# Patient Record
Sex: MALE | Race: WHITE | NOT HISPANIC OR LATINO | Employment: UNEMPLOYED | URBAN - NONMETROPOLITAN AREA
[De-identification: names, ages, dates, MRNs, and addresses within clinical notes are randomized per-mention and may not be internally consistent; named-entity substitution may affect disease eponyms.]

---

## 2018-08-08 ENCOUNTER — OFFICE VISIT (OUTPATIENT)
Dept: PULMONOLOGY | Facility: CLINIC | Age: 83
End: 2018-08-08

## 2018-08-08 VITALS
HEART RATE: 82 BPM | DIASTOLIC BLOOD PRESSURE: 73 MMHG | TEMPERATURE: 97.5 F | OXYGEN SATURATION: 90 % | WEIGHT: 198 LBS | BODY MASS INDEX: 25.41 KG/M2 | HEIGHT: 74 IN | SYSTOLIC BLOOD PRESSURE: 135 MMHG

## 2018-08-08 DIAGNOSIS — Z87.891 HISTORY OF NICOTINE USE: ICD-10-CM

## 2018-08-08 DIAGNOSIS — J44.9 COPD, SEVERE (HCC): ICD-10-CM

## 2018-08-08 DIAGNOSIS — J96.11 CHRONIC HYPOXEMIC RESPIRATORY FAILURE (HCC): Primary | ICD-10-CM

## 2018-08-08 DIAGNOSIS — Z87.891 SMOKING HISTORY: ICD-10-CM

## 2018-08-08 PROCEDURE — 99204 OFFICE O/P NEW MOD 45 MIN: CPT | Performed by: INTERNAL MEDICINE

## 2018-08-08 RX ORDER — BUDESONIDE AND FORMOTEROL FUMARATE DIHYDRATE 160; 4.5 UG/1; UG/1
AEROSOL RESPIRATORY (INHALATION)
Refills: 5 | Status: ON HOLD | COMMUNITY
Start: 2018-07-16 | End: 2020-01-01

## 2018-08-08 RX ORDER — ATORVASTATIN CALCIUM 10 MG/1
10 TABLET, FILM COATED ORAL NIGHTLY
Refills: 5 | COMMUNITY
Start: 2018-07-16

## 2018-08-08 RX ORDER — ALBUTEROL SULFATE 90 UG/1
AEROSOL, METERED RESPIRATORY (INHALATION)
Refills: 5 | Status: ON HOLD | COMMUNITY
Start: 2018-07-17 | End: 2020-01-01

## 2018-08-08 RX ORDER — AMLODIPINE BESYLATE AND BENAZEPRIL HYDROCHLORIDE 5; 20 MG/1; MG/1
1 CAPSULE ORAL DAILY
Refills: 5 | COMMUNITY
Start: 2018-07-16

## 2018-08-08 RX ORDER — CLOPIDOGREL BISULFATE 75 MG/1
75 TABLET ORAL DAILY
Refills: 5 | COMMUNITY
Start: 2018-07-16

## 2018-08-08 RX ORDER — AMITRIPTYLINE HYDROCHLORIDE 25 MG/1
25 TABLET, FILM COATED ORAL NIGHTLY
Refills: 5 | COMMUNITY
Start: 2018-07-16

## 2018-08-08 RX ORDER — DOXAZOSIN 8 MG/1
8 TABLET ORAL NIGHTLY
Refills: 5 | COMMUNITY
Start: 2018-07-16

## 2018-08-08 RX ORDER — ERGOCALCIFEROL 1.25 MG/1
50000 CAPSULE ORAL WEEKLY
Refills: 5 | COMMUNITY
Start: 2018-07-24

## 2018-08-08 RX ORDER — GABAPENTIN 100 MG/1
CAPSULE ORAL
Refills: 5 | Status: ON HOLD | COMMUNITY
Start: 2018-05-21 | End: 2020-01-01

## 2018-08-08 NOTE — PROGRESS NOTES
"Subjective   Aaron Domínguez is a 89 y.o. male who is being seen for COPD    History of Present Illness   This 89-year-old gentleman has been referred to us by his primary care provider for evaluation of ongoing shortness of breath from COPD.  Patient tells me that he has history of COPD and is using Symbicort and albuterol inhalers with good result.  He has noted some progression in his symptoms over the past 1-2 years.  He is ambulatory and walks around the house with some difficulty because of joint pain as well as shortness of breath.  He has some dry cough.  Past Medical History:   Diagnosis Date   • COPD (chronic obstructive pulmonary disease) (CMS/[facility])    • Diabetes mellitus (CMS/[facility])    • Hypertension      Past Surgical History:   Procedure Laterality Date   • CORONARY STENT PLACEMENT       Family History   Problem Relation Age of Onset   • Heart failure Mother    • Diabetes Father    • Heart failure Father       reports that he quit smoking about 20 years ago. His smoking use included Cigarettes. He has a 120.00 pack-year smoking history. His smokeless tobacco use includes Chew. He reports that he does not drink alcohol or use drugs.  No Known Allergies        Patient is up tp date with his flu shot and his pneumonia vaccination.     The following portions of the patient's history were reviewed and updated as appropriate: allergies, current medications, past family history, past medical history, past social history, past surgical history and problem list.    Review of Systems   Constitutional: Positive for fatigue.   HENT: Positive for congestion.    Respiratory: Positive for cough, chest tightness, shortness of breath and wheezing.    Cardiovascular: Positive for leg swelling.   Allergic/Immunologic: Positive for environmental allergies.   All other systems reviewed and are negative.      Objective   /73   Pulse 82   Temp 97.5 °F (36.4 °C)   Ht 188 cm (74\")   Wt 89.8 kg (198 lb)   SpO2 90%   BMI " 25.42 kg/m²   Physical Exam   Constitutional: He is oriented to person, place, and time.   HENT:   Head: Normocephalic and atraumatic.   Nose: Mucosal edema present.   Eyes: Pupils are equal, round, and reactive to light. EOM are normal.   Neck: Neck supple.   Cardiovascular: Normal rate, regular rhythm and normal heart sounds.    Pulmonary/Chest: He has rhonchi.   Vesicular breath sound bilaterally with prolonged expiratory phase   Abdominal: Soft. Bowel sounds are normal.   Musculoskeletal: Normal range of motion. He exhibits no deformity.   Neurological: He is alert and oriented to person, place, and time.   Skin: Skin is warm and dry.   Psychiatric: He has a normal mood and affect. His behavior is normal.   Nursing note and vitals reviewed.        Radiology:  No Images in the past 120 days found..    Lab Results:  Hospital Outpatient Visit on 06/14/2016   Component Date Value Ref Range Status   • BSA 06/14/2016 2.2  m^2 Preliminary   • IVSd 06/14/2016 1.1  cm Preliminary   • LVIDd 06/14/2016 4.9  cm Preliminary   • LVIDs 06/14/2016 2.5  cm Preliminary   • LVPWd 06/14/2016 1.0  cm Preliminary   • BH CV ECHO MAU - LVPWS 06/14/2016 0.7  cm Preliminary   • IVS/LVPW 06/14/2016 1.0   Preliminary   • FS 06/14/2016 49.4  % Preliminary   • EDV(Teich) 06/14/2016 114.4  ml Preliminary   • ESV(Teich) 06/14/2016 22.2  ml Preliminary   • EF(Teich) 06/14/2016 80.6  % Preliminary   • EDV(cubed) 06/14/2016 119.8  ml Preliminary   • ESV(cubed) 06/14/2016 15.5  ml Preliminary   • EF(cubed) 06/14/2016 87.1  % Preliminary   • % LVPW thick 06/14/2016 -30.4  % Preliminary   • LV mass(C)d 06/14/2016 186.0  grams Preliminary   • LV mass(C)dI 06/14/2016 85.4  grams/m^2 Preliminary   • SV(Teich) 06/14/2016 92.3  ml Preliminary   • SI(Teich) 06/14/2016 42.4  ml/m^2 Preliminary   • SV(cubed) 06/14/2016 104.3  ml Preliminary   • SI(cubed) 06/14/2016 47.9  ml/m^2 Preliminary   • Ao root diam 06/14/2016 4.1  cm Preliminary   • Ao root area  06/14/2016 13.1  cm^2 Preliminary   • ACS 06/14/2016 2.3  cm Preliminary   • LA dimension 06/14/2016 4.8  cm Preliminary   • LA/Ao 06/14/2016 1.2   Preliminary   • LVOT diam 06/14/2016 2.1  cm Preliminary   • LVOT area 06/14/2016 3.4  cm^2 Preliminary   • LVOT area(traced) 06/14/2016 3.5  cm^2 Preliminary   • LVLd ap4 06/14/2016 7.0  cm Preliminary   • EDV(MOD-sp4) 06/14/2016 107.0  ml Preliminary   • LVLs ap4 06/14/2016 6.8  cm Preliminary   • ESV(MOD-sp4) 06/14/2016 41.0  ml Preliminary   • EF(MOD-sp4) 06/14/2016 61.7  % Preliminary   • SV(MOD-sp4) 06/14/2016 66.0  ml Preliminary   • SI(MOD-sp4) 06/14/2016 30.3  ml/m^2 Preliminary   • Ao root area (BSA corrected) 06/14/2016 1.9   Preliminary   • EF - Contrast (4Ch) 06/14/2016 61.7  ml/m^2 Preliminary   • LV Knutson Vol (BSA corrected) 06/14/2016 49.1  ml/m^2 Preliminary   • LV Sys Vol (BSA corrected) 06/14/2016 18.8  ml/m^2 Preliminary   • MV E max russell 06/14/2016 65.2  cm/sec Preliminary   • MV A max russell 06/14/2016 104.1  cm/sec Preliminary   • MV E/A 06/14/2016 0.63   Preliminary   • PA acc slope 06/14/2016 845.1  cm/sec^2 Preliminary   • PA acc time 06/14/2016 0.12  sec Preliminary   • TR max russell 06/14/2016 338.0  cm/sec Preliminary   • RVSP(TR) 06/14/2016 55.7  mmHg Preliminary   • RAP systole 06/14/2016 10.0  mmHg Preliminary   • PA pr(Accel) 06/14/2016 23.5  mmHg Preliminary   • BH CV ECHO MAU - BZI_BMI 06/14/2016 25.8  kilograms/m^2 Preliminary   • BH CV ECHO MAU - BSA(HAYCOCK) 06/14/2016 2.2  m^2 Preliminary   • BH CV ECHO MAU - BZI_METRIC_WEIGHT 06/14/2016 91.2  kg Preliminary   • BH CV ECHO MAU - BZI_METRIC_HEIGHT 06/14/2016 188.0  cm Preliminary   • Echo EF Estimated 06/14/2016 65  % Final       Assessment      ICD-10-CM ICD-9-CM   1. Chronic hypoxemic respiratory failure (CMS/HCC) J96.11 518.83     799.02   2. COPD, severe (CMS/HCC) J44.9 496   3. Smoking history Z87.891 V15.82   4. History of nicotine use Z87.891 V15.82                 DISCUSSION:  This gentleman had significant smoking history, underlying obstructive airway disease is possibly the prime cause for his ongoing shortness of breath.  He also requires oxygen 24 hours.  I have asked him to continue albuterol and Symbicort for the time being, we are sending him for a pulmonary function test for objective assessment of his airways function.  I'm also sending him for a low-dose CT chest for cancer screening.  We may modify his treatment regimen after I review the PFT.      Plan    Orders Placed This Encounter   Procedures   • CT Chest Without Contrast   • Pulmonary Function Test     No orders of the defined types were placed in this encounter.                 Wilfrido Guo MD, FCCP, FAASM  Pulmonary, Critical Care, and Sleep Medicine

## 2018-08-24 ENCOUNTER — APPOINTMENT (OUTPATIENT)
Dept: RESPIRATORY THERAPY | Facility: HOSPITAL | Age: 83
End: 2018-08-24
Attending: INTERNAL MEDICINE

## 2018-08-27 ENCOUNTER — HOSPITAL ENCOUNTER (OUTPATIENT)
Dept: RESPIRATORY THERAPY | Facility: HOSPITAL | Age: 83
Discharge: HOME | End: 2018-08-27
Attending: INTERNAL MEDICINE

## 2018-08-27 ENCOUNTER — HOSPITAL ENCOUNTER (OUTPATIENT)
Dept: CT IMAGING | Facility: HOSPITAL | Age: 83
Discharge: HOME | End: 2018-08-27
Attending: INTERNAL MEDICINE | Admitting: INTERNAL MEDICINE

## 2018-08-27 DIAGNOSIS — Z87.891 HISTORY OF NICOTINE USE: ICD-10-CM

## 2018-08-27 DIAGNOSIS — J96.11 CHRONIC HYPOXEMIC RESPIRATORY FAILURE (HCC): ICD-10-CM

## 2018-08-27 DIAGNOSIS — Z87.891 SMOKING HISTORY: ICD-10-CM

## 2018-08-27 DIAGNOSIS — J44.9 COPD, SEVERE (HCC): ICD-10-CM

## 2018-08-27 PROCEDURE — 94727 GAS DIL/WSHOT DETER LNG VOL: CPT | Performed by: INTERNAL MEDICINE

## 2018-08-27 PROCEDURE — 94640 AIRWAY INHALATION TREATMENT: CPT

## 2018-08-27 PROCEDURE — 94727 GAS DIL/WSHOT DETER LNG VOL: CPT

## 2018-08-27 PROCEDURE — 94729 DIFFUSING CAPACITY: CPT

## 2018-08-27 PROCEDURE — 94729 DIFFUSING CAPACITY: CPT | Performed by: INTERNAL MEDICINE

## 2018-08-27 PROCEDURE — 71250 CT THORAX DX C-: CPT | Performed by: RADIOLOGY

## 2018-08-27 PROCEDURE — 71250 CT THORAX DX C-: CPT

## 2018-08-27 PROCEDURE — 94060 EVALUATION OF WHEEZING: CPT | Performed by: INTERNAL MEDICINE

## 2018-08-27 PROCEDURE — 94060 EVALUATION OF WHEEZING: CPT

## 2018-08-27 RX ORDER — ALBUTEROL SULFATE 2.5 MG/3ML
2.5 SOLUTION RESPIRATORY (INHALATION) ONCE
Status: COMPLETED | OUTPATIENT
Start: 2018-08-27 | End: 2018-08-27

## 2018-08-27 RX ADMIN — ALBUTEROL SULFATE 2.5 MG: 2.5 SOLUTION RESPIRATORY (INHALATION) at 15:33

## 2018-10-16 ENCOUNTER — OFFICE VISIT (OUTPATIENT)
Dept: PULMONOLOGY | Facility: CLINIC | Age: 83
End: 2018-10-16

## 2018-10-16 VITALS
HEART RATE: 74 BPM | HEIGHT: 74 IN | DIASTOLIC BLOOD PRESSURE: 53 MMHG | SYSTOLIC BLOOD PRESSURE: 164 MMHG | OXYGEN SATURATION: 91 % | TEMPERATURE: 97.5 F | BODY MASS INDEX: 25.41 KG/M2 | WEIGHT: 198 LBS

## 2018-10-16 DIAGNOSIS — J43.2 CENTRILOBULAR EMPHYSEMA (HCC): Primary | ICD-10-CM

## 2018-10-16 DIAGNOSIS — J96.11 CHRONIC HYPOXEMIC RESPIRATORY FAILURE (HCC): ICD-10-CM

## 2018-10-16 DIAGNOSIS — J45.40 MODERATE PERSISTENT ASTHMA WITHOUT COMPLICATION: ICD-10-CM

## 2018-10-16 PROCEDURE — 99214 OFFICE O/P EST MOD 30 MIN: CPT | Performed by: INTERNAL MEDICINE

## 2018-10-16 RX ORDER — ALBUTEROL SULFATE 90 UG/1
2 AEROSOL, METERED RESPIRATORY (INHALATION) EVERY 4 HOURS PRN
Qty: 1 INHALER | Refills: 11 | Status: SHIPPED | OUTPATIENT
Start: 2018-10-16

## 2018-10-16 RX ORDER — IPRATROPIUM BROMIDE AND ALBUTEROL SULFATE 2.5; .5 MG/3ML; MG/3ML
3 SOLUTION RESPIRATORY (INHALATION) EVERY 4 HOURS PRN
Qty: 120 VIAL | Refills: 11 | Status: SHIPPED | OUTPATIENT
Start: 2018-10-16

## 2018-10-16 NOTE — PROGRESS NOTES
Subjective   Aaron Domínguez is a 89 y.o. male who is being seen for COPD    History of Present Illness   Patient returns for follow-up for ongoing shortness of breath.  Since his last visit he had a pulmonary function test as well as a CT scan of the chest and was to discuss about the results.  Subjectively he is feeling slightly better than before, using his inhalers as well as nebulizers properly.  Past Medical History:   Diagnosis Date   • COPD (chronic obstructive pulmonary disease) (CMS/[facility])    • Diabetes mellitus (CMS/[facility])    • Hypertension      Past Surgical History:   Procedure Laterality Date   • CORONARY STENT PLACEMENT       Family History   Problem Relation Age of Onset   • Heart failure Mother    • Diabetes Father    • Heart failure Father       reports that he quit smoking about 20 years ago. His smoking use included Cigarettes. He has a 120.00 pack-year smoking history. His smokeless tobacco use includes Chew. He reports that he does not drink alcohol or use drugs.  No Known Allergies          Patient is current on pneumonia vaccinations, but needs his current flu vaccination for 2018.    The following portions of the patient's history were reviewed and updated as appropriate: allergies, current medications, past family history, past medical history, past social history, past surgical history and problem list.    Review of Systems   Constitutional: Negative for appetite change, chills, diaphoresis and unexpected weight change.   HENT: Negative for sore throat, trouble swallowing and voice change.    Eyes: Negative for visual disturbance.   Respiratory: Positive for cough, shortness of breath and wheezing. Negative for apnea and choking.    Cardiovascular: Negative for chest pain, palpitations and leg swelling.   Gastrointestinal: Negative for abdominal pain, constipation, diarrhea, nausea and vomiting.   Endocrine: Negative for cold intolerance, heat intolerance, polydipsia, polyphagia and polyuria.  "  Genitourinary: Negative for difficulty urinating and dysuria.   Musculoskeletal: Negative for gait problem.   Skin: Negative for rash and wound.   Neurological: Negative for syncope and light-headedness.   Hematological: Negative for adenopathy.   Psychiatric/Behavioral: Negative for agitation, behavioral problems and confusion.   All other systems reviewed and are negative.      Objective   /53   Pulse 74   Temp 97.5 °F (36.4 °C)   Ht 188 cm (74\")   Wt 89.8 kg (198 lb)   SpO2 91% Comment: 3 liters  BMI 25.42 kg/m²   Physical Exam   Constitutional: He is oriented to person, place, and time.   HENT:   Head: Normocephalic and atraumatic.   Nose: Mucosal edema present.   Eyes: Pupils are equal, round, and reactive to light. EOM are normal.   Neck: Neck supple.   Cardiovascular: Normal rate, regular rhythm and normal heart sounds.    Pulmonary/Chest: He has rhonchi.   Vesicular breath sound bilaterally with prolonged expiratory phase   Abdominal: Soft. Bowel sounds are normal.   Musculoskeletal: Normal range of motion. He exhibits no deformity.   Neurological: He is alert and oriented to person, place, and time.   Skin: Skin is warm and dry.   Psychiatric: He has a normal mood and affect. His behavior is normal.   Nursing note and vitals reviewed.        Radiology:  Ct Chest Without Contrast    Result Date: 8/28/2018  Stable CT of the thorax. There is evidence of previous granulomatous disease with multiple calcified lymph nodes in the mediastinum and hilar areas. There are calcified granulomas in the lungs as well. No noncalcified adenopathy was demonstrated.  486.49 mGy.cm The radiation dose reduction device was utilized for each scan per the ALARA (as low as reasonably achievable) protocol.  This report was finalized on 8/28/2018 8:07 AM by Dr. Bran Montgomery II, MD.        Lab Results:  Hospital Outpatient Visit on 06/14/2016   Component Date Value Ref Range Status   • BSA 06/14/2016 2.2  m^2 " Preliminary   • IVSd 06/14/2016 1.1  cm Preliminary   • LVIDd 06/14/2016 4.9  cm Preliminary   • LVIDs 06/14/2016 2.5  cm Preliminary   • LVPWd 06/14/2016 1.0  cm Preliminary   • BH CV ECHO MAU - LVPWS 06/14/2016 0.7  cm Preliminary   • IVS/LVPW 06/14/2016 1.0   Preliminary   • FS 06/14/2016 49.4  % Preliminary   • EDV(Teich) 06/14/2016 114.4  ml Preliminary   • ESV(Teich) 06/14/2016 22.2  ml Preliminary   • EF(Teich) 06/14/2016 80.6  % Preliminary   • EDV(cubed) 06/14/2016 119.8  ml Preliminary   • ESV(cubed) 06/14/2016 15.5  ml Preliminary   • EF(cubed) 06/14/2016 87.1  % Preliminary   • % LVPW thick 06/14/2016 -30.4  % Preliminary   • LV mass(C)d 06/14/2016 186.0  grams Preliminary   • LV mass(C)dI 06/14/2016 85.4  grams/m^2 Preliminary   • SV(Teich) 06/14/2016 92.3  ml Preliminary   • SI(Teich) 06/14/2016 42.4  ml/m^2 Preliminary   • SV(cubed) 06/14/2016 104.3  ml Preliminary   • SI(cubed) 06/14/2016 47.9  ml/m^2 Preliminary   • Ao root diam 06/14/2016 4.1  cm Preliminary   • Ao root area 06/14/2016 13.1  cm^2 Preliminary   • ACS 06/14/2016 2.3  cm Preliminary   • LA dimension 06/14/2016 4.8  cm Preliminary   • LA/Ao 06/14/2016 1.2   Preliminary   • LVOT diam 06/14/2016 2.1  cm Preliminary   • LVOT area 06/14/2016 3.4  cm^2 Preliminary   • LVOT area(traced) 06/14/2016 3.5  cm^2 Preliminary   • LVLd ap4 06/14/2016 7.0  cm Preliminary   • EDV(MOD-sp4) 06/14/2016 107.0  ml Preliminary   • LVLs ap4 06/14/2016 6.8  cm Preliminary   • ESV(MOD-sp4) 06/14/2016 41.0  ml Preliminary   • EF(MOD-sp4) 06/14/2016 61.7  % Preliminary   • SV(MOD-sp4) 06/14/2016 66.0  ml Preliminary   • SI(MOD-sp4) 06/14/2016 30.3  ml/m^2 Preliminary   • Ao root area (BSA corrected) 06/14/2016 1.9   Preliminary   • EF - Contrast (4Ch) 06/14/2016 61.7  ml/m^2 Preliminary   • LV Knutson Vol (BSA corrected) 06/14/2016 49.1  ml/m^2 Preliminary   • LV Sys Vol (BSA corrected) 06/14/2016 18.8  ml/m^2 Preliminary   • MV E max russell 06/14/2016 65.2  cm/sec  Preliminary   • MV A max russell 06/14/2016 104.1  cm/sec Preliminary   • MV E/A 06/14/2016 0.63   Preliminary   • PA acc slope 06/14/2016 845.1  cm/sec^2 Preliminary   • PA acc time 06/14/2016 0.12  sec Preliminary   • TR max russell 06/14/2016 338.0  cm/sec Preliminary   • RVSP(TR) 06/14/2016 55.7  mmHg Preliminary   • RAP systole 06/14/2016 10.0  mmHg Preliminary   • PA pr(Accel) 06/14/2016 23.5  mmHg Preliminary   • BH CV ECHO MAU - BZI_BMI 06/14/2016 25.8  kilograms/m^2 Preliminary   • BH CV ECHO MAU - BSA(HAYCOCK) 06/14/2016 2.2  m^2 Preliminary   • BH CV ECHO MAU - BZI_METRIC_WEIGHT 06/14/2016 91.2  kg Preliminary   • BH CV ECHO MAU - BZI_METRIC_HEIGHT 06/14/2016 188.0  cm Preliminary   • Echo EF Estimated 06/14/2016 65  % Final       Assessment      ICD-10-CM ICD-9-CM   1. Centrilobular emphysema (CMS/[facility]) J43.2 492.8   2. Moderate persistent asthma without complication J45.40 493.90   3. Chronic hypoxemic respiratory failure (CMS/[facility]) J96.11 518.83     799.02                DISCUSSION:  Reviewed the CT scan of the chest.  This is essentially unchanged as compared to the last CT scan a year ago, showing emphysematous changes along with calcified granulomas.  Reviewed the pulmonary function test.  Severe airflow limitation was noted with significant improvement after bronchodilator consistent with bronchial asthma.  Additionally there is also air trapping as well as reduced diffusion capacity consistent with coexisting emphysema.  This patient currently is on oxygen 24 hours for chronic hypoxemic respiratory failure, albuterol inhaler for when necessary use, DuoNeb nebulizer and Symbicort inhaler.  I'm replacing Symbicort with Trilegy ellipta, he would take 1 puff daily.    We would reevaluate her again in approximately 6-8 weeks from now, in between advised him to follow with his primary care provider    Plan    No orders of the defined types were placed in this encounter.    New Medications Ordered This Visit    Medications   • albuterol (PROAIR HFA) 108 (90 Base) MCG/ACT inhaler     Sig: Inhale 2 puffs Every 4 (Four) Hours As Needed for Shortness of Air.     Dispense:  1 inhaler     Refill:  11   • ipratropium-albuterol (DUO-NEB) 0.5-2.5 mg/3 ml nebulizer     Sig: Take 3 mL by nebulization Every 4 (Four) Hours As Needed for Wheezing.     Dispense:  120 vial     Refill:  11   • Fluticasone-Umeclidin-Vilant 100-62.5-25 MCG/INH aerosol powder      Sig: Inhale 1 each Daily.     Dispense:  1 each     Refill:  5                  Wilfrido Guo MD, FCCP, FAASM  Pulmonary, Critical Care, and Sleep Medicine

## 2020-01-01 ENCOUNTER — HOSPITAL ENCOUNTER (INPATIENT)
Facility: HOSPITAL | Age: 85
LOS: 21 days | End: 2021-01-10
Attending: EMERGENCY MEDICINE | Admitting: INTERNAL MEDICINE

## 2020-01-01 ENCOUNTER — APPOINTMENT (OUTPATIENT)
Dept: CT IMAGING | Facility: HOSPITAL | Age: 85
End: 2020-01-01

## 2020-01-01 ENCOUNTER — APPOINTMENT (OUTPATIENT)
Dept: GENERAL RADIOLOGY | Facility: HOSPITAL | Age: 85
End: 2020-01-01

## 2020-01-01 ENCOUNTER — APPOINTMENT (OUTPATIENT)
Dept: ULTRASOUND IMAGING | Facility: HOSPITAL | Age: 85
End: 2020-01-01

## 2020-01-01 ENCOUNTER — APPOINTMENT (OUTPATIENT)
Dept: CARDIOLOGY | Facility: HOSPITAL | Age: 85
End: 2020-01-01

## 2020-01-01 ENCOUNTER — READMISSION MANAGEMENT (OUTPATIENT)
Dept: CALL CENTER | Facility: HOSPITAL | Age: 85
End: 2020-01-01

## 2020-01-01 ENCOUNTER — HOSPITAL ENCOUNTER (INPATIENT)
Facility: HOSPITAL | Age: 85
LOS: 4 days | Discharge: HOME HEALTH | End: 2020-12-17
Attending: FAMILY MEDICINE | Admitting: HOSPITALIST

## 2020-01-01 VITALS
BODY MASS INDEX: 27.52 KG/M2 | RESPIRATION RATE: 18 BRPM | DIASTOLIC BLOOD PRESSURE: 65 MMHG | TEMPERATURE: 97.8 F | SYSTOLIC BLOOD PRESSURE: 160 MMHG | HEIGHT: 72 IN | HEART RATE: 66 BPM | OXYGEN SATURATION: 94 % | WEIGHT: 203.2 LBS
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
## 2020-01-01 DIAGNOSIS — E16.2 HYPOGLYCEMIA: Primary | ICD-10-CM

## 2020-01-01 DIAGNOSIS — R06.00 DYSPNEA, UNSPECIFIED TYPE: Primary | ICD-10-CM

## 2020-01-01 DIAGNOSIS — B95.62 MRSA BACTEREMIA: ICD-10-CM

## 2020-01-01 DIAGNOSIS — R77.8 ELEVATED TROPONIN: ICD-10-CM

## 2020-01-01 DIAGNOSIS — R73.9 HYPERGLYCEMIA: ICD-10-CM

## 2020-01-01 DIAGNOSIS — R78.81 MRSA BACTEREMIA: ICD-10-CM

## 2020-01-01 DIAGNOSIS — T68.XXXA HYPOTHERMIA, INITIAL ENCOUNTER: ICD-10-CM

## 2020-01-01 LAB
25(OH)D3 SERPL-MCNC: 55.8 NG/ML (ref 30–100)
A-A DO2: 109.1 MMHG (ref 0–300)
A-A DO2: 126.7 MMHG (ref 0–300)
A-A DO2: 4.7 MMHG (ref 0–300)
A-A DO2: 49.8 MMHG (ref 0–300)
ALBUMIN SERPL-MCNC: 3.79 G/DL (ref 3.5–5.2)
ALBUMIN SERPL-MCNC: 4.03 G/DL (ref 3.5–5.2)
ALBUMIN/GLOB SERPL: 1.2 G/DL
ALBUMIN/GLOB SERPL: 1.5 G/DL
ALP SERPL-CCNC: 107 U/L (ref 39–117)
ALP SERPL-CCNC: 108 U/L (ref 39–117)
ALT SERPL W P-5'-P-CCNC: 29 U/L (ref 1–41)
ALT SERPL W P-5'-P-CCNC: 39 U/L (ref 1–41)
ANION GAP SERPL CALCULATED.3IONS-SCNC: 2.8 MMOL/L (ref 5–15)
ANION GAP SERPL CALCULATED.3IONS-SCNC: 4.3 MMOL/L (ref 5–15)
ANION GAP SERPL CALCULATED.3IONS-SCNC: 4.5 MMOL/L (ref 5–15)
ANION GAP SERPL CALCULATED.3IONS-SCNC: 4.6 MMOL/L (ref 5–15)
ANION GAP SERPL CALCULATED.3IONS-SCNC: 5.2 MMOL/L (ref 5–15)
ANION GAP SERPL CALCULATED.3IONS-SCNC: 5.9 MMOL/L (ref 5–15)
ANION GAP SERPL CALCULATED.3IONS-SCNC: 6.6 MMOL/L (ref 5–15)
ANION GAP SERPL CALCULATED.3IONS-SCNC: 6.7 MMOL/L (ref 5–15)
ANION GAP SERPL CALCULATED.3IONS-SCNC: 6.9 MMOL/L (ref 5–15)
ANION GAP SERPL CALCULATED.3IONS-SCNC: 7.1 MMOL/L (ref 5–15)
ANION GAP SERPL CALCULATED.3IONS-SCNC: 7.3 MMOL/L (ref 5–15)
ANION GAP SERPL CALCULATED.3IONS-SCNC: 7.4 MMOL/L (ref 5–15)
ANION GAP SERPL CALCULATED.3IONS-SCNC: 7.8 MMOL/L (ref 5–15)
ANION GAP SERPL CALCULATED.3IONS-SCNC: 8.4 MMOL/L (ref 5–15)
ANION GAP SERPL CALCULATED.3IONS-SCNC: 8.5 MMOL/L (ref 5–15)
ANION GAP SERPL CALCULATED.3IONS-SCNC: 8.7 MMOL/L (ref 5–15)
ANION GAP SERPL CALCULATED.3IONS-SCNC: 8.8 MMOL/L (ref 5–15)
ARTERIAL PATENCY WRIST A: ABNORMAL
ARTERIAL PATENCY WRIST A: ABNORMAL
ARTERIAL PATENCY WRIST A: POSITIVE
ARTERIAL PATENCY WRIST A: POSITIVE
AST SERPL-CCNC: 38 U/L (ref 1–40)
AST SERPL-CCNC: 45 U/L (ref 1–40)
ATMOSPHERIC PRESS: 728 MMHG
ATMOSPHERIC PRESS: 729 MMHG
ATMOSPHERIC PRESS: 730 MMHG
ATMOSPHERIC PRESS: 730 MMHG
B PERT DNA SPEC QL NAA+PROBE: NOT DETECTED
BACTERIA BLD CULT: ABNORMAL
BACTERIA SPEC AEROBE CULT: ABNORMAL
BACTERIA SPEC AEROBE CULT: NORMAL
BACTERIA UR QL AUTO: NORMAL /HPF
BASE EXCESS BLDA CALC-SCNC: 14.7 MMOL/L (ref 0–2)
BASE EXCESS BLDA CALC-SCNC: 14.9 MMOL/L (ref 0–2)
BASE EXCESS BLDA CALC-SCNC: 6.3 MMOL/L (ref 0–2)
BASE EXCESS BLDA CALC-SCNC: 8.2 MMOL/L (ref 0–2)
BASOPHILS # BLD AUTO: 0 10*3/MM3 (ref 0–0.2)
BASOPHILS # BLD AUTO: 0 10*3/MM3 (ref 0–0.2)
BASOPHILS # BLD AUTO: 0.01 10*3/MM3 (ref 0–0.2)
BASOPHILS # BLD AUTO: 0.01 10*3/MM3 (ref 0–0.2)
BASOPHILS # BLD AUTO: 0.02 10*3/MM3 (ref 0–0.2)
BASOPHILS # BLD AUTO: 0.03 10*3/MM3 (ref 0–0.2)
BASOPHILS # BLD AUTO: 0.04 10*3/MM3 (ref 0–0.2)
BASOPHILS NFR BLD AUTO: 0 % (ref 0–1.5)
BASOPHILS NFR BLD AUTO: 0 % (ref 0–1.5)
BASOPHILS NFR BLD AUTO: 0.1 % (ref 0–1.5)
BASOPHILS NFR BLD AUTO: 0.2 % (ref 0–1.5)
BASOPHILS NFR BLD AUTO: 0.3 % (ref 0–1.5)
BASOPHILS NFR BLD AUTO: 0.3 % (ref 0–1.5)
BASOPHILS NFR BLD AUTO: 0.4 % (ref 0–1.5)
BASOPHILS NFR BLD AUTO: 0.5 % (ref 0–1.5)
BDY SITE: ABNORMAL
BH CV ECHO MEAS - % IVS THICK: -7.3 %
BH CV ECHO MEAS - % LVPW THICK: 0.68 %
BH CV ECHO MEAS - ACS: 2.6 CM
BH CV ECHO MEAS - AO MAX PG: 5.4 MMHG
BH CV ECHO MEAS - AO MEAN PG: 2 MMHG
BH CV ECHO MEAS - AO ROOT AREA (BSA CORRECTED): 1.9
BH CV ECHO MEAS - AO ROOT AREA: 11.9 CM^2
BH CV ECHO MEAS - AO ROOT DIAM: 3.9 CM
BH CV ECHO MEAS - AO V2 MAX: 116 CM/SEC
BH CV ECHO MEAS - AO V2 MEAN: 65.1 CM/SEC
BH CV ECHO MEAS - AO V2 VTI: 18.8 CM
BH CV ECHO MEAS - BSA(HAYCOCK): 2.1 M^2
BH CV ECHO MEAS - BSA: 2.1 M^2
BH CV ECHO MEAS - BZI_BMI: 26.2 KILOGRAMS/M^2
BH CV ECHO MEAS - BZI_METRIC_HEIGHT: 182.9 CM
BH CV ECHO MEAS - BZI_METRIC_WEIGHT: 87.5 KG
BH CV ECHO MEAS - EDV(CUBED): 164.1 ML
BH CV ECHO MEAS - EDV(TEICH): 145.9 ML
BH CV ECHO MEAS - EF(CUBED): 68.4 %
BH CV ECHO MEAS - EF(TEICH): 59.4 %
BH CV ECHO MEAS - ESV(CUBED): 51.9 ML
BH CV ECHO MEAS - ESV(TEICH): 59.3 ML
BH CV ECHO MEAS - FS: 31.9 %
BH CV ECHO MEAS - IVS/LVPW: 1.1
BH CV ECHO MEAS - IVSD: 1.6 CM
BH CV ECHO MEAS - IVSS: 1.5 CM
BH CV ECHO MEAS - LA DIMENSION: 3.9 CM
BH CV ECHO MEAS - LA/AO: 0.99
BH CV ECHO MEAS - LV MASS(C)D: 378.6 GRAMS
BH CV ECHO MEAS - LV MASS(C)DI: 180.4 GRAMS/M^2
BH CV ECHO MEAS - LV MASS(C)S: 204.3 GRAMS
BH CV ECHO MEAS - LV MASS(C)SI: 97.4 GRAMS/M^2
BH CV ECHO MEAS - LVIDD: 5.5 CM
BH CV ECHO MEAS - LVIDS: 3.7 CM
BH CV ECHO MEAS - LVOT AREA (M): 4.2 CM^2
BH CV ECHO MEAS - LVOT AREA: 4.2 CM^2
BH CV ECHO MEAS - LVOT DIAM: 2.3 CM
BH CV ECHO MEAS - LVPWD: 1.5 CM
BH CV ECHO MEAS - LVPWS: 1.5 CM
BH CV ECHO MEAS - MV A MAX VEL: 99.2 CM/SEC
BH CV ECHO MEAS - MV E MAX VEL: 44.7 CM/SEC
BH CV ECHO MEAS - MV E/A: 0.45
BH CV ECHO MEAS - PA ACC TIME: 0.09 SEC
BH CV ECHO MEAS - PA PR(ACCEL): 39.4 MMHG
BH CV ECHO MEAS - RAP SYSTOLE: 10 MMHG
BH CV ECHO MEAS - RVSP: 50.7 MMHG
BH CV ECHO MEAS - SI(AO): 107 ML/M^2
BH CV ECHO MEAS - SI(CUBED): 53.5 ML/M^2
BH CV ECHO MEAS - SI(TEICH): 41.3 ML/M^2
BH CV ECHO MEAS - SV(AO): 224.6 ML
BH CV ECHO MEAS - SV(CUBED): 112.2 ML
BH CV ECHO MEAS - SV(TEICH): 86.6 ML
BH CV ECHO MEAS - TR MAX VEL: 319 CM/SEC
BILIRUB SERPL-MCNC: 0.4 MG/DL (ref 0–1.2)
BILIRUB SERPL-MCNC: 0.4 MG/DL (ref 0–1.2)
BILIRUB UR QL STRIP: NEGATIVE
BODY TEMPERATURE: 0 C
BUN SERPL-MCNC: 17 MG/DL (ref 8–23)
BUN SERPL-MCNC: 17 MG/DL (ref 8–23)
BUN SERPL-MCNC: 20 MG/DL (ref 8–23)
BUN SERPL-MCNC: 21 MG/DL (ref 8–23)
BUN SERPL-MCNC: 21 MG/DL (ref 8–23)
BUN SERPL-MCNC: 22 MG/DL (ref 8–23)
BUN SERPL-MCNC: 23 MG/DL (ref 8–23)
BUN SERPL-MCNC: 23 MG/DL (ref 8–23)
BUN SERPL-MCNC: 24 MG/DL (ref 8–23)
BUN SERPL-MCNC: 24 MG/DL (ref 8–23)
BUN SERPL-MCNC: 25 MG/DL (ref 8–23)
BUN SERPL-MCNC: 26 MG/DL (ref 8–23)
BUN SERPL-MCNC: 27 MG/DL (ref 8–23)
BUN SERPL-MCNC: 28 MG/DL (ref 8–23)
BUN SERPL-MCNC: 30 MG/DL (ref 8–23)
BUN/CREAT SERPL: 19.5 (ref 7–25)
BUN/CREAT SERPL: 20.5 (ref 7–25)
BUN/CREAT SERPL: 21.4 (ref 7–25)
BUN/CREAT SERPL: 21.6 (ref 7–25)
BUN/CREAT SERPL: 22.1 (ref 7–25)
BUN/CREAT SERPL: 23 (ref 7–25)
BUN/CREAT SERPL: 23.6 (ref 7–25)
BUN/CREAT SERPL: 23.7 (ref 7–25)
BUN/CREAT SERPL: 24 (ref 7–25)
BUN/CREAT SERPL: 25.3 (ref 7–25)
BUN/CREAT SERPL: 25.5 (ref 7–25)
BUN/CREAT SERPL: 25.9 (ref 7–25)
BUN/CREAT SERPL: 26.3 (ref 7–25)
BUN/CREAT SERPL: 29.9 (ref 7–25)
BUN/CREAT SERPL: 30.3 (ref 7–25)
BUN/CREAT SERPL: 33.3 (ref 7–25)
BUN/CREAT SERPL: 38.4 (ref 7–25)
C PEPTIDE SERPL-MCNC: 0.3 NG/ML (ref 1.1–4.4)
C PNEUM DNA NPH QL NAA+NON-PROBE: NOT DETECTED
CALCIUM SPEC-SCNC: 10.3 MG/DL (ref 8.2–9.6)
CALCIUM SPEC-SCNC: 8.5 MG/DL (ref 8.2–9.6)
CALCIUM SPEC-SCNC: 8.6 MG/DL (ref 8.2–9.6)
CALCIUM SPEC-SCNC: 8.7 MG/DL (ref 8.2–9.6)
CALCIUM SPEC-SCNC: 8.9 MG/DL (ref 8.2–9.6)
CALCIUM SPEC-SCNC: 9 MG/DL (ref 8.2–9.6)
CALCIUM SPEC-SCNC: 9 MG/DL (ref 8.2–9.6)
CALCIUM SPEC-SCNC: 9.1 MG/DL (ref 8.2–9.6)
CALCIUM SPEC-SCNC: 9.2 MG/DL (ref 8.2–9.6)
CALCIUM SPEC-SCNC: 9.2 MG/DL (ref 8.2–9.6)
CALCIUM SPEC-SCNC: 9.3 MG/DL (ref 8.2–9.6)
CALCIUM SPEC-SCNC: 9.3 MG/DL (ref 8.2–9.6)
CALCIUM SPEC-SCNC: 9.6 MG/DL (ref 8.2–9.6)
CALCIUM SPEC-SCNC: 9.7 MG/DL (ref 8.2–9.6)
CHLORIDE SERPL-SCNC: 100 MMOL/L (ref 98–107)
CHLORIDE SERPL-SCNC: 101 MMOL/L (ref 98–107)
CHLORIDE SERPL-SCNC: 101 MMOL/L (ref 98–107)
CHLORIDE SERPL-SCNC: 102 MMOL/L (ref 98–107)
CHLORIDE SERPL-SCNC: 90 MMOL/L (ref 98–107)
CHLORIDE SERPL-SCNC: 92 MMOL/L (ref 98–107)
CHLORIDE SERPL-SCNC: 93 MMOL/L (ref 98–107)
CHLORIDE SERPL-SCNC: 96 MMOL/L (ref 98–107)
CHLORIDE SERPL-SCNC: 97 MMOL/L (ref 98–107)
CHLORIDE SERPL-SCNC: 98 MMOL/L (ref 98–107)
CK SERPL-CCNC: 247 U/L (ref 20–200)
CK SERPL-CCNC: 412 U/L (ref 20–200)
CK SERPL-CCNC: 414 U/L (ref 20–200)
CK SERPL-CCNC: 494 U/L (ref 20–200)
CK SERPL-CCNC: 585 U/L (ref 20–200)
CK SERPL-CCNC: 586 U/L (ref 20–200)
CLARITY UR: CLEAR
CO2 BLDA-SCNC: 33.2 MMOL/L (ref 22–33)
CO2 BLDA-SCNC: 37.7 MMOL/L (ref 22–33)
CO2 BLDA-SCNC: 44.1 MMOL/L (ref 22–33)
CO2 BLDA-SCNC: 44.4 MMOL/L (ref 22–33)
CO2 SERPL-SCNC: 25.2 MMOL/L (ref 22–29)
CO2 SERPL-SCNC: 26.3 MMOL/L (ref 22–29)
CO2 SERPL-SCNC: 28.4 MMOL/L (ref 22–29)
CO2 SERPL-SCNC: 29.7 MMOL/L (ref 22–29)
CO2 SERPL-SCNC: 30.3 MMOL/L (ref 22–29)
CO2 SERPL-SCNC: 30.4 MMOL/L (ref 22–29)
CO2 SERPL-SCNC: 30.5 MMOL/L (ref 22–29)
CO2 SERPL-SCNC: 31.6 MMOL/L (ref 22–29)
CO2 SERPL-SCNC: 31.7 MMOL/L (ref 22–29)
CO2 SERPL-SCNC: 31.9 MMOL/L (ref 22–29)
CO2 SERPL-SCNC: 32.2 MMOL/L (ref 22–29)
CO2 SERPL-SCNC: 35.1 MMOL/L (ref 22–29)
CO2 SERPL-SCNC: 35.2 MMOL/L (ref 22–29)
CO2 SERPL-SCNC: 35.6 MMOL/L (ref 22–29)
CO2 SERPL-SCNC: 36.5 MMOL/L (ref 22–29)
CO2 SERPL-SCNC: 39.8 MMOL/L (ref 22–29)
CO2 SERPL-SCNC: 40.1 MMOL/L (ref 22–29)
COHGB MFR BLD: 1.2 % (ref 0–5)
COHGB MFR BLD: 1.2 % (ref 0–5)
COHGB MFR BLD: 1.7 % (ref 0–5)
COHGB MFR BLD: 1.7 % (ref 0–5)
COLOR UR: YELLOW
CREAT SERPL-MCNC: 0.73 MG/DL (ref 0.76–1.27)
CREAT SERPL-MCNC: 0.77 MG/DL (ref 0.76–1.27)
CREAT SERPL-MCNC: 0.81 MG/DL (ref 0.76–1.27)
CREAT SERPL-MCNC: 0.83 MG/DL (ref 0.76–1.27)
CREAT SERPL-MCNC: 0.87 MG/DL (ref 0.76–1.27)
CREAT SERPL-MCNC: 0.87 MG/DL (ref 0.76–1.27)
CREAT SERPL-MCNC: 0.89 MG/DL (ref 0.76–1.27)
CREAT SERPL-MCNC: 0.9 MG/DL (ref 0.76–1.27)
CREAT SERPL-MCNC: 0.93 MG/DL (ref 0.76–1.27)
CREAT SERPL-MCNC: 0.94 MG/DL (ref 0.76–1.27)
CREAT SERPL-MCNC: 0.95 MG/DL (ref 0.76–1.27)
CREAT SERPL-MCNC: 0.95 MG/DL (ref 0.76–1.27)
CREAT SERPL-MCNC: 0.97 MG/DL (ref 0.76–1.27)
CREAT SERPL-MCNC: 1.04 MG/DL (ref 0.76–1.27)
CREAT SERPL-MCNC: 1.04 MG/DL (ref 0.76–1.27)
CREAT SERPL-MCNC: 1.1 MG/DL (ref 0.76–1.27)
CREAT SERPL-MCNC: 1.17 MG/DL (ref 0.76–1.27)
CRP SERPL-MCNC: 0.52 MG/DL (ref 0–0.5)
CRP SERPL-MCNC: 0.69 MG/DL (ref 0–0.5)
CRP SERPL-MCNC: 0.98 MG/DL (ref 0–0.5)
CRP SERPL-MCNC: 1.28 MG/DL (ref 0–0.5)
CRP SERPL-MCNC: 1.59 MG/DL (ref 0–0.5)
CRP SERPL-MCNC: 1.83 MG/DL (ref 0–0.5)
CRP SERPL-MCNC: 1.92 MG/DL (ref 0–0.5)
CRP SERPL-MCNC: 2.23 MG/DL (ref 0–0.5)
CRP SERPL-MCNC: 2.43 MG/DL (ref 0–0.5)
CRP SERPL-MCNC: 2.75 MG/DL (ref 0–0.5)
CRP SERPL-MCNC: 3.01 MG/DL (ref 0–0.5)
CRP SERPL-MCNC: 3.2 MG/DL (ref 0–0.5)
CRP SERPL-MCNC: 3.31 MG/DL (ref 0–0.5)
D-LACTATE SERPL-SCNC: 1.3 MMOL/L (ref 0.5–2)
D-LACTATE SERPL-SCNC: 1.6 MMOL/L (ref 0.5–2)
DEPRECATED RDW RBC AUTO: 46.1 FL (ref 37–54)
DEPRECATED RDW RBC AUTO: 46.8 FL (ref 37–54)
DEPRECATED RDW RBC AUTO: 47.1 FL (ref 37–54)
DEPRECATED RDW RBC AUTO: 48.6 FL (ref 37–54)
DEPRECATED RDW RBC AUTO: 49 FL (ref 37–54)
DEPRECATED RDW RBC AUTO: 49.6 FL (ref 37–54)
DEPRECATED RDW RBC AUTO: 49.6 FL (ref 37–54)
DEPRECATED RDW RBC AUTO: 49.7 FL (ref 37–54)
DEPRECATED RDW RBC AUTO: 49.8 FL (ref 37–54)
DEPRECATED RDW RBC AUTO: 50.1 FL (ref 37–54)
DEPRECATED RDW RBC AUTO: 50.1 FL (ref 37–54)
DEPRECATED RDW RBC AUTO: 50.3 FL (ref 37–54)
DEPRECATED RDW RBC AUTO: 50.5 FL (ref 37–54)
DEPRECATED RDW RBC AUTO: 50.5 FL (ref 37–54)
DEPRECATED RDW RBC AUTO: 50.9 FL (ref 37–54)
DEPRECATED RDW RBC AUTO: 51.4 FL (ref 37–54)
EOSINOPHIL # BLD AUTO: 0 10*3/MM3 (ref 0–0.4)
EOSINOPHIL # BLD AUTO: 0.01 10*3/MM3 (ref 0–0.4)
EOSINOPHIL # BLD AUTO: 0.01 10*3/MM3 (ref 0–0.4)
EOSINOPHIL # BLD AUTO: 0.02 10*3/MM3 (ref 0–0.4)
EOSINOPHIL # BLD AUTO: 0.02 10*3/MM3 (ref 0–0.4)
EOSINOPHIL # BLD AUTO: 0.03 10*3/MM3 (ref 0–0.4)
EOSINOPHIL # BLD AUTO: 0.04 10*3/MM3 (ref 0–0.4)
EOSINOPHIL # BLD AUTO: 0.07 10*3/MM3 (ref 0–0.4)
EOSINOPHIL # BLD AUTO: 0.09 10*3/MM3 (ref 0–0.4)
EOSINOPHIL # BLD AUTO: 0.11 10*3/MM3 (ref 0–0.4)
EOSINOPHIL # BLD AUTO: 0.12 10*3/MM3 (ref 0–0.4)
EOSINOPHIL NFR BLD AUTO: 0 % (ref 0.3–6.2)
EOSINOPHIL NFR BLD AUTO: 0.1 % (ref 0.3–6.2)
EOSINOPHIL NFR BLD AUTO: 0.3 % (ref 0.3–6.2)
EOSINOPHIL NFR BLD AUTO: 0.4 % (ref 0.3–6.2)
EOSINOPHIL NFR BLD AUTO: 0.7 % (ref 0.3–6.2)
EOSINOPHIL NFR BLD AUTO: 1.1 % (ref 0.3–6.2)
EOSINOPHIL NFR BLD AUTO: 1.1 % (ref 0.3–6.2)
EOSINOPHIL NFR BLD AUTO: 1.8 % (ref 0.3–6.2)
EOSINOPHIL NFR BLD AUTO: 1.8 % (ref 0.3–6.2)
ERYTHROCYTE [DISTWIDTH] IN BLOOD BY AUTOMATED COUNT: 13.5 % (ref 12.3–15.4)
ERYTHROCYTE [DISTWIDTH] IN BLOOD BY AUTOMATED COUNT: 13.6 % (ref 12.3–15.4)
ERYTHROCYTE [DISTWIDTH] IN BLOOD BY AUTOMATED COUNT: 13.7 % (ref 12.3–15.4)
ERYTHROCYTE [DISTWIDTH] IN BLOOD BY AUTOMATED COUNT: 13.9 % (ref 12.3–15.4)
ERYTHROCYTE [DISTWIDTH] IN BLOOD BY AUTOMATED COUNT: 14 % (ref 12.3–15.4)
ERYTHROCYTE [DISTWIDTH] IN BLOOD BY AUTOMATED COUNT: 14 % (ref 12.3–15.4)
ERYTHROCYTE [DISTWIDTH] IN BLOOD BY AUTOMATED COUNT: 14.1 % (ref 12.3–15.4)
ERYTHROCYTE [DISTWIDTH] IN BLOOD BY AUTOMATED COUNT: 14.2 % (ref 12.3–15.4)
ERYTHROCYTE [DISTWIDTH] IN BLOOD BY AUTOMATED COUNT: 14.3 % (ref 12.3–15.4)
ERYTHROCYTE [DISTWIDTH] IN BLOOD BY AUTOMATED COUNT: 14.3 % (ref 12.3–15.4)
ERYTHROCYTE [DISTWIDTH] IN BLOOD BY AUTOMATED COUNT: 14.4 % (ref 12.3–15.4)
ERYTHROCYTE [DISTWIDTH] IN BLOOD BY AUTOMATED COUNT: 14.5 % (ref 12.3–15.4)
ETHANOL BLD-MCNC: <10 MG/DL (ref 0–10)
ETHANOL UR QL: <0.01 %
FLUAV H1 2009 PAND RNA NPH QL NAA+PROBE: NOT DETECTED
FLUAV H1 HA GENE NPH QL NAA+PROBE: NOT DETECTED
FLUAV H3 RNA NPH QL NAA+PROBE: NOT DETECTED
FLUAV RNA RESP QL NAA+PROBE: NOT DETECTED
FLUAV SUBTYP SPEC NAA+PROBE: NOT DETECTED
FLUBV RNA ISLT QL NAA+PROBE: NOT DETECTED
FLUBV RNA RESP QL NAA+PROBE: NOT DETECTED
FOLATE SERPL-MCNC: 17.5 NG/ML (ref 4.78–24.2)
GAS FLOW AIRWAY: 2 LPM
GAS FLOW AIRWAY: 5 LPM
GFR SERPL CREATININE-BSD FRML MDRD: 101 ML/MIN/1.73
GFR SERPL CREATININE-BSD FRML MDRD: 58 ML/MIN/1.73
GFR SERPL CREATININE-BSD FRML MDRD: 63 ML/MIN/1.73
GFR SERPL CREATININE-BSD FRML MDRD: 67 ML/MIN/1.73
GFR SERPL CREATININE-BSD FRML MDRD: 67 ML/MIN/1.73
GFR SERPL CREATININE-BSD FRML MDRD: 73 ML/MIN/1.73
GFR SERPL CREATININE-BSD FRML MDRD: 74 ML/MIN/1.73
GFR SERPL CREATININE-BSD FRML MDRD: 74 ML/MIN/1.73
GFR SERPL CREATININE-BSD FRML MDRD: 75 ML/MIN/1.73
GFR SERPL CREATININE-BSD FRML MDRD: 76 ML/MIN/1.73
GFR SERPL CREATININE-BSD FRML MDRD: 79 ML/MIN/1.73
GFR SERPL CREATININE-BSD FRML MDRD: 80 ML/MIN/1.73
GFR SERPL CREATININE-BSD FRML MDRD: 82 ML/MIN/1.73
GFR SERPL CREATININE-BSD FRML MDRD: 82 ML/MIN/1.73
GFR SERPL CREATININE-BSD FRML MDRD: 87 ML/MIN/1.73
GFR SERPL CREATININE-BSD FRML MDRD: 89 ML/MIN/1.73
GFR SERPL CREATININE-BSD FRML MDRD: 95 ML/MIN/1.73
GLOBULIN UR ELPH-MCNC: 2.6 GM/DL
GLOBULIN UR ELPH-MCNC: 3.3 GM/DL
GLUCOSE BLDC GLUCOMTR-MCNC: 105 MG/DL (ref 70–130)
GLUCOSE BLDC GLUCOMTR-MCNC: 106 MG/DL (ref 70–130)
GLUCOSE BLDC GLUCOMTR-MCNC: 111 MG/DL (ref 70–130)
GLUCOSE BLDC GLUCOMTR-MCNC: 116 MG/DL (ref 70–130)
GLUCOSE BLDC GLUCOMTR-MCNC: 125 MG/DL (ref 70–130)
GLUCOSE BLDC GLUCOMTR-MCNC: 128 MG/DL (ref 70–130)
GLUCOSE BLDC GLUCOMTR-MCNC: 130 MG/DL (ref 70–130)
GLUCOSE BLDC GLUCOMTR-MCNC: 136 MG/DL (ref 70–130)
GLUCOSE BLDC GLUCOMTR-MCNC: 139 MG/DL (ref 70–130)
GLUCOSE BLDC GLUCOMTR-MCNC: 140 MG/DL (ref 70–130)
GLUCOSE BLDC GLUCOMTR-MCNC: 140 MG/DL (ref 70–130)
GLUCOSE BLDC GLUCOMTR-MCNC: 144 MG/DL (ref 70–130)
GLUCOSE BLDC GLUCOMTR-MCNC: 145 MG/DL (ref 70–130)
GLUCOSE BLDC GLUCOMTR-MCNC: 154 MG/DL (ref 70–130)
GLUCOSE BLDC GLUCOMTR-MCNC: 156 MG/DL (ref 70–130)
GLUCOSE BLDC GLUCOMTR-MCNC: 160 MG/DL (ref 70–130)
GLUCOSE BLDC GLUCOMTR-MCNC: 161 MG/DL (ref 70–130)
GLUCOSE BLDC GLUCOMTR-MCNC: 165 MG/DL (ref 70–130)
GLUCOSE BLDC GLUCOMTR-MCNC: 168 MG/DL (ref 70–130)
GLUCOSE BLDC GLUCOMTR-MCNC: 168 MG/DL (ref 70–130)
GLUCOSE BLDC GLUCOMTR-MCNC: 170 MG/DL (ref 70–130)
GLUCOSE BLDC GLUCOMTR-MCNC: 170 MG/DL (ref 70–130)
GLUCOSE BLDC GLUCOMTR-MCNC: 177 MG/DL (ref 70–130)
GLUCOSE BLDC GLUCOMTR-MCNC: 182 MG/DL (ref 70–130)
GLUCOSE BLDC GLUCOMTR-MCNC: 186 MG/DL (ref 70–130)
GLUCOSE BLDC GLUCOMTR-MCNC: 187 MG/DL (ref 70–130)
GLUCOSE BLDC GLUCOMTR-MCNC: 189 MG/DL (ref 70–130)
GLUCOSE BLDC GLUCOMTR-MCNC: 196 MG/DL (ref 70–130)
GLUCOSE BLDC GLUCOMTR-MCNC: 197 MG/DL (ref 70–130)
GLUCOSE BLDC GLUCOMTR-MCNC: 206 MG/DL (ref 70–130)
GLUCOSE BLDC GLUCOMTR-MCNC: 208 MG/DL (ref 70–130)
GLUCOSE BLDC GLUCOMTR-MCNC: 216 MG/DL (ref 70–130)
GLUCOSE BLDC GLUCOMTR-MCNC: 220 MG/DL (ref 70–130)
GLUCOSE BLDC GLUCOMTR-MCNC: 222 MG/DL (ref 70–130)
GLUCOSE BLDC GLUCOMTR-MCNC: 225 MG/DL (ref 70–130)
GLUCOSE BLDC GLUCOMTR-MCNC: 225 MG/DL (ref 70–130)
GLUCOSE BLDC GLUCOMTR-MCNC: 231 MG/DL (ref 70–130)
GLUCOSE BLDC GLUCOMTR-MCNC: 231 MG/DL (ref 70–130)
GLUCOSE BLDC GLUCOMTR-MCNC: 232 MG/DL (ref 70–130)
GLUCOSE BLDC GLUCOMTR-MCNC: 233 MG/DL (ref 70–130)
GLUCOSE BLDC GLUCOMTR-MCNC: 239 MG/DL (ref 70–130)
GLUCOSE BLDC GLUCOMTR-MCNC: 241 MG/DL (ref 70–130)
GLUCOSE BLDC GLUCOMTR-MCNC: 242 MG/DL (ref 70–130)
GLUCOSE BLDC GLUCOMTR-MCNC: 243 MG/DL (ref 70–130)
GLUCOSE BLDC GLUCOMTR-MCNC: 244 MG/DL (ref 70–130)
GLUCOSE BLDC GLUCOMTR-MCNC: 249 MG/DL (ref 70–130)
GLUCOSE BLDC GLUCOMTR-MCNC: 251 MG/DL (ref 70–130)
GLUCOSE BLDC GLUCOMTR-MCNC: 253 MG/DL (ref 70–130)
GLUCOSE BLDC GLUCOMTR-MCNC: 259 MG/DL (ref 70–130)
GLUCOSE BLDC GLUCOMTR-MCNC: 261 MG/DL (ref 70–130)
GLUCOSE BLDC GLUCOMTR-MCNC: 270 MG/DL (ref 70–130)
GLUCOSE BLDC GLUCOMTR-MCNC: 278 MG/DL (ref 70–130)
GLUCOSE BLDC GLUCOMTR-MCNC: 289 MG/DL (ref 70–130)
GLUCOSE BLDC GLUCOMTR-MCNC: 290 MG/DL (ref 70–130)
GLUCOSE BLDC GLUCOMTR-MCNC: 301 MG/DL (ref 70–130)
GLUCOSE BLDC GLUCOMTR-MCNC: 304 MG/DL (ref 70–130)
GLUCOSE BLDC GLUCOMTR-MCNC: 304 MG/DL (ref 70–130)
GLUCOSE BLDC GLUCOMTR-MCNC: 306 MG/DL (ref 70–130)
GLUCOSE BLDC GLUCOMTR-MCNC: 307 MG/DL (ref 70–130)
GLUCOSE BLDC GLUCOMTR-MCNC: 324 MG/DL (ref 70–130)
GLUCOSE BLDC GLUCOMTR-MCNC: 328 MG/DL (ref 70–130)
GLUCOSE BLDC GLUCOMTR-MCNC: 328 MG/DL (ref 70–130)
GLUCOSE BLDC GLUCOMTR-MCNC: 333 MG/DL (ref 70–130)
GLUCOSE BLDC GLUCOMTR-MCNC: 339 MG/DL (ref 70–130)
GLUCOSE BLDC GLUCOMTR-MCNC: 341 MG/DL (ref 70–130)
GLUCOSE BLDC GLUCOMTR-MCNC: 344 MG/DL (ref 70–130)
GLUCOSE BLDC GLUCOMTR-MCNC: 345 MG/DL (ref 70–130)
GLUCOSE BLDC GLUCOMTR-MCNC: 353 MG/DL (ref 70–130)
GLUCOSE BLDC GLUCOMTR-MCNC: 363 MG/DL (ref 70–130)
GLUCOSE BLDC GLUCOMTR-MCNC: 387 MG/DL (ref 70–130)
GLUCOSE BLDC GLUCOMTR-MCNC: 56 MG/DL (ref 70–130)
GLUCOSE BLDC GLUCOMTR-MCNC: 58 MG/DL (ref 70–130)
GLUCOSE BLDC GLUCOMTR-MCNC: 60 MG/DL (ref 70–130)
GLUCOSE BLDC GLUCOMTR-MCNC: 75 MG/DL (ref 70–130)
GLUCOSE BLDC GLUCOMTR-MCNC: 76 MG/DL (ref 70–130)
GLUCOSE BLDC GLUCOMTR-MCNC: 78 MG/DL (ref 70–130)
GLUCOSE BLDC GLUCOMTR-MCNC: 80 MG/DL (ref 70–130)
GLUCOSE BLDC GLUCOMTR-MCNC: 88 MG/DL (ref 70–130)
GLUCOSE BLDC GLUCOMTR-MCNC: 94 MG/DL (ref 70–130)
GLUCOSE SERPL-MCNC: 119 MG/DL (ref 65–99)
GLUCOSE SERPL-MCNC: 138 MG/DL (ref 65–99)
GLUCOSE SERPL-MCNC: 155 MG/DL (ref 65–99)
GLUCOSE SERPL-MCNC: 164 MG/DL (ref 65–99)
GLUCOSE SERPL-MCNC: 173 MG/DL (ref 65–99)
GLUCOSE SERPL-MCNC: 179 MG/DL (ref 65–99)
GLUCOSE SERPL-MCNC: 202 MG/DL (ref 65–99)
GLUCOSE SERPL-MCNC: 205 MG/DL (ref 65–99)
GLUCOSE SERPL-MCNC: 217 MG/DL (ref 65–99)
GLUCOSE SERPL-MCNC: 260 MG/DL (ref 65–99)
GLUCOSE SERPL-MCNC: 285 MG/DL (ref 65–99)
GLUCOSE SERPL-MCNC: 298 MG/DL (ref 65–99)
GLUCOSE SERPL-MCNC: 312 MG/DL (ref 65–99)
GLUCOSE SERPL-MCNC: 343 MG/DL (ref 65–99)
GLUCOSE SERPL-MCNC: 350 MG/DL (ref 65–99)
GLUCOSE SERPL-MCNC: 48 MG/DL (ref 65–99)
GLUCOSE SERPL-MCNC: 81 MG/DL (ref 65–99)
GLUCOSE UR STRIP-MCNC: ABNORMAL MG/DL
GRAM STN SPEC: ABNORMAL
HADV DNA SPEC NAA+PROBE: NOT DETECTED
HBA1C MFR BLD: 9.1 % (ref 4.8–5.6)
HCO3 BLDA-SCNC: 31.7 MMOL/L (ref 20–26)
HCO3 BLDA-SCNC: 35.7 MMOL/L (ref 20–26)
HCO3 BLDA-SCNC: 42 MMOL/L (ref 20–26)
HCO3 BLDA-SCNC: 42.3 MMOL/L (ref 20–26)
HCOV 229E RNA SPEC QL NAA+PROBE: NOT DETECTED
HCOV HKU1 RNA SPEC QL NAA+PROBE: NOT DETECTED
HCOV NL63 RNA SPEC QL NAA+PROBE: NOT DETECTED
HCOV OC43 RNA SPEC QL NAA+PROBE: NOT DETECTED
HCT VFR BLD AUTO: 27.9 % (ref 37.5–51)
HCT VFR BLD AUTO: 28.9 % (ref 37.5–51)
HCT VFR BLD AUTO: 29.5 % (ref 37.5–51)
HCT VFR BLD AUTO: 29.6 % (ref 37.5–51)
HCT VFR BLD AUTO: 29.7 % (ref 37.5–51)
HCT VFR BLD AUTO: 29.8 % (ref 37.5–51)
HCT VFR BLD AUTO: 29.9 % (ref 37.5–51)
HCT VFR BLD AUTO: 30.2 % (ref 37.5–51)
HCT VFR BLD AUTO: 30.5 % (ref 37.5–51)
HCT VFR BLD AUTO: 30.8 % (ref 37.5–51)
HCT VFR BLD AUTO: 31 % (ref 37.5–51)
HCT VFR BLD AUTO: 31.2 % (ref 37.5–51)
HCT VFR BLD AUTO: 31.2 % (ref 37.5–51)
HCT VFR BLD AUTO: 31.4 % (ref 37.5–51)
HCT VFR BLD AUTO: 32.4 % (ref 37.5–51)
HCT VFR BLD AUTO: 33.8 % (ref 37.5–51)
HCT VFR BLD AUTO: 35.6 % (ref 37.5–51)
HCT VFR BLD CALC: 29 % (ref 38–51)
HCT VFR BLD CALC: 30.7 % (ref 38–51)
HCT VFR BLD CALC: 35.2 % (ref 38–51)
HCT VFR BLD CALC: 35.3 % (ref 38–51)
HGB BLD-MCNC: 10 G/DL (ref 13–17.7)
HGB BLD-MCNC: 10 G/DL (ref 13–17.7)
HGB BLD-MCNC: 10.4 G/DL (ref 13–17.7)
HGB BLD-MCNC: 10.8 G/DL (ref 13–17.7)
HGB BLD-MCNC: 11.2 G/DL (ref 13–17.7)
HGB BLD-MCNC: 11.6 G/DL (ref 13–17.7)
HGB BLD-MCNC: 9 G/DL (ref 13–17.7)
HGB BLD-MCNC: 9.2 G/DL (ref 13–17.7)
HGB BLD-MCNC: 9.3 G/DL (ref 13–17.7)
HGB BLD-MCNC: 9.5 G/DL (ref 13–17.7)
HGB BLD-MCNC: 9.6 G/DL (ref 13–17.7)
HGB BLD-MCNC: 9.8 G/DL (ref 13–17.7)
HGB BLD-MCNC: 9.8 G/DL (ref 13–17.7)
HGB BLD-MCNC: 9.9 G/DL (ref 13–17.7)
HGB BLDA-MCNC: 10 G/DL (ref 14–18)
HGB BLDA-MCNC: 11.5 G/DL (ref 14–18)
HGB BLDA-MCNC: 11.5 G/DL (ref 14–18)
HGB BLDA-MCNC: 9.5 G/DL (ref 14–18)
HGB UR QL STRIP.AUTO: ABNORMAL
HMPV RNA NPH QL NAA+NON-PROBE: NOT DETECTED
HOLD SPECIMEN: NORMAL
HPIV1 RNA SPEC QL NAA+PROBE: NOT DETECTED
HPIV2 RNA SPEC QL NAA+PROBE: NOT DETECTED
HPIV3 RNA NPH QL NAA+PROBE: NOT DETECTED
HPIV4 P GENE NPH QL NAA+PROBE: NOT DETECTED
HYALINE CASTS UR QL AUTO: NORMAL /LPF
IMM GRANULOCYTES # BLD AUTO: 0.02 10*3/MM3 (ref 0–0.05)
IMM GRANULOCYTES # BLD AUTO: 0.02 10*3/MM3 (ref 0–0.05)
IMM GRANULOCYTES # BLD AUTO: 0.03 10*3/MM3 (ref 0–0.05)
IMM GRANULOCYTES # BLD AUTO: 0.04 10*3/MM3 (ref 0–0.05)
IMM GRANULOCYTES # BLD AUTO: 0.05 10*3/MM3 (ref 0–0.05)
IMM GRANULOCYTES # BLD AUTO: 0.07 10*3/MM3 (ref 0–0.05)
IMM GRANULOCYTES NFR BLD AUTO: 0.3 % (ref 0–0.5)
IMM GRANULOCYTES NFR BLD AUTO: 0.3 % (ref 0–0.5)
IMM GRANULOCYTES NFR BLD AUTO: 0.4 % (ref 0–0.5)
IMM GRANULOCYTES NFR BLD AUTO: 0.4 % (ref 0–0.5)
IMM GRANULOCYTES NFR BLD AUTO: 0.5 % (ref 0–0.5)
IMM GRANULOCYTES NFR BLD AUTO: 0.6 % (ref 0–0.5)
IMM GRANULOCYTES NFR BLD AUTO: 0.7 % (ref 0–0.5)
IMM GRANULOCYTES NFR BLD AUTO: 0.8 % (ref 0–0.5)
INHALED O2 CONCENTRATION: 28 %
INHALED O2 CONCENTRATION: 28 %
INHALED O2 CONCENTRATION: 36 %
INHALED O2 CONCENTRATION: 40 %
INR PPP: 0.89 (ref 0.9–1.1)
INSULIN SERPL-ACNC: 14 UIU/ML
IRON 24H UR-MRATE: 52 MCG/DL (ref 59–158)
IRON SATN MFR SERPL: 18 % (ref 20–50)
KETONES UR QL STRIP: NEGATIVE
LEUKOCYTE ESTERASE UR QL STRIP.AUTO: NEGATIVE
LIPASE SERPL-CCNC: 13 U/L (ref 13–60)
LYMPHOCYTES # BLD AUTO: 0.27 10*3/MM3 (ref 0.7–3.1)
LYMPHOCYTES # BLD AUTO: 0.33 10*3/MM3 (ref 0.7–3.1)
LYMPHOCYTES # BLD AUTO: 0.34 10*3/MM3 (ref 0.7–3.1)
LYMPHOCYTES # BLD AUTO: 0.43 10*3/MM3 (ref 0.7–3.1)
LYMPHOCYTES # BLD AUTO: 0.51 10*3/MM3 (ref 0.7–3.1)
LYMPHOCYTES # BLD AUTO: 0.55 10*3/MM3 (ref 0.7–3.1)
LYMPHOCYTES # BLD AUTO: 0.61 10*3/MM3 (ref 0.7–3.1)
LYMPHOCYTES # BLD AUTO: 0.65 10*3/MM3 (ref 0.7–3.1)
LYMPHOCYTES # BLD AUTO: 0.66 10*3/MM3 (ref 0.7–3.1)
LYMPHOCYTES # BLD AUTO: 0.67 10*3/MM3 (ref 0.7–3.1)
LYMPHOCYTES # BLD AUTO: 0.68 10*3/MM3 (ref 0.7–3.1)
LYMPHOCYTES # BLD AUTO: 0.75 10*3/MM3 (ref 0.7–3.1)
LYMPHOCYTES # BLD AUTO: 0.78 10*3/MM3 (ref 0.7–3.1)
LYMPHOCYTES NFR BLD AUTO: 10.2 % (ref 19.6–45.3)
LYMPHOCYTES NFR BLD AUTO: 10.8 % (ref 19.6–45.3)
LYMPHOCYTES NFR BLD AUTO: 11 % (ref 19.6–45.3)
LYMPHOCYTES NFR BLD AUTO: 11.1 % (ref 19.6–45.3)
LYMPHOCYTES NFR BLD AUTO: 3.1 % (ref 19.6–45.3)
LYMPHOCYTES NFR BLD AUTO: 3.8 % (ref 19.6–45.3)
LYMPHOCYTES NFR BLD AUTO: 4.3 % (ref 19.6–45.3)
LYMPHOCYTES NFR BLD AUTO: 5 % (ref 19.6–45.3)
LYMPHOCYTES NFR BLD AUTO: 7.8 % (ref 19.6–45.3)
LYMPHOCYTES NFR BLD AUTO: 8 % (ref 19.6–45.3)
LYMPHOCYTES NFR BLD AUTO: 8.6 % (ref 19.6–45.3)
LYMPHOCYTES NFR BLD AUTO: 9.1 % (ref 19.6–45.3)
LYMPHOCYTES NFR BLD AUTO: 9.4 % (ref 19.6–45.3)
Lab: ABNORMAL
M PNEUMO IGG SER IA-ACNC: NOT DETECTED
MAGNESIUM SERPL-MCNC: 1.6 MG/DL (ref 1.7–2.3)
MAGNESIUM SERPL-MCNC: 1.6 MG/DL (ref 1.7–2.3)
MAGNESIUM SERPL-MCNC: 1.7 MG/DL (ref 1.7–2.3)
MAGNESIUM SERPL-MCNC: 1.9 MG/DL (ref 1.7–2.3)
MAGNESIUM SERPL-MCNC: 1.9 MG/DL (ref 1.7–2.3)
MAGNESIUM SERPL-MCNC: 2 MG/DL (ref 1.7–2.3)
MAGNESIUM SERPL-MCNC: 2.1 MG/DL (ref 1.7–2.3)
MAGNESIUM SERPL-MCNC: 2.1 MG/DL (ref 1.7–2.3)
MAXIMAL PREDICTED HEART RATE: 129 BPM
MCH RBC QN AUTO: 30.5 PG (ref 26.6–33)
MCH RBC QN AUTO: 30.5 PG (ref 26.6–33)
MCH RBC QN AUTO: 30.6 PG (ref 26.6–33)
MCH RBC QN AUTO: 30.8 PG (ref 26.6–33)
MCH RBC QN AUTO: 30.8 PG (ref 26.6–33)
MCH RBC QN AUTO: 30.9 PG (ref 26.6–33)
MCH RBC QN AUTO: 31 PG (ref 26.6–33)
MCH RBC QN AUTO: 31.1 PG (ref 26.6–33)
MCH RBC QN AUTO: 31.1 PG (ref 26.6–33)
MCH RBC QN AUTO: 31.3 PG (ref 26.6–33)
MCH RBC QN AUTO: 31.4 PG (ref 26.6–33)
MCH RBC QN AUTO: 31.6 PG (ref 26.6–33)
MCHC RBC AUTO-ENTMCNC: 31.2 G/DL (ref 31.5–35.7)
MCHC RBC AUTO-ENTMCNC: 31.8 G/DL (ref 31.5–35.7)
MCHC RBC AUTO-ENTMCNC: 31.9 G/DL (ref 31.5–35.7)
MCHC RBC AUTO-ENTMCNC: 32.1 G/DL (ref 31.5–35.7)
MCHC RBC AUTO-ENTMCNC: 32.3 G/DL (ref 31.5–35.7)
MCHC RBC AUTO-ENTMCNC: 32.3 G/DL (ref 31.5–35.7)
MCHC RBC AUTO-ENTMCNC: 32.4 G/DL (ref 31.5–35.7)
MCHC RBC AUTO-ENTMCNC: 32.5 G/DL (ref 31.5–35.7)
MCHC RBC AUTO-ENTMCNC: 32.6 G/DL (ref 31.5–35.7)
MCHC RBC AUTO-ENTMCNC: 33.1 G/DL (ref 31.5–35.7)
MCHC RBC AUTO-ENTMCNC: 33.1 G/DL (ref 31.5–35.7)
MCHC RBC AUTO-ENTMCNC: 33.3 G/DL (ref 31.5–35.7)
MCV RBC AUTO: 93.5 FL (ref 79–97)
MCV RBC AUTO: 94.2 FL (ref 79–97)
MCV RBC AUTO: 94.7 FL (ref 79–97)
MCV RBC AUTO: 94.7 FL (ref 79–97)
MCV RBC AUTO: 95 FL (ref 79–97)
MCV RBC AUTO: 95.2 FL (ref 79–97)
MCV RBC AUTO: 95.4 FL (ref 79–97)
MCV RBC AUTO: 95.5 FL (ref 79–97)
MCV RBC AUTO: 95.9 FL (ref 79–97)
MCV RBC AUTO: 96.3 FL (ref 79–97)
MCV RBC AUTO: 96.6 FL (ref 79–97)
MCV RBC AUTO: 96.9 FL (ref 79–97)
MCV RBC AUTO: 97.4 FL (ref 79–97)
MCV RBC AUTO: 98 FL (ref 79–97)
MCV RBC AUTO: 98.6 FL (ref 79–97)
MCV RBC AUTO: 99 FL (ref 79–97)
METHGB BLD QL: 0.2 % (ref 0–3)
METHGB BLD QL: 0.4 % (ref 0–3)
METHGB BLD QL: 0.5 % (ref 0–3)
METHGB BLD QL: 0.5 % (ref 0–3)
MODALITY: ABNORMAL
MONOCYTES # BLD AUTO: 0.3 10*3/MM3 (ref 0.1–0.9)
MONOCYTES # BLD AUTO: 0.38 10*3/MM3 (ref 0.1–0.9)
MONOCYTES # BLD AUTO: 0.4 10*3/MM3 (ref 0.1–0.9)
MONOCYTES # BLD AUTO: 0.72 10*3/MM3 (ref 0.1–0.9)
MONOCYTES # BLD AUTO: 0.87 10*3/MM3 (ref 0.1–0.9)
MONOCYTES # BLD AUTO: 0.95 10*3/MM3 (ref 0.1–0.9)
MONOCYTES # BLD AUTO: 0.96 10*3/MM3 (ref 0.1–0.9)
MONOCYTES # BLD AUTO: 1.03 10*3/MM3 (ref 0.1–0.9)
MONOCYTES # BLD AUTO: 1.04 10*3/MM3 (ref 0.1–0.9)
MONOCYTES # BLD AUTO: 1.06 10*3/MM3 (ref 0.1–0.9)
MONOCYTES # BLD AUTO: 1.09 10*3/MM3 (ref 0.1–0.9)
MONOCYTES NFR BLD AUTO: 10.1 % (ref 5–12)
MONOCYTES NFR BLD AUTO: 12.6 % (ref 5–12)
MONOCYTES NFR BLD AUTO: 13.2 % (ref 5–12)
MONOCYTES NFR BLD AUTO: 14.7 % (ref 5–12)
MONOCYTES NFR BLD AUTO: 15 % (ref 5–12)
MONOCYTES NFR BLD AUTO: 15.3 % (ref 5–12)
MONOCYTES NFR BLD AUTO: 15.4 % (ref 5–12)
MONOCYTES NFR BLD AUTO: 15.7 % (ref 5–12)
MONOCYTES NFR BLD AUTO: 15.9 % (ref 5–12)
MONOCYTES NFR BLD AUTO: 3.8 % (ref 5–12)
MONOCYTES NFR BLD AUTO: 5.4 % (ref 5–12)
MONOCYTES NFR BLD AUTO: 6 % (ref 5–12)
MONOCYTES NFR BLD AUTO: 7 % (ref 5–12)
NEUTROPHILS NFR BLD AUTO: 12.15 10*3/MM3 (ref 1.7–7)
NEUTROPHILS NFR BLD AUTO: 3.97 10*3/MM3 (ref 1.7–7)
NEUTROPHILS NFR BLD AUTO: 4.21 10*3/MM3 (ref 1.7–7)
NEUTROPHILS NFR BLD AUTO: 4.76 10*3/MM3 (ref 1.7–7)
NEUTROPHILS NFR BLD AUTO: 4.78 10*3/MM3 (ref 1.7–7)
NEUTROPHILS NFR BLD AUTO: 4.92 10*3/MM3 (ref 1.7–7)
NEUTROPHILS NFR BLD AUTO: 5.04 10*3/MM3 (ref 1.7–7)
NEUTROPHILS NFR BLD AUTO: 5.72 10*3/MM3 (ref 1.7–7)
NEUTROPHILS NFR BLD AUTO: 5.83 10*3/MM3 (ref 1.7–7)
NEUTROPHILS NFR BLD AUTO: 6.32 10*3/MM3 (ref 1.7–7)
NEUTROPHILS NFR BLD AUTO: 6.34 10*3/MM3 (ref 1.7–7)
NEUTROPHILS NFR BLD AUTO: 6.47 10*3/MM3 (ref 1.7–7)
NEUTROPHILS NFR BLD AUTO: 7.24 10*3/MM3 (ref 1.7–7)
NEUTROPHILS NFR BLD AUTO: 70 % (ref 42.7–76)
NEUTROPHILS NFR BLD AUTO: 71.5 % (ref 42.7–76)
NEUTROPHILS NFR BLD AUTO: 71.6 % (ref 42.7–76)
NEUTROPHILS NFR BLD AUTO: 72.8 % (ref 42.7–76)
NEUTROPHILS NFR BLD AUTO: 75 % (ref 42.7–76)
NEUTROPHILS NFR BLD AUTO: 75.4 % (ref 42.7–76)
NEUTROPHILS NFR BLD AUTO: 76.3 % (ref 42.7–76)
NEUTROPHILS NFR BLD AUTO: 78.4 % (ref 42.7–76)
NEUTROPHILS NFR BLD AUTO: 80 % (ref 42.7–76)
NEUTROPHILS NFR BLD AUTO: 88 % (ref 42.7–76)
NEUTROPHILS NFR BLD AUTO: 89 % (ref 42.7–76)
NEUTROPHILS NFR BLD AUTO: 90.1 % (ref 42.7–76)
NEUTROPHILS NFR BLD AUTO: 91.1 % (ref 42.7–76)
NITRITE UR QL STRIP: NEGATIVE
NOTE: ABNORMAL
NOTIFIED BY: ABNORMAL
NOTIFIED BY: ABNORMAL
NOTIFIED WHO: ABNORMAL
NOTIFIED WHO: ABNORMAL
NRBC BLD AUTO-RTO: 0 /100 WBC (ref 0–0.2)
NT-PROBNP SERPL-MCNC: 396.1 PG/ML (ref 0–1800)
NT-PROBNP SERPL-MCNC: 462.8 PG/ML (ref 0–1800)
OXYHGB MFR BLDV: 87.4 % (ref 94–99)
OXYHGB MFR BLDV: 90.2 % (ref 94–99)
OXYHGB MFR BLDV: 96.1 % (ref 94–99)
OXYHGB MFR BLDV: 96.3 % (ref 94–99)
PCO2 BLDA: 48.5 MM HG (ref 35–45)
PCO2 BLDA: 64.6 MM HG (ref 35–45)
PCO2 BLDA: 68.7 MM HG (ref 35–45)
PCO2 BLDA: 70.3 MM HG (ref 35–45)
PCO2 TEMP ADJ BLD: ABNORMAL MM[HG]
PH BLDA: 7.35 PH UNITS (ref 7.35–7.45)
PH BLDA: 7.39 PH UNITS (ref 7.35–7.45)
PH BLDA: 7.39 PH UNITS (ref 7.35–7.45)
PH BLDA: 7.42 PH UNITS (ref 7.35–7.45)
PH UR STRIP.AUTO: 7 [PH] (ref 5–8)
PH, TEMP CORRECTED: ABNORMAL
PHOSPHATE SERPL-MCNC: 2.8 MG/DL (ref 2.5–4.5)
PHOSPHATE SERPL-MCNC: 2.8 MG/DL (ref 2.5–4.5)
PHOSPHATE SERPL-MCNC: 3.1 MG/DL (ref 2.5–4.5)
PLATELET # BLD AUTO: 185 10*3/MM3 (ref 140–450)
PLATELET # BLD AUTO: 185 10*3/MM3 (ref 140–450)
PLATELET # BLD AUTO: 192 10*3/MM3 (ref 140–450)
PLATELET # BLD AUTO: 193 10*3/MM3 (ref 140–450)
PLATELET # BLD AUTO: 194 10*3/MM3 (ref 140–450)
PLATELET # BLD AUTO: 194 10*3/MM3 (ref 140–450)
PLATELET # BLD AUTO: 195 10*3/MM3 (ref 140–450)
PLATELET # BLD AUTO: 205 10*3/MM3 (ref 140–450)
PLATELET # BLD AUTO: 206 10*3/MM3 (ref 140–450)
PLATELET # BLD AUTO: 210 10*3/MM3 (ref 140–450)
PLATELET # BLD AUTO: 212 10*3/MM3 (ref 140–450)
PLATELET # BLD AUTO: 215 10*3/MM3 (ref 140–450)
PLATELET # BLD AUTO: 217 10*3/MM3 (ref 140–450)
PLATELET # BLD AUTO: 228 10*3/MM3 (ref 140–450)
PMV BLD AUTO: 10 FL (ref 6–12)
PMV BLD AUTO: 10 FL (ref 6–12)
PMV BLD AUTO: 10.1 FL (ref 6–12)
PMV BLD AUTO: 10.1 FL (ref 6–12)
PMV BLD AUTO: 10.2 FL (ref 6–12)
PMV BLD AUTO: 10.3 FL (ref 6–12)
PMV BLD AUTO: 10.4 FL (ref 6–12)
PMV BLD AUTO: 9.7 FL (ref 6–12)
PMV BLD AUTO: 9.8 FL (ref 6–12)
PMV BLD AUTO: 9.9 FL (ref 6–12)
PO2 BLDA: 108 MM HG (ref 83–108)
PO2 BLDA: 60.7 MM HG (ref 83–108)
PO2 BLDA: 68.6 MM HG (ref 83–108)
PO2 BLDA: 92.7 MM HG (ref 83–108)
PO2 TEMP ADJ BLD: ABNORMAL MM[HG]
POTASSIUM SERPL-SCNC: 3.4 MMOL/L (ref 3.5–5.2)
POTASSIUM SERPL-SCNC: 3.8 MMOL/L (ref 3.5–5.2)
POTASSIUM SERPL-SCNC: 3.8 MMOL/L (ref 3.5–5.2)
POTASSIUM SERPL-SCNC: 3.9 MMOL/L (ref 3.5–5.2)
POTASSIUM SERPL-SCNC: 4 MMOL/L (ref 3.5–5.2)
POTASSIUM SERPL-SCNC: 4.1 MMOL/L (ref 3.5–5.2)
POTASSIUM SERPL-SCNC: 4.4 MMOL/L (ref 3.5–5.2)
POTASSIUM SERPL-SCNC: 4.5 MMOL/L (ref 3.5–5.2)
POTASSIUM SERPL-SCNC: 4.6 MMOL/L (ref 3.5–5.2)
POTASSIUM SERPL-SCNC: 4.9 MMOL/L (ref 3.5–5.2)
POTASSIUM SERPL-SCNC: 5 MMOL/L (ref 3.5–5.2)
POTASSIUM SERPL-SCNC: 5.2 MMOL/L (ref 3.5–5.2)
POTASSIUM SERPL-SCNC: 5.6 MMOL/L (ref 3.5–5.2)
PROT SERPL-MCNC: 6.4 G/DL (ref 6–8.5)
PROT SERPL-MCNC: 7.3 G/DL (ref 6–8.5)
PROT UR QL STRIP: ABNORMAL
PROTHROMBIN TIME: 11.8 SECONDS (ref 11.9–14.1)
QT INTERVAL: 332 MS
QT INTERVAL: 334 MS
QT INTERVAL: 338 MS
QT INTERVAL: 344 MS
QT INTERVAL: 354 MS
QT INTERVAL: 356 MS
QT INTERVAL: 360 MS
QT INTERVAL: 362 MS
QT INTERVAL: 366 MS
QT INTERVAL: 370 MS
QT INTERVAL: 372 MS
QT INTERVAL: 382 MS
QT INTERVAL: 406 MS
QTC INTERVAL: 409 MS
QTC INTERVAL: 413 MS
QTC INTERVAL: 417 MS
QTC INTERVAL: 418 MS
QTC INTERVAL: 421 MS
QTC INTERVAL: 422 MS
QTC INTERVAL: 424 MS
QTC INTERVAL: 436 MS
QTC INTERVAL: 437 MS
QTC INTERVAL: 438 MS
QTC INTERVAL: 457 MS
QTC INTERVAL: 477 MS
QTC INTERVAL: 526 MS
RBC # BLD AUTO: 2.88 10*6/MM3 (ref 4.14–5.8)
RBC # BLD AUTO: 2.93 10*6/MM3 (ref 4.14–5.8)
RBC # BLD AUTO: 3.01 10*6/MM3 (ref 4.14–5.8)
RBC # BLD AUTO: 3.04 10*6/MM3 (ref 4.14–5.8)
RBC # BLD AUTO: 3.05 10*6/MM3 (ref 4.14–5.8)
RBC # BLD AUTO: 3.09 10*6/MM3 (ref 4.14–5.8)
RBC # BLD AUTO: 3.12 10*6/MM3 (ref 4.14–5.8)
RBC # BLD AUTO: 3.15 10*6/MM3 (ref 4.14–5.8)
RBC # BLD AUTO: 3.21 10*6/MM3 (ref 4.14–5.8)
RBC # BLD AUTO: 3.21 10*6/MM3 (ref 4.14–5.8)
RBC # BLD AUTO: 3.24 10*6/MM3 (ref 4.14–5.8)
RBC # BLD AUTO: 3.27 10*6/MM3 (ref 4.14–5.8)
RBC # BLD AUTO: 3.36 10*6/MM3 (ref 4.14–5.8)
RBC # BLD AUTO: 3.44 10*6/MM3 (ref 4.14–5.8)
RBC # BLD AUTO: 3.57 10*6/MM3 (ref 4.14–5.8)
RBC # BLD AUTO: 3.76 10*6/MM3 (ref 4.14–5.8)
RBC # UR: NORMAL /HPF
REF LAB TEST METHOD: NORMAL
RHINOVIRUS RNA SPEC NAA+PROBE: NOT DETECTED
RSV RNA NPH QL NAA+NON-PROBE: NOT DETECTED
RSV RNA NPH QL NAA+NON-PROBE: NOT DETECTED
SAO2 % BLDCOA: 89.4 % (ref 94–99)
SAO2 % BLDCOA: 91.8 % (ref 94–99)
SAO2 % BLDCOA: 97.5 % (ref 94–99)
SAO2 % BLDCOA: 98.4 % (ref 94–99)
SARS-COV-2 RNA RESP QL NAA+PROBE: DETECTED
SARS-COV-2 RNA RESP QL NAA+PROBE: NOT DETECTED
SARS-COV-2 RNA RESP QL NAA+PROBE: NOT DETECTED
SODIUM SERPL-SCNC: 131 MMOL/L (ref 136–145)
SODIUM SERPL-SCNC: 131 MMOL/L (ref 136–145)
SODIUM SERPL-SCNC: 132 MMOL/L (ref 136–145)
SODIUM SERPL-SCNC: 133 MMOL/L (ref 136–145)
SODIUM SERPL-SCNC: 134 MMOL/L (ref 136–145)
SODIUM SERPL-SCNC: 135 MMOL/L (ref 136–145)
SODIUM SERPL-SCNC: 136 MMOL/L (ref 136–145)
SODIUM SERPL-SCNC: 136 MMOL/L (ref 136–145)
SODIUM SERPL-SCNC: 137 MMOL/L (ref 136–145)
SODIUM SERPL-SCNC: 138 MMOL/L (ref 136–145)
SODIUM SERPL-SCNC: 141 MMOL/L (ref 136–145)
SODIUM SERPL-SCNC: 142 MMOL/L (ref 136–145)
SP GR UR STRIP: 1.02 (ref 1–1.03)
SQUAMOUS #/AREA URNS HPF: NORMAL /HPF
STRESS TARGET HR: 110 BPM
THEOPHYLLINE SERPL-MCNC: 3.6 MCG/ML (ref 10–20)
THEOPHYLLINE SERPL-MCNC: <0.8 MCG/ML (ref 10–20)
TIBC SERPL-MCNC: 297 MCG/DL (ref 298–536)
TRANSFERRIN SERPL-MCNC: 199 MG/DL (ref 200–360)
TROPONIN T SERPL-MCNC: 0.04 NG/ML (ref 0–0.03)
TROPONIN T SERPL-MCNC: 0.04 NG/ML (ref 0–0.03)
TROPONIN T SERPL-MCNC: 0.05 NG/ML (ref 0–0.03)
TROPONIN T SERPL-MCNC: 0.06 NG/ML (ref 0–0.03)
TROPONIN T SERPL-MCNC: 0.08 NG/ML (ref 0–0.03)
TROPONIN T SERPL-MCNC: 0.08 NG/ML (ref 0–0.03)
TROPONIN T SERPL-MCNC: 0.09 NG/ML (ref 0–0.03)
TROPONIN T SERPL-MCNC: 0.1 NG/ML (ref 0–0.03)
TSH SERPL DL<=0.05 MIU/L-ACNC: 2.69 UIU/ML (ref 0.27–4.2)
UROBILINOGEN UR QL STRIP: ABNORMAL
VANCOMYCIN TROUGH SERPL-MCNC: 13 MCG/ML (ref 5–20)
VANCOMYCIN TROUGH SERPL-MCNC: 8.2 MCG/ML (ref 5–20)
VENTILATOR MODE: ABNORMAL
VIT B12 BLD-MCNC: 610 PG/ML (ref 211–946)
WBC # BLD AUTO: 13.66 10*3/MM3 (ref 3.4–10.8)
WBC # BLD AUTO: 5.35 10*3/MM3 (ref 3.4–10.8)
WBC # BLD AUTO: 5.55 10*3/MM3 (ref 3.4–10.8)
WBC # BLD AUTO: 5.8 10*3/MM3 (ref 3.4–10.8)
WBC # BLD AUTO: 6.02 10*3/MM3 (ref 3.4–10.8)
WBC # BLD AUTO: 6.32 10*3/MM3 (ref 3.4–10.8)
WBC # BLD AUTO: 6.38 10*3/MM3 (ref 3.4–10.8)
WBC # BLD AUTO: 6.52 10*3/MM3 (ref 3.4–10.8)
WBC # BLD AUTO: 6.62 10*3/MM3 (ref 3.4–10.8)
WBC # BLD AUTO: 6.67 10*3/MM3 (ref 3.4–10.8)
WBC # BLD AUTO: 6.93 10*3/MM3 (ref 3.4–10.8)
WBC # BLD AUTO: 7.04 10*3/MM3 (ref 3.4–10.8)
WBC # BLD AUTO: 7.15 10*3/MM3 (ref 3.4–10.8)
WBC # BLD AUTO: 7.94 10*3/MM3 (ref 3.4–10.8)
WBC # BLD AUTO: 8.25 10*3/MM3 (ref 3.4–10.8)
WBC # BLD AUTO: 8.28 10*3/MM3 (ref 3.4–10.8)
WBC UR QL AUTO: NORMAL /HPF
WHOLE BLOOD HOLD SPECIMEN: NORMAL

## 2020-01-01 PROCEDURE — 87040 BLOOD CULTURE FOR BACTERIA: CPT | Performed by: FAMILY MEDICINE

## 2020-01-01 PROCEDURE — 94799 UNLISTED PULMONARY SVC/PX: CPT

## 2020-01-01 PROCEDURE — 86140 C-REACTIVE PROTEIN: CPT | Performed by: NURSE PRACTITIONER

## 2020-01-01 PROCEDURE — 36600 WITHDRAWAL OF ARTERIAL BLOOD: CPT

## 2020-01-01 PROCEDURE — 80198 ASSAY OF THEOPHYLLINE: CPT | Performed by: INTERNAL MEDICINE

## 2020-01-01 PROCEDURE — 99232 SBSQ HOSP IP/OBS MODERATE 35: CPT | Performed by: PHYSICIAN ASSISTANT

## 2020-01-01 PROCEDURE — 72131 CT LUMBAR SPINE W/O DYE: CPT

## 2020-01-01 PROCEDURE — 25010000002 ENOXAPARIN PER 10 MG: Performed by: HOSPITALIST

## 2020-01-01 PROCEDURE — 84484 ASSAY OF TROPONIN QUANT: CPT | Performed by: EMERGENCY MEDICINE

## 2020-01-01 PROCEDURE — 72170 X-RAY EXAM OF PELVIS: CPT | Performed by: RADIOLOGY

## 2020-01-01 PROCEDURE — 63710000001 INSULIN DETEMIR PER 5 UNITS: Performed by: INTERNAL MEDICINE

## 2020-01-01 PROCEDURE — 63710000001 PREDNISONE PER 5 MG: Performed by: INTERNAL MEDICINE

## 2020-01-01 PROCEDURE — 85025 COMPLETE CBC W/AUTO DIFF WBC: CPT | Performed by: HOSPITALIST

## 2020-01-01 PROCEDURE — 87637 SARSCOV2&INF A&B&RSV AMP PRB: CPT | Performed by: INTERNAL MEDICINE

## 2020-01-01 PROCEDURE — 83525 ASSAY OF INSULIN: CPT | Performed by: INTERNAL MEDICINE

## 2020-01-01 PROCEDURE — 25010000002 ENOXAPARIN PER 10 MG: Performed by: INTERNAL MEDICINE

## 2020-01-01 PROCEDURE — 25010000002 METHYLPREDNISOLONE PER 40 MG: Performed by: HOSPITALIST

## 2020-01-01 PROCEDURE — 83605 ASSAY OF LACTIC ACID: CPT | Performed by: FAMILY MEDICINE

## 2020-01-01 PROCEDURE — 25010000002 VANCOMYCIN: Performed by: HOSPITALIST

## 2020-01-01 PROCEDURE — 97110 THERAPEUTIC EXERCISES: CPT

## 2020-01-01 PROCEDURE — 83735 ASSAY OF MAGNESIUM: CPT | Performed by: INTERNAL MEDICINE

## 2020-01-01 PROCEDURE — 80048 BASIC METABOLIC PNL TOTAL CA: CPT | Performed by: INTERNAL MEDICINE

## 2020-01-01 PROCEDURE — 87636 SARSCOV2 & INF A&B AMP PRB: CPT | Performed by: EMERGENCY MEDICINE

## 2020-01-01 PROCEDURE — 85610 PROTHROMBIN TIME: CPT | Performed by: FAMILY MEDICINE

## 2020-01-01 PROCEDURE — 82962 GLUCOSE BLOOD TEST: CPT

## 2020-01-01 PROCEDURE — 93010 ELECTROCARDIOGRAM REPORT: CPT | Performed by: INTERNAL MEDICINE

## 2020-01-01 PROCEDURE — 74230 X-RAY XM SWLNG FUNCJ C+: CPT

## 2020-01-01 PROCEDURE — 71045 X-RAY EXAM CHEST 1 VIEW: CPT

## 2020-01-01 PROCEDURE — 63710000001 INSULIN ASPART PER 5 UNITS: Performed by: PHYSICIAN ASSISTANT

## 2020-01-01 PROCEDURE — 94640 AIRWAY INHALATION TREATMENT: CPT

## 2020-01-01 PROCEDURE — 72125 CT NECK SPINE W/O DYE: CPT

## 2020-01-01 PROCEDURE — 93005 ELECTROCARDIOGRAM TRACING: CPT | Performed by: INTERNAL MEDICINE

## 2020-01-01 PROCEDURE — 87040 BLOOD CULTURE FOR BACTERIA: CPT | Performed by: EMERGENCY MEDICINE

## 2020-01-01 PROCEDURE — 70450 CT HEAD/BRAIN W/O DYE: CPT | Performed by: RADIOLOGY

## 2020-01-01 PROCEDURE — 80202 ASSAY OF VANCOMYCIN: CPT | Performed by: INTERNAL MEDICINE

## 2020-01-01 PROCEDURE — 82306 VITAMIN D 25 HYDROXY: CPT | Performed by: PHYSICIAN ASSISTANT

## 2020-01-01 PROCEDURE — 80053 COMPREHEN METABOLIC PANEL: CPT | Performed by: EMERGENCY MEDICINE

## 2020-01-01 PROCEDURE — 83605 ASSAY OF LACTIC ACID: CPT | Performed by: EMERGENCY MEDICINE

## 2020-01-01 PROCEDURE — 99233 SBSQ HOSP IP/OBS HIGH 50: CPT | Performed by: INTERNAL MEDICINE

## 2020-01-01 PROCEDURE — 99232 SBSQ HOSP IP/OBS MODERATE 35: CPT | Performed by: INTERNAL MEDICINE

## 2020-01-01 PROCEDURE — 83050 HGB METHEMOGLOBIN QUAN: CPT

## 2020-01-01 PROCEDURE — 74230 X-RAY XM SWLNG FUNCJ C+: CPT | Performed by: RADIOLOGY

## 2020-01-01 PROCEDURE — 63710000001 INSULIN DETEMIR PER 5 UNITS: Performed by: PHYSICIAN ASSISTANT

## 2020-01-01 PROCEDURE — 97161 PT EVAL LOW COMPLEX 20 MIN: CPT

## 2020-01-01 PROCEDURE — 97166 OT EVAL MOD COMPLEX 45 MIN: CPT

## 2020-01-01 PROCEDURE — 97116 GAIT TRAINING THERAPY: CPT

## 2020-01-01 PROCEDURE — 72128 CT CHEST SPINE W/O DYE: CPT

## 2020-01-01 PROCEDURE — 82375 ASSAY CARBOXYHB QUANT: CPT

## 2020-01-01 PROCEDURE — 72170 X-RAY EXAM OF PELVIS: CPT

## 2020-01-01 PROCEDURE — 85025 COMPLETE CBC W/AUTO DIFF WBC: CPT | Performed by: INTERNAL MEDICINE

## 2020-01-01 PROCEDURE — 73030 X-RAY EXAM OF SHOULDER: CPT

## 2020-01-01 PROCEDURE — 84484 ASSAY OF TROPONIN QUANT: CPT | Performed by: INTERNAL MEDICINE

## 2020-01-01 PROCEDURE — 93005 ELECTROCARDIOGRAM TRACING: CPT | Performed by: HOSPITALIST

## 2020-01-01 PROCEDURE — 84100 ASSAY OF PHOSPHORUS: CPT | Performed by: INTERNAL MEDICINE

## 2020-01-01 PROCEDURE — 92610 EVALUATE SWALLOWING FUNCTION: CPT

## 2020-01-01 PROCEDURE — 25010000002 VANCOMYCIN: Performed by: INTERNAL MEDICINE

## 2020-01-01 PROCEDURE — 97530 THERAPEUTIC ACTIVITIES: CPT

## 2020-01-01 PROCEDURE — 72128 CT CHEST SPINE W/O DYE: CPT | Performed by: RADIOLOGY

## 2020-01-01 PROCEDURE — 82805 BLOOD GASES W/O2 SATURATION: CPT

## 2020-01-01 PROCEDURE — 92611 MOTION FLUOROSCOPY/SWALLOW: CPT

## 2020-01-01 PROCEDURE — 93971 EXTREMITY STUDY: CPT | Performed by: RADIOLOGY

## 2020-01-01 PROCEDURE — 99232 SBSQ HOSP IP/OBS MODERATE 35: CPT | Performed by: NURSE PRACTITIONER

## 2020-01-01 PROCEDURE — 82550 ASSAY OF CK (CPK): CPT | Performed by: PHYSICIAN ASSISTANT

## 2020-01-01 PROCEDURE — 93880 EXTRACRANIAL BILAT STUDY: CPT

## 2020-01-01 PROCEDURE — 80307 DRUG TEST PRSMV CHEM ANLYZR: CPT | Performed by: FAMILY MEDICINE

## 2020-01-01 PROCEDURE — 80053 COMPREHEN METABOLIC PANEL: CPT | Performed by: FAMILY MEDICINE

## 2020-01-01 PROCEDURE — 70450 CT HEAD/BRAIN W/O DYE: CPT

## 2020-01-01 PROCEDURE — 84466 ASSAY OF TRANSFERRIN: CPT | Performed by: PHYSICIAN ASSISTANT

## 2020-01-01 PROCEDURE — 0099U HC BIOFIRE FILMARRAY RESP PANEL 1: CPT | Performed by: HOSPITALIST

## 2020-01-01 PROCEDURE — 83690 ASSAY OF LIPASE: CPT | Performed by: EMERGENCY MEDICINE

## 2020-01-01 PROCEDURE — 05HY33Z INSERTION OF INFUSION DEVICE INTO UPPER VEIN, PERCUTANEOUS APPROACH: ICD-10-PCS | Performed by: INTERNAL MEDICINE

## 2020-01-01 PROCEDURE — 87040 BLOOD CULTURE FOR BACTERIA: CPT | Performed by: HOSPITALIST

## 2020-01-01 PROCEDURE — 71260 CT THORAX DX C+: CPT | Performed by: RADIOLOGY

## 2020-01-01 PROCEDURE — 25010000002 ONDANSETRON PER 1 MG: Performed by: INTERNAL MEDICINE

## 2020-01-01 PROCEDURE — 84484 ASSAY OF TROPONIN QUANT: CPT | Performed by: PHYSICIAN ASSISTANT

## 2020-01-01 PROCEDURE — 85025 COMPLETE CBC W/AUTO DIFF WBC: CPT | Performed by: EMERGENCY MEDICINE

## 2020-01-01 PROCEDURE — 83036 HEMOGLOBIN GLYCOSYLATED A1C: CPT | Performed by: PHYSICIAN ASSISTANT

## 2020-01-01 PROCEDURE — 87636 SARSCOV2 & INF A&B AMP PRB: CPT | Performed by: FAMILY MEDICINE

## 2020-01-01 PROCEDURE — 84132 ASSAY OF SERUM POTASSIUM: CPT | Performed by: INTERNAL MEDICINE

## 2020-01-01 PROCEDURE — 36415 COLL VENOUS BLD VENIPUNCTURE: CPT

## 2020-01-01 PROCEDURE — 84484 ASSAY OF TROPONIN QUANT: CPT | Performed by: HOSPITALIST

## 2020-01-01 PROCEDURE — 82550 ASSAY OF CK (CPK): CPT | Performed by: INTERNAL MEDICINE

## 2020-01-01 PROCEDURE — 83880 ASSAY OF NATRIURETIC PEPTIDE: CPT | Performed by: FAMILY MEDICINE

## 2020-01-01 PROCEDURE — 87147 CULTURE TYPE IMMUNOLOGIC: CPT | Performed by: FAMILY MEDICINE

## 2020-01-01 PROCEDURE — 99239 HOSP IP/OBS DSCHRG MGMT >30: CPT | Performed by: INTERNAL MEDICINE

## 2020-01-01 PROCEDURE — 71045 X-RAY EXAM CHEST 1 VIEW: CPT | Performed by: RADIOLOGY

## 2020-01-01 PROCEDURE — 86140 C-REACTIVE PROTEIN: CPT | Performed by: EMERGENCY MEDICINE

## 2020-01-01 PROCEDURE — 99223 1ST HOSP IP/OBS HIGH 75: CPT | Performed by: PHYSICIAN ASSISTANT

## 2020-01-01 PROCEDURE — 85027 COMPLETE CBC AUTOMATED: CPT | Performed by: INTERNAL MEDICINE

## 2020-01-01 PROCEDURE — 93306 TTE W/DOPPLER COMPLETE: CPT | Performed by: INTERNAL MEDICINE

## 2020-01-01 PROCEDURE — 93306 TTE W/DOPPLER COMPLETE: CPT

## 2020-01-01 PROCEDURE — 93010 ELECTROCARDIOGRAM REPORT: CPT | Performed by: SPECIALIST

## 2020-01-01 PROCEDURE — 25010000002 VANCOMYCIN 5 G RECONSTITUTED SOLUTION: Performed by: INTERNAL MEDICINE

## 2020-01-01 PROCEDURE — 25010000002 VANCOMYCIN 5 G RECONSTITUTED SOLUTION: Performed by: NURSE PRACTITIONER

## 2020-01-01 PROCEDURE — 82607 VITAMIN B-12: CPT | Performed by: PHYSICIAN ASSISTANT

## 2020-01-01 PROCEDURE — 85014 HEMATOCRIT: CPT | Performed by: PHYSICIAN ASSISTANT

## 2020-01-01 PROCEDURE — 87040 BLOOD CULTURE FOR BACTERIA: CPT | Performed by: INTERNAL MEDICINE

## 2020-01-01 PROCEDURE — 63710000001 INSULIN ASPART PER 5 UNITS: Performed by: HOSPITALIST

## 2020-01-01 PROCEDURE — 72125 CT NECK SPINE W/O DYE: CPT | Performed by: RADIOLOGY

## 2020-01-01 PROCEDURE — 82550 ASSAY OF CK (CPK): CPT | Performed by: FAMILY MEDICINE

## 2020-01-01 PROCEDURE — 25010000003 MAGNESIUM SULFATE 4 GM/100ML SOLUTION: Performed by: INTERNAL MEDICINE

## 2020-01-01 PROCEDURE — 85025 COMPLETE CBC W/AUTO DIFF WBC: CPT | Performed by: PHYSICIAN ASSISTANT

## 2020-01-01 PROCEDURE — 80048 BASIC METABOLIC PNL TOTAL CA: CPT | Performed by: PHYSICIAN ASSISTANT

## 2020-01-01 PROCEDURE — 85025 COMPLETE CBC W/AUTO DIFF WBC: CPT | Performed by: FAMILY MEDICINE

## 2020-01-01 PROCEDURE — 80048 BASIC METABOLIC PNL TOTAL CA: CPT | Performed by: HOSPITALIST

## 2020-01-01 PROCEDURE — 87150 DNA/RNA AMPLIFIED PROBE: CPT | Performed by: FAMILY MEDICINE

## 2020-01-01 PROCEDURE — 63710000001 INSULIN ASPART PER 5 UNITS: Performed by: INTERNAL MEDICINE

## 2020-01-01 PROCEDURE — 82550 ASSAY OF CK (CPK): CPT | Performed by: HOSPITALIST

## 2020-01-01 PROCEDURE — 99285 EMERGENCY DEPT VISIT HI MDM: CPT

## 2020-01-01 PROCEDURE — 93005 ELECTROCARDIOGRAM TRACING: CPT | Performed by: FAMILY MEDICINE

## 2020-01-01 PROCEDURE — 81001 URINALYSIS AUTO W/SCOPE: CPT | Performed by: EMERGENCY MEDICINE

## 2020-01-01 PROCEDURE — 25010000002 VANCOMYCIN: Performed by: NURSE PRACTITIONER

## 2020-01-01 PROCEDURE — 72131 CT LUMBAR SPINE W/O DYE: CPT | Performed by: RADIOLOGY

## 2020-01-01 PROCEDURE — 99223 1ST HOSP IP/OBS HIGH 75: CPT | Performed by: HOSPITALIST

## 2020-01-01 PROCEDURE — 25010000002 VANCOMYCIN 5 G RECONSTITUTED SOLUTION: Performed by: HOSPITALIST

## 2020-01-01 PROCEDURE — 97164 PT RE-EVAL EST PLAN CARE: CPT

## 2020-01-01 PROCEDURE — 73030 X-RAY EXAM OF SHOULDER: CPT | Performed by: RADIOLOGY

## 2020-01-01 PROCEDURE — 71260 CT THORAX DX C+: CPT

## 2020-01-01 PROCEDURE — 93880 EXTRACRANIAL BILAT STUDY: CPT | Performed by: RADIOLOGY

## 2020-01-01 PROCEDURE — 25010000002 MORPHINE PER 10 MG: Performed by: INTERNAL MEDICINE

## 2020-01-01 PROCEDURE — 82746 ASSAY OF FOLIC ACID SERUM: CPT | Performed by: PHYSICIAN ASSISTANT

## 2020-01-01 PROCEDURE — C1751 CATH, INF, PER/CENT/MIDLINE: HCPCS

## 2020-01-01 PROCEDURE — 97163 PT EVAL HIGH COMPLEX 45 MIN: CPT

## 2020-01-01 PROCEDURE — 84681 ASSAY OF C-PEPTIDE: CPT | Performed by: INTERNAL MEDICINE

## 2020-01-01 PROCEDURE — 93971 EXTREMITY STUDY: CPT

## 2020-01-01 PROCEDURE — 85018 HEMOGLOBIN: CPT | Performed by: PHYSICIAN ASSISTANT

## 2020-01-01 PROCEDURE — 83735 ASSAY OF MAGNESIUM: CPT | Performed by: HOSPITALIST

## 2020-01-01 PROCEDURE — 93005 ELECTROCARDIOGRAM TRACING: CPT | Performed by: PHYSICIAN ASSISTANT

## 2020-01-01 PROCEDURE — 87186 SC STD MICRODIL/AGAR DIL: CPT | Performed by: FAMILY MEDICINE

## 2020-01-01 PROCEDURE — 93005 ELECTROCARDIOGRAM TRACING: CPT | Performed by: EMERGENCY MEDICINE

## 2020-01-01 PROCEDURE — 0 IOPAMIDOL PER 1 ML: Performed by: FAMILY MEDICINE

## 2020-01-01 PROCEDURE — 99222 1ST HOSP IP/OBS MODERATE 55: CPT | Performed by: PHYSICIAN ASSISTANT

## 2020-01-01 PROCEDURE — 83880 ASSAY OF NATRIURETIC PEPTIDE: CPT | Performed by: EMERGENCY MEDICINE

## 2020-01-01 PROCEDURE — 83540 ASSAY OF IRON: CPT | Performed by: PHYSICIAN ASSISTANT

## 2020-01-01 PROCEDURE — 84443 ASSAY THYROID STIM HORMONE: CPT | Performed by: INTERNAL MEDICINE

## 2020-01-01 PROCEDURE — 80198 ASSAY OF THEOPHYLLINE: CPT | Performed by: EMERGENCY MEDICINE

## 2020-01-01 PROCEDURE — 63710000001 PREDNISONE PER 1 MG: Performed by: INTERNAL MEDICINE

## 2020-01-01 PROCEDURE — 84484 ASSAY OF TROPONIN QUANT: CPT | Performed by: FAMILY MEDICINE

## 2020-01-01 RX ORDER — POLYETHYLENE GLYCOL 3350 17 G/17G
17 POWDER, FOR SOLUTION ORAL DAILY
Status: DISCONTINUED | OUTPATIENT
Start: 2020-01-01 | End: 2020-01-01 | Stop reason: HOSPADM

## 2020-01-01 RX ORDER — SODIUM CHLORIDE 9 MG/ML
125 INJECTION, SOLUTION INTRAVENOUS CONTINUOUS
Status: DISCONTINUED | OUTPATIENT
Start: 2020-01-01 | End: 2020-01-01

## 2020-01-01 RX ORDER — ARFORMOTEROL TARTRATE 15 UG/2ML
15 SOLUTION RESPIRATORY (INHALATION)
Status: DISCONTINUED | OUTPATIENT
Start: 2020-01-01 | End: 2020-01-01 | Stop reason: HOSPADM

## 2020-01-01 RX ORDER — PREDNISONE 20 MG/1
40 TABLET ORAL
Status: DISCONTINUED | OUTPATIENT
Start: 2020-01-01 | End: 2020-01-01 | Stop reason: HOSPADM

## 2020-01-01 RX ORDER — IPRATROPIUM BROMIDE AND ALBUTEROL SULFATE 2.5; .5 MG/3ML; MG/3ML
3 SOLUTION RESPIRATORY (INHALATION) EVERY 4 HOURS PRN
Status: DISCONTINUED | OUTPATIENT
Start: 2020-01-01 | End: 2020-01-01 | Stop reason: HOSPADM

## 2020-01-01 RX ORDER — CHOLECALCIFEROL (VITAMIN D3) 125 MCG
5 CAPSULE ORAL NIGHTLY PRN
Status: DISCONTINUED | OUTPATIENT
Start: 2020-01-01 | End: 2021-01-01

## 2020-01-01 RX ORDER — DEXTROSE AND SODIUM CHLORIDE 5; .9 G/100ML; G/100ML
INJECTION, SOLUTION INTRAVENOUS
Status: COMPLETED
Start: 2020-01-01 | End: 2020-01-01

## 2020-01-01 RX ORDER — NITROGLYCERIN 0.4 MG/1
0.4 TABLET SUBLINGUAL
Status: DISCONTINUED | OUTPATIENT
Start: 2020-01-01 | End: 2020-01-01 | Stop reason: HOSPADM

## 2020-01-01 RX ORDER — ATORVASTATIN CALCIUM 10 MG/1
10 TABLET, FILM COATED ORAL NIGHTLY
Status: DISCONTINUED | OUTPATIENT
Start: 2020-01-01 | End: 2020-01-01 | Stop reason: HOSPADM

## 2020-01-01 RX ORDER — PREDNISONE 1 MG/1
5 TABLET ORAL DAILY
COMMUNITY

## 2020-01-01 RX ORDER — ALBUTEROL SULFATE 2.5 MG/3ML
2.5 SOLUTION RESPIRATORY (INHALATION) EVERY 4 HOURS PRN
Status: DISCONTINUED | OUTPATIENT
Start: 2020-01-01 | End: 2020-01-01

## 2020-01-01 RX ORDER — BUDESONIDE 0.5 MG/2ML
0.5 INHALANT ORAL
Status: DISCONTINUED | OUTPATIENT
Start: 2020-01-01 | End: 2020-01-01 | Stop reason: HOSPADM

## 2020-01-01 RX ORDER — PREDNISONE 1 MG/1
5 TABLET ORAL DAILY
Status: CANCELLED | OUTPATIENT
Start: 2020-01-01

## 2020-01-01 RX ORDER — TERAZOSIN 5 MG/1
5 CAPSULE ORAL EVERY 12 HOURS SCHEDULED
Status: DISCONTINUED | OUTPATIENT
Start: 2020-01-01 | End: 2021-01-01

## 2020-01-01 RX ORDER — BUMETANIDE 0.25 MG/ML
2 INJECTION INTRAMUSCULAR; INTRAVENOUS EVERY 12 HOURS
Status: DISCONTINUED | OUTPATIENT
Start: 2020-01-01 | End: 2020-01-01

## 2020-01-01 RX ORDER — DEXTROSE AND SODIUM CHLORIDE 5; .9 G/100ML; G/100ML
20 INJECTION, SOLUTION INTRAVENOUS CONTINUOUS
Status: DISCONTINUED | OUTPATIENT
Start: 2020-01-01 | End: 2020-01-01

## 2020-01-01 RX ORDER — IPRATROPIUM BROMIDE AND ALBUTEROL SULFATE 2.5; .5 MG/3ML; MG/3ML
3 SOLUTION RESPIRATORY (INHALATION)
Status: DISCONTINUED | OUTPATIENT
Start: 2020-01-01 | End: 2020-01-01 | Stop reason: HOSPADM

## 2020-01-01 RX ORDER — POTASSIUM CHLORIDE 20 MEQ/1
40 TABLET, EXTENDED RELEASE ORAL EVERY 4 HOURS
Status: DISPENSED | OUTPATIENT
Start: 2020-01-01 | End: 2020-01-01

## 2020-01-01 RX ORDER — LACTULOSE 10 G/15ML
30 SOLUTION ORAL DAILY
Status: COMPLETED | OUTPATIENT
Start: 2020-01-01 | End: 2020-01-01

## 2020-01-01 RX ORDER — CLOPIDOGREL BISULFATE 75 MG/1
75 TABLET ORAL DAILY
Status: DISCONTINUED | OUTPATIENT
Start: 2020-01-01 | End: 2020-01-01 | Stop reason: HOSPADM

## 2020-01-01 RX ORDER — NICOTINE 21 MG/24HR
1 PATCH, TRANSDERMAL 24 HOURS TRANSDERMAL
Status: DISCONTINUED | OUTPATIENT
Start: 2020-01-01 | End: 2020-01-01 | Stop reason: HOSPADM

## 2020-01-01 RX ORDER — ISOSORBIDE MONONITRATE 30 MG/1
30 TABLET, EXTENDED RELEASE ORAL DAILY
Status: DISCONTINUED | OUTPATIENT
Start: 2020-01-01 | End: 2020-01-01 | Stop reason: HOSPADM

## 2020-01-01 RX ORDER — DEXTROSE MONOHYDRATE 25 G/50ML
25 INJECTION, SOLUTION INTRAVENOUS
Status: DISCONTINUED | OUTPATIENT
Start: 2020-01-01 | End: 2020-01-01 | Stop reason: HOSPADM

## 2020-01-01 RX ORDER — HYDROCODONE BITARTRATE AND ACETAMINOPHEN 5; 325 MG/1; MG/1
1 TABLET ORAL EVERY 6 HOURS PRN
Status: DISPENSED | OUTPATIENT
Start: 2020-01-01 | End: 2020-01-01

## 2020-01-01 RX ORDER — METOPROLOL SUCCINATE 50 MG/1
100 TABLET, EXTENDED RELEASE ORAL DAILY
Status: DISCONTINUED | OUTPATIENT
Start: 2020-01-01 | End: 2021-01-01

## 2020-01-01 RX ORDER — IPRATROPIUM BROMIDE AND ALBUTEROL SULFATE 2.5; .5 MG/3ML; MG/3ML
3 SOLUTION RESPIRATORY (INHALATION) EVERY 4 HOURS PRN
Status: DISCONTINUED | OUTPATIENT
Start: 2020-01-01 | End: 2020-01-01

## 2020-01-01 RX ORDER — MAGNESIUM SULFATE HEPTAHYDRATE 40 MG/ML
4 INJECTION, SOLUTION INTRAVENOUS AS NEEDED
Status: DISCONTINUED | OUTPATIENT
Start: 2020-01-01 | End: 2021-01-01

## 2020-01-01 RX ORDER — ACETAMINOPHEN 325 MG/1
650 TABLET ORAL EVERY 6 HOURS PRN
Status: DISCONTINUED | OUTPATIENT
Start: 2020-01-01 | End: 2020-01-01 | Stop reason: HOSPADM

## 2020-01-01 RX ORDER — ATORVASTATIN CALCIUM 10 MG/1
10 TABLET, FILM COATED ORAL NIGHTLY
Status: DISCONTINUED | OUTPATIENT
Start: 2020-01-01 | End: 2021-01-01

## 2020-01-01 RX ORDER — DEXTROSE MONOHYDRATE 25 G/50ML
25 INJECTION, SOLUTION INTRAVENOUS ONCE
Status: COMPLETED | OUTPATIENT
Start: 2020-01-01 | End: 2020-01-01

## 2020-01-01 RX ORDER — MAGNESIUM SULFATE HEPTAHYDRATE 40 MG/ML
2 INJECTION, SOLUTION INTRAVENOUS AS NEEDED
Status: DISCONTINUED | OUTPATIENT
Start: 2020-01-01 | End: 2021-01-01

## 2020-01-01 RX ORDER — ONDANSETRON 2 MG/ML
4 INJECTION INTRAMUSCULAR; INTRAVENOUS EVERY 6 HOURS PRN
Status: DISCONTINUED | OUTPATIENT
Start: 2020-01-01 | End: 2020-01-01 | Stop reason: HOSPADM

## 2020-01-01 RX ORDER — BUMETANIDE 1 MG/1
2 TABLET ORAL DAILY
Status: DISCONTINUED | OUTPATIENT
Start: 2020-01-01 | End: 2020-01-01

## 2020-01-01 RX ORDER — POTASSIUM CHLORIDE 1.5 G/1.77G
40 POWDER, FOR SOLUTION ORAL AS NEEDED
Status: DISCONTINUED | OUTPATIENT
Start: 2020-01-01 | End: 2021-01-01

## 2020-01-01 RX ORDER — THEOPHYLLINE 400 MG/1
400 TABLET, EXTENDED RELEASE ORAL DAILY
Status: DISCONTINUED | OUTPATIENT
Start: 2020-01-01 | End: 2021-01-01

## 2020-01-01 RX ORDER — MONTELUKAST SODIUM 10 MG/1
10 TABLET ORAL NIGHTLY
Status: DISCONTINUED | OUTPATIENT
Start: 2020-01-01 | End: 2020-01-01 | Stop reason: HOSPADM

## 2020-01-01 RX ORDER — PANTOPRAZOLE SODIUM 40 MG/1
40 TABLET, DELAYED RELEASE ORAL
Status: DISCONTINUED | OUTPATIENT
Start: 2020-01-01 | End: 2020-01-01 | Stop reason: HOSPADM

## 2020-01-01 RX ORDER — ALBUTEROL SULFATE 2.5 MG/3ML
2.5 SOLUTION RESPIRATORY (INHALATION) EVERY 4 HOURS PRN
Status: DISCONTINUED | OUTPATIENT
Start: 2020-01-01 | End: 2020-01-01 | Stop reason: HOSPADM

## 2020-01-01 RX ORDER — LOSARTAN POTASSIUM 25 MG/1
25 TABLET ORAL
Status: DISCONTINUED | OUTPATIENT
Start: 2020-01-01 | End: 2020-01-01

## 2020-01-01 RX ORDER — POTASSIUM CHLORIDE 750 MG/1
40 CAPSULE, EXTENDED RELEASE ORAL AS NEEDED
Status: DISCONTINUED | OUTPATIENT
Start: 2020-01-01 | End: 2021-01-01

## 2020-01-01 RX ORDER — TERAZOSIN 5 MG/1
5 CAPSULE ORAL EVERY 12 HOURS SCHEDULED
Status: DISCONTINUED | OUTPATIENT
Start: 2020-01-01 | End: 2020-01-01 | Stop reason: HOSPADM

## 2020-01-01 RX ORDER — NICOTINE POLACRILEX 4 MG
15 LOZENGE BUCCAL
Status: DISCONTINUED | OUTPATIENT
Start: 2020-01-01 | End: 2020-01-01 | Stop reason: HOSPADM

## 2020-01-01 RX ORDER — AMITRIPTYLINE HYDROCHLORIDE 25 MG/1
25 TABLET, FILM COATED ORAL NIGHTLY
Status: DISCONTINUED | OUTPATIENT
Start: 2020-01-01 | End: 2020-01-01 | Stop reason: HOSPADM

## 2020-01-01 RX ORDER — PREDNISONE 1 MG/1
5 TABLET ORAL DAILY
Status: DISCONTINUED | OUTPATIENT
Start: 2020-01-01 | End: 2021-01-01

## 2020-01-01 RX ORDER — SODIUM CHLORIDE 0.9 % (FLUSH) 0.9 %
10 SYRINGE (ML) INJECTION AS NEEDED
Status: DISCONTINUED | OUTPATIENT
Start: 2020-01-01 | End: 2020-01-01 | Stop reason: HOSPADM

## 2020-01-01 RX ORDER — ASPIRIN 81 MG/1
324 TABLET, CHEWABLE ORAL ONCE
Status: COMPLETED | OUTPATIENT
Start: 2020-01-01 | End: 2020-01-01

## 2020-01-01 RX ORDER — SODIUM CHLORIDE 0.9 % (FLUSH) 0.9 %
3-10 SYRINGE (ML) INJECTION AS NEEDED
Status: DISCONTINUED | OUTPATIENT
Start: 2020-01-01 | End: 2021-01-01 | Stop reason: HOSPADM

## 2020-01-01 RX ORDER — NICOTINE POLACRILEX 4 MG
15 LOZENGE BUCCAL
Status: DISCONTINUED | OUTPATIENT
Start: 2020-01-01 | End: 2021-01-01

## 2020-01-01 RX ORDER — BISACODYL 10 MG
10 SUPPOSITORY, RECTAL RECTAL ONCE
Status: DISCONTINUED | OUTPATIENT
Start: 2020-01-01 | End: 2020-01-01 | Stop reason: HOSPADM

## 2020-01-01 RX ORDER — METOPROLOL SUCCINATE 100 MG/1
100 TABLET, EXTENDED RELEASE ORAL DAILY
COMMUNITY

## 2020-01-01 RX ORDER — ISOSORBIDE MONONITRATE 30 MG/1
30 TABLET, EXTENDED RELEASE ORAL DAILY
Status: DISCONTINUED | OUTPATIENT
Start: 2020-01-01 | End: 2020-01-01

## 2020-01-01 RX ORDER — SODIUM CHLORIDE 0.9 % (FLUSH) 0.9 %
10 SYRINGE (ML) INJECTION EVERY 12 HOURS SCHEDULED
Status: DISCONTINUED | OUTPATIENT
Start: 2020-01-01 | End: 2020-01-01 | Stop reason: HOSPADM

## 2020-01-01 RX ORDER — SODIUM CHLORIDE 9 MG/ML
100 INJECTION, SOLUTION INTRAVENOUS CONTINUOUS
Status: DISCONTINUED | OUTPATIENT
Start: 2020-01-01 | End: 2020-01-01 | Stop reason: HOSPADM

## 2020-01-01 RX ORDER — BUMETANIDE 1 MG/1
1 TABLET ORAL DAILY
Status: DISCONTINUED | OUTPATIENT
Start: 2020-01-01 | End: 2021-01-01

## 2020-01-01 RX ORDER — THEOPHYLLINE 400 MG/1
400 TABLET, EXTENDED RELEASE ORAL DAILY
COMMUNITY

## 2020-01-01 RX ORDER — ISOSORBIDE MONONITRATE 30 MG/1
30 TABLET, EXTENDED RELEASE ORAL DAILY
COMMUNITY

## 2020-01-01 RX ORDER — ACETAMINOPHEN 325 MG/1
650 TABLET ORAL ONCE
Status: COMPLETED | OUTPATIENT
Start: 2020-01-01 | End: 2020-01-01

## 2020-01-01 RX ORDER — METHYLPREDNISOLONE SODIUM SUCCINATE 40 MG/ML
40 INJECTION, POWDER, LYOPHILIZED, FOR SOLUTION INTRAMUSCULAR; INTRAVENOUS EVERY 12 HOURS
Status: DISCONTINUED | OUTPATIENT
Start: 2020-01-01 | End: 2020-01-01

## 2020-01-01 RX ORDER — MAGNESIUM SULFATE HEPTAHYDRATE 40 MG/ML
4 INJECTION, SOLUTION INTRAVENOUS ONCE
Status: COMPLETED | OUTPATIENT
Start: 2020-01-01 | End: 2020-01-01

## 2020-01-01 RX ORDER — DOCUSATE SODIUM 100 MG/1
100 CAPSULE, LIQUID FILLED ORAL 2 TIMES DAILY
Status: DISCONTINUED | OUTPATIENT
Start: 2020-01-01 | End: 2021-01-01

## 2020-01-01 RX ORDER — METOPROLOL SUCCINATE 50 MG/1
100 TABLET, EXTENDED RELEASE ORAL DAILY
Status: DISCONTINUED | OUTPATIENT
Start: 2020-01-01 | End: 2020-01-01 | Stop reason: HOSPADM

## 2020-01-01 RX ORDER — SODIUM CHLORIDE 0.9 % (FLUSH) 0.9 %
10 SYRINGE (ML) INJECTION AS NEEDED
Status: DISCONTINUED | OUTPATIENT
Start: 2020-01-01 | End: 2021-01-01 | Stop reason: HOSPADM

## 2020-01-01 RX ORDER — IPRATROPIUM BROMIDE AND ALBUTEROL SULFATE 2.5; .5 MG/3ML; MG/3ML
3 SOLUTION RESPIRATORY (INHALATION) EVERY 6 HOURS PRN
Status: DISCONTINUED | OUTPATIENT
Start: 2020-01-01 | End: 2020-01-01

## 2020-01-01 RX ORDER — ACETAMINOPHEN 325 MG/1
650 TABLET ORAL EVERY 6 HOURS PRN
Status: DISCONTINUED | OUTPATIENT
Start: 2020-01-01 | End: 2021-01-01

## 2020-01-01 RX ORDER — AMITRIPTYLINE HYDROCHLORIDE 25 MG/1
25 TABLET, FILM COATED ORAL NIGHTLY
Status: DISCONTINUED | OUTPATIENT
Start: 2020-01-01 | End: 2021-01-01

## 2020-01-01 RX ORDER — SODIUM CHLORIDE 0.9 % (FLUSH) 0.9 %
3 SYRINGE (ML) INJECTION EVERY 12 HOURS SCHEDULED
Status: DISCONTINUED | OUTPATIENT
Start: 2020-01-01 | End: 2021-01-01

## 2020-01-01 RX ORDER — MONTELUKAST SODIUM 10 MG/1
10 TABLET ORAL NIGHTLY
Status: DISCONTINUED | OUTPATIENT
Start: 2020-01-01 | End: 2021-01-01

## 2020-01-01 RX ORDER — DEXTROSE MONOHYDRATE 25 G/50ML
25 INJECTION, SOLUTION INTRAVENOUS
Status: DISCONTINUED | OUTPATIENT
Start: 2020-01-01 | End: 2021-01-01

## 2020-01-01 RX ORDER — MONTELUKAST SODIUM 10 MG/1
10 TABLET ORAL NIGHTLY
COMMUNITY

## 2020-01-01 RX ORDER — MORPHINE SULFATE 2 MG/ML
1 INJECTION, SOLUTION INTRAMUSCULAR; INTRAVENOUS EVERY 4 HOURS PRN
Status: DISCONTINUED | OUTPATIENT
Start: 2020-01-01 | End: 2020-01-01

## 2020-01-01 RX ORDER — ALBUTEROL SULFATE 90 UG/1
2 AEROSOL, METERED RESPIRATORY (INHALATION)
Status: DISCONTINUED | OUTPATIENT
Start: 2020-01-01 | End: 2021-01-01 | Stop reason: HOSPADM

## 2020-01-01 RX ORDER — THEOPHYLLINE 400 MG/1
400 TABLET, EXTENDED RELEASE ORAL DAILY
Status: DISCONTINUED | OUTPATIENT
Start: 2020-01-01 | End: 2020-01-01 | Stop reason: HOSPADM

## 2020-01-01 RX ORDER — CLOPIDOGREL BISULFATE 75 MG/1
75 TABLET ORAL DAILY
Status: DISCONTINUED | OUTPATIENT
Start: 2020-01-01 | End: 2021-01-01

## 2020-01-01 RX ADMIN — IPRATROPIUM BROMIDE AND ALBUTEROL SULFATE 3 ML: .5; 3 SOLUTION RESPIRATORY (INHALATION) at 07:00

## 2020-01-01 RX ADMIN — TERAZOSIN HYDROCHLORIDE 5 MG: 5 CAPSULE ORAL at 22:14

## 2020-01-01 RX ADMIN — TERAZOSIN HYDROCHLORIDE 5 MG: 5 CAPSULE ORAL at 20:16

## 2020-01-01 RX ADMIN — MAGNESIUM SULFATE HEPTAHYDRATE 4 G: 40 INJECTION, SOLUTION INTRAVENOUS at 04:13

## 2020-01-01 RX ADMIN — MONTELUKAST SODIUM 10 MG: 10 TABLET, COATED ORAL at 19:33

## 2020-01-01 RX ADMIN — SODIUM CHLORIDE 100 ML/HR: 9 INJECTION, SOLUTION INTRAVENOUS at 05:57

## 2020-01-01 RX ADMIN — TERAZOSIN HYDROCHLORIDE 5 MG: 5 CAPSULE ORAL at 09:05

## 2020-01-01 RX ADMIN — METHYLPREDNISOLONE SODIUM SUCCINATE 40 MG: 40 INJECTION, POWDER, FOR SOLUTION INTRAMUSCULAR; INTRAVENOUS at 19:54

## 2020-01-01 RX ADMIN — TERAZOSIN HYDROCHLORIDE 5 MG: 5 CAPSULE ORAL at 09:04

## 2020-01-01 RX ADMIN — INSULIN ASPART 7 UNITS: 100 INJECTION, SOLUTION INTRAVENOUS; SUBCUTANEOUS at 12:28

## 2020-01-01 RX ADMIN — ENOXAPARIN SODIUM 40 MG: 80 INJECTION SUBCUTANEOUS at 05:51

## 2020-01-01 RX ADMIN — DOCUSATE SODIUM 100 MG: 100 CAPSULE ORAL at 21:28

## 2020-01-01 RX ADMIN — IPRATROPIUM BROMIDE AND ALBUTEROL SULFATE 3 ML: .5; 3 SOLUTION RESPIRATORY (INHALATION) at 10:37

## 2020-01-01 RX ADMIN — METOPROLOL SUCCINATE 100 MG: 50 TABLET, EXTENDED RELEASE ORAL at 08:31

## 2020-01-01 RX ADMIN — CLOPIDOGREL 75 MG: 75 TABLET, FILM COATED ORAL at 09:04

## 2020-01-01 RX ADMIN — METFORMIN HYDROCHLORIDE 500 MG: 500 TABLET ORAL at 08:31

## 2020-01-01 RX ADMIN — DOCUSATE SODIUM 100 MG: 100 CAPSULE ORAL at 20:12

## 2020-01-01 RX ADMIN — METHYLPREDNISOLONE SODIUM SUCCINATE 40 MG: 40 INJECTION, POWDER, FOR SOLUTION INTRAMUSCULAR; INTRAVENOUS at 08:39

## 2020-01-01 RX ADMIN — SODIUM CHLORIDE, PRESERVATIVE FREE 10 ML: 5 INJECTION INTRAVENOUS at 08:39

## 2020-01-01 RX ADMIN — IPRATROPIUM BROMIDE 0.5 MG: 0.5 SOLUTION RESPIRATORY (INHALATION) at 18:38

## 2020-01-01 RX ADMIN — BUDESONIDE 0.5 MG: 0.5 INHALANT RESPIRATORY (INHALATION) at 19:12

## 2020-01-01 RX ADMIN — METOPROLOL SUCCINATE 100 MG: 50 TABLET, EXTENDED RELEASE ORAL at 08:18

## 2020-01-01 RX ADMIN — IPRATROPIUM BROMIDE AND ALBUTEROL SULFATE 3 ML: .5; 3 SOLUTION RESPIRATORY (INHALATION) at 14:23

## 2020-01-01 RX ADMIN — CLOPIDOGREL 75 MG: 75 TABLET, FILM COATED ORAL at 08:11

## 2020-01-01 RX ADMIN — ARFORMOTEROL TARTRATE 15 MCG: 15 SOLUTION RESPIRATORY (INHALATION) at 18:20

## 2020-01-01 RX ADMIN — Medication: at 20:17

## 2020-01-01 RX ADMIN — IPRATROPIUM BROMIDE 0.5 MG: 0.5 SOLUTION RESPIRATORY (INHALATION) at 18:04

## 2020-01-01 RX ADMIN — IPRATROPIUM BROMIDE AND ALBUTEROL SULFATE 3 ML: .5; 3 SOLUTION RESPIRATORY (INHALATION) at 22:21

## 2020-01-01 RX ADMIN — MONTELUKAST SODIUM 10 MG: 10 TABLET, COATED ORAL at 21:13

## 2020-01-01 RX ADMIN — INSULIN ASPART 2 UNITS: 100 INJECTION, SOLUTION INTRAVENOUS; SUBCUTANEOUS at 12:33

## 2020-01-01 RX ADMIN — INSULIN ASPART 2 UNITS: 100 INJECTION, SOLUTION INTRAVENOUS; SUBCUTANEOUS at 18:21

## 2020-01-01 RX ADMIN — SODIUM CHLORIDE, PRESERVATIVE FREE 3 ML: 5 INJECTION INTRAVENOUS at 20:17

## 2020-01-01 RX ADMIN — THEOPHYLLINE 400 MG: 400 TABLET, EXTENDED RELEASE ORAL at 09:05

## 2020-01-01 RX ADMIN — ISOSORBIDE MONONITRATE 30 MG: 30 TABLET, EXTENDED RELEASE ORAL at 08:46

## 2020-01-01 RX ADMIN — ISOSORBIDE MONONITRATE 30 MG: 30 TABLET, EXTENDED RELEASE ORAL at 09:41

## 2020-01-01 RX ADMIN — MONTELUKAST SODIUM 10 MG: 10 TABLET, COATED ORAL at 21:28

## 2020-01-01 RX ADMIN — AMITRIPTYLINE HYDROCHLORIDE 25 MG: 25 TABLET, FILM COATED ORAL at 22:14

## 2020-01-01 RX ADMIN — SODIUM CHLORIDE, PRESERVATIVE FREE 10 ML: 5 INJECTION INTRAVENOUS at 09:33

## 2020-01-01 RX ADMIN — TERAZOSIN HYDROCHLORIDE 5 MG: 5 CAPSULE ORAL at 08:31

## 2020-01-01 RX ADMIN — AMITRIPTYLINE HYDROCHLORIDE 25 MG: 25 TABLET, FILM COATED ORAL at 19:54

## 2020-01-01 RX ADMIN — INSULIN DETEMIR 5 UNITS: 100 INJECTION, SOLUTION SUBCUTANEOUS at 09:05

## 2020-01-01 RX ADMIN — ENOXAPARIN SODIUM 40 MG: 80 INJECTION SUBCUTANEOUS at 05:52

## 2020-01-01 RX ADMIN — INSULIN DETEMIR 15 UNITS: 100 INJECTION, SOLUTION SUBCUTANEOUS at 11:36

## 2020-01-01 RX ADMIN — SODIUM CHLORIDE, PRESERVATIVE FREE 3 ML: 5 INJECTION INTRAVENOUS at 09:05

## 2020-01-01 RX ADMIN — METFORMIN HYDROCHLORIDE 1000 MG: 500 TABLET ORAL at 08:57

## 2020-01-01 RX ADMIN — PREDNISONE 40 MG: 20 TABLET ORAL at 09:04

## 2020-01-01 RX ADMIN — ACETAMINOPHEN 650 MG: 325 TABLET ORAL at 11:06

## 2020-01-01 RX ADMIN — INSULIN DETEMIR 12 UNITS: 100 INJECTION, SOLUTION SUBCUTANEOUS at 07:48

## 2020-01-01 RX ADMIN — MONTELUKAST SODIUM 10 MG: 10 TABLET, COATED ORAL at 20:12

## 2020-01-01 RX ADMIN — OFLOXACIN 50000 UNITS: 300 TABLET, COATED ORAL at 13:26

## 2020-01-01 RX ADMIN — INSULIN ASPART 5 UNITS: 100 INJECTION, SOLUTION INTRAVENOUS; SUBCUTANEOUS at 11:38

## 2020-01-01 RX ADMIN — ACETAMINOPHEN 650 MG: 325 TABLET ORAL at 11:02

## 2020-01-01 RX ADMIN — SODIUM CHLORIDE, PRESERVATIVE FREE 10 ML: 5 INJECTION INTRAVENOUS at 19:55

## 2020-01-01 RX ADMIN — DOCUSATE SODIUM 100 MG: 100 CAPSULE ORAL at 20:16

## 2020-01-01 RX ADMIN — PREDNISONE 5 MG: 5 TABLET ORAL at 08:42

## 2020-01-01 RX ADMIN — VANCOMYCIN HYDROCHLORIDE 1500 MG: 10 INJECTION, POWDER, LYOPHILIZED, FOR SOLUTION INTRAVENOUS at 17:10

## 2020-01-01 RX ADMIN — ONDANSETRON 4 MG: 2 INJECTION INTRAMUSCULAR; INTRAVENOUS at 12:40

## 2020-01-01 RX ADMIN — TERAZOSIN HYDROCHLORIDE 5 MG: 5 CAPSULE ORAL at 08:57

## 2020-01-01 RX ADMIN — METFORMIN HYDROCHLORIDE 1000 MG: 500 TABLET ORAL at 09:05

## 2020-01-01 RX ADMIN — ATORVASTATIN CALCIUM 10 MG: 10 TABLET, FILM COATED ORAL at 19:33

## 2020-01-01 RX ADMIN — METFORMIN HYDROCHLORIDE 1000 MG: 500 TABLET ORAL at 09:04

## 2020-01-01 RX ADMIN — ENOXAPARIN SODIUM 40 MG: 40 INJECTION SUBCUTANEOUS at 21:21

## 2020-01-01 RX ADMIN — BUDESONIDE 0.5 MG: 0.5 INHALANT RESPIRATORY (INHALATION) at 19:31

## 2020-01-01 RX ADMIN — DOCUSATE SODIUM 100 MG: 100 CAPSULE ORAL at 21:13

## 2020-01-01 RX ADMIN — SODIUM CHLORIDE, PRESERVATIVE FREE 3 ML: 5 INJECTION INTRAVENOUS at 08:14

## 2020-01-01 RX ADMIN — VANCOMYCIN HYDROCHLORIDE 1500 MG: 10 INJECTION, POWDER, LYOPHILIZED, FOR SOLUTION INTRAVENOUS at 09:17

## 2020-01-01 RX ADMIN — IPRATROPIUM BROMIDE AND ALBUTEROL SULFATE 3 ML: .5; 3 SOLUTION RESPIRATORY (INHALATION) at 14:45

## 2020-01-01 RX ADMIN — LISINOPRIL: 10 TABLET ORAL at 10:00

## 2020-01-01 RX ADMIN — SODIUM CHLORIDE, PRESERVATIVE FREE 3 ML: 5 INJECTION INTRAVENOUS at 21:02

## 2020-01-01 RX ADMIN — ENOXAPARIN SODIUM 40 MG: 80 INJECTION SUBCUTANEOUS at 05:33

## 2020-01-01 RX ADMIN — IPRATROPIUM BROMIDE 0.5 MG: 0.5 SOLUTION RESPIRATORY (INHALATION) at 18:41

## 2020-01-01 RX ADMIN — AMITRIPTYLINE HYDROCHLORIDE 25 MG: 25 TABLET, FILM COATED ORAL at 20:16

## 2020-01-01 RX ADMIN — BUMETANIDE 1 MG: 1 TABLET ORAL at 08:57

## 2020-01-01 RX ADMIN — SODIUM CHLORIDE, PRESERVATIVE FREE 10 ML: 5 INJECTION INTRAVENOUS at 20:58

## 2020-01-01 RX ADMIN — METFORMIN HYDROCHLORIDE 1000 MG: 500 TABLET ORAL at 08:11

## 2020-01-01 RX ADMIN — IPRATROPIUM BROMIDE 0.5 MG: 0.5 SOLUTION RESPIRATORY (INHALATION) at 13:13

## 2020-01-01 RX ADMIN — ALBUTEROL SULFATE 2 PUFF: 90 AEROSOL, METERED RESPIRATORY (INHALATION) at 17:51

## 2020-01-01 RX ADMIN — INSULIN ASPART 5 UNITS: 100 INJECTION, SOLUTION INTRAVENOUS; SUBCUTANEOUS at 09:16

## 2020-01-01 RX ADMIN — DOCUSATE SODIUM 100 MG: 100 CAPSULE ORAL at 08:57

## 2020-01-01 RX ADMIN — METOPROLOL SUCCINATE 100 MG: 50 TABLET, EXTENDED RELEASE ORAL at 09:03

## 2020-01-01 RX ADMIN — IPRATROPIUM BROMIDE 0.5 MG: 0.5 SOLUTION RESPIRATORY (INHALATION) at 12:32

## 2020-01-01 RX ADMIN — IPRATROPIUM BROMIDE 0.5 MG: 0.5 SOLUTION RESPIRATORY (INHALATION) at 19:11

## 2020-01-01 RX ADMIN — LOSARTAN POTASSIUM 25 MG: 25 TABLET, FILM COATED ORAL at 08:47

## 2020-01-01 RX ADMIN — INSULIN DETEMIR 5 UNITS: 100 INJECTION, SOLUTION SUBCUTANEOUS at 11:07

## 2020-01-01 RX ADMIN — PREDNISONE 5 MG: 5 TABLET ORAL at 08:11

## 2020-01-01 RX ADMIN — IPRATROPIUM BROMIDE AND ALBUTEROL SULFATE 3 ML: .5; 3 SOLUTION RESPIRATORY (INHALATION) at 06:59

## 2020-01-01 RX ADMIN — ISOSORBIDE MONONITRATE 30 MG: 30 TABLET, EXTENDED RELEASE ORAL at 08:18

## 2020-01-01 RX ADMIN — ENOXAPARIN SODIUM 40 MG: 40 INJECTION SUBCUTANEOUS at 20:12

## 2020-01-01 RX ADMIN — INSULIN ASPART 4 UNITS: 100 INJECTION, SOLUTION INTRAVENOUS; SUBCUTANEOUS at 09:05

## 2020-01-01 RX ADMIN — BUDESONIDE 0.5 MG: 0.5 INHALANT RESPIRATORY (INHALATION) at 10:25

## 2020-01-01 RX ADMIN — IPRATROPIUM BROMIDE 0.5 MG: 0.5 SOLUTION RESPIRATORY (INHALATION) at 13:17

## 2020-01-01 RX ADMIN — ACETAMINOPHEN 650 MG: 325 TABLET ORAL at 01:21

## 2020-01-01 RX ADMIN — ATORVASTATIN CALCIUM 10 MG: 10 TABLET, FILM COATED ORAL at 22:32

## 2020-01-01 RX ADMIN — IPRATROPIUM BROMIDE 0.5 MG: 0.5 SOLUTION RESPIRATORY (INHALATION) at 12:49

## 2020-01-01 RX ADMIN — SODIUM CHLORIDE, PRESERVATIVE FREE 3 ML: 5 INJECTION INTRAVENOUS at 08:31

## 2020-01-01 RX ADMIN — DOCUSATE SODIUM 100 MG: 100 CAPSULE ORAL at 21:11

## 2020-01-01 RX ADMIN — ENOXAPARIN SODIUM 40 MG: 40 INJECTION SUBCUTANEOUS at 21:08

## 2020-01-01 RX ADMIN — BUMETANIDE 2 MG: 0.25 INJECTION INTRAMUSCULAR; INTRAVENOUS at 05:34

## 2020-01-01 RX ADMIN — THEOPHYLLINE 400 MG: 400 TABLET, EXTENDED RELEASE ORAL at 09:04

## 2020-01-01 RX ADMIN — ENOXAPARIN SODIUM 80 MG: 80 INJECTION SUBCUTANEOUS at 17:40

## 2020-01-01 RX ADMIN — IPRATROPIUM BROMIDE 0.5 MG: 0.5 SOLUTION RESPIRATORY (INHALATION) at 06:26

## 2020-01-01 RX ADMIN — BUDESONIDE 0.5 MG: 0.5 INHALANT RESPIRATORY (INHALATION) at 06:53

## 2020-01-01 RX ADMIN — ALBUTEROL SULFATE 2 PUFF: 90 AEROSOL, METERED RESPIRATORY (INHALATION) at 08:58

## 2020-01-01 RX ADMIN — SODIUM CHLORIDE, PRESERVATIVE FREE 3 ML: 5 INJECTION INTRAVENOUS at 08:43

## 2020-01-01 RX ADMIN — TERAZOSIN HYDROCHLORIDE 5 MG: 5 CAPSULE ORAL at 22:32

## 2020-01-01 RX ADMIN — AMITRIPTYLINE HYDROCHLORIDE 25 MG: 25 TABLET, FILM COATED ORAL at 20:12

## 2020-01-01 RX ADMIN — CLOPIDOGREL 75 MG: 75 TABLET, FILM COATED ORAL at 09:05

## 2020-01-01 RX ADMIN — IPRATROPIUM BROMIDE AND ALBUTEROL SULFATE 3 ML: .5; 3 SOLUTION RESPIRATORY (INHALATION) at 00:40

## 2020-01-01 RX ADMIN — TERAZOSIN HYDROCHLORIDE 5 MG: 5 CAPSULE ORAL at 21:28

## 2020-01-01 RX ADMIN — ATORVASTATIN CALCIUM 10 MG: 10 TABLET, FILM COATED ORAL at 21:11

## 2020-01-01 RX ADMIN — MONTELUKAST SODIUM 10 MG: 10 TABLET, COATED ORAL at 21:01

## 2020-01-01 RX ADMIN — PANTOPRAZOLE SODIUM 40 MG: 40 TABLET, DELAYED RELEASE ORAL at 05:32

## 2020-01-01 RX ADMIN — HYDROCODONE BITARTRATE AND ACETAMINOPHEN 1 TABLET: 5; 325 TABLET ORAL at 21:52

## 2020-01-01 RX ADMIN — PANTOPRAZOLE SODIUM 40 MG: 40 TABLET, DELAYED RELEASE ORAL at 05:02

## 2020-01-01 RX ADMIN — IPRATROPIUM BROMIDE 0.5 MG: 0.5 SOLUTION RESPIRATORY (INHALATION) at 06:36

## 2020-01-01 RX ADMIN — IPRATROPIUM BROMIDE 0.5 MG: 0.5 SOLUTION RESPIRATORY (INHALATION) at 18:40

## 2020-01-01 RX ADMIN — CLOPIDOGREL 75 MG: 75 TABLET, FILM COATED ORAL at 08:57

## 2020-01-01 RX ADMIN — ENOXAPARIN SODIUM 80 MG: 80 INJECTION SUBCUTANEOUS at 03:54

## 2020-01-01 RX ADMIN — TERAZOSIN HYDROCHLORIDE 5 MG: 5 CAPSULE ORAL at 21:12

## 2020-01-01 RX ADMIN — THEOPHYLLINE 400 MG: 400 TABLET, EXTENDED RELEASE ORAL at 08:21

## 2020-01-01 RX ADMIN — METFORMIN HYDROCHLORIDE 1000 MG: 500 TABLET ORAL at 17:50

## 2020-01-01 RX ADMIN — TERAZOSIN HYDROCHLORIDE 5 MG: 5 CAPSULE ORAL at 21:08

## 2020-01-01 RX ADMIN — DOCUSATE SODIUM 100 MG: 100 CAPSULE ORAL at 19:33

## 2020-01-01 RX ADMIN — IPRATROPIUM BROMIDE AND ALBUTEROL SULFATE 3 ML: .5; 3 SOLUTION RESPIRATORY (INHALATION) at 10:10

## 2020-01-01 RX ADMIN — HYDROCODONE BITARTRATE AND ACETAMINOPHEN 1 TABLET: 5; 325 TABLET ORAL at 11:44

## 2020-01-01 RX ADMIN — Medication 5 MG: at 20:12

## 2020-01-01 RX ADMIN — IPRATROPIUM BROMIDE 0.5 MG: 0.5 SOLUTION RESPIRATORY (INHALATION) at 00:44

## 2020-01-01 RX ADMIN — IPRATROPIUM BROMIDE 0.5 MG: 0.5 SOLUTION RESPIRATORY (INHALATION) at 06:32

## 2020-01-01 RX ADMIN — BUDESONIDE 0.5 MG: 0.5 INHALANT RESPIRATORY (INHALATION) at 10:09

## 2020-01-01 RX ADMIN — BUDESONIDE 0.5 MG: 0.5 INHALANT RESPIRATORY (INHALATION) at 20:55

## 2020-01-01 RX ADMIN — TERAZOSIN HYDROCHLORIDE 5 MG: 5 CAPSULE ORAL at 08:11

## 2020-01-01 RX ADMIN — HYDROCODONE BITARTRATE AND ACETAMINOPHEN 1 TABLET: 5; 325 TABLET ORAL at 23:48

## 2020-01-01 RX ADMIN — ATORVASTATIN CALCIUM 10 MG: 10 TABLET, FILM COATED ORAL at 21:08

## 2020-01-01 RX ADMIN — TERAZOSIN HYDROCHLORIDE 5 MG: 5 CAPSULE ORAL at 08:21

## 2020-01-01 RX ADMIN — SODIUM CHLORIDE 100 ML/HR: 9 INJECTION, SOLUTION INTRAVENOUS at 20:58

## 2020-01-01 RX ADMIN — POLYETHYLENE GLYCOL (3350) 17 G: 17 POWDER, FOR SOLUTION ORAL at 09:04

## 2020-01-01 RX ADMIN — METOPROLOL SUCCINATE 100 MG: 50 TABLET, EXTENDED RELEASE ORAL at 09:05

## 2020-01-01 RX ADMIN — TERAZOSIN HYDROCHLORIDE 5 MG: 5 CAPSULE ORAL at 08:46

## 2020-01-01 RX ADMIN — AMITRIPTYLINE HYDROCHLORIDE 25 MG: 25 TABLET, FILM COATED ORAL at 20:33

## 2020-01-01 RX ADMIN — PANTOPRAZOLE SODIUM 40 MG: 40 TABLET, DELAYED RELEASE ORAL at 07:10

## 2020-01-01 RX ADMIN — DOCUSATE SODIUM 100 MG: 100 CAPSULE ORAL at 20:17

## 2020-01-01 RX ADMIN — METOPROLOL SUCCINATE 100 MG: 50 TABLET, EXTENDED RELEASE ORAL at 08:57

## 2020-01-01 RX ADMIN — METOPROLOL SUCCINATE 100 MG: 50 TABLET, EXTENDED RELEASE ORAL at 08:46

## 2020-01-01 RX ADMIN — ENOXAPARIN SODIUM 80 MG: 80 INJECTION SUBCUTANEOUS at 17:09

## 2020-01-01 RX ADMIN — VANCOMYCIN HYDROCHLORIDE 2000 MG: 1 INJECTION, POWDER, LYOPHILIZED, FOR SOLUTION INTRAVENOUS at 22:35

## 2020-01-01 RX ADMIN — ARFORMOTEROL TARTRATE 15 MCG: 15 SOLUTION RESPIRATORY (INHALATION) at 20:55

## 2020-01-01 RX ADMIN — BUMETANIDE 1 MG: 1 TABLET ORAL at 08:14

## 2020-01-01 RX ADMIN — INSULIN ASPART 6 UNITS: 100 INJECTION, SOLUTION INTRAVENOUS; SUBCUTANEOUS at 16:54

## 2020-01-01 RX ADMIN — INSULIN ASPART 4 UNITS: 100 INJECTION, SOLUTION INTRAVENOUS; SUBCUTANEOUS at 07:47

## 2020-01-01 RX ADMIN — TERAZOSIN HYDROCHLORIDE 5 MG: 5 CAPSULE ORAL at 20:33

## 2020-01-01 RX ADMIN — POTASSIUM CHLORIDE 40 MEQ: 1500 TABLET, EXTENDED RELEASE ORAL at 18:05

## 2020-01-01 RX ADMIN — LOSARTAN POTASSIUM 25 MG: 25 TABLET, FILM COATED ORAL at 17:40

## 2020-01-01 RX ADMIN — MONTELUKAST SODIUM 10 MG: 10 TABLET, COATED ORAL at 21:08

## 2020-01-01 RX ADMIN — ATORVASTATIN CALCIUM 10 MG: 10 TABLET, FILM COATED ORAL at 21:28

## 2020-01-01 RX ADMIN — PANTOPRAZOLE SODIUM 40 MG: 40 TABLET, DELAYED RELEASE ORAL at 05:56

## 2020-01-01 RX ADMIN — ENOXAPARIN SODIUM 40 MG: 40 INJECTION SUBCUTANEOUS at 19:53

## 2020-01-01 RX ADMIN — ATORVASTATIN CALCIUM 10 MG: 10 TABLET, FILM COATED ORAL at 20:33

## 2020-01-01 RX ADMIN — AMITRIPTYLINE HYDROCHLORIDE 25 MG: 25 TABLET, FILM COATED ORAL at 22:32

## 2020-01-01 RX ADMIN — ENOXAPARIN SODIUM 80 MG: 80 INJECTION SUBCUTANEOUS at 05:22

## 2020-01-01 RX ADMIN — MORPHINE SULFATE 1 MG: 2 INJECTION, SOLUTION INTRAMUSCULAR; INTRAVENOUS at 17:22

## 2020-01-01 RX ADMIN — CLOPIDOGREL 75 MG: 75 TABLET, FILM COATED ORAL at 13:26

## 2020-01-01 RX ADMIN — DOCUSATE SODIUM 100 MG: 100 CAPSULE ORAL at 08:21

## 2020-01-01 RX ADMIN — BUMETANIDE 1 MG: 1 TABLET ORAL at 08:21

## 2020-01-01 RX ADMIN — INSULIN ASPART 2 UNITS: 100 INJECTION, SOLUTION INTRAVENOUS; SUBCUTANEOUS at 18:05

## 2020-01-01 RX ADMIN — BUMETANIDE 2 MG: 0.25 INJECTION INTRAMUSCULAR; INTRAVENOUS at 17:22

## 2020-01-01 RX ADMIN — ISOSORBIDE MONONITRATE 30 MG: 30 TABLET, EXTENDED RELEASE ORAL at 08:58

## 2020-01-01 RX ADMIN — IPRATROPIUM BROMIDE 0.5 MG: 0.5 SOLUTION RESPIRATORY (INHALATION) at 23:52

## 2020-01-01 RX ADMIN — BUMETANIDE 2 MG: 1 TABLET ORAL at 09:03

## 2020-01-01 RX ADMIN — IPRATROPIUM BROMIDE 0.5 MG: 0.5 SOLUTION RESPIRATORY (INHALATION) at 00:30

## 2020-01-01 RX ADMIN — MAGNESIUM SULFATE 4 G: 4 INJECTION INTRAVENOUS at 11:35

## 2020-01-01 RX ADMIN — ISOSORBIDE MONONITRATE 30 MG: 30 TABLET, EXTENDED RELEASE ORAL at 09:05

## 2020-01-01 RX ADMIN — DEXTROSE AND SODIUM CHLORIDE: 5; 900 INJECTION, SOLUTION INTRAVENOUS at 14:15

## 2020-01-01 RX ADMIN — INSULIN DETEMIR 15 UNITS: 100 INJECTION, SOLUTION SUBCUTANEOUS at 08:54

## 2020-01-01 RX ADMIN — INSULIN ASPART 4 UNITS: 100 INJECTION, SOLUTION INTRAVENOUS; SUBCUTANEOUS at 11:39

## 2020-01-01 RX ADMIN — IPRATROPIUM BROMIDE 0.5 MG: 0.5 SOLUTION RESPIRATORY (INHALATION) at 13:04

## 2020-01-01 RX ADMIN — MONTELUKAST SODIUM 10 MG: 10 TABLET, COATED ORAL at 22:32

## 2020-01-01 RX ADMIN — OFLOXACIN 50000 UNITS: 300 TABLET, COATED ORAL at 08:58

## 2020-01-01 RX ADMIN — AMITRIPTYLINE HYDROCHLORIDE 25 MG: 25 TABLET, FILM COATED ORAL at 21:13

## 2020-01-01 RX ADMIN — INSULIN ASPART 4 UNITS: 100 INJECTION, SOLUTION INTRAVENOUS; SUBCUTANEOUS at 16:51

## 2020-01-01 RX ADMIN — TERAZOSIN HYDROCHLORIDE 5 MG: 5 CAPSULE ORAL at 19:54

## 2020-01-01 RX ADMIN — MONTELUKAST SODIUM 10 MG: 10 TABLET, COATED ORAL at 19:54

## 2020-01-01 RX ADMIN — Medication: at 09:02

## 2020-01-01 RX ADMIN — SODIUM CHLORIDE, PRESERVATIVE FREE 3 ML: 5 INJECTION INTRAVENOUS at 20:34

## 2020-01-01 RX ADMIN — BUDESONIDE 0.5 MG: 0.5 INHALANT RESPIRATORY (INHALATION) at 10:05

## 2020-01-01 RX ADMIN — VANCOMYCIN HYDROCHLORIDE 1500 MG: 10 INJECTION, POWDER, LYOPHILIZED, FOR SOLUTION INTRAVENOUS at 17:22

## 2020-01-01 RX ADMIN — ARFORMOTEROL TARTRATE 15 MCG: 15 SOLUTION RESPIRATORY (INHALATION) at 10:25

## 2020-01-01 RX ADMIN — TERAZOSIN HYDROCHLORIDE 5 MG: 5 CAPSULE ORAL at 21:01

## 2020-01-01 RX ADMIN — IPRATROPIUM BROMIDE 0.5 MG: 0.5 SOLUTION RESPIRATORY (INHALATION) at 06:45

## 2020-01-01 RX ADMIN — DOCUSATE SODIUM 100 MG: 100 CAPSULE ORAL at 09:05

## 2020-01-01 RX ADMIN — ISOSORBIDE MONONITRATE 30 MG: 30 TABLET, EXTENDED RELEASE ORAL at 09:04

## 2020-01-01 RX ADMIN — IPRATROPIUM BROMIDE 0.5 MG: 0.5 SOLUTION RESPIRATORY (INHALATION) at 06:40

## 2020-01-01 RX ADMIN — METOPROLOL SUCCINATE 100 MG: 50 TABLET, EXTENDED RELEASE ORAL at 13:27

## 2020-01-01 RX ADMIN — IOPAMIDOL 100 ML: 755 INJECTION, SOLUTION INTRAVENOUS at 15:26

## 2020-01-01 RX ADMIN — HYDROCODONE BITARTRATE AND ACETAMINOPHEN 1 TABLET: 5; 325 TABLET ORAL at 12:20

## 2020-01-01 RX ADMIN — ALBUTEROL SULFATE 2 PUFF: 90 AEROSOL, METERED RESPIRATORY (INHALATION) at 13:35

## 2020-01-01 RX ADMIN — IPRATROPIUM BROMIDE 0.5 MG: 0.5 SOLUTION RESPIRATORY (INHALATION) at 23:57

## 2020-01-01 RX ADMIN — VANCOMYCIN HYDROCHLORIDE 1500 MG: 10 INJECTION, POWDER, LYOPHILIZED, FOR SOLUTION INTRAVENOUS at 17:40

## 2020-01-01 RX ADMIN — ATORVASTATIN CALCIUM 10 MG: 10 TABLET, FILM COATED ORAL at 20:16

## 2020-01-01 RX ADMIN — ACETAMINOPHEN 650 MG: 325 TABLET ORAL at 13:37

## 2020-01-01 RX ADMIN — VANCOMYCIN HYDROCHLORIDE 1500 MG: 10 INJECTION, POWDER, LYOPHILIZED, FOR SOLUTION INTRAVENOUS at 16:54

## 2020-01-01 RX ADMIN — PREDNISONE 5 MG: 5 TABLET ORAL at 09:05

## 2020-01-01 RX ADMIN — ATORVASTATIN CALCIUM 10 MG: 10 TABLET, FILM COATED ORAL at 21:15

## 2020-01-01 RX ADMIN — METFORMIN HYDROCHLORIDE 1000 MG: 500 TABLET ORAL at 18:05

## 2020-01-01 RX ADMIN — PREDNISONE 5 MG: 5 TABLET ORAL at 08:21

## 2020-01-01 RX ADMIN — ENOXAPARIN SODIUM 80 MG: 80 INJECTION SUBCUTANEOUS at 05:45

## 2020-01-01 RX ADMIN — THEOPHYLLINE 400 MG: 400 TABLET, EXTENDED RELEASE ORAL at 08:11

## 2020-01-01 RX ADMIN — ARFORMOTEROL TARTRATE 15 MCG: 15 SOLUTION RESPIRATORY (INHALATION) at 10:09

## 2020-01-01 RX ADMIN — ENOXAPARIN SODIUM 40 MG: 40 INJECTION SUBCUTANEOUS at 20:58

## 2020-01-01 RX ADMIN — MONTELUKAST SODIUM 10 MG: 10 TABLET, COATED ORAL at 22:14

## 2020-01-01 RX ADMIN — IPRATROPIUM BROMIDE 0.5 MG: 0.5 SOLUTION RESPIRATORY (INHALATION) at 06:50

## 2020-01-01 RX ADMIN — DOCUSATE SODIUM 100 MG: 100 CAPSULE ORAL at 08:58

## 2020-01-01 RX ADMIN — ISOSORBIDE MONONITRATE 30 MG: 30 TABLET, EXTENDED RELEASE ORAL at 09:18

## 2020-01-01 RX ADMIN — IPRATROPIUM BROMIDE 0.5 MG: 0.5 SOLUTION RESPIRATORY (INHALATION) at 12:24

## 2020-01-01 RX ADMIN — ASPIRIN 324 MG: 81 TABLET, CHEWABLE ORAL at 15:47

## 2020-01-01 RX ADMIN — INSULIN ASPART 4 UNITS: 100 INJECTION, SOLUTION INTRAVENOUS; SUBCUTANEOUS at 17:19

## 2020-01-01 RX ADMIN — INSULIN ASPART 6 UNITS: 100 INJECTION, SOLUTION INTRAVENOUS; SUBCUTANEOUS at 12:41

## 2020-01-01 RX ADMIN — ACETAMINOPHEN 650 MG: 325 TABLET ORAL at 16:13

## 2020-01-01 RX ADMIN — METFORMIN HYDROCHLORIDE 1000 MG: 500 TABLET ORAL at 17:37

## 2020-01-01 RX ADMIN — DOCUSATE SODIUM 100 MG: 100 CAPSULE ORAL at 08:18

## 2020-01-01 RX ADMIN — ATORVASTATIN CALCIUM 10 MG: 10 TABLET, FILM COATED ORAL at 21:13

## 2020-01-01 RX ADMIN — INSULIN ASPART 3 UNITS: 100 INJECTION, SOLUTION INTRAVENOUS; SUBCUTANEOUS at 10:55

## 2020-01-01 RX ADMIN — SODIUM CHLORIDE 500 ML: 9 INJECTION, SOLUTION INTRAVENOUS at 10:30

## 2020-01-01 RX ADMIN — IPRATROPIUM BROMIDE AND ALBUTEROL SULFATE 3 ML: .5; 3 SOLUTION RESPIRATORY (INHALATION) at 06:52

## 2020-01-01 RX ADMIN — ISOSORBIDE MONONITRATE 30 MG: 30 TABLET, EXTENDED RELEASE ORAL at 08:31

## 2020-01-01 RX ADMIN — THEOPHYLLINE 400 MG: 400 TABLET, EXTENDED RELEASE ORAL at 08:18

## 2020-01-01 RX ADMIN — METOPROLOL SUCCINATE 100 MG: 50 TABLET, EXTENDED RELEASE ORAL at 09:04

## 2020-01-01 RX ADMIN — ARFORMOTEROL TARTRATE 15 MCG: 15 SOLUTION RESPIRATORY (INHALATION) at 19:12

## 2020-01-01 RX ADMIN — SODIUM CHLORIDE, PRESERVATIVE FREE 10 ML: 5 INJECTION INTRAVENOUS at 09:20

## 2020-01-01 RX ADMIN — ATORVASTATIN CALCIUM 10 MG: 10 TABLET, FILM COATED ORAL at 19:54

## 2020-01-01 RX ADMIN — METOPROLOL SUCCINATE 100 MG: 50 TABLET, EXTENDED RELEASE ORAL at 08:58

## 2020-01-01 RX ADMIN — IPRATROPIUM BROMIDE 0.5 MG: 0.5 SOLUTION RESPIRATORY (INHALATION) at 07:48

## 2020-01-01 RX ADMIN — DEXTROSE MONOHYDRATE 25 G: 25 INJECTION, SOLUTION INTRAVENOUS at 04:23

## 2020-01-01 RX ADMIN — HYDROCODONE BITARTRATE AND ACETAMINOPHEN 1 TABLET: 5; 325 TABLET ORAL at 20:33

## 2020-01-01 RX ADMIN — IPRATROPIUM BROMIDE AND ALBUTEROL SULFATE 3 ML: .5; 3 SOLUTION RESPIRATORY (INHALATION) at 06:43

## 2020-01-01 RX ADMIN — IPRATROPIUM BROMIDE 0.5 MG: 0.5 SOLUTION RESPIRATORY (INHALATION) at 00:12

## 2020-01-01 RX ADMIN — VANCOMYCIN HYDROCHLORIDE 1750 MG: 5 INJECTION, POWDER, LYOPHILIZED, FOR SOLUTION INTRAVENOUS at 07:10

## 2020-01-01 RX ADMIN — THEOPHYLLINE 400 MG: 400 TABLET, EXTENDED RELEASE ORAL at 08:43

## 2020-01-01 RX ADMIN — METOPROLOL SUCCINATE 100 MG: 50 TABLET, EXTENDED RELEASE ORAL at 09:06

## 2020-01-01 RX ADMIN — INSULIN ASPART 4 UNITS: 100 INJECTION, SOLUTION INTRAVENOUS; SUBCUTANEOUS at 16:53

## 2020-01-01 RX ADMIN — ACETAMINOPHEN 650 MG: 325 TABLET ORAL at 09:10

## 2020-01-01 RX ADMIN — SODIUM CHLORIDE 500 ML: 9 INJECTION, SOLUTION INTRAVENOUS at 20:02

## 2020-01-01 RX ADMIN — HYDROCODONE BITARTRATE AND ACETAMINOPHEN 1 TABLET: 5; 325 TABLET ORAL at 11:38

## 2020-01-01 RX ADMIN — IPRATROPIUM BROMIDE 0.5 MG: 0.5 SOLUTION RESPIRATORY (INHALATION) at 01:12

## 2020-01-01 RX ADMIN — CLOPIDOGREL 75 MG: 75 TABLET, FILM COATED ORAL at 09:18

## 2020-01-01 RX ADMIN — IPRATROPIUM BROMIDE AND ALBUTEROL SULFATE 3 ML: .5; 3 SOLUTION RESPIRATORY (INHALATION) at 23:58

## 2020-01-01 RX ADMIN — LOSARTAN POTASSIUM 25 MG: 25 TABLET, FILM COATED ORAL at 09:03

## 2020-01-01 RX ADMIN — METFORMIN HYDROCHLORIDE 1000 MG: 500 TABLET ORAL at 17:21

## 2020-01-01 RX ADMIN — LACTULOSE 30 G: 10 SOLUTION ORAL at 21:22

## 2020-01-01 RX ADMIN — TERAZOSIN HYDROCHLORIDE 5 MG: 5 CAPSULE ORAL at 21:13

## 2020-01-01 RX ADMIN — METHYLPREDNISOLONE SODIUM SUCCINATE 40 MG: 40 INJECTION, POWDER, FOR SOLUTION INTRAMUSCULAR; INTRAVENOUS at 21:21

## 2020-01-01 RX ADMIN — INSULIN DETEMIR 5 UNITS: 100 INJECTION, SOLUTION SUBCUTANEOUS at 09:08

## 2020-01-01 RX ADMIN — MONTELUKAST SODIUM 10 MG: 10 TABLET, COATED ORAL at 21:11

## 2020-01-01 RX ADMIN — INSULIN ASPART 7 UNITS: 100 INJECTION, SOLUTION INTRAVENOUS; SUBCUTANEOUS at 08:30

## 2020-01-01 RX ADMIN — DOCUSATE SODIUM 100 MG: 100 CAPSULE ORAL at 22:14

## 2020-01-01 RX ADMIN — CLOPIDOGREL 75 MG: 75 TABLET, FILM COATED ORAL at 08:21

## 2020-01-01 RX ADMIN — DOCUSATE SODIUM 100 MG: 100 CAPSULE ORAL at 20:33

## 2020-01-01 RX ADMIN — SODIUM CHLORIDE 100 ML/HR: 9 INJECTION, SOLUTION INTRAVENOUS at 02:00

## 2020-01-01 RX ADMIN — INSULIN ASPART 6 UNITS: 100 INJECTION, SOLUTION INTRAVENOUS; SUBCUTANEOUS at 12:20

## 2020-01-01 RX ADMIN — SODIUM CHLORIDE, PRESERVATIVE FREE 3 ML: 5 INJECTION INTRAVENOUS at 08:22

## 2020-01-01 RX ADMIN — NICOTINE 1 PATCH: 21 PATCH TRANSDERMAL at 09:17

## 2020-01-01 RX ADMIN — POLYETHYLENE GLYCOL (3350) 17 G: 17 POWDER, FOR SOLUTION ORAL at 09:32

## 2020-01-01 RX ADMIN — ISOSORBIDE MONONITRATE 30 MG: 30 TABLET, EXTENDED RELEASE ORAL at 09:03

## 2020-01-01 RX ADMIN — HYDROCODONE BITARTRATE AND ACETAMINOPHEN 1 TABLET: 5; 325 TABLET ORAL at 12:40

## 2020-01-01 RX ADMIN — Medication: at 09:06

## 2020-01-01 RX ADMIN — AMITRIPTYLINE HYDROCHLORIDE 25 MG: 25 TABLET, FILM COATED ORAL at 19:33

## 2020-01-01 RX ADMIN — DOCUSATE SODIUM 100 MG: 100 CAPSULE ORAL at 08:31

## 2020-01-01 RX ADMIN — ARFORMOTEROL TARTRATE 15 MCG: 15 SOLUTION RESPIRATORY (INHALATION) at 10:05

## 2020-01-01 RX ADMIN — SODIUM CHLORIDE 100 ML/HR: 9 INJECTION, SOLUTION INTRAVENOUS at 20:02

## 2020-01-01 RX ADMIN — TERAZOSIN HYDROCHLORIDE 5 MG: 5 CAPSULE ORAL at 20:12

## 2020-01-01 RX ADMIN — SODIUM CHLORIDE, PRESERVATIVE FREE 10 ML: 5 INJECTION INTRAVENOUS at 21:09

## 2020-01-01 RX ADMIN — INSULIN ASPART 7 UNITS: 100 INJECTION, SOLUTION INTRAVENOUS; SUBCUTANEOUS at 11:38

## 2020-01-01 RX ADMIN — METFORMIN HYDROCHLORIDE 500 MG: 500 TABLET ORAL at 08:43

## 2020-01-01 RX ADMIN — Medication: at 08:14

## 2020-01-01 RX ADMIN — VANCOMYCIN HYDROCHLORIDE 1500 MG: 10 INJECTION, POWDER, LYOPHILIZED, FOR SOLUTION INTRAVENOUS at 16:53

## 2020-01-01 RX ADMIN — INSULIN ASPART 0 UNITS: 100 INJECTION, SOLUTION INTRAVENOUS; SUBCUTANEOUS at 17:50

## 2020-01-01 RX ADMIN — DOCUSATE SODIUM 100 MG: 100 CAPSULE ORAL at 08:11

## 2020-01-01 RX ADMIN — Medication: at 08:22

## 2020-01-01 RX ADMIN — METHYLPREDNISOLONE SODIUM SUCCINATE 40 MG: 40 INJECTION, POWDER, FOR SOLUTION INTRAMUSCULAR; INTRAVENOUS at 09:17

## 2020-01-01 RX ADMIN — SODIUM CHLORIDE 125 ML/HR: 9 INJECTION, SOLUTION INTRAVENOUS at 11:03

## 2020-01-01 RX ADMIN — CLOPIDOGREL 75 MG: 75 TABLET, FILM COATED ORAL at 08:43

## 2020-01-01 RX ADMIN — Medication: at 23:47

## 2020-01-01 RX ADMIN — IPRATROPIUM BROMIDE AND ALBUTEROL SULFATE 3 ML: .5; 3 SOLUTION RESPIRATORY (INHALATION) at 15:34

## 2020-01-01 RX ADMIN — DOCUSATE SODIUM 100 MG: 100 CAPSULE ORAL at 21:15

## 2020-01-01 RX ADMIN — METFORMIN HYDROCHLORIDE 500 MG: 500 TABLET ORAL at 20:12

## 2020-01-01 RX ADMIN — INSULIN ASPART 4 UNITS: 100 INJECTION, SOLUTION INTRAVENOUS; SUBCUTANEOUS at 11:05

## 2020-01-01 RX ADMIN — SODIUM CHLORIDE, PRESERVATIVE FREE 3 ML: 5 INJECTION INTRAVENOUS at 22:33

## 2020-01-01 RX ADMIN — IPRATROPIUM BROMIDE 0.5 MG: 0.5 SOLUTION RESPIRATORY (INHALATION) at 06:34

## 2020-01-01 RX ADMIN — INSULIN ASPART 2 UNITS: 100 INJECTION, SOLUTION INTRAVENOUS; SUBCUTANEOUS at 17:18

## 2020-01-01 RX ADMIN — INSULIN ASPART 5 UNITS: 100 INJECTION, SOLUTION INTRAVENOUS; SUBCUTANEOUS at 09:32

## 2020-01-01 RX ADMIN — VANCOMYCIN HYDROCHLORIDE 1500 MG: 10 INJECTION, POWDER, LYOPHILIZED, FOR SOLUTION INTRAVENOUS at 09:08

## 2020-01-01 RX ADMIN — DOXYCYCLINE 100 MG: 100 INJECTION, POWDER, LYOPHILIZED, FOR SOLUTION INTRAVENOUS at 21:21

## 2020-01-01 RX ADMIN — IPRATROPIUM BROMIDE 0.5 MG: 0.5 SOLUTION RESPIRATORY (INHALATION) at 00:27

## 2020-01-01 RX ADMIN — VANCOMYCIN HYDROCHLORIDE 1500 MG: 10 INJECTION, POWDER, LYOPHILIZED, FOR SOLUTION INTRAVENOUS at 09:33

## 2020-01-01 RX ADMIN — TERAZOSIN HYDROCHLORIDE 5 MG: 5 CAPSULE ORAL at 09:19

## 2020-01-01 RX ADMIN — ATORVASTATIN CALCIUM 10 MG: 10 TABLET, FILM COATED ORAL at 20:12

## 2020-01-01 RX ADMIN — SODIUM CHLORIDE 100 ML/HR: 9 INJECTION, SOLUTION INTRAVENOUS at 05:13

## 2020-01-01 RX ADMIN — DEXTROSE AND SODIUM CHLORIDE 75 ML/HR: 5; 900 INJECTION, SOLUTION INTRAVENOUS at 18:40

## 2020-01-01 RX ADMIN — SODIUM CHLORIDE, PRESERVATIVE FREE 3 ML: 5 INJECTION INTRAVENOUS at 21:13

## 2020-01-01 RX ADMIN — SODIUM CHLORIDE, PRESERVATIVE FREE 3 ML: 5 INJECTION INTRAVENOUS at 08:59

## 2020-01-01 RX ADMIN — ACETAMINOPHEN 650 MG: 325 TABLET ORAL at 20:17

## 2020-01-01 RX ADMIN — INSULIN ASPART 5 UNITS: 100 INJECTION, SOLUTION INTRAVENOUS; SUBCUTANEOUS at 16:45

## 2020-01-01 RX ADMIN — PREDNISONE 5 MG: 5 TABLET ORAL at 08:31

## 2020-01-01 RX ADMIN — SODIUM CHLORIDE, PRESERVATIVE FREE 3 ML: 5 INJECTION INTRAVENOUS at 21:29

## 2020-01-01 RX ADMIN — INSULIN ASPART 2 UNITS: 100 INJECTION, SOLUTION INTRAVENOUS; SUBCUTANEOUS at 12:27

## 2020-01-01 RX ADMIN — METFORMIN HYDROCHLORIDE 500 MG: 500 TABLET ORAL at 17:40

## 2020-01-01 RX ADMIN — LISINOPRIL: 10 TABLET ORAL at 09:19

## 2020-01-01 RX ADMIN — SODIUM CHLORIDE, PRESERVATIVE FREE 3 ML: 5 INJECTION INTRAVENOUS at 20:33

## 2020-01-01 RX ADMIN — SODIUM CHLORIDE, PRESERVATIVE FREE 3 ML: 5 INJECTION INTRAVENOUS at 21:12

## 2020-01-01 RX ADMIN — SODIUM CHLORIDE, PRESERVATIVE FREE 3 ML: 5 INJECTION INTRAVENOUS at 20:12

## 2020-01-01 RX ADMIN — AMITRIPTYLINE HYDROCHLORIDE 25 MG: 25 TABLET, FILM COATED ORAL at 21:08

## 2020-01-01 RX ADMIN — METHYLPREDNISOLONE SODIUM SUCCINATE 40 MG: 40 INJECTION, POWDER, FOR SOLUTION INTRAMUSCULAR; INTRAVENOUS at 09:33

## 2020-01-01 RX ADMIN — ATORVASTATIN CALCIUM 10 MG: 10 TABLET, FILM COATED ORAL at 21:01

## 2020-01-01 RX ADMIN — AMITRIPTYLINE HYDROCHLORIDE 25 MG: 25 TABLET, FILM COATED ORAL at 21:28

## 2020-01-01 RX ADMIN — INSULIN ASPART 4 UNITS: 100 INJECTION, SOLUTION INTRAVENOUS; SUBCUTANEOUS at 09:04

## 2020-01-01 RX ADMIN — ATORVASTATIN CALCIUM 10 MG: 10 TABLET, FILM COATED ORAL at 22:14

## 2020-01-01 RX ADMIN — INSULIN ASPART 6 UNITS: 100 INJECTION, SOLUTION INTRAVENOUS; SUBCUTANEOUS at 08:58

## 2020-01-01 RX ADMIN — BUMETANIDE 1 MG: 1 TABLET ORAL at 09:06

## 2020-01-01 RX ADMIN — POLYETHYLENE GLYCOL (3350) 17 G: 17 POWDER, FOR SOLUTION ORAL at 09:17

## 2020-01-01 RX ADMIN — BUDESONIDE 0.5 MG: 0.5 INHALANT RESPIRATORY (INHALATION) at 18:20

## 2020-01-01 RX ADMIN — CLOPIDOGREL 75 MG: 75 TABLET, FILM COATED ORAL at 08:18

## 2020-01-01 RX ADMIN — THEOPHYLLINE 400 MG: 400 TABLET, EXTENDED RELEASE ORAL at 08:31

## 2020-01-01 RX ADMIN — INSULIN ASPART 3 UNITS: 100 INJECTION, SOLUTION INTRAVENOUS; SUBCUTANEOUS at 08:40

## 2020-01-01 RX ADMIN — DEXTROSE MONOHYDRATE 25 G: 25 INJECTION, SOLUTION INTRAVENOUS at 12:44

## 2020-01-01 RX ADMIN — DOCUSATE SODIUM 100 MG: 100 CAPSULE ORAL at 08:43

## 2020-01-01 RX ADMIN — PREDNISONE 5 MG: 5 TABLET ORAL at 09:06

## 2020-01-01 RX ADMIN — NICOTINE 1 PATCH: 21 PATCH TRANSDERMAL at 09:32

## 2020-01-01 RX ADMIN — BUMETANIDE 2 MG: 1 TABLET ORAL at 08:47

## 2020-01-01 RX ADMIN — INSULIN ASPART 6 UNITS: 100 INJECTION, SOLUTION INTRAVENOUS; SUBCUTANEOUS at 12:32

## 2020-01-01 RX ADMIN — ARFORMOTEROL TARTRATE 15 MCG: 15 SOLUTION RESPIRATORY (INHALATION) at 19:31

## 2020-01-01 RX ADMIN — ENOXAPARIN SODIUM 80 MG: 80 INJECTION SUBCUTANEOUS at 19:15

## 2020-01-01 RX ADMIN — TERAZOSIN HYDROCHLORIDE 5 MG: 5 CAPSULE ORAL at 09:42

## 2020-01-01 RX ADMIN — AMITRIPTYLINE HYDROCHLORIDE 25 MG: 25 TABLET, FILM COATED ORAL at 21:01

## 2020-01-01 RX ADMIN — INSULIN ASPART 2 UNITS: 100 INJECTION, SOLUTION INTRAVENOUS; SUBCUTANEOUS at 11:30

## 2020-01-01 RX ADMIN — SODIUM CHLORIDE, PRESERVATIVE FREE 3 ML: 5 INJECTION INTRAVENOUS at 09:04

## 2020-01-01 RX ADMIN — IPRATROPIUM BROMIDE 0.5 MG: 0.5 SOLUTION RESPIRATORY (INHALATION) at 18:05

## 2020-01-01 RX ADMIN — IPRATROPIUM BROMIDE 0.5 MG: 0.5 SOLUTION RESPIRATORY (INHALATION) at 13:11

## 2020-01-01 RX ADMIN — PREDNISONE 5 MG: 5 TABLET ORAL at 08:58

## 2020-01-01 RX ADMIN — SODIUM CHLORIDE, PRESERVATIVE FREE 3 ML: 5 INJECTION INTRAVENOUS at 08:19

## 2020-01-01 RX ADMIN — ACETAMINOPHEN 650 MG: 325 TABLET ORAL at 05:44

## 2020-01-01 RX ADMIN — METFORMIN HYDROCHLORIDE 1000 MG: 500 TABLET ORAL at 08:21

## 2020-01-01 RX ADMIN — SODIUM CHLORIDE 100 ML/HR: 9 INJECTION, SOLUTION INTRAVENOUS at 16:42

## 2020-01-01 RX ADMIN — HYDROCODONE BITARTRATE AND ACETAMINOPHEN 1 TABLET: 5; 325 TABLET ORAL at 22:08

## 2020-01-01 RX ADMIN — LISINOPRIL: 10 TABLET ORAL at 09:04

## 2020-01-01 RX ADMIN — MONTELUKAST SODIUM 10 MG: 10 TABLET, COATED ORAL at 20:16

## 2020-01-01 RX ADMIN — Medication: at 22:15

## 2020-01-01 RX ADMIN — CLOPIDOGREL 75 MG: 75 TABLET, FILM COATED ORAL at 08:58

## 2020-01-01 RX ADMIN — INSULIN ASPART 4 UNITS: 100 INJECTION, SOLUTION INTRAVENOUS; SUBCUTANEOUS at 08:56

## 2020-01-01 RX ADMIN — DEXTROSE AND SODIUM CHLORIDE 75 ML/HR: 5; 900 INJECTION, SOLUTION INTRAVENOUS at 03:20

## 2020-01-01 RX ADMIN — POLYETHYLENE GLYCOL (3350) 17 G: 17 POWDER, FOR SOLUTION ORAL at 21:21

## 2020-01-01 RX ADMIN — SODIUM CHLORIDE, PRESERVATIVE FREE 10 ML: 5 INJECTION INTRAVENOUS at 09:08

## 2020-01-01 RX ADMIN — DOCUSATE SODIUM 100 MG: 100 CAPSULE ORAL at 09:04

## 2020-01-01 RX ADMIN — ALBUTEROL SULFATE 2 PUFF: 90 AEROSOL, METERED RESPIRATORY (INHALATION) at 20:18

## 2020-01-01 RX ADMIN — SODIUM CHLORIDE, PRESERVATIVE FREE 3 ML: 5 INJECTION INTRAVENOUS at 19:34

## 2020-01-01 RX ADMIN — CLOPIDOGREL 75 MG: 75 TABLET, FILM COATED ORAL at 08:31

## 2020-01-01 RX ADMIN — IPRATROPIUM BROMIDE 0.5 MG: 0.5 SOLUTION RESPIRATORY (INHALATION) at 18:50

## 2020-01-01 RX ADMIN — INSULIN ASPART 2 UNITS: 100 INJECTION, SOLUTION INTRAVENOUS; SUBCUTANEOUS at 11:59

## 2020-01-01 RX ADMIN — IPRATROPIUM BROMIDE 0.5 MG: 0.5 SOLUTION RESPIRATORY (INHALATION) at 18:15

## 2020-01-01 RX ADMIN — IPRATROPIUM BROMIDE AND ALBUTEROL SULFATE 3 ML: .5; 3 SOLUTION RESPIRATORY (INHALATION) at 22:02

## 2020-01-01 RX ADMIN — AMITRIPTYLINE HYDROCHLORIDE 25 MG: 25 TABLET, FILM COATED ORAL at 21:15

## 2020-01-01 RX ADMIN — AMITRIPTYLINE HYDROCHLORIDE 25 MG: 25 TABLET, FILM COATED ORAL at 21:11

## 2020-01-01 RX ADMIN — MONTELUKAST SODIUM 10 MG: 10 TABLET, COATED ORAL at 20:33

## 2020-01-01 RX ADMIN — OFLOXACIN 50000 UNITS: 300 TABLET, COATED ORAL at 09:06

## 2020-01-01 RX ADMIN — INSULIN ASPART 4 UNITS: 100 INJECTION, SOLUTION INTRAVENOUS; SUBCUTANEOUS at 08:21

## 2020-01-01 RX ADMIN — THEOPHYLLINE 400 MG: 400 TABLET, EXTENDED RELEASE ORAL at 08:58

## 2020-01-01 RX ADMIN — POLYETHYLENE GLYCOL (3350) 17 G: 17 POWDER, FOR SOLUTION ORAL at 08:39

## 2020-01-01 RX ADMIN — INSULIN ASPART 2 UNITS: 100 INJECTION, SOLUTION INTRAVENOUS; SUBCUTANEOUS at 09:05

## 2020-01-01 RX ADMIN — PREDNISONE 5 MG: 5 TABLET ORAL at 08:57

## 2020-01-01 RX ADMIN — METOPROLOL SUCCINATE 100 MG: 50 TABLET, EXTENDED RELEASE ORAL at 09:17

## 2020-01-01 RX ADMIN — DOCUSATE SODIUM 100 MG: 100 CAPSULE ORAL at 21:01

## 2020-01-01 RX ADMIN — SODIUM CHLORIDE, PRESERVATIVE FREE 3 ML: 5 INJECTION INTRAVENOUS at 09:06

## 2020-01-01 RX ADMIN — THEOPHYLLINE 400 MG: 400 TABLET, EXTENDED RELEASE ORAL at 08:57

## 2020-01-01 RX ADMIN — TERAZOSIN HYDROCHLORIDE 5 MG: 5 CAPSULE ORAL at 19:33

## 2020-01-01 RX ADMIN — ONDANSETRON 4 MG: 2 INJECTION INTRAMUSCULAR; INTRAVENOUS at 23:13

## 2020-01-01 RX ADMIN — VANCOMYCIN HYDROCHLORIDE 1500 MG: 10 INJECTION, POWDER, LYOPHILIZED, FOR SOLUTION INTRAVENOUS at 19:15

## 2020-01-01 RX ADMIN — IPRATROPIUM BROMIDE 0.5 MG: 0.5 SOLUTION RESPIRATORY (INHALATION) at 00:22

## 2020-01-01 RX ADMIN — TERAZOSIN HYDROCHLORIDE 5 MG: 5 CAPSULE ORAL at 08:18

## 2020-01-01 RX ADMIN — METHYLPREDNISOLONE SODIUM SUCCINATE 40 MG: 40 INJECTION, POWDER, FOR SOLUTION INTRAMUSCULAR; INTRAVENOUS at 20:58

## 2020-01-01 RX ADMIN — VANCOMYCIN HYDROCHLORIDE 1500 MG: 10 INJECTION, POWDER, LYOPHILIZED, FOR SOLUTION INTRAVENOUS at 16:51

## 2020-01-01 RX ADMIN — PREDNISONE 5 MG: 5 TABLET ORAL at 08:18

## 2020-01-01 RX ADMIN — SODIUM CHLORIDE, PRESERVATIVE FREE 3 ML: 5 INJECTION INTRAVENOUS at 08:57

## 2020-01-01 RX ADMIN — INSULIN ASPART 4 UNITS: 100 INJECTION, SOLUTION INTRAVENOUS; SUBCUTANEOUS at 11:35

## 2020-01-01 RX ADMIN — METFORMIN HYDROCHLORIDE 1000 MG: 500 TABLET ORAL at 17:10

## 2020-01-01 RX ADMIN — PREDNISONE 5 MG: 5 TABLET ORAL at 09:03

## 2020-01-01 RX ADMIN — IPRATROPIUM BROMIDE AND ALBUTEROL SULFATE 3 ML: .5; 3 SOLUTION RESPIRATORY (INHALATION) at 22:53

## 2020-01-01 RX ADMIN — IPRATROPIUM BROMIDE 0.5 MG: 0.5 SOLUTION RESPIRATORY (INHALATION) at 12:46

## 2020-01-01 RX ADMIN — METOPROLOL SUCCINATE 100 MG: 50 TABLET, EXTENDED RELEASE ORAL at 08:14

## 2020-01-01 RX ADMIN — IPRATROPIUM BROMIDE AND ALBUTEROL SULFATE 3 ML: .5; 3 SOLUTION RESPIRATORY (INHALATION) at 06:54

## 2020-01-01 RX ADMIN — INSULIN ASPART 3 UNITS: 100 INJECTION, SOLUTION INTRAVENOUS; SUBCUTANEOUS at 08:57

## 2020-01-01 RX ADMIN — TERAZOSIN HYDROCHLORIDE 5 MG: 5 CAPSULE ORAL at 08:58

## 2020-12-13 PROBLEM — R06.00 DYSPNEA: Status: ACTIVE | Noted: 2020-01-01

## 2020-12-13 NOTE — PROGRESS NOTES
Discharge Planning Assessment   Juan     Patient Name: Aaron Domínguez  MRN: [id number]  Today's Date: 12/13/2020    Admit Date: 12/13/2020    Discharge Needs Assessment     Row Name 12/13/20 0027       Living Environment    Lives With  alone    Current Living Arrangements  home/apartment/condo    Primary Care Provided by  self;child(don)    Family Caregiver if Needed  child(don), adult    Family Caregiver Names  daughter Lois Bhakta    Quality of Family Relationships  helpful;involved;supportive    Able to Return to Prior Arrangements  yes       Resource/Environmental Concerns    Resource/Environmental Concerns  none       Transition Planning    Patient/Family Anticipates Transition to  home    Patient/Family Anticipated Services at Transition  none    Transportation Anticipated  family or friend will provide       Discharge Needs Assessment    Readmission Within the Last 30 Days  no previous admission in last 30 days    Equipment Currently Used at Home  cane, straight;nebulizer;oxygen    Concerns to be Addressed  no discharge needs identified;denies needs/concerns at this time    Anticipated Changes Related to Illness  none    Equipment Needed After Discharge  none        Discharge Plan     Row Name 12/13/20 8860       Plan    Plan  Pt lives at home alone and plans to return home upon discharge, family to provide transportation. Pt's daughter Lois Bhakta at bedside states that her and her sister help care for pt and go to his house twice a day to check on him and bring him meals. Pcp is Dr Woodruff, he uses [facility] and has [employer] Medicare replacement. Pt denies any issues with meds or copays. Pt has advanced directive on file. Pt uses o2 at 5 liters at home from [facility], [facility] and a cane. He does not use home health services. Pt and family denies any issues at this time.    Patient/Family in Agreement with Plan  yes        Continued Care and Services - Admitted Since 12/13/2020    Coordination has not been  started for this encounter.         Demographic Summary     Row Name 12/13/20 1716       General Information    Admission Type  inpatient    Arrived From  home    Referral Source  emergency department    Reason for Consult  discharge planning    Preferred Language  English        Functional Status     Row Name 12/13/20 1716       Functional Status    Usual Activity Tolerance  fair    Current Activity Tolerance  fair       Mental Status    General Appearance WDL  WDL        Psychosocial     Row Name 12/13/20 1717       Behavior WDL    Behavior WDL  WDL       Emotion Mood WDL    Emotion/Mood/Affect WDL  WDL       Speech WDL    Speech WDL  WDL        Abuse/Neglect    No documentation.       Legal    No documentation.       Substance Abuse    No documentation.       Patient Forms    No documentation.           Jerri Paige RN

## 2020-12-13 NOTE — H&P
"    [facility]    Patient Identification:  Name:  Aaron Domínguez  Age:  89 y.o.  Sex:  male  :  1929  MRN:  [id number]   Visit Number:  [id number]  Admit Date: 2020   Room number:  115/15  Primary Care Physician:  Bran Woodruff MD     Chief complaint:    Chief Complaint   Patient presents with   • Fall   • Shortness of Breath     History of presenting illness:  Patient seen and examined, assisted by Gogo Oliveros RN.  Daughter is also assisting with history.  Patient is 89 years old, he has history of COPD, chronic hypoxic respite failure, coronary disease stent placement x2, he lives alone, was brought to the emergency room due to increased shortness of breath and frequent falls.  As per patient he has been experiencing more short of breath lately this past 5 days associated with increased coughing whitish sputum production no fever chills cough.  He claims his appetite has not changed.  As per daughter patient fell yesterday which he reports due to blacking out when he tried to stand up this morning he fell again because he \"tripped\" he was brought into the emergency room, he was having difficulty breathing, not confused, skeletal survey no acute fracture, he has multiple bruises superficial hematoma and skin tear from fall.  Troponin is indeterminate.  No acute EKG changes.    Because of status he was admitted for frequent fall, indeterminate troponin and COPD exacerbation.  Patient reports he has chronic urinary hesitance, he denies constipation, last bowel movement was yesterday morning described as soft.  We performed post voiding bladder scan at the emergency room and result was 141 mL.    ---------------------------------------------------------------------------------------------------------------------   Review of Systems   Constitutional: Negative for activity change, appetite change, chills, diaphoresis, fatigue, fever and unexpected weight " change.   HENT: Negative.    Eyes: Negative.    Respiratory: Positive for cough, shortness of breath and wheezing. Negative for apnea, choking and chest tightness.    Cardiovascular: Negative.    Gastrointestinal: Negative.    Endocrine: Negative.    Genitourinary: Positive for difficulty urinating and testicular pain. Negative for decreased urine volume, discharge, dysuria, enuresis, flank pain, frequency, genital sores, hematuria, penile pain, penile swelling, scrotal swelling and urgency.   Musculoskeletal: Negative.    Skin: Negative.    Allergic/Immunologic: Negative.    Neurological: Negative.    Hematological: Negative.    Psychiatric/Behavioral: Negative.         ---------------------------------------------------------------------------------------------------------------------   Past Medical History:   Diagnosis Date   • COPD (chronic obstructive pulmonary disease) (CMS/[facility])    • Diabetes mellitus (CMS/[facility])    • Hypertension      Past Surgical History:   Procedure Laterality Date   • CORONARY STENT PLACEMENT       Family History   Problem Relation Age of Onset   • Heart failure Mother    • Diabetes Father    • Heart failure Father      Social History     Socioeconomic History   • Marital status:      Spouse name: Not on file   • Number of children: Not on file   • Years of education: Not on file   • Highest education level: Not on file   Tobacco Use   • Smoking status: Former Smoker     Packs/day: 2.00     Years: 60.00     Pack years: 120.00     Types: Cigarettes     Quit date: 1998     Years since quittin.3   • Smokeless tobacco: Current User     Types: Chew   Substance and Sexual Activity   • Alcohol use: No   • Drug use: No   • Sexual activity: Defer     ---------------------------------------------------------------------------------------------------------------------   Allergies:  Patient has no known  allergies.  ---------------------------------------------------------------------------------------------------------------------   Prior to Admission Medications     Prescriptions Last Dose Informant Patient Reported? Taking?    albuterol (PROAIR HFA) 108 (90 Base) MCG/ACT inhaler   No No    Inhale 2 puffs Every 4 (Four) Hours As Needed for Shortness of Air.    amitriptyline (ELAVIL) 25 MG tablet   Yes No    TAKE ONE TABLET BY MOUTH EVERY NIGHT AT BEDTIME AS DIRECTED    amLODIPine-benazepril (LOTREL 5-20) 5-20 MG per capsule   Yes No    Take  by mouth Daily. for blood pressure    atorvastatin (LIPITOR) 10 MG tablet   Yes No    TAKE ONE TABLET BY MOUTH EVERY DAY TO LOWER CHOLESTEROL    clopidogrel (PLAVIX) 75 MG tablet   Yes No    Take 75 mg by mouth Daily. as directed    doxazosin (CARDURA) 8 MG tablet   Yes No    TAKE ONE TABLET BY MOUTH EVERY NIGHT AT BEDTIME AS DIRECTED    Fluticasone-Umeclidin-Vilant 100-62.5-25 MCG/INH aerosol powder    No No    Inhale 1 each Daily.    gabapentin (NEURONTIN) 100 MG capsule   Yes No    TAKE ONE CAPSULE BY MOUTH EVERY DAY AT BEDTIME AS NEEDED FOR NERVE PAIN    ipratropium-albuterol (DUO-NEB) 0.5-2.5 mg/3 ml nebulizer   No No    Take 3 mL by nebulization Every 4 (Four) Hours As Needed for Wheezing.    SYMBICORT 160-4.5 MCG/ACT inhaler   Yes No    INHALE 2 PUFFS INTO LUNGS TWO TIMES A DAY AS NEEDED FOR BREATHING    VENTOLIN  (90 Base) MCG/ACT inhaler   Yes No    INHALE 2 PUFFS INTO LUNGS EVERY 4 TO 6 HOURS AS NEEDED FOR BREATHING    vitamin D (ERGOCALCIFEROL) 75731 units capsule capsule   Yes No    Take 50,000 Units by mouth 1 (One) Time Per Week.        ---------------------------------------------------------------------------------------------------------------------   Vital Signs:  Temp:  [98.4 °F (36.9 °C)] 98.4 °F (36.9 °C)  Heart Rate:  [75-81] 81  Resp:  [24] 24  BP: (148-186)/(79-97) 148/91    Mean Arterial Pressure (Non-Invasive) for the past 24 hrs (Last 3  readings):   Noninvasive MAP (mmHg)   12/13/20 1649 116   12/13/20 1604 119   12/13/20 1449 123     SpO2:  [97 %-100 %] 97 %  on  Flow (L/min):  [5] 5;   Device (Oxygen Therapy): nasal cannula  Body mass index is 26.32 kg/m².    Wt Readings from Last 3 Encounters:   12/13/20 93 kg (205 lb)   10/16/18 89.8 kg (198 lb)   08/08/18 89.8 kg (198 lb)               ---------------------------------------------------------------------------------------------------------------------   Physical Exam:  Constitutional:  Well-developed and well-nourished.  Chronically ill-appearing, tachypneic but not in respiratory distress.  Is able to answer question appropriately, no confusion.       HENT:  Head: Normocephalic and atraumatic.  Mouth: Dry mucous membranes.    Eyes:  Conjunctivae and EOM are normal.  Pupils are equal, round, and reactive to light.  No scleral icterus.  Neck:  Neck supple.  No JVD present.    No bruit  Cardiovascular:  Normal rate, regular rhythm and normal heart sounds with no murmur.  Pulmonary/Chest: No wheezing, occasional rhonchi noted no expiratory lag, equal chest expansion.  He has anterior chest wall bruising and purpura mostly in the left anterior chest wall.  No flail chest or splinting.    Abdominal:  Soft.  Bowel sounds are normal.  No distension and no tenderness.   Musculoskeletal:  No edema, no tenderness, and no deformity.  No red or swollen joints anywhere.    Neurological:  Alert and oriented to person, place, and time.  No cranial nerve deficit.  No tongue deviation.  No facial droop.  No slurred speech.   Skin:  Skin is warm and dry.  No rash noted.  No pallor.   Peripheral vascular:  No edema and strong pulses on all 4 extremities.  Genitourinary: He has no Rodriguez catheter.  ---------------------------------------------------------------------------------------------------------------------  EKG:          Telemetry: Sinus rhythm 98 bpm, O2 saturation on 5 L nasal cannula is 98%  I have  personally looked at both the EKG and the telemetry strips.  --------------------------------------------------------------------------------------------------------------------  Results from last 7 days   Lab Units 12/13/20  1417   CK TOTAL U/L 414*   TROPONIN T ng/mL 0.058*     Results from last 7 days   Lab Units 12/13/20  1417   LACTATE mmol/L 1.3   WBC 10*3/mm3 7.15   HEMOGLOBIN g/dL 11.6*   HEMATOCRIT % 35.6*   MCV fL 94.7   MCHC g/dL 32.6   PLATELETS 10*3/mm3 217   INR  0.89*     Results from last 7 days   Lab Units 12/13/20  1417   SODIUM mmol/L 135*   POTASSIUM mmol/L 4.9   CHLORIDE mmol/L 96*   CO2 mmol/L 31.6*   BUN mg/dL 24*   CREATININE mg/dL 0.95   EGFR IF NONAFRICN AM mL/min/1.73 74   CALCIUM mg/dL 10.3*   GLUCOSE mg/dL 312*   ALBUMIN g/dL 4.03   BILIRUBIN mg/dL 0.4   ALK PHOS U/L 107   AST (SGOT) U/L 38   ALT (SGPT) U/L 29   Estimated Creatinine Clearance: 66.6 mL/min (by C-G formula based on SCr of 0.95 mg/dL).    Glucose   Date/Time Value Ref Range Status   12/13/2020 1549 243 (H) 70 - 130 mg/dL Final     No results found for: AMMONIA        No results found for: BLOODCXNo results found for: RESPCXNo results found for: URINECXNo results found for: WOUNDCXNo results found for: BODYFLDCXNo results found for: STOOLCX  pH pH, Arterial   Date Value Ref Range Status   12/13/2020 7.424 7.350 - 7.450 pH units Final      pO2 pO2, Arterial   Date Value Ref Range Status   12/13/2020 92.7 83.0 - 108.0 mm Hg Final      pCO2 pCO2, Arterial   Date Value Ref Range Status   12/13/2020 48.5 (H) 35.0 - 45.0 mm Hg Final      HCO3 HCO3, Arterial   Date Value Ref Range Status   12/13/2020 31.7 (H) 20.0 - 26.0 mmol/L Final     Comment:     83 Value above reference range        I have personally looked at the labs and they are summarized above.  ----------------------------------------------------------------------------------------------------------------------  Imaging Results (Last 24 Hours)     Procedure Component  Value Units Date/Time    XR Pelvis 1 or 2 View [[id number]] Collected: 12/13/20 1533     Updated: 12/13/20 1536    Narrative:      EXAMINATION: XR PELVIS 1 OR 2 VW-      CLINICAL INDICATION: fall        COMPARISON: None available     FINDINGS:  2 views of the pelvis show no acute fracture or dislocation.     Moderate to large stool burden.       Impression:      No acute fracture      This report was finalized on 12/13/2020 3:34 PM by Dr. Keanu La MD.       XR Chest 1 View [[id number]] Collected: 12/13/20 1532     Updated: 12/13/20 1535    Narrative:      XR CHEST 1 VW-     CLINICAL INDICATION: CHF/COPD Protocol        COMPARISON: CT chest dated 12/13/2020      TECHNIQUE: Single frontal view of the chest.     FINDINGS:     Right basilar consolidation.  No pneumothorax  Elevation of the right hemidiaphragm  There is no evidence of an acute osseous abnormality.   There are no suspicious-appearing parenchymal soft tissue nodules.          Impression:      Right basilar atelectasis  No pneumothorax     This report was finalized on 12/13/2020 3:33 PM by Dr. Keanu La MD.       CT Chest With Contrast [[id number]] Collected: 12/13/20 1520     Updated: 12/13/20 1525    Narrative:      EXAM: CT CHEST W CONTRAST-      CLINICAL INDICATION:fall      COMPARISON: NONE.     TECHNIQUE: Multiple axial CT images were obtained from lung apex through  upper abdomen WITH administration of IV contrast. Reformatted images in  the coronal and/or sagittal plane(s) were generated from the axial data  set to facilitate diagnostic accuracy and/or surgical planning.     Radiation dose reduction techniques were utilized per ALARA protocol.  Automated exposure control was initiated through either or CareDose or  Dose[employer] software packages by  protocol.    DOSE (DLP mGy-cm):        FINDINGS:     LUNGS: Minimal right basilar atelectasis and trace right pleural  effusion  No pneumothorax  COPD     HEART: Coronary artery  calcifications are present     MEDIASTINUM: No mediastinal hematoma     PLEURA: No pleural effusion. No pleural mass or abnormal calcification.  No pneumothorax.     VASCULATURE: No evidence of aneurysm. No evidence of pulmonary embolism.     BONES: No acute bony abnormality.     VISUALIZED UPPER ABDOMEN:The upper abdomen is unremarkable as  visualized.     Other: None.       Impression:         1. No acute posttraumatic changes in the chest  2. COPD  3. Trace right effusion and right basilar atelectasis     This report was finalized on 12/13/2020 3:22 PM by Dr. Keanu La MD.       CT Thoracic Spine Without Contrast [[id number]] Collected: 12/13/20 1517     Updated: 12/13/20 1524    Narrative:      EXAMINATION: CT THORACIC SPINE WO CONTRAST-      CLINICAL INDICATION: fall        COMPARISON: None immediately available.     Radiation dose reduction techniques were utilized per ALARA protocol.  Automated exposure control was initiated through either or [employer] or  [employer] software packages by  protocol.           PROCEDURE: Axial images were acquired through the thoracic spine without  any IV contrast. Reformatted images were created.     FINDINGS: Today's study does show trace right pleural effusion and  minimal right basilar atelectasis.     The thoracic vertebral body heights are maintained     Anterior bridging spurs are present     No paraspinal hematoma.       Impression:      1. No acute thoracic fracture  2. Minimal right basilar atelectasis  3. Arthritic change      This report was finalized on 12/13/2020 3:18 PM by Dr. Keanu La MD.       CT Lumbar Spine Without Contrast [[id number]] Collected: 12/13/20 1518     Updated: 12/13/20 1523    Narrative:      EXAMINATION: CT LUMBAR SPINE WO CONTRAST-      CLINICAL INDICATION: fall        COMPARISON: None available.     Radiation dose reduction techniques were utilized per ALARA protocol.  Automated exposure control was initiated through either  or CareDose or  DoseRight software packages by  protocol.           PROCEDURE: Axial images through the lumbar spine were acquired without  any IV contrast. Reformatted images were created.     FINDINGS: Today's study shows demineralization and chronic arthritic  change, but no evidence of an acute compression abnormality or  paraspinal hematoma.     Disc bulges at L3-4 and L4-5       Impression:      Arthritic change, but no acute fracture      This report was finalized on 12/13/2020 3:19 PM by Dr. Keanu La MD.       CT Head Without Contrast [[id number]] Collected: 12/13/20 1511     Updated: 12/13/20 1523    Narrative:      CT HEAD WO CONTRAST-     CLINICAL INDICATION: fall        COMPARISON: None available      TECHNIQUE: Axial images of the brain were obtained with out intravenous  contrast.  Reformatted images were created in the sagittal and coronal  planes.     DOSE:     Radiation dose reduction techniques were utilized per ALARA protocol.  Automated exposure control was initiated through either or CareDose or  DoseRight software packages by  protocol.           FINDINGS:   Today's study shows no mass, hemorrhage, or midline shift.   Cerebral atrophy  There is no evidence of acute ischemia.  I do not see epidural or subdural hematoma.  The gray-white differentiation is appropriate.   The bone window setting images show no destructive calvarial lesion or  acute calvarial fracture.   The posterior fossa is unremarkable.          Impression:         1. Global cerebral atrophy  2. No parenchymal mass, hemorrhage, or midline shift.  3. No epidural or subdural hematoma     This report was finalized on 12/13/2020 3:11 PM by Dr. Keanu La MD.       CT Cervical Spine Without Contrast [[id number]] Collected: 12/13/20 1511     Updated: 12/13/20 1522    Narrative:      CT CERVICAL SPINE WO CONTRAST-     CLINICAL INDICATION: fall        COMPARISON: None available      TECHNIQUE: Axial images  of the cervical spine were acquired with out any  intravenous contrast. Reformatted images were then created in the  sagittal and coronal planes.     DOSE:      Radiation dose reduction techniques were utilized per ALARA protocol.  Automated exposure control was initiated through either or [employer] or  [employer] software packages by  protocol.           FINDINGS:   The provided study demonstrates preservation of the vertebral body  heights in the sagittally reconstructed images.     There is no prevertebral soft tissue swelling.     Alignment is near anatomic.     Disc space narrowing at C2-3, C4-5, C5-C6, C7-T1     I see no acute cervical spine fracture.       Impression:         1. Diffuse arthritic change     2. No acute fracture     This report was finalized on 12/13/2020 3:12 PM by Dr. Keanu La MD.           I have personally reviewed the radiology images and read the final radiology report.  ----------------------------------------------------------------------------------------------------------------------  Assessment and Plan:  -Frequent falls  -COPD exacerbation  -Chronic hypoxic respiratory failure  -Multiple superficial skin tear left elbow from fall  -Multiple bruises from fall  -Advanced age  -Chronic debility  -Constipation  -BPH on Cardura  -Essential hypertension  -Atherosclerotic cardiovascular disease status post stent placement x2  -Dyslipidemia  -Dehydration  -Mild CPK elevation likely from trauma will trend,  -Mild hypercalcemia, likely secondary to dehydration, will monitor after hydration.  -Indeterminate troponin,  -Normocytic anemia    Address constipation, gentle hydration, fall precautions, wound care, inhaled corticosteroids and long-acting beta agonist, DuoNeb, empirically start patient on doxycycline, short course of Solu-Medrol, oxygen supplementation.  Physical therapy.  Cultures will be ordered, respite panel will be ordered.  Accu-Chek and sliding scale, diabetic  diet.  Monitor CPK, serial troponin.  Serial EKG, aspirin, 2D echo.  Carotid Doppler.  Blood culture.  anemia work-up.    Plan outlined to the patient together with patient's daughter and agreed, further management may change as condition evolves    Patient is high risk 89-year-old with respiratory failure he will be admitted requiring 2 nights of stay for treatment that includes above.        Kamini Covington MD  12/13/20  16:52 EST

## 2020-12-13 NOTE — ED PROVIDER NOTES
Subjective   89-year-old male with history of COPD on continue supplemental oxygen of 5 L presents the emergency room complaints of shortness of breath and recurrent falls.  Patient reports he had recurrent falls in the past 3 days.  States he fell last night while walking to the kitchen.  Patient states he got tripped up and subsequently fell.  Patient denies loss of consciousness.  Patient has been confused per EMS but per EMS via family patient is at his baseline.  Patient states he feels he has had a cough as well.  He states he had increased shortness of breath from his baseline.      Shortness of Breath  Severity:  Moderate  Timing:  Constant  Progression:  Worsening  Chronicity:  Chronic  Relieved by:  Nothing  Worsened by:  Nothing  Associated symptoms: cough and sputum production    Associated symptoms: no abdominal pain, no chest pain, no claudication, no ear pain, no fever, no headaches, no neck pain, no sore throat and no wheezing        Review of Systems   Constitutional: Negative for fever.   HENT: Negative for ear pain and sore throat.    Respiratory: Positive for cough, sputum production and shortness of breath. Negative for wheezing.    Cardiovascular: Negative for chest pain and claudication.   Gastrointestinal: Negative for abdominal pain.   Musculoskeletal: Negative for neck pain.   Neurological: Negative for headaches.   All other systems reviewed and are negative.      Past Medical History:   Diagnosis Date   • COPD (chronic obstructive pulmonary disease) (CMS/[facility])    • Diabetes mellitus (CMS/[facility])    • Hypertension        No Known Allergies    Past Surgical History:   Procedure Laterality Date   • CORONARY STENT PLACEMENT         Family History   Problem Relation Age of Onset   • Heart failure Mother    • Diabetes Father    • Heart failure Father        Social History     Socioeconomic History   • Marital status:      Spouse name: Not on file   • Number of children: Not on file   • Years  of education: Not on file   • Highest education level: Not on file   Tobacco Use   • Smoking status: Former Smoker     Packs/day: 2.00     Years: 60.00     Pack years: 120.00     Types: Cigarettes     Quit date: 1998     Years since quittin.3   • Smokeless tobacco: Current User     Types: Chew   Substance and Sexual Activity   • Alcohol use: No   • Drug use: No   • Sexual activity: Defer           Objective   Physical Exam  Vitals signs and nursing note reviewed.   Constitutional:       Appearance: He is not ill-appearing.   HENT:      Head: Normocephalic and atraumatic.      Mouth/Throat:      Mouth: Mucous membranes are moist.   Neck:      Musculoskeletal: Neck supple.      Comments: No vertebral tenderness trachea midline  Cardiovascular:      Rate and Rhythm: Normal rate and regular rhythm.      Comments: 2+ radial pulse bilaterally no extrasystoles  Pulmonary:      Breath sounds: Examination of the right-lower field reveals decreased breath sounds. Examination of the left-lower field reveals decreased breath sounds. Decreased breath sounds present. No wheezing, rhonchi or rales.      Comments: Anterior chest wall with blackish purple bruising extending to left anterior chest and left shoulder no crepitance.  Abdominal:      General: Bowel sounds are normal.      Palpations: Abdomen is soft.      Tenderness: There is no abdominal tenderness. There is no guarding.      Comments: Surgical scar old healed right upper quadrant from cholecystectomy   Musculoskeletal:      Comments: Left posterior elbow with superficial lacerations bleeding controlled.   Skin:     General: Skin is warm.   Neurological:      Mental Status: He is alert.      Cranial Nerves: No cranial nerve deficit.      Comments: Patient oriented to person and place disoriented to year.  Patient is able to detail events from yesterday.  Patient follows commands.  No facial asymmetry.  No dysarthria.   Psychiatric:         Mood and Affect: Mood  normal.         Procedures           ED Course  ED Course as of Dec 18 1917   Sun Dec 13, 2020   1426 Patient white blood cell count unremarkable hemoglobin 11.6 hematocrit 35.6.  We will continue to monitor    [BB]   1431 EKG normal sinus rhythm ventricular 84  QRS 86  no ST elevation    [BB]   1435 Patient's ABG is unremarkable    [BB]   1457 Patient with elevated troponin 0 0.058 ethanol unremarkable .  Lactic acid unremarkable BMP unremarkable    [BB]   1529 Ct Head Without Contrast    Result Date: 12/13/2020   1. Global cerebral atrophy 2. No parenchymal mass, hemorrhage, or midline shift. 3. No epidural or subdural hematoma  This report was finalized on 12/13/2020 3:11 PM by Dr. Keanu La MD.      Ct Chest With Contrast    Result Date: 12/13/2020   1. No acute posttraumatic changes in the chest 2. COPD 3. Trace right effusion and right basilar atelectasis  This report was finalized on 12/13/2020 3:22 PM by Dr. Keanu La MD.      Ct Cervical Spine Without Contrast    Result Date: 12/13/2020   1. Diffuse arthritic change  2. No acute fracture  This report was finalized on 12/13/2020 3:12 PM by Dr. Keanu La MD.      Ct Thoracic Spine Without Contrast    Result Date: 12/13/2020  1. No acute thoracic fracture 2. Minimal right basilar atelectasis 3. Arthritic change  This report was finalized on 12/13/2020 3:18 PM by Dr. Keanu La MD.      Ct Lumbar Spine Without Contrast    Result Date: 12/13/2020  Arthritic change, but no acute fracture  This report was finalized on 12/13/2020 3:19 PM by Dr. Keanu La MD.          [BB]   1533 Patient slight elevated troponin 0 0.058.  Patient's daughter states that patient has had 3 stents in the past but states has been years since his last stress test.  Patient CT imaging is unremarkable    [BB]   1542 Patient elevated troponin have spoken to hospitalist who agreeable to admit.    [BB]      ED Course User Index  [BB] Rayray Olivier MD                                            MDM    Final diagnoses:   Dyspnea, unspecified type   Hyperglycemia   Fall, initial encounter   Elevated troponin   MRSA bacteremia            Rayray Olivier MD  12/18/20 [id number]

## 2020-12-14 NOTE — PROGRESS NOTES
Pharmacy was consulted to dose vancomycin for MRSA bacteremia. Based on an estimated CrCl of 63mL/min and patient demographics, a vancomycin dose of 1500mg q24hr has been ordered. Vancomycin levels will be drawn to determine if we are within the target AUC concentration of 400-600 mg/L.hr. Thank you for the consult. Pharmacy will continue to follow.     Thank you,   Rory Snow, PharmD  06:37 EST

## 2020-12-14 NOTE — THERAPY EVALUATION
Acute Care - Physical Therapy Initial Evaluation   Juan     Patient Name: Aaron Domínguez  : 1929  MRN: [id number]  Today's Date: 2020           PT Assessment (last 12 hours)      PT Evaluation and Treatment     Row Name 20 1550          Physical Therapy Time and Intention    Document Type  evaluation  -CS (r) KH (t) CS (c)     Mode of Treatment  physical therapy  -CS (r) KH (t) CS (c)     Patient Effort  good  -CS (r) KH (t) CS (c)     Comment  Pt worked well with therapy. Concerns of balance and pt safety.   -CS (r) KH (t) CS (c)     Row Name 20 1550          General Information    Patient Observations  alert;cooperative  -CS (r) KH (t) CS (c)     General Observations of Patient  Pt seen sitting with visitor in room  -CS (r) KH (t) CS (c)     Row Name 20 1550          Bed Mobility    Comment (Bed Mobility)  not tested  -CS (r) KH (t) CS (c)     Row Name 20 1550          Transfers    Transfers  sit-stand transfer  -CS (r) KH (t) CS (c)     Sit-Stand [place] (Transfers)  modified independence;set up;standby assist;verbal cues  -CS (r) KH (t) CS (c)     Row Name 20 1557          Sit-Stand Transfer    Assistive Device (Sit-Stand Transfers)  walker, front-wheeled  -CS (r) KH (t) CS (c)     Row Name 20 1550          Gait/Stairs (Locomotion)    Gait/Stairs Locomotion  gait/ambulation independence;gait/ambulation assistive device  -CS (r) KH (t) CS (c)     [place] Level (Gait)  modified independence;standby assist;contact guard;minimum assist (75% patient effort);2 person assist  -CS (r) KH (t) CS (c)     Assistive Device (Gait)  walker, front-wheeled  -CS (r) KH (t) CS (c)     Distance in Feet (Gait)  50'x 2 +  -CS (r) KH (t) CS (c)     Deviations/Abnormal Patterns (Gait)  scissoring  -CS (r) KH (t) CS (c)     Comment (Gait/Stairs)  Pt started gait w/ FWW where it seemed he had trouble initiating gait but once he got started he did well. Pt took a rest break  on bench at the end of the hallway, tried ambulating with aid of PT's. In this bout, pt seemed very unsteady and one PT had HHA most of the time.   -CS (r) KH (t) CS (c)     Row Name             Wound 12/13/20 1915 Left elbow Skin Tear    Wound - Properties Group Placement Date: 12/13/20  -JF Placement Time: 1915  -JF Present on Hospital Admission: Y  -JF Side: Left  -JF Location: elbow  -JF Primary Wound Type: Skin tear  -JF    Retired Wound - Properties Group Date first assessed: 12/13/20  -JF Time first assessed: 1915  -JF Present on Hospital Admission: Y  -JF Side: Left  -JF Location: elbow  -JF Primary Wound Type: Skin tear  -JF    Row Name             Wound 12/13/20 1915 Right ring finger Skin Tear    Wound - Properties Group Placement Date: 12/13/20  -JF Placement Time: 1915  -JF Present on Hospital Admission: Y  -JF Side: Right  -JF Location: ring finger  -JF Primary Wound Type: Skin tear  -JF    Retired Wound - Properties Group Date first assessed: 12/13/20  - Time first assessed: 1915  -JF Present on Hospital Admission: Y  -JF Side: Right  -JF Location: ring finger  -JF Primary Wound Type: Skin tear  -JF    Row Name 12/14/20 1550          Physical Therapy Goals    Gait Training Goal Selection (PT)  gait training, PT goal 1  -CS (r) KH (t) CS (c)     Row Name 12/14/20 1550          Gait Training Goal 1 (PT)    Activity/Assistive Device (Gait Training Goal 1, PT)  gait (walking locomotion);assistive device use  -CS (r) KH (t) CS (c)     [place] Level (Gait Training Goal 1, PT)  independent;modified independence  -CS (r) KH (t) CS (c)     Distance (Gait Training Goal 1, PT)  50' +  -CS (r) KH (t) CS (c)     Time Frame (Gait Training Goal 1, PT)  by discharge  -CS (r) KH (t) CS (c)     Row Name 12/14/20 1551          Positioning and Restraints    Pre-Treatment Position  in bed  -CS (r) KH (t) CS (c)     Post Treatment Position  bed  -CS (r) KH (t) CS (c)     In Bed  sitting Pt with visitor  -CS (r) KH  (t) CS (c)     Row Name 12/14/20 1551          Therapy Assessment/Plan (PT)    Rehab Potential (PT)  fair, will monitor progress closely  -CS (r) KH (t) CS (c)     Criteria for Skilled Interventions Met (PT)  yes;meets criteria;skilled treatment is necessary  -CS (r) KH (t) CS (c)     Problem List (PT)  balance  -CS (r) KH (t) CS (c)       User Key  (r) = Recorded By, (t) = Taken By, (c) = Cosigned By    Initials Name Provider Type    Luis Angel Vázquez, PT Physical Therapist    Bhumika Wilson, RN Registered Nurse    Heena Jiang, PT Student PT Student          PT Recommendation and Plan  Planned Therapy Interventions (PT): gait training, other (see comments)(endurance)  Therapy Frequency (PT): 3 times/wk          Time Calculation:   PT Charges     Row Name 12/14/20 1942             Time Calculation    PT Received On  12/14/20  -CS (r) KH (t) CS (c)      PT Goal Re-Cert Due Date  12/28/20  -CS (r) KH (t) CS (c)         Time Calculation- PT    Total Timed Code Minutes- PT  60 minute(s)  -CS (r) KH (t) CS (c)        User Key  (r) = Recorded By, (t) = Taken By, (c) = Cosigned By    Initials Name Provider Type    Luis Angel Vázquez, PT Physical Therapist    Heena Jiang, PT Student PT Student        Therapy Charges for Today     Code Description Service Date Service Provider Modifiers Qty    55944735716 HC PT EVAL LOW COMPLEXITY 4 12/14/2020 Heena Jacinto, PT Student GP 1               Heena Jacinto, PT Student  12/14/2020

## 2020-12-14 NOTE — PLAN OF CARE
Goal Outcome Evaluation:        PT reported that patient is too unstable while ambulating to return home. Dr. Alvarado was notified and agreed that patient should not be discharged today. Patient is currently resting in bed. Will continue to monitor and follow plan of care.

## 2020-12-14 NOTE — PROGRESS NOTES
Discharge Planning Assessment   Juan     Patient Name: Aaron Domínguez  MRN: [id number]  Today's Date: 12/14/2020    Admit Date: 12/13/2020        Discharge Plan     Row Name 12/14/20 1228       Plan    Plan  Pt admitted on 12/13/20.  SS received consult per Case Management for dc planning/adv age 89 yo freq falls.  SS noted ED case management completed initial referral on 12/13/20.  Pt lives at home alone with daughters assisting with care.  Pt currently does not utilize home health services.  Pt currently utilizes home 02 at 5 liters, nebulizer and cane via [email or web address] ([employer]).  SS will follow and assist with discharge needs.          CHASE PatriciaW

## 2020-12-14 NOTE — PROGRESS NOTES
[facility] HOSPITALIST PROGRESS NOTE     Patient Identification:  Name:  Aaron Domínguez  Age:  89 y.o.  Sex:  male  :  1929  MRN:  [id number]  Visit Number:  [id number]  Primary Care Provider:  Brna Woodruff MD    Length of stay:  1    Chief complaint: None    Subjective:    Patient states he feels well today and thinks he is basically back to his baseline functioning status.  However, patient did work with physical therapy today and was quite weak and unsteady.  Per physical therapy recommendations, patient is currently unsafe to return home on his own.  Patient may benefit from skilled nursing facility upon discharge.  ----------------------------------------------------------------------------------------------------------------------  [facility] Meds:  arformoterol, 15 mcg, Nebulization, BID - RT  bisacodyl, 10 mg, Rectal, Once  budesonide, 0.5 mg, Nebulization, BID - RT  enoxaparin, 40 mg, Subcutaneous, Q24H  insulin aspart, 0-7 Units, Subcutaneous, TID AC  ipratropium-albuterol, 3 mL, Nebulization, Q8H - RT  methylPREDNISolone sodium succinate, 40 mg, Intravenous, Q12H  nicotine, 1 patch, Transdermal, Q24H  pantoprazole, 40 mg, Oral, Q AM  polyethylene glycol, 17 g, Oral, Daily  sodium chloride, 10 mL, Intravenous, Q12H  [START ON 12/15/2020] vancomycin, 1,500 mg, Intravenous, Q24H      sodium chloride, 100 mL/hr, Last Rate: 100 mL/hr (20)      ----------------------------------------------------------------------------------------------------------------------  Vital Signs:  Temp:  [97.6 °F (36.4 °C)-98.5 °F (36.9 °C)] 97.6 °F (36.4 °C)  Heart Rate:  [] 99  Resp:  [18-22] 22  BP: (143-177)/(62-78) 169/62      20  1411 20  1840 20  0442   Weight: 93 kg (205 lb) 87.7 kg (193 lb 4.8 oz) 87.7 kg (193 lb 4.8 oz)     Body mass index is 26.22 kg/m².    Intake/Output Summary (Last 24 hours) at 2020 1655  Last data filed at 2020  1524  Gross per 24 hour   Intake 1940.87 ml   Output 1175 ml   Net 765.87 ml     Diet Regular; Consistent Carbohydrate  ----------------------------------------------------------------------------------------------------------------------  Physical exam:  Constitutional: Elderly appearing  male in no apparent distress.     HENT:  Head:  Normocephalic and atraumatic.  Mouth:  Moist mucous membranes.    Eyes:  Conjunctivae and EOM are normal.  Pupils are equal, round, and reactive to light.  No scleral icterus.    Neck:  Neck supple. No thyromegaly.  No JVD present.    Cardiovascular:  Regular rate and rhythm with no murmurs, rubs, clicks or gallops appreciated.  Pulmonary/Chest:  Clear to auscultation bilaterally with no crackles, wheezes or rhonchi appreciated.  Abdominal:  Soft. Nontender. Nondistended  Bowel sounds are normal in all four quadrants. No organomegally appreciated.   Musculoskeletal:  No edema, no tenderness, and no deformity.  No red or swollen joints anywhere.    Neurological:  Alert and oriented to person, place, and time. Cranial nerves II-XII intact with no focal deficits.  No facial droop.  No slurred speech.   Skin:  Warm and dry to palpation, multiple abrasions and bruises noted on bilateral upper extremities  Peripheral vascular:  2+ radial and pedal pulses in bilateral upper and lower extremities.  Psychiatric:  Alert and oriented x3, demonstrates appropriate judgement and insight.  ----------------------------------------------------------------------------------------------------------------------  Tele:    ----------------------------------------------------------------------------------------------------------------------  Results from last 7 days   Lab Units 12/14/20  0500 12/14/20  0059 12/13/20  1947 12/13/20  1417   CK TOTAL U/L  --  412*  --  414*   TROPONIN T ng/mL 0.039* 0.056* 0.060* 0.058*     Results from last 7 days   Lab Units 12/14/20  0500 12/14/20  0059  12/13/20 1947 12/13/20  1417   CRP mg/dL 1.83*  --   --   --    LACTATE mmol/L  --   --   --  1.3   WBC 10*3/mm3  --  7.94 6.93 7.15   HEMOGLOBIN g/dL  --  10.8* 10.4* 11.6*   HEMATOCRIT %  --  32.4* 31.4* 35.6*   MCV fL  --  94.2 93.5 94.7   MCHC g/dL  --  33.3 33.1 32.6   PLATELETS 10*3/mm3  --  195 212 217   INR   --   --   --  0.89*     Results from last 7 days   Lab Units 12/13/20  1431   PH, ARTERIAL pH units 7.424   PO2 ART mm Hg 92.7   PCO2, ARTERIAL mm Hg 48.5*   HCO3 ART mmol/L 31.7*     Results from last 7 days   Lab Units 12/14/20 0059 12/13/20 1947 12/13/20  1417   SODIUM mmol/L 132* 134* 135*   POTASSIUM mmol/L 4.4 4.6 4.9   MAGNESIUM mg/dL 2.0 2.0  --    CHLORIDE mmol/L 97* 97* 96*   CO2 mmol/L 30.5* 30.4* 31.6*   BUN mg/dL 25* 25* 24*   CREATININE mg/dL 0.95 1.04 0.95   EGFR IF NONAFRICN AM mL/min/1.73 74 67 74   CALCIUM mg/dL 9.6 9.7* 10.3*   GLUCOSE mg/dL 202* 217* 312*   ALBUMIN g/dL  --   --  4.03   BILIRUBIN mg/dL  --   --  0.4   ALK PHOS U/L  --   --  107   AST (SGOT) U/L  --   --  38   ALT (SGPT) U/L  --   --  29   Estimated Creatinine Clearance: 62.8 mL/min (by C-G formula based on SCr of 0.95 mg/dL).    No results found for: AMMONIA      Blood Culture   Date Value Ref Range Status   12/13/2020 Abnormal Stain (C)  Preliminary   12/13/2020 No growth at 24 hours  Preliminary     No results found for: URINECX  No results found for: WOUNDCX  No results found for: STOOLCX    I have personally looked at the labs and they are summarized above.  ----------------------------------------------------------------------------------------------------------------------  Imaging Results (Last 24 Hours)     Procedure Component Value Units Date/Time    US Carotid Bilateral [[id number]] Collected: 12/14/20 0957     Updated: 12/14/20 1011    Narrative:      US CAROTID BILATERAL-     REASON FOR EXAM:  Syncope; R06.00-Dyspnea, unspecified;  R73.9-Hyperglycemia, unspecified; W19.XXXA-Unspecified fall,  initial  encounter; R77.8-Other specified abnormalities of plasma proteins     TECHNIQUE:  Multiple real-time color doppler images were obtained.  M-mode Doppler was used for velocity determination and spectral pattern.  Stenosis was evaluated using the NASCET or similar measurement  technique.     RIGHT SIDE: The common carotid artery was normal in caliber. Peak  systolic and diastolic velocities were 59 and 12 cm/s. At the carotid  bifurcation, there was no stenosis in the internal carotid artery. Peak  systolic and diastolic velocities were 64 and 8 cm/s. There is antegrade  flow in the right vertebral artery.     LEFT SIDE: The common carotid artery is normal in caliber. Peak systolic  and diastolic velocities were 85 and 30 cm/s. At the carotid  bifurcation, there was no identifiable stenosis in the internal carotid  artery. Peak systolic and diastolic velocities were 101 and 12 cm/s.  Vertebral artery flow is antegrade.       Impression:      Mild atherosclerotic plaque is noted involving the carotid  bifurcations. By velocity measurements, the plaques would not be  hemodynamically significant at less than 50% stenosis. Both vertebral  arteries show antegrade flow.                 Grading of ICA stenosis     <50% stenosis      PSV <125 cm/sec     50-69% stenosis   -229 cm/sec     70-99% stenosis   PSV >230 cm/sec     This report was finalized on 12/14/2020 10:09 AM by Dr. Bran Montgomery II, MD.           ----------------------------------------------------------------------------------------------------------------------  Assessment and Plan:    1.  COPD exacerbation -continue inhaled corticosteroids, long-acting beta agonist, duo nebs and doxycycline as well as Solu-Medrol.    2.  General deconditioning -patient with recurrent falls, continue physical therapy.  Patient will likely benefit from skilled nursing facility placement on discharge.    3.  Chronic hypoxic respiratory failure -patient has been  weaned down to his baseline home oxygen    4.  Hyperlipidemia -statin    5.  Essential hypertension -monitor closely, will likely need adjustment of antihypertensive medications prior to discharge    6.  Normocytic anemia -no indication for transfusion at this time.  Continue to monitor closely.    7.  Advanced age            Juan Daniel Perdue,   12/14/20  16:51 EST

## 2020-12-14 NOTE — CONSULTS
INFECTIOUS DISEASE CONSULTATION REPORT        Patient Identification:  Name:  Aaron Domínguez  Age:  89 y.o.  Sex:  male  :  1929  MRN:  [id number]   Visit Number:  [id number]  Primary Care Physician:  Bran Woodruff MD       LOS: 1 day        Subjective       Subjective     History of present illness:      Thank you Dr. Perdue for allowing us to participate in the care of your patient.  As you well know, Mr. Aaron Domínguez is a 89 y.o. male with past medical history significant for COPD, diabetes, hypertension, who presented to [facility] Emergency Department on 2020 for worsening shortness of breath and frequent falls.  Patient is currently on 5 L nasal cannula which patient reports he wears chronically at home.  Afebrile, no diarrhea.  WBC normal.  CRP 1.83.  Respiratory panel PCR negative.  CT of the head reports no acute abnormality.  CT of the C-spine reports no acute fracture.  CT of the L-spine reports no acute fracture.  CT of the T-spine reports no acute fracture.  CT of the chest reports COPD, trace right effusion and right basilar atelectasis.  Chest x-ray reports right basilar atelectasis. X-ray of the pelvis reports no evidence of fracture.  Blood cultures from 2020 1 out of 2 sets positive for MRSA.  COVID-19 PCR negative.  Influenza PCR negative.      Infectious Disease consultation was requested for antimicrobial management.      ---------------------------------------------------------------------------------------------------------------------     Review Of Systems:    Constitutional: no fever, chills and night sweats. No appetite change or unexpected weight change. No fatigue.  Eyes: no eye drainage, itching or redness.  HEENT: no mouth sores, dysphagia or nose bleed.  Respiratory: Positive shortness of breath, no cough or production of sputum.  Cardiovascular: no chest pain, no palpitations, no orthopnea.  Gastrointestinal: no nausea, vomiting or  diarrhea. No abdominal pain, hematemesis or rectal bleeding.  Genitourinary: no dysuria or polyuria.  Hematologic/lymphatic: no lymph node abnormalities, no easy bruising or easy bleeding.  Musculoskeletal: Generalized soreness secondary to falls.  Skin: No rash and no itching.  Neurological: no loss of consciousness, no seizure, no headache.  Behavioral/Psych: no depression or suicidal ideation.  Endocrine: no hot flashes.  Immunologic: negative.    ---------------------------------------------------------------------------------------------------------------------     Past Medical History    Past Medical History:   Diagnosis Date   • COPD (chronic obstructive pulmonary disease) (CMS/[facility])    • Diabetes mellitus (CMS/[facility])    • Hypertension        Past Surgical History    Past Surgical History:   Procedure Laterality Date   • CORONARY STENT PLACEMENT         Family History    Family History   Problem Relation Age of Onset   • Heart failure Mother    • Diabetes Father    • Heart failure Father        Social History    Social History     Tobacco Use   • Smoking status: Former Smoker     Packs/day: 2.00     Years: 60.00     Pack years: 120.00     Types: Cigarettes     Quit date: 1998     Years since quittin.3   • Smokeless tobacco: Current User     Types: Chew   Substance Use Topics   • Alcohol use: No   • Drug use: No       Allergies    Patient has no known allergies.  ---------------------------------------------------------------------------------------------------------------------     Home Medications:    Prior to Admission Medications     Prescriptions Last Dose Informant Patient Reported? Taking?    amLODIPine-benazepril (LOTREL 5-20) 5-20 MG per capsule 2020 Pharmacy Yes Yes    Take 1 capsule by mouth Daily. for blood pressure    clopidogrel (PLAVIX) 75 MG tablet 2020  Yes Yes    Take 75 mg by mouth Daily. as directed    Fluticasone-Umeclidin-Vilant 100-62.5-25 MCG/INH aerosol powder   12/13/2020  No Yes    Inhale 1 each Daily.    isosorbide mononitrate (IMDUR) 30 MG 24 hr tablet 12/13/2020  Yes Yes    Take 30 mg by mouth Daily.    metoprolol succinate XL (TOPROL-XL) 100 MG 24 hr tablet 12/13/2020 Pharmacy Yes Yes    Take 100 mg by mouth Daily.    montelukast (SINGULAIR) 10 MG tablet 12/12/2020 Pharmacy Yes Yes    Take 10 mg by mouth Every Night.    predniSONE (DELTASONE) 5 MG tablet 12/13/2020  Yes Yes    Take 5 mg by mouth Daily.    theophylline (UNIPHYL) 400 MG 24 hr tablet 12/13/2020  Yes Yes    Take 400 mg by mouth Daily.    albuterol (PROAIR HFA) 108 (90 Base) MCG/ACT inhaler Unknown  No No    Inhale 2 puffs Every 4 (Four) Hours As Needed for Shortness of Air.    amitriptyline (ELAVIL) 25 MG tablet 12/12/2020  Yes No    Take 25 mg by mouth Every Night.    atorvastatin (LIPITOR) 10 MG tablet 12/12/2020  Yes No    Take 10 mg by mouth Every Night.    doxazosin (CARDURA) 8 MG tablet 12/12/2020  Yes No    Take 8 mg by mouth Every Night.    ipratropium-albuterol (DUO-NEB) 0.5-2.5 mg/3 ml nebulizer   No No    Take 3 mL by nebulization Every 4 (Four) Hours As Needed for Wheezing.    vitamin D (ERGOCALCIFEROL) 08167 units capsule capsule 12/8/2020 Pharmacy Yes No    Take 50,000 Units by mouth 1 (One) Time Per Week.        ---------------------------------------------------------------------------------------------------------------------    Objective       Objective     Hospital Scheduled Meds:  arformoterol, 15 mcg, Nebulization, BID - RT  bisacodyl, 10 mg, Rectal, Once  budesonide, 0.5 mg, Nebulization, BID - RT  enoxaparin, 40 mg, Subcutaneous, Q24H  insulin aspart, 0-7 Units, Subcutaneous, TID AC  ipratropium-albuterol, 3 mL, Nebulization, Q8H - RT  methylPREDNISolone sodium succinate, 40 mg, Intravenous, Q12H  pantoprazole, 40 mg, Oral, Q AM  polyethylene glycol, 17 g, Oral, Daily  sodium chloride, 10 mL, Intravenous, Q12H  [START ON 12/15/2020] vancomycin, 1,500 mg, Intravenous,  Q24H      sodium chloride, 100 mL/hr, Last Rate: 100 mL/hr (12/13/20 2002)      ---------------------------------------------------------------------------------------------------------------------   Vital Signs:  Temp:  [98 °F (36.7 °C)-98.5 °F (36.9 °C)] 98.2 °F (36.8 °C)  Heart Rate:  [] 91  Resp:  [18-24] 20  BP: (143-186)/(63-97) 177/68  Mean Arterial Pressure (Non-Invasive) for the past 24 hrs (Last 3 readings):   Noninvasive MAP (mmHg)   12/14/20 1005 108   12/14/20 0613 134   12/14/20 0501 139     SpO2 Percentage    12/14/20 0501 12/14/20 0613 12/14/20 1005   SpO2: 93% 94% 91%     SpO2:  [91 %-100 %] 91 %  on  Flow (L/min):  [4-5] 5;   Device (Oxygen Therapy): nasal cannula    Body mass index is 26.22 kg/m².  Wt Readings from Last 3 Encounters:   12/14/20 87.7 kg (193 lb 4.8 oz)   10/16/18 89.8 kg (198 lb)   08/08/18 89.8 kg (198 lb)     ---------------------------------------------------------------------------------------------------------------------     Physical Exam:    Constitutional:  Well-developed and well-nourished.  No respiratory distress.      HENT:  Head: Normocephalic and atraumatic.  Mouth:  Moist mucous membranes.    Eyes:  Conjunctivae and EOM are normal.  No scleral icterus.  Neck:  Neck supple.  No JVD present.    Cardiovascular:  Normal rate, regular rhythm and normal heart sounds with no murmur. No edema.  Pulmonary/Chest:  No respiratory distress, no wheezes, no crackles, with decreased lung sounds in the bases.  Abdominal:  Soft.  Bowel sounds are normal.  No distension and no tenderness.   Musculoskeletal:  No edema, no tenderness, and no deformity.  No swelling or redness of joints.  Neurological:  Alert and oriented to person, place, and time.  No facial droop.  No slurred speech.   Skin:  Skin is warm and dry.  No rash noted.  No pallor.  Scattered bruising to chest wall and bilateral upper extremities.  Left elbow skin tear with bruising noted.  Psychiatric:  Normal mood  and affect.  Behavior is normal.    ---------------------------------------------------------------------------------------------------------------------    Results from last 7 days   Lab Units 12/14/20 0500 12/14/20 0059 12/13/20 1947 12/13/20 1417   CK TOTAL U/L  --  412*  --  414*   TROPONIN T ng/mL 0.039* 0.056* 0.060* 0.058*     Results from last 7 days   Lab Units 12/13/20  1417   PROBNP pg/mL 396.1       Results from last 7 days   Lab Units 12/13/20  1431   PH, ARTERIAL pH units 7.424   PO2 ART mm Hg 92.7   PCO2, ARTERIAL mm Hg 48.5*   HCO3 ART mmol/L 31.7*     Results from last 7 days   Lab Units 12/14/20 0500 12/14/20 0059 12/13/20 1947 12/13/20  1417   CRP mg/dL 1.83*  --   --   --    LACTATE mmol/L  --   --   --  1.3   WBC 10*3/mm3  --  7.94 6.93 7.15   HEMOGLOBIN g/dL  --  10.8* 10.4* 11.6*   HEMATOCRIT %  --  32.4* 31.4* 35.6*   MCV fL  --  94.2 93.5 94.7   MCHC g/dL  --  33.3 33.1 32.6   PLATELETS 10*3/mm3  --  195 212 217   INR   --   --   --  0.89*     Results from last 7 days   Lab Units 12/14/20 0059 12/13/20 1947 12/13/20 1417   SODIUM mmol/L 132* 134* 135*   POTASSIUM mmol/L 4.4 4.6 4.9   MAGNESIUM mg/dL 2.0 2.0  --    CHLORIDE mmol/L 97* 97* 96*   CO2 mmol/L 30.5* 30.4* 31.6*   BUN mg/dL 25* 25* 24*   CREATININE mg/dL 0.95 1.04 0.95   EGFR IF NONAFRICN AM mL/min/1.73 74 67 74   CALCIUM mg/dL 9.6 9.7* 10.3*   GLUCOSE mg/dL 202* 217* 312*   ALBUMIN g/dL  --   --  4.03   BILIRUBIN mg/dL  --   --  0.4   ALK PHOS U/L  --   --  107   AST (SGOT) U/L  --   --  38   ALT (SGPT) U/L  --   --  29   Estimated Creatinine Clearance: 62.8 mL/min (by C-G formula based on SCr of 0.95 mg/dL).  No results found for: AMMONIA    Glucose   Date/Time Value Ref Range Status   12/14/2020 1007 242 (H) 70 - 130 mg/dL Final   12/14/2020 0747 249 (H) 70 - 130 mg/dL Final   12/14/2020 0350 261 (H) 70 - 130 mg/dL Final   12/13/2020 1549 243 (H) 70 - 130 mg/dL Final     No results found for: HGBA1C  No results found  for: TSH, FREET4    Blood Culture   Date Value Ref Range Status   12/13/2020 Abnormal Stain (C)  Preliminary     No results found for: URINECX  No results found for: WOUNDCX  No results found for: STOOLCX  No results found for: RESPCX  Pain Management Panel     There is no flowsheet data to display.        I have personally reviewed the above laboratory results.   ---------------------------------------------------------------------------------------------------------------------  Imaging Results (Last 7 Days)     Procedure Component Value Units Date/Time    US Carotid Bilateral [[id number]] Collected: 12/14/20 0957     Updated: 12/14/20 1011    Narrative:      US CAROTID BILATERAL-     REASON FOR EXAM:  Syncope; R06.00-Dyspnea, unspecified;  R73.9-Hyperglycemia, unspecified; W19.XXXA-Unspecified fall, initial  encounter; R77.8-Other specified abnormalities of plasma proteins     TECHNIQUE:  Multiple real-time color doppler images were obtained.  M-mode Doppler was used for velocity determination and spectral pattern.  Stenosis was evaluated using the NASCET or similar measurement  technique.     RIGHT SIDE: The common carotid artery was normal in caliber. Peak  systolic and diastolic velocities were 59 and 12 cm/s. At the carotid  bifurcation, there was no stenosis in the internal carotid artery. Peak  systolic and diastolic velocities were 64 and 8 cm/s. There is antegrade  flow in the right vertebral artery.     LEFT SIDE: The common carotid artery is normal in caliber. Peak systolic  and diastolic velocities were 85 and 30 cm/s. At the carotid  bifurcation, there was no identifiable stenosis in the internal carotid  artery. Peak systolic and diastolic velocities were 101 and 12 cm/s.  Vertebral artery flow is antegrade.       Impression:      Mild atherosclerotic plaque is noted involving the carotid  bifurcations. By velocity measurements, the plaques would not be  hemodynamically significant at less than 50%  stenosis. Both vertebral  arteries show antegrade flow.                 Grading of ICA stenosis     <50% stenosis      PSV <125 cm/sec     50-69% stenosis   -229 cm/sec     70-99% stenosis   PSV >230 cm/sec     This report was finalized on 12/14/2020 10:09 AM by Dr. Bran Montgomery II, MD.       XR Pelvis 1 or 2 View [[id number]] Collected: 12/13/20 1533     Updated: 12/13/20 1536    Narrative:      EXAMINATION: XR PELVIS 1 OR 2 VW-      CLINICAL INDICATION: fall        COMPARISON: None available     FINDINGS:  2 views of the pelvis show no acute fracture or dislocation.     Moderate to large stool burden.       Impression:      No acute fracture      This report was finalized on 12/13/2020 3:34 PM by Dr. Keanu La MD.       XR Chest 1 View [[id number]] Collected: 12/13/20 1532     Updated: 12/13/20 1535    Narrative:      XR CHEST 1 VW-     CLINICAL INDICATION: CHF/COPD Protocol        COMPARISON: CT chest dated 12/13/2020      TECHNIQUE: Single frontal view of the chest.     FINDINGS:     Right basilar consolidation.  No pneumothorax  Elevation of the right hemidiaphragm  There is no evidence of an acute osseous abnormality.   There are no suspicious-appearing parenchymal soft tissue nodules.          Impression:      Right basilar atelectasis  No pneumothorax     This report was finalized on 12/13/2020 3:33 PM by Dr. Keanu La MD.       CT Chest With Contrast [[id number]] Collected: 12/13/20 1520     Updated: 12/13/20 1525    Narrative:      EXAM: CT CHEST W CONTRAST-      CLINICAL INDICATION:fall      COMPARISON: NONE.     TECHNIQUE: Multiple axial CT images were obtained from lung apex through  upper abdomen WITH administration of IV contrast. Reformatted images in  the coronal and/or sagittal plane(s) were generated from the axial data  set to facilitate diagnostic accuracy and/or surgical planning.     Radiation dose reduction techniques were utilized per ALARA protocol.  Automated exposure  control was initiated through either or [employer] or  [employer] software packages by  protocol.    DOSE (DLP mGy-cm):        FINDINGS:     LUNGS: Minimal right basilar atelectasis and trace right pleural  effusion  No pneumothorax  COPD     HEART: Coronary artery calcifications are present     MEDIASTINUM: No mediastinal hematoma     PLEURA: No pleural effusion. No pleural mass or abnormal calcification.  No pneumothorax.     VASCULATURE: No evidence of aneurysm. No evidence of pulmonary embolism.     BONES: No acute bony abnormality.     VISUALIZED UPPER ABDOMEN:The upper abdomen is unremarkable as  visualized.     Other: None.       Impression:         1. No acute posttraumatic changes in the chest  2. COPD  3. Trace right effusion and right basilar atelectasis     This report was finalized on 12/13/2020 3:22 PM by Dr. Keanu La MD.       CT Thoracic Spine Without Contrast [[id number]] Collected: 12/13/20 1517     Updated: 12/13/20 1524    Narrative:      EXAMINATION: CT THORACIC SPINE WO CONTRAST-      CLINICAL INDICATION: fall        COMPARISON: None immediately available.     Radiation dose reduction techniques were utilized per ALARA protocol.  Automated exposure control was initiated through either or [employer] or  [employer] software packages by  protocol.           PROCEDURE: Axial images were acquired through the thoracic spine without  any IV contrast. Reformatted images were created.     FINDINGS: Today's study does show trace right pleural effusion and  minimal right basilar atelectasis.     The thoracic vertebral body heights are maintained     Anterior bridging spurs are present     No paraspinal hematoma.       Impression:      1. No acute thoracic fracture  2. Minimal right basilar atelectasis  3. Arthritic change      This report was finalized on 12/13/2020 3:18 PM by Dr. Keanu La MD.       CT Lumbar Spine Without Contrast [[id number]] Collected: 12/13/20 1518      Updated: 12/13/20 1523    Narrative:      EXAMINATION: CT LUMBAR SPINE WO CONTRAST-      CLINICAL INDICATION: fall        COMPARISON: None available.     Radiation dose reduction techniques were utilized per ALARA protocol.  Automated exposure control was initiated through either or [employer] or  [employer] software packages by  protocol.           PROCEDURE: Axial images through the lumbar spine were acquired without  any IV contrast. Reformatted images were created.     FINDINGS: Today's study shows demineralization and chronic arthritic  change, but no evidence of an acute compression abnormality or  paraspinal hematoma.     Disc bulges at L3-4 and L4-5       Impression:      Arthritic change, but no acute fracture      This report was finalized on 12/13/2020 3:19 PM by Dr. Keanu La MD.       CT Head Without Contrast [[id number]] Collected: 12/13/20 1511     Updated: 12/13/20 1523    Narrative:      CT HEAD WO CONTRAST-     CLINICAL INDICATION: fall        COMPARISON: None available      TECHNIQUE: Axial images of the brain were obtained with out intravenous  contrast.  Reformatted images were created in the sagittal and coronal  planes.     DOSE:     Radiation dose reduction techniques were utilized per ALARA protocol.  Automated exposure control was initiated through either or [employer] or  [employer] software packages by  protocol.           FINDINGS:   Today's study shows no mass, hemorrhage, or midline shift.   Cerebral atrophy  There is no evidence of acute ischemia.  I do not see epidural or subdural hematoma.  The gray-white differentiation is appropriate.   The bone window setting images show no destructive calvarial lesion or  acute calvarial fracture.   The posterior fossa is unremarkable.          Impression:         1. Global cerebral atrophy  2. No parenchymal mass, hemorrhage, or midline shift.  3. No epidural or subdural hematoma     This report was finalized on 12/13/2020  3:11 PM by Dr. Keanu La MD.       CT Cervical Spine Without Contrast [[id number]] Collected: 12/13/20 1511     Updated: 12/13/20 1522    Narrative:      CT CERVICAL SPINE WO CONTRAST-     CLINICAL INDICATION: fall        COMPARISON: None available      TECHNIQUE: Axial images of the cervical spine were acquired with out any  intravenous contrast. Reformatted images were then created in the  sagittal and coronal planes.     DOSE:      Radiation dose reduction techniques were utilized per ALARA protocol.  Automated exposure control was initiated through either or [employer] or  [employer] software packages by  protocol.           FINDINGS:   The provided study demonstrates preservation of the vertebral body  heights in the sagittally reconstructed images.     There is no prevertebral soft tissue swelling.     Alignment is near anatomic.     Disc space narrowing at C2-3, C4-5, C5-C6, C7-T1     I see no acute cervical spine fracture.       Impression:         1. Diffuse arthritic change     2. No acute fracture     This report was finalized on 12/13/2020 3:12 PM by Dr. Keanu La MD.           I have personally reviewed the above radiology results.   ---------------------------------------------------------------------------------------------------------------------      Assessment & Plan        Assessment/Plan       ASSESSMENT:    1.  Sepsis   2.  MRSA bacteremia    PLAN:    Patient presents with worsening shortness of breath and frequent falls.  Patient is currently on 5 L nasal cannula which patient reports he wears chronically at home.  Afebrile, no diarrhea.  WBC normal.  CRP 1.83.  Respiratory panel PCR negative.  CT of the head reports no acute abnormality.  CT of the C-spine reports no acute fracture.  CT of the L-spine reports no acute fracture.  CT of the T-spine reports no acute fracture.  CT of the chest reports COPD, trace right effusion and right basilar atelectasis.  Chest x-ray reports  right basilar atelectasis. X-ray of the pelvis reports no evidence of fracture.  Blood cultures from 12/13/2020 1 out of 2 sets positive for MRSA.  COVID-19 PCR negative.  Influenza PCR negative.    In the setting of MRSA bacteremia with unknown source recommend 2D echo with possibility of NINA if bacteremia persists.  Repeat blood cultures x2.  Recommend to continue current antibiotic regimen of vancomycin monotherapy.  We will continue to follow closely and adjust antibiotic therapy as needed.    Again, thank you Dr. Perdue for allowing us to participate in the care of your patient and please feel free to call for any questions you may have.        Code Status:   Code Status and Medical Interventions:   Ordered at: 12/13/20 1722     Level Of Support Discussed With:    Patient     Code Status:    CPR     Medical Interventions (Level of Support Prior to Arrest):    Full     Scribed for Azam Stephenson MD.      Paola Ochoa, SARAH  12/14/20  11:33 EST     Physician Attestation:    The documentation recorded by the scribe accurately reflects the service I personally performed and the decisions made by me.    Azam Stephenson MD  Infectious Diseases  12/15/20  09:24 EST

## 2020-12-14 NOTE — PLAN OF CARE
Pt bed alarm went off, upon entering pt room, pt standing up bedside trying to use urinal with his O2 NC off.  We helped pt back to bed, applied O2.  Pt disoriented and confused, see Orders, Dr. Ayers aware.  Pt alert and oriented at this time.  Having a difficult time keeping the leads and NC on pt.  Replaced leads and NC several times throughout shift.  Pt denies CP or SOA at this time.  Will continue to monitor and follow plan of care.

## 2020-12-15 NOTE — PLAN OF CARE
Goal Outcome Evaluation:     Pt with no s/s of any distress, no complaints voiced, resting with eyes closed at this time, will continue with current plan of care

## 2020-12-15 NOTE — PROGRESS NOTES
[facility] HOSPITALIST PROGRESS NOTE     Patient Identification:  Name:  Aaron Domínguez  Age:  89 y.o.  Sex:  male  :  1929  MRN:  [id number]  Visit Number:  [id number]  Primary Care Provider:  Bran Woodruff MD    Length of stay:  2    Chief complaint: None    Subjective:    Patient states he is feeling quite well today.  He denies any fevers, chills, diaphoresis, shortness of breath, rigors, dysuria or any other symptoms overnight.  Patient is asking to be discharged home today, as he feels back to his baseline functioning status.  ----------------------------------------------------------------------------------------------------------------------  Current Hospital Meds:  amitriptyline, 25 mg, Oral, Nightly  amLODIPine-lisinopril 5-20 mg combo dose, , Oral, Q24H  arformoterol, 15 mcg, Nebulization, BID - RT  atorvastatin, 10 mg, Oral, Nightly  bisacodyl, 10 mg, Rectal, Once  budesonide, 0.5 mg, Nebulization, BID - RT  cholecalciferol, 50,000 Units, Oral, Weekly  clopidogrel, 75 mg, Oral, Daily  enoxaparin, 40 mg, Subcutaneous, Q24H  insulin aspart, 0-7 Units, Subcutaneous, TID AC  ipratropium-albuterol, 3 mL, Nebulization, Q8H - RT  isosorbide mononitrate, 30 mg, Oral, Daily  methylPREDNISolone sodium succinate, 40 mg, Intravenous, Q12H  metoprolol succinate XL, 100 mg, Oral, Daily  montelukast, 10 mg, Oral, Nightly  nicotine, 1 patch, Transdermal, Q24H  pantoprazole, 40 mg, Oral, Q AM  polyethylene glycol, 17 g, Oral, Daily  sodium chloride, 10 mL, Intravenous, Q12H  terazosin, 5 mg, Oral, Q12H  theophylline, 400 mg, Oral, Daily  vancomycin, 1,500 mg, Intravenous, Q24H      sodium chloride, 100 mL/hr, Last Rate: 100 mL/hr (12/15/20 0557)      ----------------------------------------------------------------------------------------------------------------------  Vital Signs:  Temp:  [97.6 °F (36.4 °C)-98.8 °F (37.1 °C)] 98.7 °F (37.1 °C)  Heart Rate:  [] 88  Resp:  [18-22] 22  BP:  (142-170)/(64-84) 162/84      12/13/20  1840 12/14/20  0442 12/15/20  6901   Weight: 87.7 kg (193 lb 4.8 oz) 87.7 kg (193 lb 4.8 oz) 89.1 kg (196 lb 6.4 oz)     Body mass index is 26.64 kg/m².    Intake/Output Summary (Last 24 hours) at 12/15/2020 1455  Last data filed at 12/15/2020 1006  Gross per 24 hour   Intake 1887 ml   Output 1350 ml   Net 537 ml     Diet Regular; Consistent Carbohydrate  ----------------------------------------------------------------------------------------------------------------------  Physical exam:  Constitutional: Elderly appearing  male in no apparent distress.     HENT:  Head:  Normocephalic and atraumatic.  Mouth:  Moist mucous membranes.    Eyes:  Conjunctivae and EOM are normal.  Pupils are equal, round, and reactive to light.  No scleral icterus.    Neck:  Neck supple. No thyromegaly.  No JVD present.    Cardiovascular:  Regular rate and rhythm with no murmurs, rubs, clicks or gallops appreciated.  Pulmonary/Chest:  Clear to auscultation bilaterally with no crackles, wheezes or rhonchi appreciated.  Abdominal:  Soft. Nontender. Nondistended  Bowel sounds are normal in all four quadrants. No organomegally appreciated.   Musculoskeletal:  No edema, no tenderness, and no deformity.  No red or swollen joints anywhere.    Neurological:  Alert and oriented to person, place, and time. Cranial nerves II-XII intact with no focal deficits.  No facial droop.  No slurred speech.   Skin:  Warm and dry to palpation, multiple abrasions and bruises noted on bilateral upper extremities  Peripheral vascular:  2+ radial and pedal pulses in bilateral upper and lower extremities.  Psychiatric:  Alert and oriented x3, demonstrates appropriate judgement and insight.  ----------------------------------------------------------------------------------------------------------------------  Tele:     ----------------------------------------------------------------------------------------------------------------------  Results from last 7 days   Lab Units 12/14/20 0500 12/14/20 0059 12/13/20 1947 12/13/20  1417   CK TOTAL U/L  --  412*  --  414*   TROPONIN T ng/mL 0.039* 0.056* 0.060* 0.058*     Results from last 7 days   Lab Units 12/15/20  0352 12/14/20  0500 12/14/20  0059 12/13/20  1947 12/13/20  1417   CRP mg/dL 1.28* 1.83*  --   --   --    LACTATE mmol/L  --   --   --   --  1.3   WBC 10*3/mm3 7.04  --  7.94 6.93 7.15   HEMOGLOBIN g/dL 9.9*  --  10.8* 10.4* 11.6*   HEMATOCRIT % 31.0*  --  32.4* 31.4* 35.6*   MCV fL 96.6  --  94.2 93.5 94.7   MCHC g/dL 31.9  --  33.3 33.1 32.6   PLATELETS 10*3/mm3 192  --  195 212 217   INR   --   --   --   --  0.89*     Results from last 7 days   Lab Units 12/13/20  1431   PH, ARTERIAL pH units 7.424   PO2 ART mm Hg 92.7   PCO2, ARTERIAL mm Hg 48.5*   HCO3 ART mmol/L 31.7*     Results from last 7 days   Lab Units 12/15/20  0352 12/14/20  0059 12/13/20  1947 12/13/20  1417   SODIUM mmol/L 131* 132* 134* 135*   POTASSIUM mmol/L 5.0 4.4 4.6 4.9   MAGNESIUM mg/dL 1.9 2.0 2.0  --    CHLORIDE mmol/L 98 97* 97* 96*   CO2 mmol/L 25.2 30.5* 30.4* 31.6*   BUN mg/dL 27* 25* 25* 24*   CREATININE mg/dL 0.89 0.95 1.04 0.95   EGFR IF NONAFRICN AM mL/min/1.73 80 74 67 74   CALCIUM mg/dL 9.1 9.6 9.7* 10.3*   GLUCOSE mg/dL 343* 202* 217* 312*   ALBUMIN g/dL  --   --   --  4.03   BILIRUBIN mg/dL  --   --   --  0.4   ALK PHOS U/L  --   --   --  107   AST (SGOT) U/L  --   --   --  38   ALT (SGPT) U/L  --   --   --  29   Estimated Creatinine Clearance: 68.1 mL/min (by C-G formula based on SCr of 0.89 mg/dL).    No results found for: AMMONIA      Blood Culture   Date Value Ref Range Status   12/13/2020 Abnormal Stain (C)  Preliminary   12/13/2020 No growth at 24 hours  Preliminary     No results found for: URINECX  No results found for: WOUNDCX  No results found for: STOOLCX    I have  personally looked at the labs and they are summarized above.  ----------------------------------------------------------------------------------------------------------------------  Imaging Results (Last 24 Hours)     ** No results found for the last 24 hours. **        ----------------------------------------------------------------------------------------------------------------------  Assessment and Plan:    1.  COPD exacerbation -continue inhaled corticosteroids, long-acting beta agonist, duo nebs and doxycycline.  Will de-escalate Solu-Medrol to oral prednisone tomorrow    2.  General deconditioning -patient with recurrent falls, continue physical therapy.  Physical therapy recommending patient be placed in skilled nursing facility or inpatient rehab.  However, patient requesting to be discharged home.    3.  Possible MRSA bacteremia -patient has 1 of 3 blood cultures positive for MRSA.  Infectious disease has been consulted.  Vancomycin has been initiated.  Appreciate infectious disease recommendations.    4.  Chronic hypoxic respiratory failure -patient has been weaned down to his baseline home oxygen    5.  Hyperlipidemia -statin    6.  Essential hypertension -monitor closely, will likely need adjustment of antihypertensive medications prior to discharge    7.  Normocytic anemia -no indication for transfusion at this time.  Continue to monitor closely.    8.  Advanced age            Juan Daniel Perdue,   12/15/20  14:55 EST

## 2020-12-15 NOTE — PLAN OF CARE
Goal Outcome Evaluation:  Plan of Care Reviewed With: patient, daughter  Progress: improving   Patient sitting up in bed stating he is feeling better. Patient denies any pain or discomfort at this time.  Patient had an episode of nausea resolved with medications ice, deep breathing and cool cloth.  Patient rested family at the bedside for several hours today.  Patient had an episode of low blood sugar treated with snack, BS coming back will continue to monitor and provide care as indicated

## 2020-12-15 NOTE — THERAPY EVALUATION
Acute Care - Physical Therapy Initial Evaluation   Juan     Patient Name: Aaron Domínguez  : 1929  MRN: [id number]  Today's Date: 12/15/2020           PT Assessment (last 12 hours)      PT Evaluation and Treatment     Row Name 12/15/20 1433          Physical Therapy Time and Intention    Subjective Information  complains of;nausea/vomiting This AM pt c/o of nausea but was better by PM session  -CS (r) KH (t) CS (c)     Document Type  therapy note (daily note)  -CS (r) KH (t) CS (c)     Mode of Treatment  physical therapy  -CS (r) KH (t) CS (c)     Patient Effort  good  -CS (r) KH (t) CS (c)     Symptoms Noted During/After Treatment  shortness of breath Pt SpO2 was in 90's post session  -CS (r) KH (t) CS (c)     Comment  Pt was visited this AM, treatment given PM session after feeling better.   -CS (r) KH (t) CS (c)     Row Name 12/15/20 1433          Bed Mobility    Bed Mobility  supine-sit  -CS (r) KH (t) CS (c)     Supine-Sit [place] (Bed Mobility)  independent;modified independence;standby assist  -CS (r) KH (t) CS (c)     Row Name 12/15/20 1433          Transfers    Transfers  sit-stand transfer  -CS (r) KH (t) CS (c)     Comment (Transfers)  Pt did well venessa REYES, min(A) x2 with benchseat at end of hallway  -CS (r) KH (t) CS (c)     Sit-Stand [place] (Transfers)  modified independence;set up;standby assist;contact guard;minimum assist (75% patient effort)  -CS (r) KH (t) CS (c)     Row Name 12/15/20 1433          Sit-Stand Transfer    Assistive Device (Sit-Stand Transfers)  walker, front-wheeled  -CS (r) KH (t) CS (c)     Row Name 12/15/20 1433          Gait/Stairs (Locomotion)    Gait/Stairs Locomotion  gait/ambulation assistive device  -CS (r) KH (t) CS (c)     [place] Level (Gait)  modified independence;contact guard;2 person assist;1 person to manage equipment  -CS (r) KH (t) CS (c)     Assistive Device (Gait)  walker, front-wheeled  -CS (r) KH (t) CS (c)     Distance in Feet  (Gait)  ~100'+  -CS (r) KH (t) CS (c)     Row Name             Wound 12/13/20 1915 Left elbow Skin Tear    Wound - Properties Group Placement Date: 12/13/20  -JF Placement Time: 1915  -JF Present on Hospital Admission: Y  -JF Side: Left  -JF Location: elbow  -JF Primary Wound Type: Skin tear  -JF    Retired Wound - Properties Group Date first assessed: 12/13/20  -JF Time first assessed: 1915  -JF Present on Hospital Admission: Y  -JF Side: Left  -JF Location: elbow  -JF Primary Wound Type: Skin tear  -JF    Row Name             Wound 12/13/20 1915 Right ring finger Skin Tear    Wound - Properties Group Placement Date: 12/13/20  -JF Placement Time: 1915  -JF Present on Hospital Admission: Y  -JF Side: Right  -JF Location: ring finger  -JF Primary Wound Type: Skin tear  -JF    Retired Wound - Properties Group Date first assessed: 12/13/20  -JF Time first assessed: 1915  -JF Present on Hospital Admission: Y  -JF Side: Right  -JF Location: ring finger  -JF Primary Wound Type: Skin tear  -JF    Row Name 12/15/20 1433          Physical Therapy Goals    Gait Training Goal Selection (PT)  gait training, PT goal 1  -CS (r) KH (t) CS (c)     Row Name 12/15/20 1433          Gait Training Goal 1 (PT)    Activity/Assistive Device (Gait Training Goal 1, PT)  gait (walking locomotion);assistive device use  -CS (r) KH (t) CS (c)     [place] Level (Gait Training Goal 1, PT)  independent;modified independence  -CS (r) KH (t) CS (c)     Distance (Gait Training Goal 1, PT)  50' +  -CS (r) KH (t) CS (c)     Time Frame (Gait Training Goal 1, PT)  by discharge  -CS (r) KH (t) CS (c)     Row Name 12/15/20 1433          Positioning and Restraints    Pre-Treatment Position  in bed  -CS (r) KH (t) CS (c)     Post Treatment Position  bed  -CS (r) KH (t) CS (c)     In Bed  -- Pt was left with visitor (daughter)  -CS (r) KH (t) CS (c)     Row Name 12/15/20 1433          Therapy Assessment/Plan (PT)    PT Diagnosis (PT)  fall risk  -CS (r)  KH (t) CS (c)     Rehab Potential (PT)  fair, will monitor progress closely  -CS (r) KH (t) CS (c)     Criteria for Skilled Interventions Met (PT)  other (see comments)  -CS (r) KH (t) CS (c)       User Key  (r) = Recorded By, (t) = Taken By, (c) = Cosigned By    Initials Name Provider Type    Luis Angel Vázquez, PT Physical Therapist    Bhumika Wilson, RN Registered Nurse    Heena Jiang, PT Student PT Student          PT Recommendation and Plan  Planned Therapy Interventions (PT): gait training, other (see comments)(endurance)  Therapy Frequency (PT): 3 times/wk          Time Calculation:   PT Charges     Row Name 12/15/20 1443             Time Calculation    PT Received On  12/15/20  -CS (r) KH (t) CS (c)      PT Goal Re-Cert Due Date  12/28/20  -CS (r) KH (t) CS (c)         Time Calculation- PT    Total Timed Code Minutes- PT  15 minute(s)  -CS (r) KH (t) CS (c)         Timed Charges    87775 - Gait Training Minutes   15  -CS (r) KH (t) CS (c)        User Key  (r) = Recorded By, (t) = Taken By, (c) = Cosigned By    Initials Name Provider Type    Luis Angel Vázquez, PT Physical Therapist    Heena Jiang, PT Student PT Student        Therapy Charges for Today     Code Description Service Date Service Provider Modifiers Qty    84989133641 HC PT EVAL LOW COMPLEXITY 4 12/14/2020 Heena Jacinto, PT Student GP 1    73130221543 HC GAIT TRAINING EA 15 MIN 12/15/2020 Heena Jacinto, PT Student GP 1               Heena Jacinto, PT Student  12/15/2020

## 2020-12-16 NOTE — DISCHARGE PLACEMENT REQUEST
"Estelita Domínguez (89 y.o. 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)   Code Status: CPR    Ht: 182.9 cm (72\")   Wt: 91.2 kg (201 lb)    Admission Cmt: None   Principal Problem: Dyspnea [R06.00]                 Active Insurance as of 12/13/2020     Primary Coverage     Payor Plan Insurance Group Employer/Plan Group    HUMANA MEDICARE REPLACEMENT HUMANA MEDICARE REPLACEMENT [id number]     Payor Plan Address Payor Plan Phone Number Payor Plan Fax Number Effective Dates    [address] [address] [phone]  1/1/2015 - None Entered    [facility] [id number]       Subscriber Name Subscriber Birth Date Member ID       ESTELITA DOMÍNGUEZ 2/27/1929 [id number]                 Emergency Contacts      (Rel.) Home Phone Work Phone Mobile Phone    Lois Bhakta (Power of ) [phone] -- --    Rossana Retana (Power of ) [phone] -- --            Emergency Contact Information     Name Relation Home Work Mobile    Lois Bhakta Power of  [phone]      Rossana Retana Power of  [phone]            Insurance Information                HUMANA MEDICARE REPLACEMENT/HUMANA MEDICARE REPLACEMENT Phone: [phone]    Subscriber: Estelita Domínguez Subscriber#: [id number]    Group#: [id number] Precert#:           Treatment Team     Provider Relationship Specialty Contact    Juan Daniel Perdue DO Attending --  [phone]    Anna Steiner " "KARTIK RN Registered Nurse --  [phone]    Sean Wood, RRT Respiratory Therapist --  [phone]    Azam Stephenson MD Consulting Physician Infectious Diseases  [phone]    Luis Angel Montalvo, PT Physical Therapist Physical Therapy     Francisca Arango Patient Care Technician --           Problem List           Codes Noted - Resolved       Hospital    * (Principal) Dyspnea ICD-10-CM: R06.00  ICD-9-CM: 786.09 2020 - Present             History & Physical      Kamini Covington MD at 20 1652              [facility]    Patient Identification:  Name:  Aaron Domínguez  Age:  89 y.o.  Sex:  male  :  1929  MRN:  [id number]   Visit Number:  [id number]  Admit Date: 2020   Room number:  115/15  Primary Care Physician:  Bran Woodruff MD     Chief complaint:    Chief Complaint   Patient presents with   • Fall   • Shortness of Breath     History of presenting illness:  Patient seen and examined, assisted by Gogo Oliveros RN.  Daughter is also assisting with history.  Patient is 89 years old, he has history of COPD, chronic hypoxic respite failure, coronary disease stent placement x2, he lives alone, was brought to the emergency room due to increased shortness of breath and frequent falls.  As per patient he has been experiencing more short of breath lately this past 5 days associated with increased coughing whitish sputum production no fever chills cough.  He claims his appetite has not changed.  As per daughter patient fell yesterday which he reports due to blacking out when he tried to stand up this morning he fell again because he \"tripped\" he was brought into the emergency room, he was having difficulty breathing, not confused, skeletal survey no acute fracture, he has multiple bruises superficial hematoma and skin tear from fall.  Troponin is indeterminate.  No acute EKG changes.    Because of status he was admitted for frequent " fall, indeterminate troponin and COPD exacerbation.  Patient reports he has chronic urinary hesitance, he denies constipation, last bowel movement was yesterday morning described as soft.  We performed post voiding bladder scan at the emergency room and result was 141 mL.    ---------------------------------------------------------------------------------------------------------------------   Review of Systems   Constitutional: Negative for activity change, appetite change, chills, diaphoresis, fatigue, fever and unexpected weight change.   HENT: Negative.    Eyes: Negative.    Respiratory: Positive for cough, shortness of breath and wheezing. Negative for apnea, choking and chest tightness.    Cardiovascular: Negative.    Gastrointestinal: Negative.    Endocrine: Negative.    Genitourinary: Positive for difficulty urinating and testicular pain. Negative for decreased urine volume, discharge, dysuria, enuresis, flank pain, frequency, genital sores, hematuria, penile pain, penile swelling, scrotal swelling and urgency.   Musculoskeletal: Negative.    Skin: Negative.    Allergic/Immunologic: Negative.    Neurological: Negative.    Hematological: Negative.    Psychiatric/Behavioral: Negative.         ---------------------------------------------------------------------------------------------------------------------   Past Medical History:   Diagnosis Date   • COPD (chronic obstructive pulmonary disease) (CMS/[facility])    • Diabetes mellitus (CMS/[facility])    • Hypertension      Past Surgical History:   Procedure Laterality Date   • CORONARY STENT PLACEMENT       Family History   Problem Relation Age of Onset   • Heart failure Mother    • Diabetes Father    • Heart failure Father      Social History     Socioeconomic History   • Marital status:      Spouse name: Not on file   • Number of children: Not on file   • Years of education: Not on file   • Highest education level: Not on file   Tobacco Use   • Smoking status:  Former Smoker     Packs/day: 2.00     Years: 60.00     Pack years: 120.00     Types: Cigarettes     Quit date: 1998     Years since quittin.3   • Smokeless tobacco: Current User     Types: Chew   Substance and Sexual Activity   • Alcohol use: No   • Drug use: No   • Sexual activity: Defer     ---------------------------------------------------------------------------------------------------------------------   Allergies:  Patient has no known allergies.  ---------------------------------------------------------------------------------------------------------------------   Prior to Admission Medications     Prescriptions Last Dose Informant Patient Reported? Taking?    albuterol (PROAIR HFA) 108 (90 Base) MCG/ACT inhaler   No No    Inhale 2 puffs Every 4 (Four) Hours As Needed for Shortness of Air.    amitriptyline (ELAVIL) 25 MG tablet   Yes No    TAKE ONE TABLET BY MOUTH EVERY NIGHT AT BEDTIME AS DIRECTED    amLODIPine-benazepril (LOTREL 5-20) 5-20 MG per capsule   Yes No    Take  by mouth Daily. for blood pressure    atorvastatin (LIPITOR) 10 MG tablet   Yes No    TAKE ONE TABLET BY MOUTH EVERY DAY TO LOWER CHOLESTEROL    clopidogrel (PLAVIX) 75 MG tablet   Yes No    Take 75 mg by mouth Daily. as directed    doxazosin (CARDURA) 8 MG tablet   Yes No    TAKE ONE TABLET BY MOUTH EVERY NIGHT AT BEDTIME AS DIRECTED    Fluticasone-Umeclidin-Vilant 100-62.5-25 MCG/INH aerosol powder    No No    Inhale 1 each Daily.    gabapentin (NEURONTIN) 100 MG capsule   Yes No    TAKE ONE CAPSULE BY MOUTH EVERY DAY AT BEDTIME AS NEEDED FOR NERVE PAIN    ipratropium-albuterol (DUO-NEB) 0.5-2.5 mg/3 ml nebulizer   No No    Take 3 mL by nebulization Every 4 (Four) Hours As Needed for Wheezing.    SYMBICORT 160-4.5 MCG/ACT inhaler   Yes No    INHALE 2 PUFFS INTO LUNGS TWO TIMES A DAY AS NEEDED FOR BREATHING    VENTOLIN  (90 Base) MCG/ACT inhaler   Yes No    INHALE 2 PUFFS INTO LUNGS EVERY 4 TO 6 HOURS AS NEEDED FOR  BREATHING    vitamin D (ERGOCALCIFEROL) 58180 units capsule capsule   Yes No    Take 50,000 Units by mouth 1 (One) Time Per Week.        ---------------------------------------------------------------------------------------------------------------------   Vital Signs:  Temp:  [98.4 °F (36.9 °C)] 98.4 °F (36.9 °C)  Heart Rate:  [75-81] 81  Resp:  [24] 24  BP: (148-186)/(79-97) 148/91    Mean Arterial Pressure (Non-Invasive) for the past 24 hrs (Last 3 readings):   Noninvasive MAP (mmHg)   12/13/20 1649 116   12/13/20 1604 119   12/13/20 1449 123     SpO2:  [97 %-100 %] 97 %  on  Flow (L/min):  [5] 5;   Device (Oxygen Therapy): nasal cannula  Body mass index is 26.32 kg/m².    Wt Readings from Last 3 Encounters:   12/13/20 93 kg (205 lb)   10/16/18 89.8 kg (198 lb)   08/08/18 89.8 kg (198 lb)               ---------------------------------------------------------------------------------------------------------------------   Physical Exam:  Constitutional:  Well-developed and well-nourished.  Chronically ill-appearing, tachypneic but not in respiratory distress.  Is able to answer question appropriately, no confusion.       HENT:  Head: Normocephalic and atraumatic.  Mouth: Dry mucous membranes.    Eyes:  Conjunctivae and EOM are normal.  Pupils are equal, round, and reactive to light.  No scleral icterus.  Neck:  Neck supple.  No JVD present.    No bruit  Cardiovascular:  Normal rate, regular rhythm and normal heart sounds with no murmur.  Pulmonary/Chest: No wheezing, occasional rhonchi noted no expiratory lag, equal chest expansion.  He has anterior chest wall bruising and purpura mostly in the left anterior chest wall.  No flail chest or splinting.    Abdominal:  Soft.  Bowel sounds are normal.  No distension and no tenderness.   Musculoskeletal:  No edema, no tenderness, and no deformity.  No red or swollen joints anywhere.    Neurological:  Alert and oriented to person, place, and time.  No cranial nerve  deficit.  No tongue deviation.  No facial droop.  No slurred speech.   Skin:  Skin is warm and dry.  No rash noted.  No pallor.   Peripheral vascular:  No edema and strong pulses on all 4 extremities.  Genitourinary: He has no Rodriguez catheter.  ---------------------------------------------------------------------------------------------------------------------  EKG:          Telemetry: Sinus rhythm 98 bpm, O2 saturation on 5 L nasal cannula is 98%  I have personally looked at both the EKG and the telemetry strips.  --------------------------------------------------------------------------------------------------------------------  Results from last 7 days   Lab Units 12/13/20  1417   CK TOTAL U/L 414*   TROPONIN T ng/mL 0.058*     Results from last 7 days   Lab Units 12/13/20  1417   LACTATE mmol/L 1.3   WBC 10*3/mm3 7.15   HEMOGLOBIN g/dL 11.6*   HEMATOCRIT % 35.6*   MCV fL 94.7   MCHC g/dL 32.6   PLATELETS 10*3/mm3 217   INR  0.89*     Results from last 7 days   Lab Units 12/13/20  1417   SODIUM mmol/L 135*   POTASSIUM mmol/L 4.9   CHLORIDE mmol/L 96*   CO2 mmol/L 31.6*   BUN mg/dL 24*   CREATININE mg/dL 0.95   EGFR IF NONAFRICN AM mL/min/1.73 74   CALCIUM mg/dL 10.3*   GLUCOSE mg/dL 312*   ALBUMIN g/dL 4.03   BILIRUBIN mg/dL 0.4   ALK PHOS U/L 107   AST (SGOT) U/L 38   ALT (SGPT) U/L 29   Estimated Creatinine Clearance: 66.6 mL/min (by C-G formula based on SCr of 0.95 mg/dL).    Glucose   Date/Time Value Ref Range Status   12/13/2020 1549 243 (H) 70 - 130 mg/dL Final     No results found for: AMMONIA        No results found for: BLOODCXNo results found for: RESPCXNo results found for: URINECXNo results found for: WOUNDCXNo results found for: BODYFLDCXNo results found for: STOOLCX  pH pH, Arterial   Date Value Ref Range Status   12/13/2020 7.424 7.350 - 7.450 pH units Final      pO2 pO2, Arterial   Date Value Ref Range Status   12/13/2020 92.7 83.0 - 108.0 mm Hg Final      pCO2 pCO2, Arterial   Date Value Ref Range  Status   12/13/2020 48.5 (H) 35.0 - 45.0 mm Hg Final      HCO3 HCO3, Arterial   Date Value Ref Range Status   12/13/2020 31.7 (H) 20.0 - 26.0 mmol/L Final     Comment:     83 Value above reference range        I have personally looked at the labs and they are summarized above.  ----------------------------------------------------------------------------------------------------------------------  Imaging Results (Last 24 Hours)     Procedure Component Value Units Date/Time    XR Pelvis 1 or 2 View [[id number]] Collected: 12/13/20 1533     Updated: 12/13/20 1536    Narrative:      EXAMINATION: XR PELVIS 1 OR 2 VW-      CLINICAL INDICATION: fall        COMPARISON: None available     FINDINGS:  2 views of the pelvis show no acute fracture or dislocation.     Moderate to large stool burden.       Impression:      No acute fracture      This report was finalized on 12/13/2020 3:34 PM by Dr. Keanu La MD.       XR Chest 1 View [[id number]] Collected: 12/13/20 1532     Updated: 12/13/20 1535    Narrative:      XR CHEST 1 VW-     CLINICAL INDICATION: CHF/COPD Protocol        COMPARISON: CT chest dated 12/13/2020      TECHNIQUE: Single frontal view of the chest.     FINDINGS:     Right basilar consolidation.  No pneumothorax  Elevation of the right hemidiaphragm  There is no evidence of an acute osseous abnormality.   There are no suspicious-appearing parenchymal soft tissue nodules.          Impression:      Right basilar atelectasis  No pneumothorax     This report was finalized on 12/13/2020 3:33 PM by Dr. Keanu La MD.       CT Chest With Contrast [[id number]] Collected: 12/13/20 1520     Updated: 12/13/20 1525    Narrative:      EXAM: CT CHEST W CONTRAST-      CLINICAL INDICATION:fall      COMPARISON: NONE.     TECHNIQUE: Multiple axial CT images were obtained from lung apex through  upper abdomen WITH administration of IV contrast. Reformatted images in  the coronal and/or sagittal plane(s) were generated from the  axial data  set to facilitate diagnostic accuracy and/or surgical planning.     Radiation dose reduction techniques were utilized per ALARA protocol.  Automated exposure control was initiated through either or CareDose or  DoseRight software packages by  protocol.    DOSE (DLP mGy-cm):        FINDINGS:     LUNGS: Minimal right basilar atelectasis and trace right pleural  effusion  No pneumothorax  COPD     HEART: Coronary artery calcifications are present     MEDIASTINUM: No mediastinal hematoma     PLEURA: No pleural effusion. No pleural mass or abnormal calcification.  No pneumothorax.     VASCULATURE: No evidence of aneurysm. No evidence of pulmonary embolism.     BONES: No acute bony abnormality.     VISUALIZED UPPER ABDOMEN:The upper abdomen is unremarkable as  visualized.     Other: None.       Impression:         1. No acute posttraumatic changes in the chest  2. COPD  3. Trace right effusion and right basilar atelectasis     This report was finalized on 12/13/2020 3:22 PM by Dr. Keanu La MD.       CT Thoracic Spine Without Contrast [[id number]] Collected: 12/13/20 1517     Updated: 12/13/20 1524    Narrative:      EXAMINATION: CT THORACIC SPINE WO CONTRAST-      CLINICAL INDICATION: fall        COMPARISON: None immediately available.     Radiation dose reduction techniques were utilized per ALARA protocol.  Automated exposure control was initiated through either or CareDose or  DoseRight software packages by  protocol.           PROCEDURE: Axial images were acquired through the thoracic spine without  any IV contrast. Reformatted images were created.     FINDINGS: Today's study does show trace right pleural effusion and  minimal right basilar atelectasis.     The thoracic vertebral body heights are maintained     Anterior bridging spurs are present     No paraspinal hematoma.       Impression:      1. No acute thoracic fracture  2. Minimal right basilar atelectasis  3. Arthritic  change      This report was finalized on 12/13/2020 3:18 PM by Dr. Keanu La MD.       CT Lumbar Spine Without Contrast [[id number]] Collected: 12/13/20 1518     Updated: 12/13/20 1523    Narrative:      EXAMINATION: CT LUMBAR SPINE WO CONTRAST-      CLINICAL INDICATION: fall        COMPARISON: None available.     Radiation dose reduction techniques were utilized per ALARA protocol.  Automated exposure control was initiated through either or CareDose or  DoseRight software packages by  protocol.           PROCEDURE: Axial images through the lumbar spine were acquired without  any IV contrast. Reformatted images were created.     FINDINGS: Today's study shows demineralization and chronic arthritic  change, but no evidence of an acute compression abnormality or  paraspinal hematoma.     Disc bulges at L3-4 and L4-5       Impression:      Arthritic change, but no acute fracture      This report was finalized on 12/13/2020 3:19 PM by Dr. Keanu La MD.       CT Head Without Contrast [[id number]] Collected: 12/13/20 1511     Updated: 12/13/20 1523    Narrative:      CT HEAD WO CONTRAST-     CLINICAL INDICATION: fall        COMPARISON: None available      TECHNIQUE: Axial images of the brain were obtained with out intravenous  contrast.  Reformatted images were created in the sagittal and coronal  planes.     DOSE:     Radiation dose reduction techniques were utilized per ALARA protocol.  Automated exposure control was initiated through either or CareDose or  DoseRight software packages by  protocol.           FINDINGS:   Today's study shows no mass, hemorrhage, or midline shift.   Cerebral atrophy  There is no evidence of acute ischemia.  I do not see epidural or subdural hematoma.  The gray-white differentiation is appropriate.   The bone window setting images show no destructive calvarial lesion or  acute calvarial fracture.   The posterior fossa is unremarkable.          Impression:          1. Global cerebral atrophy  2. No parenchymal mass, hemorrhage, or midline shift.  3. No epidural or subdural hematoma     This report was finalized on 12/13/2020 3:11 PM by Dr. Keanu La MD.       CT Cervical Spine Without Contrast [[id number]] Collected: 12/13/20 1511     Updated: 12/13/20 1522    Narrative:      CT CERVICAL SPINE WO CONTRAST-     CLINICAL INDICATION: fall        COMPARISON: None available      TECHNIQUE: Axial images of the cervical spine were acquired with out any  intravenous contrast. Reformatted images were then created in the  sagittal and coronal planes.     DOSE:      Radiation dose reduction techniques were utilized per ALARA protocol.  Automated exposure control was initiated through either or [email or web address] or  DoseRight software packages by  protocol.           FINDINGS:   The provided study demonstrates preservation of the vertebral body  heights in the sagittally reconstructed images.     There is no prevertebral soft tissue swelling.     Alignment is near anatomic.     Disc space narrowing at C2-3, C4-5, C5-C6, C7-T1     I see no acute cervical spine fracture.       Impression:         1. Diffuse arthritic change     2. No acute fracture     This report was finalized on 12/13/2020 3:12 PM by Dr. Keanu La MD.           I have personally reviewed the radiology images and read the final radiology report.  ----------------------------------------------------------------------------------------------------------------------  Assessment and Plan:  -Frequent falls  -COPD exacerbation  -Chronic hypoxic respiratory failure  -Multiple superficial skin tear left elbow from fall  -Multiple bruises from fall  -Advanced age  -Chronic debility  -Constipation  -BPH on Cardura  -Essential hypertension  -Atherosclerotic cardiovascular disease status post stent placement x2  -Dyslipidemia  -Dehydration  -Mild CPK elevation likely from trauma will trend,  -Mild hypercalcemia, likely  secondary to dehydration, will monitor after hydration.  -Indeterminate troponin,  -Normocytic anemia    Address constipation, gentle hydration, fall precautions, wound care, inhaled corticosteroids and long-acting beta agonist, DuoNeb, empirically start patient on doxycycline, short course of Solu-Medrol, oxygen supplementation.  Physical therapy.  Cultures will be ordered, respite panel will be ordered.  Accu-Chek and sliding scale, diabetic diet.  Monitor CPK, serial troponin.  Serial EKG, aspirin, 2D echo.  Carotid Doppler.  Blood culture.  anemia work-up.    Plan outlined to the patient together with patient's daughter and agreed, further management may change as condition evolves    Patient is high risk 89-year-old with respiratory failure he will be admitted requiring 2 nights of stay for treatment that includes above.        Kamini Covington MD  12/13/20  16:52 EST    Electronically signed by Kamini Covington MD at 12/13/20 9083       Vital Signs (last day)     Date/Time   Temp   Temp src   Pulse   Resp   BP   Patient Position   SpO2    12/16/20 1534   --   --   62   18   --   --   98    12/16/20 1459   97.6 (36.4)   Oral   59   18   133/56   Lying   98    12/16/20 1027   98 (36.7)   Oral   65   18   152/91   Lying   97    12/16/20 0916   --   --   68   --   162/90   --   --    12/16/20 0700   --   --   73   18   --   --   --    12/16/20 0655   98 (36.7)   Oral   72   18   158/92   Lying   99    12/16/20 0653   --   --   70   18   --   --   99    12/16/20 0242   97.9 (36.6)   Oral   79   18   144/58   Lying   97    12/15/20 2319   97.7 (36.5)   Oral   --   --   --   --   --    12/15/20 2230   --   --   90   16   --   --   96    12/15/20 2221   --   --   95   16   --   --   96    12/15/20 1953   --   --   78   --   --   --   --    12/15/20 1923   --   --   90   18   --   --   96    12/15/20 1912   --   --   96   18   --   --   96    12/15/20 1855   98.2 (36.8)   Oral   77   20   130/53   Lying   98     12/15/20 1542   98.5 (36.9)   Axillary   62   20   122/60   Lying   96    12/15/20 1019   --   --   88   22   --   Lying   98    12/15/20 1009   --   --   80   22   --   Lying   98    12/15/20 1006   98.7 (37.1)   Axillary   90   18   162/84   Lying   95    12/15/20 0941   --   --   92   --   170/66   --   --    12/15/20 0704   --   --   100   18   --   Lying   91    12/15/20 0654   --   --   104   18   --   Lying   90    12/15/20 0628   98.8 (37.1)   Axillary   88   18   169/64   Lying   91    12/15/20 0226   97.6 (36.4)   --   94   18   160/66   --   91              Lines, Drains & Airways    Active LDAs     Name:   Placement date:   Placement time:   Site:   Days:    Peripheral IV 12/15/20 0423 Right Wrist   12/15/20    0423    Wrist   1    Peripheral IV 12/15/20 0424 Left Forearm   12/15/20    0424    Forearm   1                  Current Facility-Administered Medications   Medication Dose Route Frequency Provider Last Rate Last Admin   • acetaminophen (TYLENOL) tablet 650 mg  650 mg Oral Q6H PRN Merline Downs PA-C   650 mg at 12/14/20 0121   • albuterol (PROVENTIL) nebulizer solution 0.083% 2.5 mg/3mL  2.5 mg Nebulization Q4H PRN Juan Daniel Perdue DO       • amitriptyline (ELAVIL) tablet 25 mg  25 mg Oral Nightly Juan Daniel Perdue DO   25 mg at 12/15/20 1954   • amLODIPine (NORVASC) 5 mg, lisinopril (PRINIVIL,ZESTRIL) 20 mg   Oral Q24H Juan Daniel Perdue DO   Given at 12/16/20 0919   • arformoterol (BROVANA) nebulizer solution 15 mcg  15 mcg Nebulization BID - RT Kamini Covington MD   15 mcg at 12/15/20 1912   • atorvastatin (LIPITOR) tablet 10 mg  10 mg Oral Nightly Juan Daniel Perdue DO   10 mg at 12/15/20 1954   • bisacodyl (DULCOLAX) suppository 10 mg  10 mg Rectal Once Kamini Covington MD       • budesonide (PULMICORT) nebulizer solution 0.5 mg  0.5 mg Nebulization BID - RT Kamini Covington MD   0.5 mg at 12/16/20 0653   • cholecalciferol (VITAMIN D3) capsule  50,000 Units  50,000 Units Oral Weekly Juan Daniel Perdue DO   50,000 Units at 12/15/20 1326   • clopidogrel (PLAVIX) tablet 75 mg  75 mg Oral Daily Juan Daniel Perdue DO   75 mg at 12/16/20 0918   • dextrose (D50W) 25 g/ 50mL Intravenous Solution 25 g  25 g Intravenous Q15 Min PRN Kamini Covington MD       • dextrose (GLUTOSE) oral gel 15 g  15 g Oral Q15 Min PRN Kamini Covington MD       • enoxaparin (LOVENOX) syringe 40 mg  40 mg Subcutaneous Q24H Kamini Covington MD   40 mg at 12/15/20 1953   • glucagon (human recombinant) (GLUCAGEN DIAGNOSTIC) injection 1 mg  1 mg Subcutaneous Q15 Min PRN Kamini Covington MD       • insulin aspart (novoLOG) injection 0-7 Units  0-7 Units Subcutaneous TID AC Kamini Covington MD   6 Units at 12/16/20 1232   • ipratropium-albuterol (DUO-NEB) nebulizer solution 3 mL  3 mL Nebulization Q8H - RT Kamini Covington MD   3 mL at 12/16/20 1534   • ipratropium-albuterol (DUO-NEB) nebulizer solution 3 mL  3 mL Nebulization Q4H PRN Juan Daniel Perdue DO       • isosorbide mononitrate (IMDUR) 24 hr tablet 30 mg  30 mg Oral Daily Juan Daniel Perdue DO   30 mg at 12/16/20 0918   • methylPREDNISolone sodium succinate (SOLU-Medrol) injection 40 mg  40 mg Intravenous Q12H Kamini Covington MD   40 mg at 12/16/20 0917   • metoprolol succinate XL (TOPROL-XL) 24 hr tablet 100 mg  100 mg Oral Daily Juan Daniel Perdue DO   100 mg at 12/16/20 0917   • montelukast (SINGULAIR) tablet 10 mg  10 mg Oral Nightly Juan Daniel Perdue DO   10 mg at 12/15/20 1954   • nicotine (NICODERM CQ) 21 MG/24HR patch 1 patch  1 patch Transdermal Q24H Juan Daniel Perdue DO   1 patch at 12/16/20 0917   • nitroglycerin (NITROSTAT) SL tablet 0.4 mg  0.4 mg Sublingual Q5 Min PRN Kamini Covington MD       • ondansetron (ZOFRAN) injection 4 mg  4 mg Intravenous Q6H PRN Juan Daniel Perdue DO   4 mg at 12/15/20 2319   • pantoprazole (PROTONIX) EC tablet  40 mg  40 mg Oral Q AM Kamini Covington MD   40 mg at 12/16/20 0502   • polyethylene glycol (MIRALAX) packet 17 g  17 g Oral Daily Kamini Covington MD   17 g at 12/16/20 0917   • sodium chloride 0.9 % flush 10 mL  10 mL Intravenous PRN Rayray Olivier MD       • sodium chloride 0.9 % flush 10 mL  10 mL Intravenous Q12H Kamini Covington MD   10 mL at 12/16/20 0920   • sodium chloride 0.9 % flush 10 mL  10 mL Intravenous PRN Kamini Covington MD       • sodium chloride 0.9 % infusion  100 mL/hr Intravenous Continuous Kamini Covington  mL/hr at 12/16/20 0919 100 mL/hr at 12/16/20 0919   • terazosin (HYTRIN) capsule 5 mg  5 mg Oral Q12H Juan Daniel Perdue DO   5 mg at 12/16/20 0919   • theophylline (UNIPHYL) 24 hr tablet 400 mg  400 mg Oral Daily Juan Daniel Perdue DO   Stopped at 12/15/20 1000   • vancomycin 1500 mg/500 mL 0.9% NS IVPB ([facility])  1,500 mg Intravenous Q24H Paola Ochoa APRN   1,500 mg at 12/16/20 0917       Lab Results (last 24 hours)     Procedure Component Value Units Date/Time    Blood Culture - Blood, Arm, Left [[id number]] Collected: 12/15/20 1516    Specimen: Blood from Arm, Left Updated: 12/16/20 1530     Blood Culture No growth at 24 hours    Blood Culture - Blood, Arm, Right [[id number]] Collected: 12/15/20 1518    Specimen: Blood from Arm, Right Updated: 12/16/20 1530     Blood Culture No growth at 24 hours    Blood Culture - Blood, Arm, Left [[id number]] Collected: 12/13/20 1415    Specimen: Blood from Arm, Left Updated: 12/16/20 1445     Blood Culture No growth at 3 days    POC Glucose Once [[id number]]  (Abnormal) Collected: 12/16/20 1028    Specimen: Blood Updated: 12/16/20 1038     Glucose 363 mg/dL     POC Glucose Once [[id number]]  (Abnormal) Collected: 12/16/20 0658    Specimen: Blood Updated: 12/16/20 0704     Glucose 344 mg/dL     Blood Culture - Blood, Arm, Right [[id number]]  (Abnormal)  (Susceptibility) Collected: 12/13/20 1430    Specimen: Blood  from Arm, Right Updated: 12/16/20 0637     Blood Culture Staphylococcus aureus, MRSA     Comment: Infectious disease consultation is highly recommended to rule out distant foci of infection.  Methicillin resistant Staphylococcus aureus, Patient may be an isolation risk.        Gram Stain Anaerobic Bottle Gram positive cocci in clusters    Susceptibility      Staphylococcus aureus, MRSA     MARGARETTE     Gentamicin Susceptible     Oxacillin Resistant     Rifampin Susceptible     Vancomycin Susceptible                Susceptibility Comments     Staphylococcus aureus, MRSA    This isolate does not demonstrate inducible clindamycin resistance in vitro.               Basic Metabolic Panel [[id number]]  (Abnormal) Collected: 12/16/20 0510    Specimen: Blood Updated: 12/16/20 0555     Glucose 350 mg/dL      BUN 28 mg/dL      Creatinine 0.73 mg/dL      Sodium 133 mmol/L      Potassium 5.6 mmol/L      Chloride 100 mmol/L      CO2 26.3 mmol/L      Calcium 8.7 mg/dL      eGFR Non African Amer 101 mL/min/1.73      BUN/Creatinine Ratio 38.4     Anion Gap 6.7 mmol/L     Narrative:      GFR Normal >60  Chronic Kidney Disease <60  Kidney Failure <15      Magnesium [[id number]]  (Normal) Collected: 12/16/20 0510    Specimen: Blood Updated: 12/16/20 0555     Magnesium 2.1 mg/dL     C-reactive Protein [[id number]]  (Abnormal) Collected: 12/16/20 0510    Specimen: Blood Updated: 12/16/20 0555     C-Reactive Protein 0.69 mg/dL     CBC & Differential [[id number]]  (Abnormal) Collected: 12/16/20 0510    Specimen: Blood Updated: 12/16/20 0534    Narrative:      The following orders were created for panel order CBC & Differential.  Procedure                               Abnormality         Status                     ---------                               -----------         ------                     CBC Auto Differential[[id number]]        Abnormal            Final result                 Please view results for these tests on the individual orders.     CBC Auto Differential [[id number]]  (Abnormal) Collected: 12/16/20 0510    Specimen: Blood Updated: 12/16/20 0534     WBC 6.62 10*3/mm3      RBC 3.05 10*6/mm3      Hemoglobin 9.5 g/dL      Hematocrit 29.9 %      MCV 98.0 fL      MCH 31.1 pg      MCHC 31.8 g/dL      RDW 14.0 %      RDW-SD 49.6 fl      MPV 9.8 fL      Platelets 185 10*3/mm3      Neutrophil % 88.0 %      Lymphocyte % 5.0 %      Monocyte % 6.0 %      Eosinophil % 0.0 %      Basophil % 0.2 %      Immature Grans % 0.8 %      Neutrophils, Absolute 5.83 10*3/mm3      Lymphocytes, Absolute 0.33 10*3/mm3      Monocytes, Absolute 0.40 10*3/mm3      Eosinophils, Absolute 0.00 10*3/mm3      Basophils, Absolute 0.01 10*3/mm3      Immature Grans, Absolute 0.05 10*3/mm3      nRBC 0.0 /100 WBC     Troponin [[id number]]  (Abnormal) Collected: 12/15/20 2321    Specimen: Blood Updated: 12/16/20 0016     Troponin T 0.040 ng/mL     Narrative:      Troponin T Reference Range:  <= 0.03 ng/mL-   Negative for AMI  >0.03 ng/mL-     Abnormal for myocardial necrosis.  Clinicians would have to utilize clinical acumen, EKG, Troponin and serial changes to determine if it is an Acute Myocardial Infarction or myocardial injury due to an underlying chronic condition.       Results may be falsely decreased if patient taking Biotin.      POC Glucose Once [[id number]]  (Abnormal) Collected: 12/15/20 2310    Specimen: Blood Updated: 12/15/20 2318     Glucose 307 mg/dL     POC Glucose Once [[id number]]  (Abnormal) Collected: 12/15/20 2159    Specimen: Blood Updated: 12/15/20 2206     Glucose 304 mg/dL     Blood Culture - Blood, Hand, Left [[id number]] Collected: 12/13/20 1947    Specimen: Blood from Hand, Left Updated: 12/15/20 2000     Blood Culture No growth at 2 days    POC Glucose Once [[id number]]  (Abnormal) Collected: 12/15/20 1922    Specimen: Blood Updated: 12/15/20 1928     Glucose 345 mg/dL     POC Glucose Once [[id number]]  (Abnormal) Collected: 12/15/20 0802    Specimen: Blood  Updated: 12/15/20 1733     Glucose 165 mg/dL     POC Glucose Once [[id number]]  (Normal) Collected: 12/15/20 1639    Specimen: Blood Updated: 12/15/20 1648     Glucose 75 mg/dL     POC Glucose Once [[id number]]  (Abnormal) Collected: 12/15/20 1618    Specimen: Blood Updated: 12/15/20 1635     Glucose 56 mg/dL         Imaging Results (Last 24 Hours)     ** No results found for the last 24 hours. **        ECG/EMG Results (last 24 hours)     Procedure Component Value Units Date/Time    ECG 12 Lead [[id number]] Collected: 12/15/20 2319     Updated: 12/15/20 2320     QT Interval 360 ms      QTC Interval 438 ms     Narrative:      Test Reason : changes  Blood Pressure : **/** mmHG  Vent. Rate : 089 BPM     Atrial Rate : 089 BPM     P-R Int : 170 ms          QRS Dur : 086 ms      QT Int : 360 ms       P-R-T Axes : 035 005 -15 degrees     QTc Int : 438 ms    Sinus rhythm with marked sinus arrhythmia  Possible Anterior infarct (cited on or before 13-DEC-2020)  Abnormal ECG  When compared with ECG of 14-DEC-2020 06:09,  Questionable change in initial forces of Septal leads    Referred By:  MANI           Confirmed By:           Orders (last 24 hrs)      Start     Ordered    12/17/20 0700  Vancomycin, Trough  Timed      12/16/20 0915    12/17/20 0700  Vancomycin level at 7am Thursday, hold the 8am dose of vancomycin if the level is > 20 mcg/ml.  Nursing Communication  Once     Comments: Vancomycin level at 7am Thursday, hold the 8am dose of vancomycin if the level is > 20 mcg/ml.    12/16/20 0915    12/17/20 0600  Theophylline Level  Morning Draw      12/16/20 0901    12/16/20 1338  PICC Consult  Once     Provider:  (Not yet assigned)    12/16/20 1337    12/16/20 1038  POC Glucose Once  Once      12/16/20 1028    12/16/20 0705  POC Glucose Once  Once      12/16/20 0658    12/16/20 0600  C-reactive Protein  Daily      12/15/20 1226    12/16/20 0600  CBC (No Diff)  Morning Draw,   Status:  Canceled      12/15/20 1500    12/16/20  0600  CBC Auto Differential  PROCEDURE ONCE      12/16/20 0003    12/15/20 2319  POC Glucose Once  Once      12/15/20 2310    12/15/20 2314  ECG 12 Lead  STAT      12/15/20 2313    12/15/20 2314  Troponin  STAT      12/15/20 2314    12/15/20 2207  POC Glucose Once  Once      12/15/20 2159    12/15/20 2100  amitriptyline (ELAVIL) tablet 25 mg  Nightly      12/15/20 0751    12/15/20 2100  atorvastatin (LIPITOR) tablet 10 mg  Nightly      12/15/20 0751    12/15/20 2100  montelukast (SINGULAIR) tablet 10 mg  Nightly      12/15/20 0751    12/15/20 1929  POC Glucose Once  Once      12/15/20 1922    12/15/20 1734  POC Glucose Once  Once      12/15/20 1727    12/15/20 1649  POC Glucose Once  Once      12/15/20 1639    12/15/20 1636  POC Glucose Once  Once      12/15/20 1618    12/15/20 1134  ondansetron (ZOFRAN) injection 4 mg  Every 6 Hours PRN      12/15/20 1134    12/15/20 1000  amLODIPine (NORVASC) 5 mg, lisinopril (PRINIVIL,ZESTRIL) 20 mg  Every 24 Hours Scheduled      12/15/20 0751    12/15/20 1000  clopidogrel (PLAVIX) tablet 75 mg  Daily      12/15/20 0751    12/15/20 1000  terazosin (HYTRIN) capsule 5 mg  Every 12 Hours Scheduled      12/15/20 0751    12/15/20 1000  isosorbide mononitrate (IMDUR) 24 hr tablet 30 mg  Daily      12/15/20 0751    12/15/20 1000  metoprolol succinate XL (TOPROL-XL) 24 hr tablet 100 mg  Daily      12/15/20 0751    12/15/20 1000  theophylline (UNIPHYL) 24 hr tablet 400 mg  Daily      12/15/20 0751    12/15/20 1000  cholecalciferol (VITAMIN D3) capsule 50,000 Units  Weekly      12/15/20 0751    12/15/20 0800  vancomycin 1500 mg/500 mL 0.9% NS IVPB ([facility])  Every 24 Hours      12/14/20 0635    12/15/20 0749  albuterol (PROVENTIL) nebulizer solution 0.083% 2.5 mg/3mL  Every 4 Hours PRN      12/15/20 0751    12/15/20 0749  ipratropium-albuterol (DUO-NEB) nebulizer solution 3 mL  Every 4 Hours PRN      12/15/20 0751    12/14/20 1800  nicotine (NICODERM CQ) 21 MG/24HR patch 1 patch  Every 24  Hours Scheduled      12/14/20 1650    12/14/20 0730  insulin aspart (novoLOG) injection 0-7 Units  3 Times Daily Before Meals      12/13/20 1925 12/14/20 0600  pantoprazole (PROTONIX) EC tablet 40 mg  Every Early Morning      12/13/20 1925 12/14/20 0048  acetaminophen (TYLENOL) tablet 650 mg  Every 6 Hours PRN      12/14/20 0049    12/13/20 2300  ipratropium-albuterol (DUO-NEB) nebulizer solution 3 mL  Every 8 Hours - RT      12/13/20 1925 12/13/20 2200  POC Glucose 4x Daily AC & at Bedtime  4 Times Daily Before Meals & at Bedtime     Comments: If bedtime blood glucose is greater than 350 mg/dl, call MD.      12/13/20 1925 12/13/20 2100  sodium chloride 0.9 % flush 10 mL  Every 12 Hours Scheduled      12/13/20 1925 12/13/20 2100  enoxaparin (LOVENOX) syringe 40 mg  Every 24 Hours      12/13/20 1925 12/13/20 2100  methylPREDNISolone sodium succinate (SOLU-Medrol) injection 40 mg  Every 12 Hours      12/13/20 1925 12/13/20 2045  polyethylene glycol (MIRALAX) packet 17 g  Daily      12/13/20 1925 12/13/20 2015  sodium chloride 0.9 % infusion  Continuous      12/13/20 1925 12/13/20 2015  bisacodyl (DULCOLAX) suppository 10 mg  Once      12/13/20 1925 12/13/20 2015  arformoterol (BROVANA) nebulizer solution 15 mcg  2 Times Daily - RT      12/13/20 1925 12/13/20 2015  budesonide (PULMICORT) nebulizer solution 0.5 mg  2 Times Daily - RT      12/13/20 1925 12/13/20 2000  Vital Signs  Every 4 Hours      12/13/20 1925 12/13/20 1926  Intake & Output  Every Shift      12/13/20 1925 12/13/20 1926  Basic Metabolic Panel  Daily      12/13/20 1925 12/13/20 1926  Magnesium  Daily      12/13/20 1925 12/13/20 1926  CBC & Differential  Daily      12/13/20 1925 12/13/20 1925  dextrose (GLUTOSE) oral gel 15 g  Every 15 Minutes PRN      12/13/20 1925 12/13/20 1925  dextrose (D50W) 25 g/ 50mL Intravenous Solution 25 g  Every 15 Minutes PRN      12/13/20 1925 12/13/20 1925  glucagon  (human recombinant) (GLUCAGEN DIAGNOSTIC) injection 1 mg  Every 15 Minutes PRN      12/13/20 1925 12/13/20 1925  sodium chloride 0.9 % flush 10 mL  As Needed      12/13/20 1925 12/13/20 1925  nitroglycerin (NITROSTAT) SL tablet 0.4 mg  Every 5 Minutes PRN      12/13/20 1925 12/13/20 1413  sodium chloride 0.9 % flush 10 mL  As Needed      12/13/20 1415    Unscheduled  Telemetry - Pulse Oximetry  Continuous PRN     Comments: If Patient Develops Unresponsiveness, Acute Dyspnea, Cyanosis or Suspected Hypoxemia Start Continuous Pulse Ox Monitoring, Apply Oxygen & Notify Provider    12/13/20 1925    Unscheduled  Oxygen Therapy- Nasal Cannula; Titrate for SPO2: 90% - 95%  Continuous PRN     Comments: If Patient Develops Unresponsiveness, Acute Dyspnea, Cyanosis or Suspected Hypoxemia Start Continuous Pulse Ox Monitoring, Apply Oxygen & Notify Provider    12/13/20 1925    Unscheduled  ECG 12 Lead  As Needed     Comments: Nurse to Release if Patient Expericences Acute Chest Pain or Dysrhythmias    12/13/20 1925    Unscheduled  Potassium  As Needed     Comments: For Ventricular Arrhythmias      12/13/20 1925    Unscheduled  Magnesium  As Needed     Comments: For Ventricular Arrhythmias      12/13/20 1925    Unscheduled  Troponin  As Needed     Comments: For Chest Pain      12/13/20 1925    Unscheduled  Digoxin Level  As Needed     Comments: For Atrial Arrhythmias      12/13/20 1925    Unscheduled  Blood Gas, Arterial -With Co-Ox Panel: Yes  As Needed     Comments: Per O2 PolicyNotify Physician      12/13/20 1925    Unscheduled  Up With Assistance  As Needed      12/13/20 1925    --  theophylline (UNIPHYL) 400 MG 24 hr tablet  Daily      12/13/20 1858    --  metoprolol succinate XL (TOPROL-XL) 100 MG 24 hr tablet  Daily      12/13/20 1858    --  isosorbide mononitrate (IMDUR) 30 MG 24 hr tablet  Daily      12/13/20 1858    --  montelukast (SINGULAIR) 10 MG tablet  Nightly      12/13/20 1858    --  predniSONE  (DELTASONE) 5 MG tablet  Daily      20 1858                Operative/Procedure Notes (last 24 hours) (Notes from 12/15/20 1611 through 20)    No notes of this type exist for this encounter.            Physician Progress Notes (last 24 hours) (Notes from 12/15/20 1611 through 20)      Paola Ochoa APRN at 20 1210                     PROGRESS NOTE         Patient Identification:  Name:  Aaron Domínguez  Age:  89 y.o.  Sex:  male  :  1929  MRN:  [id number]  Visit Number:  [id number]  Primary Care Provider:  Bran Woodruff MD         LOS: 3 days       ----------------------------------------------------------------------------------------------------------------------  Subjective       Chief Complaints:    Fall and Shortness of Breath        Interval History:      Patient continues on 5 L nasal cannula with no apparent distress.  WBC normal.  CRP improving at 0.69.  Afebrile, no diarrhea.    Review of Systems:    Constitutional: no fever, chills and night sweats. No appetite change or unexpected weight change. No fatigue.  Eyes: no eye drainage, itching or redness.  HEENT: no mouth sores, dysphagia or nose bleed.  Respiratory: Positive shortness of breath, no cough or production of sputum.  Cardiovascular: no chest pain, no palpitations, no orthopnea.  Gastrointestinal: no nausea, vomiting or diarrhea. No abdominal pain, hematemesis or rectal bleeding.  Genitourinary: no dysuria or polyuria.  Hematologic/lymphatic: no lymph node abnormalities, no easy bruising or easy bleeding.  Musculoskeletal: Generalized soreness secondary to falls.  Skin: No rash and no itching.  Neurological: no loss of consciousness, no seizure, no headache.  Behavioral/Psych: no depression or suicidal ideation.  Endocrine: no hot flashes.  Immunologic: negative.    ----------------------------------------------------------------------------------------------------------------------      Objective        Current Hospital Meds:  amitriptyline, 25 mg, Oral, Nightly  amLODIPine-lisinopril 5-20 mg combo dose, , Oral, Q24H  arformoterol, 15 mcg, Nebulization, BID - RT  atorvastatin, 10 mg, Oral, Nightly  bisacodyl, 10 mg, Rectal, Once  budesonide, 0.5 mg, Nebulization, BID - RT  cholecalciferol, 50,000 Units, Oral, Weekly  clopidogrel, 75 mg, Oral, Daily  enoxaparin, 40 mg, Subcutaneous, Q24H  insulin aspart, 0-7 Units, Subcutaneous, TID AC  ipratropium-albuterol, 3 mL, Nebulization, Q8H - RT  isosorbide mononitrate, 30 mg, Oral, Daily  methylPREDNISolone sodium succinate, 40 mg, Intravenous, Q12H  metoprolol succinate XL, 100 mg, Oral, Daily  montelukast, 10 mg, Oral, Nightly  nicotine, 1 patch, Transdermal, Q24H  pantoprazole, 40 mg, Oral, Q AM  polyethylene glycol, 17 g, Oral, Daily  sodium chloride, 10 mL, Intravenous, Q12H  terazosin, 5 mg, Oral, Q12H  theophylline, 400 mg, Oral, Daily  vancomycin, 1,500 mg, Intravenous, Q24H      sodium chloride, 100 mL/hr, Last Rate: 100 mL/hr (12/16/20 0919)      ----------------------------------------------------------------------------------------------------------------------    Vital Signs:  Temp:  [97.7 °F (36.5 °C)-98.5 °F (36.9 °C)] 98 °F (36.7 °C)  Heart Rate:  [62-96] 65  Resp:  [16-20] 18  BP: (122-162)/(53-92) 152/91  Mean Arterial Pressure (Non-Invasive) for the past 24 hrs (Last 3 readings):   Noninvasive MAP (mmHg)   12/16/20 1027 98   12/16/20 0655 131   12/16/20 0242 91     SpO2 Percentage    12/16/20 0653 12/16/20 0655 12/16/20 1027   SpO2: 99% 99% 97%     SpO2:  [96 %-99 %] 97 %  on  Flow (L/min):  [5] 5;   Device (Oxygen Therapy): humidified;nasal cannula    Body mass index is 27.26 kg/m².  Wt Readings from Last 3 Encounters:   12/16/20 91.2 kg (201 lb)   10/16/18 89.8 kg (198 lb)   08/08/18 89.8 kg (198 lb)        Intake/Output Summary (Last 24 hours) at 12/16/2020 1210  Last data filed at 12/16/2020 0854  Gross per 24 hour   Intake 3180.64 ml   Output  1850 ml   Net 1330.64 ml     Diet Regular; Consistent Carbohydrate  ----------------------------------------------------------------------------------------------------------------------      Physical Exam:    Constitutional:  Well-developed and well-nourished.  No respiratory distress.      HENT:  Head: Normocephalic and atraumatic.  Mouth:  Moist mucous membranes.    Eyes:  Conjunctivae and EOM are normal.  No scleral icterus.  Neck:  Neck supple.  No JVD present.    Cardiovascular:  Normal rate, regular rhythm and normal heart sounds with no murmur. No edema.  Pulmonary/Chest:  No respiratory distress, no wheezes, no crackles, with decreased lung sounds in the bases.  Abdominal:  Soft.  Bowel sounds are normal.  No distension and no tenderness.   Musculoskeletal:  No edema, no tenderness, and no deformity.  No swelling or redness of joints.  Neurological:  Alert and oriented to person, place, and time.  No facial droop.  No slurred speech.   Skin:  Skin is warm and dry.  No rash noted.  No pallor.  Scattered bruising to chest wall and bilateral upper extremities.  Left elbow skin tear with bruising noted.  Psychiatric:  Normal mood and affect.  Behavior is normal.    ----------------------------------------------------------------------------------------------------------------------  Results from last 7 days   Lab Units 12/15/20  8621 12/14/20  0500 12/14/20  0059  12/13/20  1417   CK TOTAL U/L  --   --  412*  --  414*   TROPONIN T ng/mL 0.040* 0.039* 0.056*   < > 0.058*    < > = values in this interval not displayed.     Results from last 7 days   Lab Units 12/13/20  1417   PROBNP pg/mL 396.1       Results from last 7 days   Lab Units 12/13/20  1431   PH, ARTERIAL pH units 7.424   PO2 ART mm Hg 92.7   PCO2, ARTERIAL mm Hg 48.5*   HCO3 ART mmol/L 31.7*     Results from last 7 days   Lab Units 12/16/20  0510 12/15/20  0352 12/14/20  0500 12/14/20  0059  12/13/20  1417   CRP mg/dL 0.69* 1.28* 1.83*  --   --   --     LACTATE mmol/L  --   --   --   --   --  1.3   WBC 10*3/mm3 6.62 7.04  --  7.94   < > 7.15   HEMOGLOBIN g/dL 9.5* 9.9*  --  10.8*   < > 11.6*   HEMATOCRIT % 29.9* 31.0*  --  32.4*   < > 35.6*   MCV fL 98.0* 96.6  --  94.2   < > 94.7   MCHC g/dL 31.8 31.9  --  33.3   < > 32.6   PLATELETS 10*3/mm3 185 192  --  195   < > 217   INR   --   --   --   --   --  0.89*    < > = values in this interval not displayed.     Results from last 7 days   Lab Units 12/16/20  0510 12/15/20  0352 12/14/20  0059  12/13/20  1417   SODIUM mmol/L 133* 131* 132*   < > 135*   POTASSIUM mmol/L 5.6* 5.0 4.4   < > 4.9   MAGNESIUM mg/dL 2.1 1.9 2.0   < >  --    CHLORIDE mmol/L 100 98 97*   < > 96*   CO2 mmol/L 26.3 25.2 30.5*   < > 31.6*   BUN mg/dL 28* 27* 25*   < > 24*   CREATININE mg/dL 0.73* 0.89 0.95   < > 0.95   EGFR IF NONAFRICN AM mL/min/1.73 101 80 74   < > 74   CALCIUM mg/dL 8.7 9.1 9.6   < > 10.3*   GLUCOSE mg/dL 350* 343* 202*   < > 312*   ALBUMIN g/dL  --   --   --   --  4.03   BILIRUBIN mg/dL  --   --   --   --  0.4   ALK PHOS U/L  --   --   --   --  107   AST (SGOT) U/L  --   --   --   --  38   ALT (SGPT) U/L  --   --   --   --  29    < > = values in this interval not displayed.   Estimated Creatinine Clearance: 77.6 mL/min (A) (by C-G formula based on SCr of 0.73 mg/dL (L)).  No results found for: AMMONIA    Glucose   Date/Time Value Ref Range Status   12/16/2020 1028 363 (H) 70 - 130 mg/dL Final   12/16/2020 0658 344 (H) 70 - 130 mg/dL Final   12/15/2020 2310 307 (H) 70 - 130 mg/dL Final   12/15/2020 2159 304 (H) 70 - 130 mg/dL Final   12/15/2020 1922 345 (H) 70 - 130 mg/dL Final   12/15/2020 1727 165 (H) 70 - 130 mg/dL Final   12/15/2020 1639 75 70 - 130 mg/dL Final   12/15/2020 1618 56 (L) 70 - 130 mg/dL Final     No results found for: HGBA1C  No results found for: TSH, FREET4    Blood Culture   Date Value Ref Range Status   12/13/2020 No growth at 24 hours  Preliminary   12/13/2020 Staphylococcus aureus, MRSA (C)  Preliminary      Comment:     Infectious disease consultation is highly recommended to rule out distant foci of infection.  Methicillin resistant Staphylococcus aureus, Patient may be an isolation risk.   12/13/2020 No growth at 24 hours  Preliminary     No results found for: URINECX  No results found for: WOUNDCX  No results found for: STOOLCX  No results found for: RESPCX  Pain Management Panel     There is no flowsheet data to display.            ----------------------------------------------------------------------------------------------------------------------  Imaging Results (Last 24 Hours)     ** No results found for the last 24 hours. **          ----------------------------------------------------------------------------------------------------------------------    Assessment/Plan       Assessment/Plan     ASSESSMENT:    1.  Sepsis   2.  MRSA bacteremia    PLAN:    Patient continues on 5 L nasal cannula with no apparent distress.  WBC normal.  CRP improving at 0.69.  Afebrile, no diarrhea.    2D echo unremarkable for vegetation.    Respiratory panel PCR negative.  CT of the head reports no acute abnormality.  CT of the C-spine reports no acute fracture.  CT of the L-spine reports no acute fracture.  CT of the T-spine reports no acute fracture.  CT of the chest reports COPD, trace right effusion and right basilar atelectasis.  Chest x-ray reports right basilar atelectasis. X-ray of the pelvis reports no evidence of fracture.  Blood cultures from 12/13/2020 1 out of 2 sets positive for MRSA.  COVID-19 PCR negative.  Influenza PCR negative.    Recommend to continue vancomycin monotherapy for 2 full week course through 12/28/2020.  We will continue to follow closely and adjust antibiotic therapy as needed.    Code Status:   Code Status and Medical Interventions:   Ordered at: 12/13/20 2714     Level Of Support Discussed With:    Patient     Code Status:    CPR     Medical Interventions (Level of Support Prior to Arrest):     Full       Scribed for Azam Stephenson MD.    SARAH Vasquez  20  12:10 EST     Physician Attestation:    The documentation recorded by the scribe accurately reflects the service I personally performed and the decisions made by me.    Azam Stephenson MD  Infectious Diseases  20  12:10 EST      Electronically signed by Paola Ochoa APRN at 20 1212       Consult Notes (last 24 hours) (Notes from 12/15/20 1611 through 20)    No notes of this type exist for this encounter.            Physical Therapy Notes (last 24 hours) (Notes from 12/15/20 1611 through 20)      Heena Jacinto, PT Student at 20 1123  Version 1 of 1    Attestation signed by Luis Angel Montalvo, PT at 20 1125    The patient was seen with Student PT.  He was assessed and treatment was provided under the appropriate level of supervision from the primary physical therapist (PT).  The clinical decision-making process for the treatment provided; patient response to treatment; therapist assessment of the response to treatment; and plan of care documented in this note have been formulated and approved by the primary PT.  Charge  was approved by the primary PT and is accurate for the services provided.                  Acute Care - Physical Therapy Treatment Note   [facility]     Patient Name: Aaron Domínguez  : 1929  MRN: [id number]  Today's Date: 2020           PT Assessment (last 12 hours)      PT Evaluation and Treatment     Row Name 20 0403          Physical Therapy Time and Intention    Subjective Information  no complaints  -CS (r) KH (t) CS (c)     Document Type  therapy note (daily note)  -CS (r) KH (t) CS (c)     Mode of Treatment  physical therapy  -CS (r) KH (t) CS (c)     Patient Effort  good  -CS (r) KH (t) CS (c)     Comment  Pt worked very well wiht therapy this day. Pt ambulated within room with FWW   -CS (r) KH (t) CS (c)     Row Name 20 4497           General Information    Patient Observations  alert;cooperative  -CS (r) KH (t) CS (c)     General Observations of Patient  Pt seen in bed this AM  -CS (r) KH (t) CS (c)     Row Name 12/16/20 1109          Bed Mobility    Bed Mobility  supine-sit  -CS (r) KH (t) CS (c)     Supine-Sit [place] (Bed Mobility)  independent;modified independence;standby assist;minimum assist (75% patient effort)  -CS (r) KH (t) CS (c)     Row Name 12/16/20 1109          Transfers    Transfers  sit-stand transfer  -CS (r) KH (t) CS (c)     Sit-Stand [place] (Transfers)  modified independence;set up;standby assist;contact guard;minimum assist (75% patient effort)  -CS (r) KH (t) CS (c)     Row Name 12/16/20 1109          Sit-Stand Transfer    Assistive Device (Sit-Stand Transfers)  walker, front-wheeled  -CS (r) KH (t) CS (c)     Row Name 12/16/20 1109          Gait/Stairs (Locomotion)    Gait/Stairs Locomotion  gait/ambulation assistive device  -CS (r) KH (t) CS (c)     [place] Level (Gait)  modified independence;standby assist;contact guard;1 person to manage equipment  -CS (r) KH (t) CS (c)     Assistive Device (Gait)  walker, front-wheeled  -CS (r) KH (t) CS (c)     Distance in Feet (Gait)  Pt took laps in room (~30' or more)  -CS (r) KH (t) CS (c)     Row Name             Wound 12/13/20 1915 Left elbow Skin Tear    Wound - Properties Group Placement Date: 12/13/20  - Placement Time: 1915  -JF Present on Hospital Admission: Y  -JF Side: Left  -JF Location: elbow  -JF Primary Wound Type: Skin tear  -JF    Retired Wound - Properties Group Date first assessed: 12/13/20  - Time first assessed: 1915  -JF Present on Hospital Admission: Y  -JF Side: Left  -JF Location: elbow  -JF Primary Wound Type: Skin tear  -JF    Row Name             Wound 12/13/20 1915 Right ring finger Skin Tear    Wound - Properties Group Placement Date: 12/13/20  - Placement Time: 1915  -JF Present on Hospital Admission: Y  -JF Side: Right  -JF  Location: ring finger  -JF Primary Wound Type: Skin tear  -JF    Retired Wound - Properties Group Date first assessed: 12/13/20  -JF Time first assessed: 1915  -JF Present on Hospital Admission: Y  -JF Side: Right  -JF Location: ring finger  -JF Primary Wound Type: Skin tear  -JF    Row Name 12/16/20 1109          Physical Therapy Goals    Gait Training Goal Selection (PT)  gait training, PT goal 1  -CS (r) KH (t) CS (c)     Row Name 12/16/20 1109          Gait Training Goal 1 (PT)    Activity/Assistive Device (Gait Training Goal 1, PT)  gait (walking locomotion);assistive device use  -CS (r) KH (t) CS (c)     [place] Level (Gait Training Goal 1, PT)  independent;modified independence  -CS (r) KH (t) CS (c)     Distance (Gait Training Goal 1, PT)  50' +  -CS (r) KH (t) CS (c)     Time Frame (Gait Training Goal 1, PT)  by discharge  -CS (r) KH (t) CS (c)     Row Name 12/16/20 1109          Positioning and Restraints    Pre-Treatment Position  in bed  -CS (r) KH (t) CS (c)     Post Treatment Position  bed  -CS (r) KH (t) CS (c)     In Bed  sitting;sitting EOB;call light within reach  -CS (r) KH (t) CS (c)       User Key  (r) = Recorded By, (t) = Taken By, (c) = Cosigned By    Initials Name Provider Type    Luis Angel Vázquez, PT Physical Therapist    Bhumika Wilson, RN Registered Nurse    Heena Jiang, PT Student PT Student          PT Recommendation and Plan  Planned Therapy Interventions (PT): gait training, other (see comments)(endurance)  Therapy Frequency (PT): 3 times/wk          Time Calculation:   PT Charges     Row Name 12/16/20 1117             Time Calculation    PT Received On  12/16/20  -CS (r) KH (t) CS (c)      PT Goal Re-Cert Due Date  12/28/20  -CS (r) KH (t) CS (c)         Time Calculation- PT    Total Timed Code Minutes- PT  10 minute(s)  -CS (r) KH (t) CS (c)         Timed Charges    40265 - Gait Training Minutes   10  -CS (r) KH (t) CS (c)        User Key  (r) = Recorded By, (t) =  Taken By, (c) = Cosigned By    Initials Name Provider Type    CS Luis Angel Montalvo, PT Physical Therapist     Heena Jacinto, PT Student PT Student        Therapy Charges for Today     Code Description Service Date Service Provider Modifiers Qty    14859575375 HC GAIT TRAINING EA 15 MIN 12/15/2020 Heena Jacinto, PT Student GP 1    06655393991 HC GAIT TRAINING EA 15 MIN 12/16/2020 Heena Jacinto, PT Student GP 1               Heena Jacinto PT Student  12/16/2020      Electronically signed by Luis Angel Montalvo PT at 12/16/20 1125       Occupational Therapy Notes (last 24 hours) (Notes from 12/15/20 1611 through 12/16/20 1611)    No notes exist for this encounter.         Speech Language Pathology Notes (last 24 hours) (Notes from 12/15/20 1611 through 12/16/20 1611)    No notes exist for this encounter.         ADL Documentation (last day)     Date/Time Transferring Toileting Bathing Dressing Eating Communication Swallowing    12/15/20 1953  2 - assistive person  2 - assistive person  2 - assistive person  2 - assistive person  0 - independent  0 - understands/communicates without difficulty  0 - swallows foods/liquids without difficulty    12/15/20 0800  2 - assistive person  2 - assistive person  2 - assistive person  2 - assistive person  0 - independent  0 - understands/communicates without difficulty  0 - swallows foods/liquids without difficulty

## 2020-12-16 NOTE — PLAN OF CARE
Goal Outcome Evaluation:  Plan of Care Reviewed With: patient  Progress: improving  Outcome Summary: Pt had complaints of not feeling well this shift. When asked if he was in pain, he said no. He then told the lead RN that his stomach was bothering him. PRN zofran administered as ordered. Pt stated that helped him feel better. STAT EKG and troponin ordered as well. Troponin came back critical at 0.040. Merline ALVAREZ aware. Instructed to monitor pt. Pt stated he's not had chest pain. Pt O2 dropped into 80s. Raised HOB, instructed him to take deep breaths through his nose, and repositioned him in bed. That helped his O2 sats improve. Glucose has been elevated as well. Merline ALVAREZ aware. Instructed to monitor pt's glucose. VSS. Will cont to monitor and follow plan of care.

## 2020-12-16 NOTE — PROGRESS NOTES
Discharge Planning Assessment   J[facility]     Patient Name: Aaron Domínguez  MRN: [id number]  Today's Date: 12/16/2020    Admit Date: 12/13/2020        Discharge Plan     Row Name 12/16/20 1701       Plan    Plan  Pt admitted on 12/13/20.  Pt lives at home alone with daughters assisting with care.  SS notified per Physician of need for home IV antibiotics.  SS spoke with pt who is agreeable for SS to begin home IV arrangements.  SS faxed pt information to [facility] at [phone] and notified Franck at [phone].  SS will follow up in am with [facility].          CHASE PatriciaW

## 2020-12-16 NOTE — PROGRESS NOTES
PROGRESS NOTE         Patient Identification:  Name:  Aaron Domínguez  Age:  89 y.o.  Sex:  male  :  1929  MRN:  [id number]  Visit Number:  [id number]  Primary Care Provider:  Bran Woodruff MD         LOS: 3 days       ----------------------------------------------------------------------------------------------------------------------  Subjective       Chief Complaints:    Fall and Shortness of Breath        Interval History:      Patient continues on 5 L nasal cannula with no apparent distress.  WBC normal.  CRP improving at 0.69.  Afebrile, no diarrhea.    Review of Systems:    Constitutional: no fever, chills and night sweats. No appetite change or unexpected weight change. No fatigue.  Eyes: no eye drainage, itching or redness.  HEENT: no mouth sores, dysphagia or nose bleed.  Respiratory: Positive shortness of breath, no cough or production of sputum.  Cardiovascular: no chest pain, no palpitations, no orthopnea.  Gastrointestinal: no nausea, vomiting or diarrhea. No abdominal pain, hematemesis or rectal bleeding.  Genitourinary: no dysuria or polyuria.  Hematologic/lymphatic: no lymph node abnormalities, no easy bruising or easy bleeding.  Musculoskeletal: Generalized soreness secondary to falls.  Skin: No rash and no itching.  Neurological: no loss of consciousness, no seizure, no headache.  Behavioral/Psych: no depression or suicidal ideation.  Endocrine: no hot flashes.  Immunologic: negative.    ----------------------------------------------------------------------------------------------------------------------      Objective       Current [facility] Meds:  amitriptyline, 25 mg, Oral, Nightly  amLODIPine-lisinopril 5-20 mg combo dose, , Oral, Q24H  arformoterol, 15 mcg, Nebulization, BID - RT  atorvastatin, 10 mg, Oral, Nightly  bisacodyl, 10 mg, Rectal, Once  budesonide, 0.5 mg, Nebulization, BID - RT  cholecalciferol, 50,000 Units, Oral, Weekly  clopidogrel, 75 mg, Oral,  Daily  enoxaparin, 40 mg, Subcutaneous, Q24H  insulin aspart, 0-7 Units, Subcutaneous, TID AC  ipratropium-albuterol, 3 mL, Nebulization, Q8H - RT  isosorbide mononitrate, 30 mg, Oral, Daily  methylPREDNISolone sodium succinate, 40 mg, Intravenous, Q12H  metoprolol succinate XL, 100 mg, Oral, Daily  montelukast, 10 mg, Oral, Nightly  nicotine, 1 patch, Transdermal, Q24H  pantoprazole, 40 mg, Oral, Q AM  polyethylene glycol, 17 g, Oral, Daily  sodium chloride, 10 mL, Intravenous, Q12H  terazosin, 5 mg, Oral, Q12H  theophylline, 400 mg, Oral, Daily  vancomycin, 1,500 mg, Intravenous, Q24H      sodium chloride, 100 mL/hr, Last Rate: 100 mL/hr (12/16/20 0919)      ----------------------------------------------------------------------------------------------------------------------    Vital Signs:  Temp:  [97.7 °F (36.5 °C)-98.5 °F (36.9 °C)] 98 °F (36.7 °C)  Heart Rate:  [62-96] 65  Resp:  [16-20] 18  BP: (122-162)/(53-92) 152/91  Mean Arterial Pressure (Non-Invasive) for the past 24 hrs (Last 3 readings):   Noninvasive MAP (mmHg)   12/16/20 1027 98   12/16/20 0655 131   12/16/20 0242 91     SpO2 Percentage    12/16/20 0653 12/16/20 0655 12/16/20 1027   SpO2: 99% 99% 97%     SpO2:  [96 %-99 %] 97 %  on  Flow (L/min):  [5] 5;   Device (Oxygen Therapy): humidified;nasal cannula    Body mass index is 27.26 kg/m².  Wt Readings from Last 3 Encounters:   12/16/20 91.2 kg (201 lb)   10/16/18 89.8 kg (198 lb)   08/08/18 89.8 kg (198 lb)        Intake/Output Summary (Last 24 hours) at 12/16/2020 1210  Last data filed at 12/16/2020 0854  Gross per 24 hour   Intake 3180.64 ml   Output 1850 ml   Net 1330.64 ml     Diet Regular; Consistent Carbohydrate  ----------------------------------------------------------------------------------------------------------------------      Physical Exam:    Constitutional:  Well-developed and well-nourished.  No respiratory distress.      HENT:  Head: Normocephalic and atraumatic.  Mouth:  Moist  mucous membranes.    Eyes:  Conjunctivae and EOM are normal.  No scleral icterus.  Neck:  Neck supple.  No JVD present.    Cardiovascular:  Normal rate, regular rhythm and normal heart sounds with no murmur. No edema.  Pulmonary/Chest:  No respiratory distress, no wheezes, no crackles, with decreased lung sounds in the bases.  Abdominal:  Soft.  Bowel sounds are normal.  No distension and no tenderness.   Musculoskeletal:  No edema, no tenderness, and no deformity.  No swelling or redness of joints.  Neurological:  Alert and oriented to person, place, and time.  No facial droop.  No slurred speech.   Skin:  Skin is warm and dry.  No rash noted.  No pallor.  Scattered bruising to chest wall and bilateral upper extremities.  Left elbow skin tear with bruising noted.  Psychiatric:  Normal mood and affect.  Behavior is normal.    ----------------------------------------------------------------------------------------------------------------------  Results from last 7 days   Lab Units 12/15/20  2321 12/14/20  0500 12/14/20  0059 12/13/20  1417   CK TOTAL U/L  --   --  412*  --  414*   TROPONIN T ng/mL 0.040* 0.039* 0.056*   < > 0.058*    < > = values in this interval not displayed.     Results from last 7 days   Lab Units 12/13/20  1417   PROBNP pg/mL 396.1       Results from last 7 days   Lab Units 12/13/20  1431   PH, ARTERIAL pH units 7.424   PO2 ART mm Hg 92.7   PCO2, ARTERIAL mm Hg 48.5*   HCO3 ART mmol/L 31.7*     Results from last 7 days   Lab Units 12/16/20  0510 12/15/20  0352 12/14/20  0500 12/14/20  0059 12/13/20  1417   CRP mg/dL 0.69* 1.28* 1.83*  --   --   --    LACTATE mmol/L  --   --   --   --   --  1.3   WBC 10*3/mm3 6.62 7.04  --  7.94   < > 7.15   HEMOGLOBIN g/dL 9.5* 9.9*  --  10.8*   < > 11.6*   HEMATOCRIT % 29.9* 31.0*  --  32.4*   < > 35.6*   MCV fL 98.0* 96.6  --  94.2   < > 94.7   MCHC g/dL 31.8 31.9  --  33.3   < > 32.6   PLATELETS 10*3/mm3 185 192  --  195   < > 217   INR   --   --   --   --    --  0.89*    < > = values in this interval not displayed.     Results from last 7 days   Lab Units 12/16/20  0510 12/15/20  0352 12/14/20  0059  12/13/20  1417   SODIUM mmol/L 133* 131* 132*   < > 135*   POTASSIUM mmol/L 5.6* 5.0 4.4   < > 4.9   MAGNESIUM mg/dL 2.1 1.9 2.0   < >  --    CHLORIDE mmol/L 100 98 97*   < > 96*   CO2 mmol/L 26.3 25.2 30.5*   < > 31.6*   BUN mg/dL 28* 27* 25*   < > 24*   CREATININE mg/dL 0.73* 0.89 0.95   < > 0.95   EGFR IF NONAFRICN AM mL/min/1.73 101 80 74   < > 74   CALCIUM mg/dL 8.7 9.1 9.6   < > 10.3*   GLUCOSE mg/dL 350* 343* 202*   < > 312*   ALBUMIN g/dL  --   --   --   --  4.03   BILIRUBIN mg/dL  --   --   --   --  0.4   ALK PHOS U/L  --   --   --   --  107   AST (SGOT) U/L  --   --   --   --  38   ALT (SGPT) U/L  --   --   --   --  29    < > = values in this interval not displayed.   Estimated Creatinine Clearance: 77.6 mL/min (A) (by C-G formula based on SCr of 0.73 mg/dL (L)).  No results found for: AMMONIA    Glucose   Date/Time Value Ref Range Status   12/16/2020 1028 363 (H) 70 - 130 mg/dL Final   12/16/2020 0658 344 (H) 70 - 130 mg/dL Final   12/15/2020 2310 307 (H) 70 - 130 mg/dL Final   12/15/2020 2159 304 (H) 70 - 130 mg/dL Final   12/15/2020 1922 345 (H) 70 - 130 mg/dL Final   12/15/2020 1727 165 (H) 70 - 130 mg/dL Final   12/15/2020 1639 75 70 - 130 mg/dL Final   12/15/2020 1618 56 (L) 70 - 130 mg/dL Final     No results found for: HGBA1C  No results found for: TSH, FREET4    Blood Culture   Date Value Ref Range Status   12/13/2020 No growth at 24 hours  Preliminary   12/13/2020 Staphylococcus aureus, MRSA (C)  Preliminary     Comment:     Infectious disease consultation is highly recommended to rule out distant foci of infection.  Methicillin resistant Staphylococcus aureus, Patient may be an isolation risk.   12/13/2020 No growth at 24 hours  Preliminary     No results found for: URINECX  No results found for: WOUNDCX  No results found for: STOOLCX  No results found  for: RESPCX  Pain Management Panel     There is no flowsheet data to display.            ----------------------------------------------------------------------------------------------------------------------  Imaging Results (Last 24 Hours)     ** No results found for the last 24 hours. **          ----------------------------------------------------------------------------------------------------------------------    Assessment/Plan       Assessment/Plan     ASSESSMENT:    1.  Sepsis   2.  MRSA bacteremia    PLAN:    Patient continues on 5 L nasal cannula with no apparent distress.  WBC normal.  CRP improving at 0.69.  Afebrile, no diarrhea.    2D echo unremarkable for vegetation.    Respiratory panel PCR negative.  CT of the head reports no acute abnormality.  CT of the C-spine reports no acute fracture.  CT of the L-spine reports no acute fracture.  CT of the T-spine reports no acute fracture.  CT of the chest reports COPD, trace right effusion and right basilar atelectasis.  Chest x-ray reports right basilar atelectasis. X-ray of the pelvis reports no evidence of fracture.  Blood cultures from 12/13/2020 1 out of 2 sets positive for MRSA.  COVID-19 PCR negative.  Influenza PCR negative.    Recommend to continue vancomycin monotherapy for 2 full week course through 12/28/2020.  We will continue to follow closely and adjust antibiotic therapy as needed.    Code Status:   Code Status and Medical Interventions:   Ordered at: 12/13/20 5448     Level Of Support Discussed With:    Patient     Code Status:    CPR     Medical Interventions (Level of Support Prior to Arrest):    Full       Scribed for Azam Stephenson MD.    Paola Ochoa, APRN  12/16/20  12:10 EST     Physician Attestation:    The documentation recorded by the scribe accurately reflects the service I personally performed and the decisions made by me.    Azam Stephenson MD  Infectious Diseases  12/16/20  12:10 EST

## 2020-12-16 NOTE — PROGRESS NOTES
[facility] HOSPITALIST PROGRESS NOTE     Patient Identification:  Name:  Aaron Domínguez  Age:  89 y.o.  Sex:  male  :  1929  MRN:  [id number]  Visit Number:  [id number]  Primary Care Provider:  Bran Woodruff MD    Length of stay:  3    Chief complaint: None    Subjective:    Patient again states that he feels back to his baseline functioning status.  Did discuss with patient the finding of 1 of 3 blood cultures positive for MRSA.  Patient expressed his understanding.  Patient is being scheduled for PICC line placement and will be discharged home in the near future on IV vancomycin for the next 2 weeks.  Otherwise, no new events overnight.  ----------------------------------------------------------------------------------------------------------------------  Current Hospital Meds:  amitriptyline, 25 mg, Oral, Nightly  amLODIPine-lisinopril 5-20 mg combo dose, , Oral, Q24H  arformoterol, 15 mcg, Nebulization, BID - RT  atorvastatin, 10 mg, Oral, Nightly  bisacodyl, 10 mg, Rectal, Once  budesonide, 0.5 mg, Nebulization, BID - RT  cholecalciferol, 50,000 Units, Oral, Weekly  clopidogrel, 75 mg, Oral, Daily  enoxaparin, 40 mg, Subcutaneous, Q24H  insulin aspart, 0-7 Units, Subcutaneous, TID AC  ipratropium-albuterol, 3 mL, Nebulization, Q8H - RT  isosorbide mononitrate, 30 mg, Oral, Daily  methylPREDNISolone sodium succinate, 40 mg, Intravenous, Q12H  metoprolol succinate XL, 100 mg, Oral, Daily  montelukast, 10 mg, Oral, Nightly  nicotine, 1 patch, Transdermal, Q24H  pantoprazole, 40 mg, Oral, Q AM  polyethylene glycol, 17 g, Oral, Daily  sodium chloride, 10 mL, Intravenous, Q12H  terazosin, 5 mg, Oral, Q12H  theophylline, 400 mg, Oral, Daily  vancomycin, 1,500 mg, Intravenous, Q24H      sodium chloride, 100 mL/hr, Last Rate: 100 mL/hr (20 0919)      ----------------------------------------------------------------------------------------------------------------------  Vital  Signs:  Temp:  [97.6 °F (36.4 °C)-98.2 °F (36.8 °C)] 97.6 °F (36.4 °C)  Heart Rate:  [59-96] 62  Resp:  [16-20] 18  BP: (130-162)/(53-92) 133/56      12/14/20  0442 12/15/20  0451 12/16/20  0500   Weight: 87.7 kg (193 lb 4.8 oz) 89.1 kg (196 lb 6.4 oz) 91.2 kg (201 lb)     Body mass index is 27.26 kg/m².    Intake/Output Summary (Last 24 hours) at 12/16/2020 1626  Last data filed at 12/16/2020 1459  Gross per 24 hour   Intake 2450.97 ml   Output 2250 ml   Net 200.97 ml     Diet Regular; Consistent Carbohydrate  ----------------------------------------------------------------------------------------------------------------------  Physical exam:  Constitutional: Elderly appearing  male in no apparent distress.     HENT:  Head:  Normocephalic and atraumatic.  Mouth:  Moist mucous membranes.    Eyes:  Conjunctivae and EOM are normal.  Pupils are equal, round, and reactive to light.  No scleral icterus.    Neck:  Neck supple. No thyromegaly.  No JVD present.    Cardiovascular:  Regular rate and rhythm with no murmurs, rubs, clicks or gallops appreciated.  Pulmonary/Chest:  Clear to auscultation bilaterally with no crackles, wheezes or rhonchi appreciated.  Abdominal:  Soft. Nontender. Nondistended  Bowel sounds are normal in all four quadrants. No organomegally appreciated.   Musculoskeletal:  No edema, no tenderness, and no deformity.  No red or swollen joints anywhere.    Neurological:  Alert and oriented to person, place, and time. Cranial nerves II-XII intact with no focal deficits.  No facial droop.  No slurred speech.   Skin:  Warm and dry to palpation, multiple abrasions and bruises noted on bilateral upper extremities  Peripheral vascular:  2+ radial and pedal pulses in bilateral upper and lower extremities.  Psychiatric:  Alert and oriented x3, demonstrates appropriate judgement and  insight.  ----------------------------------------------------------------------------------------------------------------------  Tele:    ----------------------------------------------------------------------------------------------------------------------  Results from last 7 days   Lab Units 12/15/20  2321 12/14/20  0500 12/14/20  0059 12/13/20  1417   CK TOTAL U/L  --   --  412*  --  414*   TROPONIN T ng/mL 0.040* 0.039* 0.056*   < > 0.058*    < > = values in this interval not displayed.     Results from last 7 days   Lab Units 12/16/20  0510 12/15/20  0352 12/14/20  0500 12/14/20 0059 12/13/20  1417   CRP mg/dL 0.69* 1.28* 1.83*  --   --   --    LACTATE mmol/L  --   --   --   --   --  1.3   WBC 10*3/mm3 6.62 7.04  --  7.94   < > 7.15   HEMOGLOBIN g/dL 9.5* 9.9*  --  10.8*   < > 11.6*   HEMATOCRIT % 29.9* 31.0*  --  32.4*   < > 35.6*   MCV fL 98.0* 96.6  --  94.2   < > 94.7   MCHC g/dL 31.8 31.9  --  33.3   < > 32.6   PLATELETS 10*3/mm3 185 192  --  195   < > 217   INR   --   --   --   --   --  0.89*    < > = values in this interval not displayed.     Results from last 7 days   Lab Units 12/13/20  1431   PH, ARTERIAL pH units 7.424   PO2 ART mm Hg 92.7   PCO2, ARTERIAL mm Hg 48.5*   HCO3 ART mmol/L 31.7*     Results from last 7 days   Lab Units 12/16/20  0510 12/15/20  0352 12/14/20  0059  12/13/20  1417   SODIUM mmol/L 133* 131* 132*   < > 135*   POTASSIUM mmol/L 5.6* 5.0 4.4   < > 4.9   MAGNESIUM mg/dL 2.1 1.9 2.0   < >  --    CHLORIDE mmol/L 100 98 97*   < > 96*   CO2 mmol/L 26.3 25.2 30.5*   < > 31.6*   BUN mg/dL 28* 27* 25*   < > 24*   CREATININE mg/dL 0.73* 0.89 0.95   < > 0.95   EGFR IF NONAFRICN AM mL/min/1.73 101 80 74   < > 74   CALCIUM mg/dL 8.7 9.1 9.6   < > 10.3*   GLUCOSE mg/dL 350* 343* 202*   < > 312*   ALBUMIN g/dL  --   --   --   --  4.03   BILIRUBIN mg/dL  --   --   --   --  0.4   ALK PHOS U/L  --   --   --   --  107   AST (SGOT) U/L  --   --   --   --  38   ALT (SGPT) U/L  --   --   --    --  29    < > = values in this interval not displayed.   Estimated Creatinine Clearance: 77.6 mL/min (A) (by C-G formula based on SCr of 0.73 mg/dL (L)).    No results found for: AMMONIA      Blood Culture   Date Value Ref Range Status   12/13/2020 Abnormal Stain (C)  Preliminary   12/13/2020 No growth at 24 hours  Preliminary     No results found for: URINECX  No results found for: WOUNDCX  No results found for: STOOLCX    I have personally looked at the labs and they are summarized above.  ----------------------------------------------------------------------------------------------------------------------  Imaging Results (Last 24 Hours)     ** No results found for the last 24 hours. **        ----------------------------------------------------------------------------------------------------------------------  Assessment and Plan:    1.  COPD exacerbation -continue inhaled corticosteroids, long-acting beta agonist and duo nebs.  Will de-escalate Solu-Medrol to prednisone today.    2.  General deconditioning - patient with recurrent falls, continue physical therapy.  Physical therapy recommending patient be placed in skilled nursing facility or inpatient rehab.  However, patient requesting to be discharged home with home health physical therapy    3.  Possible MRSA bacteremia -appreciate infectious disease recommendations, patient will have PICC line placed and case management has been consulted for IV vancomycin administration at home.    4.  Chronic hypoxic respiratory failure -patient has been weaned down to his baseline home oxygen    5.  Hyperlipidemia -statin    6.  Essential hypertension -monitor closely, will likely need adjustment of antihypertensive medications prior to discharge    7.  Normocytic anemia -no indication for transfusion at this time.  Continue to monitor closely.    8.  Advanced age            Juan Daniel Perdue,   12/16/20  16:26 EST

## 2020-12-16 NOTE — THERAPY TREATMENT NOTE
Acute Care - Physical Therapy Treatment Note   Juan     Patient Name: Aaron Domínguez  : 1929  MRN: [id number]  Today's Date: 2020           PT Assessment (last 12 hours)      PT Evaluation and Treatment     Row Name 20 1109          Physical Therapy Time and Intention    Subjective Information  no complaints  -CS (r) KH (t) CS (c)     Document Type  therapy note (daily note)  -CS (r) KH (t) CS (c)     Mode of Treatment  physical therapy  -CS (r) KH (t) CS (c)     Patient Effort  good  -CS (r) KH (t) CS (c)     Comment  Pt worked very well wiht therapy this day. Pt ambulated within room with FWW   -CS (r) KH (t) CS (c)     Row Name 20 1109          General Information    Patient Observations  alert;cooperative  -CS (r) KH (t) CS (c)     General Observations of Patient  Pt seen in bed this AM  -CS (r) KH (t) CS (c)     Row Name 20 1109          Bed Mobility    Bed Mobility  supine-sit  -CS (r) KH (t) CS (c)     Supine-Sit [place] (Bed Mobility)  independent;modified independence;standby assist;minimum assist (75% patient effort)  -CS (r) KH (t) CS (c)     Row Name 20 1109          Transfers    Transfers  sit-stand transfer  -CS (r) KH (t) CS (c)     Sit-Stand [place] (Transfers)  modified independence;set up;standby assist;contact guard;minimum assist (75% patient effort)  -CS (r) KH (t) CS (c)     Row Name 20 1109          Sit-Stand Transfer    Assistive Device (Sit-Stand Transfers)  walker, front-wheeled  -CS (r) KH (t) CS (c)     Row Name 20 1109          Gait/Stairs (Locomotion)    Gait/Stairs Locomotion  gait/ambulation assistive device  -CS (r) KH (t) CS (c)     [place] Level (Gait)  modified independence;standby assist;contact guard;1 person to manage equipment  -CS (r) KH (t) CS (c)     Assistive Device (Gait)  walker, front-wheeled  -CS (r) KH (t) CS (c)     Distance in Feet (Gait)  Pt took laps in room (~30' or more)  -DARSHANA (r) COLT (t) DARSHANA (c)      Row Name             Wound 12/13/20 1915 Left elbow Skin Tear    Wound - Properties Group Placement Date: 12/13/20  -JF Placement Time: 1915  -JF Present on Hospital Admission: Y  -JF Side: Left  -JF Location: elbow  -JF Primary Wound Type: Skin tear  -JF    Retired Wound - Properties Group Date first assessed: 12/13/20  -JF Time first assessed: 1915  -JF Present on Hospital Admission: Y  -JF Side: Left  -JF Location: elbow  -JF Primary Wound Type: Skin tear  -JF    Row Name             Wound 12/13/20 1915 Right ring finger Skin Tear    Wound - Properties Group Placement Date: 12/13/20  -JF Placement Time: 1915  -JF Present on Hospital Admission: Y  -JF Side: Right  -JF Location: ring finger  -JF Primary Wound Type: Skin tear  -JF    Retired Wound - Properties Group Date first assessed: 12/13/20  -JF Time first assessed: 1915  -JF Present on Hospital Admission: Y  -JF Side: Right  -JF Location: ring finger  -JF Primary Wound Type: Skin tear  -JF    Row Name 12/16/20 1109          Physical Therapy Goals    Gait Training Goal Selection (PT)  gait training, PT goal 1  -CS (r) KH (t) CS (c)     Row Name 12/16/20 1109          Gait Training Goal 1 (PT)    Activity/Assistive Device (Gait Training Goal 1, PT)  gait (walking locomotion);assistive device use  -CS (r) KH (t) CS (c)     [place] Level (Gait Training Goal 1, PT)  independent;modified independence  -CS (r) KH (t) CS (c)     Distance (Gait Training Goal 1, PT)  50' +  -CS (r) KH (t) CS (c)     Time Frame (Gait Training Goal 1, PT)  by discharge  -CS (r) KH (t) CS (c)     Row Name 12/16/20 1109          Positioning and Restraints    Pre-Treatment Position  in bed  -CS (r) KH (t) CS (c)     Post Treatment Position  bed  -CS (r) KH (t) CS (c)     In Bed  sitting;sitting EOB;call light within reach  -CS (r) KH (t) CS (c)       User Key  (r) = Recorded By, (t) = Taken By, (c) = Cosigned By    Initials Name Provider Type    Luis Angel Vázquez, PT Physical  Therapist    Bhumika Wilson, RN Registered Nurse    Heena Jiang, PT Student PT Student          PT Recommendation and Plan  Planned Therapy Interventions (PT): gait training, other (see comments)(endurance)  Therapy Frequency (PT): 3 times/wk          Time Calculation:   PT Charges     Row Name 12/16/20 1117             Time Calculation    PT Received On  12/16/20  -CS (r) KH (t) CS (c)      PT Goal Re-Cert Due Date  12/28/20  -CS (r) KH (t) CS (c)         Time Calculation- PT    Total Timed Code Minutes- PT  10 minute(s)  -CS (r) KH (t) CS (c)         Timed Charges    47419 - Gait Training Minutes   10  -CS (r) KH (t) CS (c)        User Key  (r) = Recorded By, (t) = Taken By, (c) = Cosigned By    Initials Name Provider Type    CS Luis Angel Montalvo, PT Physical Therapist    Heena Jiang, PT Student PT Student        Therapy Charges for Today     Code Description Service Date Service Provider Modifiers Qty    27785409180 HC GAIT TRAINING EA 15 MIN 12/15/2020 Heena Jacinto, PT Student GP 1    [id number] HC GAIT TRAINING EA 15 MIN 12/16/2020 Heena Jacinto, PT Student GP 1               Heena Jacinto, PT Student  12/16/2020

## 2020-12-16 NOTE — PLAN OF CARE
Goal Outcome Evaluation:  Plan of Care Reviewed With: patient  Progress: improving   Patient has had a much better day today.  He has rested and been up several times to walk to the doorway.  Patient denies any issues will continue to monitor and provide care as indicated.

## 2020-12-17 PROBLEM — R06.00 DYSPNEA: Status: RESOLVED | Noted: 2020-01-01 | Resolved: 2020-01-01

## 2020-12-17 NOTE — DISCHARGE SUMMARY
[facility] HOSPITALIST MEDICINE DISCHARGE SUMMARY    Patient Identification:  Name:  Aaron Domínguez  Age:  89 y.o.  Sex:  male  :  1929  MRN:  [id number]  Visit Number:  [id number]    Date of Admission: 2020  Date of Discharge:  2020     PCP: Bran Woodruff MD    DISCHARGE DIAGNOSIS   1.  COPD exacerbation  2.  MRSA bacteremia  3.  Chronic hypoxic respiratory failure  4.  Essential hypertension  5.  Hyperlipidemia  6.  Normocytic anemia  7.  General deconditioning  8.  Advanced age      CONSULTS  1. Dr. Stephenson, ID      PROCEDURES PERFORMED   1.  Patient had a PICC line placed on 2020 secondary to need for prolonged IV antibiotic administration.  Patient tolerated the procedure well with no postprocedural complications.      HOSPITAL COURSE  Mr. Domínguez is a 89 y.o. male who presented to [facility] ED on 2020 with a chief complaint of generalized weakness with a mechanical fall and shortness of breath.  Patient has a past medical history remarkable for COPD, type 2 diabetes mellitus and essential hypertension.  Patient does live at home alone and was brought to the emergency department secondary to shortness of breath and recurrent falls at home.  Patient reported being more short of breath in the 5 days preceding evaluation in the emergency department.  He also endorsed increased coughing with a whitish sputum but denied any fevers or chills.  Patient's daughter reported the patient fell the day prior to evaluation in the emergency department when he jeanette from a seated position.  The patient reports he tripped over his feet and had a mechanical fall.  Nevertheless, patient was brought to the emergency department for further treatment and evaluation of recurrent falls and shortness of breath.  Initial evaluation in the emergency department did consist of basic laboratory work as well as physical exam and vital signs.  Please see initial H&P for specific  details of initial encounter during admission.  Overall, patient was diagnosed with mild COPD exacerbation and recurrent falls.  Patient was admitted for further treatment and evaluation of mild COPD exacerbation.    In regards to COPD exacerbation, patient was started on inhaled corticosteroids with long-acting beta agonist, duo nebs and doxycycline as well as a short course of Solu-Medrol.  In regards to patient's recurrent falls, physical therapy and Occupational Therapy were consulted.  After thorough evaluation, recommendation was made for skilled nursing facility or inpatient rehab.  This was presented to the patient but he politely declined and wished to return home.  Patient was to be discharged home on 12/15/2020 after this conversation.  However, patient did have return of a single blood culture with MRSA bacteremia.  Patient did have 3 blood cultures performed in total with 1 of 3 being positive for MRSA.  Patient's white blood cell count remained within normal limits, patient was afebrile and CRP had returned to close to normal.  It was felt this was likely secondary to contaminated specimen, however infectious disease was consulted for a formal opinion.  After thorough evaluation by infectious disease, recommendations were made to treat patient's 1 of 3 blood cultures as not contaminated but real.  Recommendation was made for patient to be treated for 2 weeks with IV vancomycin.  As such, patient was scheduled to have a PICC line placed which was accomplished on 12/17/2020.  Case management was consulted who has arranged for home health with IV vancomycin administration.  With this in mind, it is felt patient has reached maximum medical management of current hospitalization and will be discharged home in stable condition today.  The beforementioned plan was thoroughly discussed with the patient and he expressed his understanding and willingness to proceed with the beforementioned plan.    VITAL SIGNS:       12/15/20  0451 12/16/20  0500 12/17/20  0500   Weight: 89.1 kg (196 lb 6.4 oz) 91.2 kg (201 lb) 92.2 kg (203 lb 3.2 oz)     Body mass index is 27.56 kg/m².    PHYSICAL EXAM:  Constitutional: Elderly appearing  male in no apparent distress.     HENT:  Head:  Normocephalic and atraumatic.  Mouth:  Moist mucous membranes.    Eyes:  Conjunctivae and EOM are normal.  Pupils are equal, round, and reactive to light.  No scleral icterus.    Neck:  Neck supple. No thyromegaly.  No JVD present.    Cardiovascular:  Regular rate and rhythm with no murmurs, rubs, clicks or gallops appreciated.  Pulmonary/Chest:  Clear to auscultation bilaterally with no crackles, wheezes or rhonchi appreciated.  Abdominal:  Soft. Nontender. Nondistended  Bowel sounds are normal in all four quadrants. No organomegally appreciated.   Musculoskeletal:  No edema, no tenderness, and no deformity.  No red or swollen joints anywhere.    Neurological:  Alert and oriented to person, place, and time. Cranial nerves II-XII intact with no focal deficits.  No facial droop.  No slurred speech.   Skin:  Warm and dry to palpation, multiple abrasions and bruises noted on bilateral upper extremities and chest  Peripheral vascular:  2+ radial and pedal pulses in bilateral upper and lower extremities.  Psychiatric:  Alert and oriented x3, demonstrates appropriate judgement and insight.    DISCHARGE DISPOSITION   Stable    DISCHARGE MEDICATIONS:     Discharge Medications      New Medications      Instructions Start Date   vancomycin   1,500 mg, Intravenous, Every 24 Hours   Start Date: December 18, 2020        Continue These Medications      Instructions Start Date   albuterol sulfate  (90 Base) MCG/ACT inhaler  Commonly known as: ProAir HFA   2 puffs, Inhalation, Every 4 Hours PRN      amitriptyline 25 MG tablet  Commonly known as: ELAVIL   25 mg, Oral, Nightly      amLODIPine-benazepril 5-20 MG per capsule  Commonly known as: LOTREL 5-20   1  capsule, Oral, Daily, for blood pressure      atorvastatin 10 MG tablet  Commonly known as: LIPITOR   10 mg, Oral, Nightly      clopidogrel 75 MG tablet  Commonly known as: PLAVIX   75 mg, Oral, Daily, as directed      doxazosin 8 MG tablet  Commonly known as: CARDURA   8 mg, Oral, Nightly      Fluticasone-Umeclidin-Vilant 100-62.5-25 MCG/INH aerosol powder    1 each, Inhalation, Daily      ipratropium-albuterol 0.5-2.5 mg/3 ml nebulizer  Commonly known as: DUO-NEB   3 mL, Nebulization, Every 4 Hours PRN      isosorbide mononitrate 30 MG 24 hr tablet  Commonly known as: IMDUR   30 mg, Oral, Daily      metoprolol succinate  MG 24 hr tablet  Commonly known as: TOPROL-XL   100 mg, Oral, Daily      montelukast 10 MG tablet  Commonly known as: SINGULAIR   10 mg, Oral, Nightly      predniSONE 5 MG tablet  Commonly known as: DELTASONE   5 mg, Oral, Daily      theophylline 400 MG 24 hr tablet  Commonly known as: UNIPHYL   400 mg, Oral, Daily      vitamin D 1.25 MG (40604 UT) capsule capsule  Commonly known as: ERGOCALCIFEROL   50,000 Units, Oral, Weekly                   Additional Instructions for the Follow-ups that You Need to Schedule     Ambulatory Referral to Home Health   As directed      Face to Face Visit Date: 12/17/2020    Follow-up provider for Plan of Care?: I treated the patient in an acute care facility and will not continue treatment after discharge.    Follow-up provider: ELYSSA WOODRUFF [[id number]]    Reason/Clinical Findings: MRSA Bacteremia    Describe mobility limitations that make leaving home difficult: Advanced Age and MRSA bacteremia    Nursing/Therapeutic Services Requested: Skilled Nursing    Skilled nursing orders: Infusion therapy    Frequency: 1 Week 1         Discharge Follow-up with PCP   As directed       Currently Documented PCP:    Elyssa Woodruff MD    PCP Phone Number:    [phone]     Follow Up Details: please follow up with pcp in 3-4 weeks           Follow-up Information      Bran Woodruff MD .    Specialty: Internal Medicine  Why: please follow up with pcp in 3-4 weeks  Contact information:  [address] [facility] [address] [phone]                   TEST  RESULTS PENDING AT DISCHARGE  Pending Labs     Order Current Status    Blood Culture - Blood, Arm, Left Preliminary result    Blood Culture - Blood, Arm, Left Preliminary result    Blood Culture - Blood, Arm, Right Preliminary result    Blood Culture - Blood, Hand, Left Preliminary result           Juan Daniel Perdue, DO  12/17/20  15:07 EST    Please note that this discharge summary required more than 30 minutes to complete.    Please send a copy of this dictation to the following providers:  Bran Woodruff MD

## 2020-12-17 NOTE — PROGRESS NOTES
Discharge Planning Assessment   [place]     Patient Name: Aaron Domínguez  MRN: [id number]  Today's Date: 12/17/2020    Admit Date: 12/13/2020    Discharge Plan     Row Name 12/17/20 1253       Plan    Plan  SS spoke with Franck at [facility] who stated that pt's copay would be $30.  SS spoke with pt who stated he is agreeable to copay.  Pt stated his daughters would be able to assist with home IV infusion.  Pt will need home health order prior to discharge.  [facility] per Franck will need order for IV antibiotic with duration.  SS will follow.            CHASE PatriciaW

## 2020-12-17 NOTE — PROGRESS NOTES
PROGRESS NOTE         Patient Identification:  Name:  Aaron Domínguez  Age:  89 y.o.  Sex:  male  :  1929  MRN:  [id number]  Visit Number:  [id number]  Primary Care Provider:  Bran Woodruff MD         LOS: 4 days       ----------------------------------------------------------------------------------------------------------------------  Subjective       Chief Complaints:    Fall and Shortness of Breath        Interval History:      Patient resting in bed.  Feels much better today.  Continues on 5 L nasal cannula with no apparent distress.  WBC normal.  CRP improving at 0.52.    Review of Systems:    Constitutional: no fever, chills and night sweats. No appetite change or unexpected weight change. No fatigue.  Eyes: no eye drainage, itching or redness.  HEENT: no mouth sores, dysphagia or nose bleed.  Respiratory: Positive shortness of breath, no cough or production of sputum.  Cardiovascular: no chest pain, no palpitations, no orthopnea.  Gastrointestinal: no nausea, vomiting or diarrhea. No abdominal pain, hematemesis or rectal bleeding.  Genitourinary: no dysuria or polyuria.  Hematologic/lymphatic: no lymph node abnormalities, no easy bruising or easy bleeding.  Musculoskeletal: Generalized soreness secondary to falls.  Skin: No rash and no itching.  Neurological: no loss of consciousness, no seizure, no headache.  Behavioral/Psych: no depression or suicidal ideation.  Endocrine: no hot flashes.  Immunologic: negative.    ----------------------------------------------------------------------------------------------------------------------      Objective       Current [facility] Meds:  amitriptyline, 25 mg, Oral, Nightly  amLODIPine-lisinopril 5-20 mg combo dose, , Oral, Q24H  arformoterol, 15 mcg, Nebulization, BID - RT  atorvastatin, 10 mg, Oral, Nightly  bisacodyl, 10 mg, Rectal, Once  budesonide, 0.5 mg, Nebulization, BID - RT  cholecalciferol, 50,000 Units, Oral, Weekly  clopidogrel, 75  mg, Oral, Daily  enoxaparin, 40 mg, Subcutaneous, Q24H  insulin aspart, 0-7 Units, Subcutaneous, TID AC  ipratropium-albuterol, 3 mL, Nebulization, Q8H - RT  isosorbide mononitrate, 30 mg, Oral, Daily  metoprolol succinate XL, 100 mg, Oral, Daily  montelukast, 10 mg, Oral, Nightly  nicotine, 1 patch, Transdermal, Q24H  pantoprazole, 40 mg, Oral, Q AM  polyethylene glycol, 17 g, Oral, Daily  predniSONE, 40 mg, Oral, Daily With Breakfast  sodium chloride, 10 mL, Intravenous, Q12H  terazosin, 5 mg, Oral, Q12H  theophylline, 400 mg, Oral, Daily  vancomycin, 1,500 mg, Intravenous, Q24H      sodium chloride, 100 mL/hr, Last Rate: 100 mL/hr (12/17/20 0513)      ----------------------------------------------------------------------------------------------------------------------    Vital Signs:  Temp:  [97.2 °F (36.2 °C)-98.2 °F (36.8 °C)] 98.2 °F (36.8 °C)  Heart Rate:  [59-98] 84  Resp:  [18] 18  BP: (116-170)/(56-78) 120/64  Mean Arterial Pressure (Non-Invasive) for the past 24 hrs (Last 3 readings):   Noninvasive MAP (mmHg)   12/16/20 1459 93     SpO2 Percentage    12/17/20 0707 12/17/20 1005 12/17/20 1029   SpO2: 96% 95% 93%     SpO2:  [93 %-98 %] 93 %  on  Flow (L/min):  [5] 5;   Device (Oxygen Therapy): humidified;nasal cannula    Body mass index is 27.56 kg/m².  Wt Readings from Last 3 Encounters:   12/17/20 92.2 kg (203 lb 3.2 oz)   10/16/18 89.8 kg (198 lb)   08/08/18 89.8 kg (198 lb)        Intake/Output Summary (Last 24 hours) at 12/17/2020 1206  Last data filed at 12/17/2020 0900  Gross per 24 hour   Intake 3133.71 ml   Output 2150 ml   Net 983.71 ml     Diet Regular; Consistent Carbohydrate  ----------------------------------------------------------------------------------------------------------------------      Physical Exam:    Constitutional:  Well-developed and well-nourished.  No respiratory distress.      HENT:  Head: Normocephalic and atraumatic.  Mouth:  Moist mucous membranes.    Eyes:  Conjunctivae  and EOM are normal.  No scleral icterus.  Neck:  Neck supple.  No JVD present.    Cardiovascular:  Normal rate, regular rhythm and normal heart sounds with no murmur. No edema.  Pulmonary/Chest:  No respiratory distress, no wheezes, no crackles, with decreased lung sounds in the bases.  Abdominal:  Soft.  Bowel sounds are normal.  No distension and no tenderness.   Musculoskeletal:  No edema, no tenderness, and no deformity.  No swelling or redness of joints.  Neurological:  Alert and oriented to person, place, and time.  No facial droop.  No slurred speech.   Skin:  Skin is warm and dry.  No rash noted.  No pallor.  Scattered bruising to chest wall and bilateral upper extremities.  Left elbow skin tear with bruising noted.  Psychiatric:  Normal mood and affect.  Behavior is normal.    ----------------------------------------------------------------------------------------------------------------------  Results from last 7 days   Lab Units 12/15/20  2321 12/14/20  0500 12/14/20  0059  12/13/20  1417   CK TOTAL U/L  --   --  412*  --  414*   TROPONIN T ng/mL 0.040* 0.039* 0.056*   < > 0.058*    < > = values in this interval not displayed.     Results from last 7 days   Lab Units 12/13/20  1417   PROBNP pg/mL 396.1       Results from last 7 days   Lab Units 12/13/20  1431   PH, ARTERIAL pH units 7.424   PO2 ART mm Hg 92.7   PCO2, ARTERIAL mm Hg 48.5*   HCO3 ART mmol/L 31.7*     Results from last 7 days   Lab Units 12/17/20  0205 12/16/20  0510 12/15/20  0352  12/13/20  1417   CRP mg/dL 0.52* 0.69* 1.28*   < >  --    LACTATE mmol/L  --   --   --   --  1.3   WBC 10*3/mm3 8.25 6.62 7.04   < > 7.15   HEMOGLOBIN g/dL 9.3* 9.5* 9.9*   < > 11.6*   HEMATOCRIT % 29.8* 29.9* 31.0*   < > 35.6*   MCV fL 99.0* 98.0* 96.6   < > 94.7   MCHC g/dL 31.2* 31.8 31.9   < > 32.6   PLATELETS 10*3/mm3 194 185 192   < > 217   INR   --   --   --   --  0.89*    < > = values in this interval not displayed.     Results from last 7 days   Lab  Units 12/17/20  0205 12/16/20  0510 12/15/20  0352  12/13/20  1417   SODIUM mmol/L 134* 133* 131*   < > 135*   POTASSIUM mmol/L 5.2 5.6* 5.0   < > 4.9   MAGNESIUM mg/dL 2.1 2.1 1.9   < >  --    CHLORIDE mmol/L 101 100 98   < > 96*   CO2 mmol/L 28.4 26.3 25.2   < > 31.6*   BUN mg/dL 30* 28* 27*   < > 24*   CREATININE mg/dL 0.90 0.73* 0.89   < > 0.95   EGFR IF NONAFRICN AM mL/min/1.73 79 101 80   < > 74   CALCIUM mg/dL 8.7 8.7 9.1   < > 10.3*   GLUCOSE mg/dL 298* 350* 343*   < > 312*   ALBUMIN g/dL  --   --   --   --  4.03   BILIRUBIN mg/dL  --   --   --   --  0.4   ALK PHOS U/L  --   --   --   --  107   AST (SGOT) U/L  --   --   --   --  38   ALT (SGPT) U/L  --   --   --   --  29    < > = values in this interval not displayed.   Estimated Creatinine Clearance: 69.7 mL/min (by C-G formula based on SCr of 0.9 mg/dL).  No results found for: AMMONIA    Glucose   Date/Time Value Ref Range Status   12/17/2020 1032 270 (H) 70 - 130 mg/dL Final   12/17/2020 0644 278 (H) 70 - 130 mg/dL Final   12/16/2020 2119 333 (H) 70 - 130 mg/dL Final   12/16/2020 1648 196 (H) 70 - 130 mg/dL Final   12/16/2020 1028 363 (H) 70 - 130 mg/dL Final   12/16/2020 0658 344 (H) 70 - 130 mg/dL Final   12/15/2020 2310 307 (H) 70 - 130 mg/dL Final   12/15/2020 2159 304 (H) 70 - 130 mg/dL Final     No results found for: HGBA1C  No results found for: TSH, FREET4    Blood Culture   Date Value Ref Range Status   12/13/2020 No growth at 24 hours  Preliminary   12/13/2020 Staphylococcus aureus, MRSA (C)  Preliminary     Comment:     Infectious disease consultation is highly recommended to rule out distant foci of infection.  Methicillin resistant Staphylococcus aureus, Patient may be an isolation risk.   12/13/2020 No growth at 24 hours  Preliminary     No results found for: URINECX  No results found for: WOUNDCX  No results found for: STOOLCX  No results found for: RESPCX  Pain Management Panel     There is no flowsheet data to display.             ----------------------------------------------------------------------------------------------------------------------  Imaging Results (Last 24 Hours)     ** No results found for the last 24 hours. **          ----------------------------------------------------------------------------------------------------------------------    Assessment/Plan       Assessment/Plan     ASSESSMENT:    1.  Sepsis   2.  MRSA bacteremia    PLAN:    Patient resting in bed.  Feels much better today.  Continues on 5 L nasal cannula with no apparent distress.  WBC normal.  CRP improving at 0.52.    2D echo unremarkable for vegetation.    Respiratory panel PCR negative.  CT of the head reports no acute abnormality.  CT of the C-spine reports no acute fracture.  CT of the L-spine reports no acute fracture.  CT of the T-spine reports no acute fracture.  CT of the chest reports COPD, trace right effusion and right basilar atelectasis.  Chest x-ray reports right basilar atelectasis. X-ray of the pelvis reports no evidence of fracture.  Blood cultures from 12/13/2020 1 out of 2 sets positive for MRSA.  COVID-19 PCR negative.  Influenza PCR negative.    Recommend to continue vancomycin monotherapy for 2 full week course through 12/28/2020.  Patient stable from ID standpoint.    Code Status:   Code Status and Medical Interventions:   Ordered at: 12/13/20 5232     Level Of Support Discussed With:    Patient     Code Status:    CPR     Medical Interventions (Level of Support Prior to Arrest):    Full           Paola Ochoa, APRN  12/17/20  12:06 EST

## 2020-12-17 NOTE — NURSING NOTE
Pt has tolerated all interventions well. Pt is anxious and ready to be d/c'd. Pt has not experienced any confusion during shift. Oriented x4. Family member presented to room at lunch time to visit with pt and wanted to be informed when pt was being d/c'd.  No other needs/requests at this time. No acute distress noted. Will continue and follow plan of care.

## 2020-12-17 NOTE — DISCHARGE PLACEMENT REQUEST
"Estelita Domínguez (89 y.o. Male)     Date of Birth Social Security Number Address Home Phone MRN    02/27/1929  [address]  [facility] [address] [phone] [id number]    Restorationist Marital Status          Restorationism        Admission Date Admission Type Admitting Provider Attending Provider Department, Room/Bed    12/13/20 Emergency Oculam, MD Iron Scanlon Thomas Anthony, DO [address], 3318/1P    Discharge Date Discharge Disposition Discharge Destination         Home or Self Care              Attending Provider: Juan Daniel Perdue DO    Allergies: No Known Allergies    Isolation: Contact   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)   Code Status: CPR    Ht: 182.9 cm (72\")   Wt: 92.2 kg (203 lb 3.2 oz)    Admission Cmt: None   Principal Problem: Dyspnea [R06.00]                 Active Insurance as of 12/13/2020     Primary Coverage     Payor Plan Insurance Group Employer/Plan Group    HUMANA MEDICARE REPLACEMENT HUMANA MEDICARE REPLACEMENT [id number]     Payor Plan Address Payor Plan Phone Number Payor Plan Fax Number Effective Dates    [address] [address] [phone]  1/1/2015 - None Entered    [facility] [id number]       Subscriber Name Subscriber Birth Date Member ID       ESTELITA DOMÍNGUEZ 2/27/1929 [id number]                 Emergency Contacts      (Rel.) Home Phone Work Phone Mobile Phone    YonyLois (Power of ) [phone] -- --    Rossana Retana (Power of ) [phone] -- --            Emergency Contact Information     Name Relation Home Work Mobile    YonyLois Power of  [phone]      Rossnaa Retana Power of  [phone]            Insurance Information                HUMANA MEDICARE REPLACEMENT/HUMANA MEDICARE REPLACEMENT Phone: [phone]    Subscriber: Estelita Domínguez Subscriber#: [id number]    Group#: [id number] Precert#:           Treatment Team     Provider Relationship Specialty Contact    Juan Daniel Perdue DO Attending, Physician " "of Record Internal Medicine  [phone]    Anna Steiner RN Registered Nurse --  [phone]    Hector Sandhu, RN Registered Nurse --     Azam Stephenson MD Consulting Physician Infectious Diseases  [phone]    Susana Sultana, RRT Respiratory Therapist --  [phone]    Luis Angel Montalvo, PT Physical Therapist Physical Therapy     Lashon Nguyen Patient Care Technician --                History & Physical      Kamini Covington MD at 20 1652              [facility]    Patient Identification:  Name:  Aaron Domínguez  Age:  89 y.o.  Sex:  male  :  1929  MRN:  [id number]   Visit Number:  [id number]  Admit Date: 2020   Room number:  115/15  Primary Care Physician:  Bran oWodruff MD     Chief complaint:    Chief Complaint   Patient presents with   • Fall   • Shortness of Breath     History of presenting illness:  Patient seen and examined, assisted by Gogo Oliveros RN.  Daughter is also assisting with history.  Patient is 89 years old, he has history of COPD, chronic hypoxic respite failure, coronary disease stent placement x2, he lives alone, was brought to the emergency room due to increased shortness of breath and frequent falls.  As per patient he has been experiencing more short of breath lately this past 5 days associated with increased coughing whitish sputum production no fever chills cough.  He claims his appetite has not changed.  As per daughter patient fell yesterday which he reports due to blacking out when he tried to stand up this morning he fell again because he \"tripped\" he was brought into the emergency room, he was having difficulty breathing, not confused, skeletal survey no acute fracture, he has multiple bruises superficial hematoma and skin tear from fall.  Troponin is indeterminate.  No acute EKG changes.    Because of status he was admitted for frequent fall, indeterminate troponin and COPD exacerbation.  " Patient reports he has chronic urinary hesitance, he denies constipation, last bowel movement was yesterday morning described as soft.  We performed post voiding bladder scan at the emergency room and result was 141 mL.    ---------------------------------------------------------------------------------------------------------------------   Review of Systems   Constitutional: Negative for activity change, appetite change, chills, diaphoresis, fatigue, fever and unexpected weight change.   HENT: Negative.    Eyes: Negative.    Respiratory: Positive for cough, shortness of breath and wheezing. Negative for apnea, choking and chest tightness.    Cardiovascular: Negative.    Gastrointestinal: Negative.    Endocrine: Negative.    Genitourinary: Positive for difficulty urinating and testicular pain. Negative for decreased urine volume, discharge, dysuria, enuresis, flank pain, frequency, genital sores, hematuria, penile pain, penile swelling, scrotal swelling and urgency.   Musculoskeletal: Negative.    Skin: Negative.    Allergic/Immunologic: Negative.    Neurological: Negative.    Hematological: Negative.    Psychiatric/Behavioral: Negative.         ---------------------------------------------------------------------------------------------------------------------   Past Medical History:   Diagnosis Date   • COPD (chronic obstructive pulmonary disease) (CMS/[facility])    • Diabetes mellitus (CMS/[facility])    • Hypertension      Past Surgical History:   Procedure Laterality Date   • CORONARY STENT PLACEMENT       Family History   Problem Relation Age of Onset   • Heart failure Mother    • Diabetes Father    • Heart failure Father      Social History     Socioeconomic History   • Marital status:      Spouse name: Not on file   • Number of children: Not on file   • Years of education: Not on file   • Highest education level: Not on file   Tobacco Use   • Smoking status: Former Smoker     Packs/day: 2.00     Years: 60.00      Pack years: 120.00     Types: Cigarettes     Quit date: 1998     Years since quittin.3   • Smokeless tobacco: Current User     Types: Chew   Substance and Sexual Activity   • Alcohol use: No   • Drug use: No   • Sexual activity: Defer     ---------------------------------------------------------------------------------------------------------------------   Allergies:  Patient has no known allergies.  ---------------------------------------------------------------------------------------------------------------------   Prior to Admission Medications     Prescriptions Last Dose Informant Patient Reported? Taking?    albuterol (PROAIR HFA) 108 (90 Base) MCG/ACT inhaler   No No    Inhale 2 puffs Every 4 (Four) Hours As Needed for Shortness of Air.    amitriptyline (ELAVIL) 25 MG tablet   Yes No    TAKE ONE TABLET BY MOUTH EVERY NIGHT AT BEDTIME AS DIRECTED    amLODIPine-benazepril (LOTREL 5-20) 5-20 MG per capsule   Yes No    Take  by mouth Daily. for blood pressure    atorvastatin (LIPITOR) 10 MG tablet   Yes No    TAKE ONE TABLET BY MOUTH EVERY DAY TO LOWER CHOLESTEROL    clopidogrel (PLAVIX) 75 MG tablet   Yes No    Take 75 mg by mouth Daily. as directed    doxazosin (CARDURA) 8 MG tablet   Yes No    TAKE ONE TABLET BY MOUTH EVERY NIGHT AT BEDTIME AS DIRECTED    Fluticasone-Umeclidin-Vilant 100-62.5-25 MCG/INH aerosol powder    No No    Inhale 1 each Daily.    gabapentin (NEURONTIN) 100 MG capsule   Yes No    TAKE ONE CAPSULE BY MOUTH EVERY DAY AT BEDTIME AS NEEDED FOR NERVE PAIN    ipratropium-albuterol (DUO-NEB) 0.5-2.5 mg/3 ml nebulizer   No No    Take 3 mL by nebulization Every 4 (Four) Hours As Needed for Wheezing.    SYMBICORT 160-4.5 MCG/ACT inhaler   Yes No    INHALE 2 PUFFS INTO LUNGS TWO TIMES A DAY AS NEEDED FOR BREATHING    VENTOLIN  (90 Base) MCG/ACT inhaler   Yes No    INHALE 2 PUFFS INTO LUNGS EVERY 4 TO 6 HOURS AS NEEDED FOR BREATHING    vitamin D (ERGOCALCIFEROL) 93897 units capsule  capsule   Yes No    Take 50,000 Units by mouth 1 (One) Time Per Week.        ---------------------------------------------------------------------------------------------------------------------   Vital Signs:  Temp:  [98.4 °F (36.9 °C)] 98.4 °F (36.9 °C)  Heart Rate:  [75-81] 81  Resp:  [24] 24  BP: (148-186)/(79-97) 148/91    Mean Arterial Pressure (Non-Invasive) for the past 24 hrs (Last 3 readings):   Noninvasive MAP (mmHg)   12/13/20 1649 116   12/13/20 1604 119   12/13/20 1449 123     SpO2:  [97 %-100 %] 97 %  on  Flow (L/min):  [5] 5;   Device (Oxygen Therapy): nasal cannula  Body mass index is 26.32 kg/m².    Wt Readings from Last 3 Encounters:   12/13/20 93 kg (205 lb)   10/16/18 89.8 kg (198 lb)   08/08/18 89.8 kg (198 lb)               ---------------------------------------------------------------------------------------------------------------------   Physical Exam:  Constitutional:  Well-developed and well-nourished.  Chronically ill-appearing, tachypneic but not in respiratory distress.  Is able to answer question appropriately, no confusion.       HENT:  Head: Normocephalic and atraumatic.  Mouth: Dry mucous membranes.    Eyes:  Conjunctivae and EOM are normal.  Pupils are equal, round, and reactive to light.  No scleral icterus.  Neck:  Neck supple.  No JVD present.    No bruit  Cardiovascular:  Normal rate, regular rhythm and normal heart sounds with no murmur.  Pulmonary/Chest: No wheezing, occasional rhonchi noted no expiratory lag, equal chest expansion.  He has anterior chest wall bruising and purpura mostly in the left anterior chest wall.  No flail chest or splinting.    Abdominal:  Soft.  Bowel sounds are normal.  No distension and no tenderness.   Musculoskeletal:  No edema, no tenderness, and no deformity.  No red or swollen joints anywhere.    Neurological:  Alert and oriented to person, place, and time.  No cranial nerve deficit.  No tongue deviation.  No facial droop.  No slurred  speech.   Skin:  Skin is warm and dry.  No rash noted.  No pallor.   Peripheral vascular:  No edema and strong pulses on all 4 extremities.  Genitourinary: He has no Rodriguez catheter.  ---------------------------------------------------------------------------------------------------------------------  EKG:          Telemetry: Sinus rhythm 98 bpm, O2 saturation on 5 L nasal cannula is 98%  I have personally looked at both the EKG and the telemetry strips.  --------------------------------------------------------------------------------------------------------------------  Results from last 7 days   Lab Units 12/13/20  1417   CK TOTAL U/L 414*   TROPONIN T ng/mL 0.058*     Results from last 7 days   Lab Units 12/13/20  1417   LACTATE mmol/L 1.3   WBC 10*3/mm3 7.15   HEMOGLOBIN g/dL 11.6*   HEMATOCRIT % 35.6*   MCV fL 94.7   MCHC g/dL 32.6   PLATELETS 10*3/mm3 217   INR  0.89*     Results from last 7 days   Lab Units 12/13/20  1417   SODIUM mmol/L 135*   POTASSIUM mmol/L 4.9   CHLORIDE mmol/L 96*   CO2 mmol/L 31.6*   BUN mg/dL 24*   CREATININE mg/dL 0.95   EGFR IF NONAFRICN AM mL/min/1.73 74   CALCIUM mg/dL 10.3*   GLUCOSE mg/dL 312*   ALBUMIN g/dL 4.03   BILIRUBIN mg/dL 0.4   ALK PHOS U/L 107   AST (SGOT) U/L 38   ALT (SGPT) U/L 29   Estimated Creatinine Clearance: 66.6 mL/min (by C-G formula based on SCr of 0.95 mg/dL).    Glucose   Date/Time Value Ref Range Status   12/13/2020 1549 243 (H) 70 - 130 mg/dL Final     No results found for: AMMONIA        No results found for: BLOODCXNo results found for: RESPCXNo results found for: URINECXNo results found for: WOUNDCXNo results found for: BODYFLDCXNo results found for: STOOLCX  pH pH, Arterial   Date Value Ref Range Status   12/13/2020 7.424 7.350 - 7.450 pH units Final      pO2 pO2, Arterial   Date Value Ref Range Status   12/13/2020 92.7 83.0 - 108.0 mm Hg Final      pCO2 pCO2, Arterial   Date Value Ref Range Status   12/13/2020 48.5 (H) 35.0 - 45.0 mm Hg Final       HCO3 HCO3, Arterial   Date Value Ref Range Status   12/13/2020 31.7 (H) 20.0 - 26.0 mmol/L Final     Comment:     83 Value above reference range        I have personally looked at the labs and they are summarized above.  ----------------------------------------------------------------------------------------------------------------------  Imaging Results (Last 24 Hours)     Procedure Component Value Units Date/Time    XR Pelvis 1 or 2 View [[id number]] Collected: 12/13/20 1533     Updated: 12/13/20 1536    Narrative:      EXAMINATION: XR PELVIS 1 OR 2 VW-      CLINICAL INDICATION: fall        COMPARISON: None available     FINDINGS:  2 views of the pelvis show no acute fracture or dislocation.     Moderate to large stool burden.       Impression:      No acute fracture      This report was finalized on 12/13/2020 3:34 PM by Dr. Keanu La MD.       XR Chest 1 View [[id number]] Collected: 12/13/20 1532     Updated: 12/13/20 1535    Narrative:      XR CHEST 1 VW-     CLINICAL INDICATION: CHF/COPD Protocol        COMPARISON: CT chest dated 12/13/2020      TECHNIQUE: Single frontal view of the chest.     FINDINGS:     Right basilar consolidation.  No pneumothorax  Elevation of the right hemidiaphragm  There is no evidence of an acute osseous abnormality.   There are no suspicious-appearing parenchymal soft tissue nodules.          Impression:      Right basilar atelectasis  No pneumothorax     This report was finalized on 12/13/2020 3:33 PM by Dr. Keanu La MD.       CT Chest With Contrast [[id number]] Collected: 12/13/20 1520     Updated: 12/13/20 1525    Narrative:      EXAM: CT CHEST W CONTRAST-      CLINICAL INDICATION:fall      COMPARISON: NONE.     TECHNIQUE: Multiple axial CT images were obtained from lung apex through  upper abdomen WITH administration of IV contrast. Reformatted images in  the coronal and/or sagittal plane(s) were generated from the axial data  set to facilitate diagnostic accuracy and/or  surgical planning.     Radiation dose reduction techniques were utilized per ALARA protocol.  Automated exposure control was initiated through either or CareDose or  DoseRight software packages by  protocol.    DOSE (DLP mGy-cm):        FINDINGS:     LUNGS: Minimal right basilar atelectasis and trace right pleural  effusion  No pneumothorax  COPD     HEART: Coronary artery calcifications are present     MEDIASTINUM: No mediastinal hematoma     PLEURA: No pleural effusion. No pleural mass or abnormal calcification.  No pneumothorax.     VASCULATURE: No evidence of aneurysm. No evidence of pulmonary embolism.     BONES: No acute bony abnormality.     VISUALIZED UPPER ABDOMEN:The upper abdomen is unremarkable as  visualized.     Other: None.       Impression:         1. No acute posttraumatic changes in the chest  2. COPD  3. Trace right effusion and right basilar atelectasis     This report was finalized on 12/13/2020 3:22 PM by Dr. Keanu La MD.       CT Thoracic Spine Without Contrast [[id number]] Collected: 12/13/20 1517     Updated: 12/13/20 1524    Narrative:      EXAMINATION: CT THORACIC SPINE WO CONTRAST-      CLINICAL INDICATION: fall        COMPARISON: None immediately available.     Radiation dose reduction techniques were utilized per ALARA protocol.  Automated exposure control was initiated through either or CareDose or  DoseRight software packages by  protocol.           PROCEDURE: Axial images were acquired through the thoracic spine without  any IV contrast. Reformatted images were created.     FINDINGS: Today's study does show trace right pleural effusion and  minimal right basilar atelectasis.     The thoracic vertebral body heights are maintained     Anterior bridging spurs are present     No paraspinal hematoma.       Impression:      1. No acute thoracic fracture  2. Minimal right basilar atelectasis  3. Arthritic change      This report was finalized on 12/13/2020 3:18  PM by Dr. Keanu La MD.       CT Lumbar Spine Without Contrast [[id number]] Collected: 12/13/20 1518     Updated: 12/13/20 1523    Narrative:      EXAMINATION: CT LUMBAR SPINE WO CONTRAST-      CLINICAL INDICATION: fall        COMPARISON: None available.     Radiation dose reduction techniques were utilized per ALARA protocol.  Automated exposure control was initiated through either or CareDose or  DoseRight software packages by  protocol.           PROCEDURE: Axial images through the lumbar spine were acquired without  any IV contrast. Reformatted images were created.     FINDINGS: Today's study shows demineralization and chronic arthritic  change, but no evidence of an acute compression abnormality or  paraspinal hematoma.     Disc bulges at L3-4 and L4-5       Impression:      Arthritic change, but no acute fracture      This report was finalized on 12/13/2020 3:19 PM by Dr. Keanu La MD.       CT Head Without Contrast [[id number]] Collected: 12/13/20 1511     Updated: 12/13/20 1523    Narrative:      CT HEAD WO CONTRAST-     CLINICAL INDICATION: fall        COMPARISON: None available      TECHNIQUE: Axial images of the brain were obtained with out intravenous  contrast.  Reformatted images were created in the sagittal and coronal  planes.     DOSE:     Radiation dose reduction techniques were utilized per ALARA protocol.  Automated exposure control was initiated through either or CareDose or  DoseRight software packages by  protocol.           FINDINGS:   Today's study shows no mass, hemorrhage, or midline shift.   Cerebral atrophy  There is no evidence of acute ischemia.  I do not see epidural or subdural hematoma.  The gray-white differentiation is appropriate.   The bone window setting images show no destructive calvarial lesion or  acute calvarial fracture.   The posterior fossa is unremarkable.          Impression:         1. Global cerebral atrophy  2. No parenchymal mass,  hemorrhage, or midline shift.  3. No epidural or subdural hematoma     This report was finalized on 12/13/2020 3:11 PM by Dr. Keanu La MD.       CT Cervical Spine Without Contrast [[id number]] Collected: 12/13/20 1511     Updated: 12/13/20 1522    Narrative:      CT CERVICAL SPINE WO CONTRAST-     CLINICAL INDICATION: fall        COMPARISON: None available      TECHNIQUE: Axial images of the cervical spine were acquired with out any  intravenous contrast. Reformatted images were then created in the  sagittal and coronal planes.     DOSE:      Radiation dose reduction techniques were utilized per ALARA protocol.  Automated exposure control was initiated through either or [employer] or  DoseRight software packages by  protocol.           FINDINGS:   The provided study demonstrates preservation of the vertebral body  heights in the sagittally reconstructed images.     There is no prevertebral soft tissue swelling.     Alignment is near anatomic.     Disc space narrowing at C2-3, C4-5, C5-C6, C7-T1     I see no acute cervical spine fracture.       Impression:         1. Diffuse arthritic change     2. No acute fracture     This report was finalized on 12/13/2020 3:12 PM by Dr. Keanu La MD.           I have personally reviewed the radiology images and read the final radiology report.  ----------------------------------------------------------------------------------------------------------------------  Assessment and Plan:  -Frequent falls  -COPD exacerbation  -Chronic hypoxic respiratory failure  -Multiple superficial skin tear left elbow from fall  -Multiple bruises from fall  -Advanced age  -Chronic debility  -Constipation  -BPH on Cardura  -Essential hypertension  -Atherosclerotic cardiovascular disease status post stent placement x2  -Dyslipidemia  -Dehydration  -Mild CPK elevation likely from trauma will trend,  -Mild hypercalcemia, likely secondary to dehydration, will monitor after  hydration.  -Indeterminate troponin,  -Normocytic anemia    Address constipation, gentle hydration, fall precautions, wound care, inhaled corticosteroids and long-acting beta agonist, DuoNeb, empirically start patient on doxycycline, short course of Solu-Medrol, oxygen supplementation.  Physical therapy.  Cultures will be ordered, respite panel will be ordered.  Accu-Chek and sliding scale, diabetic diet.  Monitor CPK, serial troponin.  Serial EKG, aspirin, 2D echo.  Carotid Doppler.  Blood culture.  anemia work-up.    Plan outlined to the patient together with patient's daughter and agreed, further management may change as condition evolves    Patient is high risk 89-year-old with respiratory failure he will be admitted requiring 2 nights of stay for treatment that includes above.        Kamini Covington MD  12/13/20  16:52 EST    Electronically signed by Kamini Covington MD at 12/13/20 1717       Lines, Drains & Airways    Active LDAs     Name:   Placement date:   Placement time:   Site:   Days:    Peripheral IV 12/15/20 0423 Right Wrist   12/15/20    0423    Wrist   2                  Lab Results (last 24 hours)     Procedure Component Value Units Date/Time    Blood Culture - Blood, Arm, Left [[id number]] Collected: 12/15/20 1516    Specimen: Blood from Arm, Left Updated: 12/17/20 1530     Blood Culture No growth at 2 days    Blood Culture - Blood, Arm, Right [[id number]] Collected: 12/15/20 1518    Specimen: Blood from Arm, Right Updated: 12/17/20 1530     Blood Culture No growth at 2 days    Blood Culture - Blood, Arm, Left [[id number]] Collected: 12/13/20 1415    Specimen: Blood from Arm, Left Updated: 12/17/20 1445     Blood Culture No growth at 4 days    POC Glucose Once [[id number]]  (Abnormal) Collected: 12/17/20 1032    Specimen: Blood Updated: 12/17/20 1053     Glucose 270 mg/dL     Vancomycin, Trough [[id number]]  (Normal) Collected: 12/17/20 0700    Specimen: Blood Updated: 12/17/20 0737      Vancomycin Trough 8.20 mcg/mL     POC Glucose Once [[id number]]  (Abnormal) Collected: 12/17/20 0644    Specimen: Blood Updated: 12/17/20 0710     Glucose 278 mg/dL     Theophylline Level [[id number]]  (Abnormal) Collected: 12/17/20 0205    Specimen: Blood Updated: 12/17/20 0259     Theophylline Level <0.8 mcg/mL     Basic Metabolic Panel [[id number]]  (Abnormal) Collected: 12/17/20 0205    Specimen: Blood Updated: 12/17/20 0239     Glucose 298 mg/dL      BUN 30 mg/dL      Creatinine 0.90 mg/dL      Sodium 134 mmol/L      Potassium 5.2 mmol/L      Chloride 101 mmol/L      CO2 28.4 mmol/L      Calcium 8.7 mg/dL      eGFR Non African Amer 79 mL/min/1.73      BUN/Creatinine Ratio 33.3     Anion Gap 4.6 mmol/L     Narrative:      GFR Normal >60  Chronic Kidney Disease <60  Kidney Failure <15      Magnesium [[id number]]  (Normal) Collected: 12/17/20 0205    Specimen: Blood Updated: 12/17/20 0239     Magnesium 2.1 mg/dL     C-reactive Protein [[id number]]  (Abnormal) Collected: 12/17/20 0205    Specimen: Blood Updated: 12/17/20 0239     C-Reactive Protein 0.52 mg/dL     CBC & Differential [[id number]]  (Abnormal) Collected: 12/17/20 0205    Specimen: Blood Updated: 12/17/20 0220    Narrative:      The following orders were created for panel order CBC & Differential.  Procedure                               Abnormality         Status                     ---------                               -----------         ------                     CBC Auto Differential[[id number]]        Abnormal            Final result                 Please view results for these tests on the individual orders.    CBC Auto Differential [[id number]]  (Abnormal) Collected: 12/17/20 0205    Specimen: Blood Updated: 12/17/20 0220     WBC 8.25 10*3/mm3      RBC 3.01 10*6/mm3      Hemoglobin 9.3 g/dL      Hematocrit 29.8 %      MCV 99.0 fL      MCH 30.9 pg      MCHC 31.2 g/dL      RDW 14.0 %      RDW-SD 50.9 fl      MPV 10.2 fL      Platelets 194 10*3/mm3       Neutrophil % 78.4 %      Lymphocyte % 8.0 %      Monocyte % 13.2 %      Eosinophil % 0.0 %      Basophil % 0.0 %      Immature Grans % 0.4 %      Neutrophils, Absolute 6.47 10*3/mm3      Lymphocytes, Absolute 0.66 10*3/mm3      Monocytes, Absolute 1.09 10*3/mm3      Eosinophils, Absolute 0.00 10*3/mm3      Basophils, Absolute 0.00 10*3/mm3      Immature Grans, Absolute 0.03 10*3/mm3      nRBC 0.0 /100 WBC     POC Glucose Once [[id number]]  (Abnormal) Collected: 12/16/20 2119    Specimen: Blood Updated: 12/16/20 2125     Glucose 333 mg/dL     Blood Culture - Blood, Hand, Left [[id number]] Collected: 12/13/20 1947    Specimen: Blood from Hand, Left Updated: 12/16/20 2000     Blood Culture No growth at 3 days    POC Glucose Once [[id number]]  (Abnormal) Collected: 12/16/20 1648    Specimen: Blood Updated: 12/16/20 1658     Glucose 196 mg/dL         Orders (last 24 hrs)      Start     Ordered    12/18/20 0600  Theophylline Level  Morning Draw      12/17/20 0938    12/18/20 0000  vancomycin 1500 mg/500 mL 0.9% NS IVPB ([facility])  Every 24 Hours      12/17/20 1507    12/17/20 1506  Discontinue IV  Once      12/17/20 1507    12/17/20 1503  Discharge patient  Once      12/17/20 1507    12/17/20 1431  Verify Informed Consent for PICC Line Placement  Once      12/17/20 1430    12/17/20 1431  Insert PICC Using Ultrasound Guidance  Once     Provider:  (Not yet assigned)    12/17/20 1430    12/17/20 1431  No Dilantin Through PICC  Continuous      12/17/20 1430    12/17/20 1054  POC Glucose Once  Once      12/17/20 1032    12/17/20 0800  predniSONE (DELTASONE) tablet 40 mg  Daily With Breakfast      12/16/20 1628    12/17/20 0711  POC Glucose Once  Once      12/17/20 0644    12/17/20 0700  Vancomycin, Trough  Timed      12/16/20 0915    12/17/20 0700  Vancomycin level at 7am Thursday, hold the 8am dose of vancomycin if the level is > 20 mcg/ml.  Nursing Communication  Once     Comments: Vancomycin level at 7am Thursday, hold the 8am dose  of vancomycin if the level is > 20 mcg/ml.    12/16/20 0915    12/17/20 0600  Theophylline Level  Morning Draw      12/16/20 0901    12/17/20 0600  CBC (No Diff)  Morning Draw,   Status:  Canceled      12/16/20 1633    12/17/20 0600  CBC Auto Differential  PROCEDURE ONCE      12/17/20 0002    12/17/20 0000  Discharge Follow-up with PCP      12/17/20 1507    12/17/20 0000  Ambulatory Referral to Home Health      12/17/20 1507    12/16/20 2126  POC Glucose Once  Once      12/16/20 2119    12/16/20 1659  POC Glucose Once  Once      12/16/20 1648    12/16/20 0600  C-reactive Protein  Daily      12/15/20 1226    12/15/20 2100  amitriptyline (ELAVIL) tablet 25 mg  Nightly      12/15/20 0751    12/15/20 2100  atorvastatin (LIPITOR) tablet 10 mg  Nightly      12/15/20 0751    12/15/20 2100  montelukast (SINGULAIR) tablet 10 mg  Nightly      12/15/20 0751    12/15/20 1134  ondansetron (ZOFRAN) injection 4 mg  Every 6 Hours PRN      12/15/20 1134    12/15/20 1000  amLODIPine (NORVASC) 5 mg, lisinopril (PRINIVIL,ZESTRIL) 20 mg  Every 24 Hours Scheduled      12/15/20 0751    12/15/20 1000  clopidogrel (PLAVIX) tablet 75 mg  Daily      12/15/20 0751    12/15/20 1000  terazosin (HYTRIN) capsule 5 mg  Every 12 Hours Scheduled      12/15/20 0751    12/15/20 1000  isosorbide mononitrate (IMDUR) 24 hr tablet 30 mg  Daily      12/15/20 0751    12/15/20 1000  metoprolol succinate XL (TOPROL-XL) 24 hr tablet 100 mg  Daily      12/15/20 0751    12/15/20 1000  theophylline (UNIPHYL) 24 hr tablet 400 mg  Daily      12/15/20 0751    12/15/20 1000  cholecalciferol (VITAMIN D3) capsule 50,000 Units  Weekly      12/15/20 0751    12/15/20 0800  vancomycin 1500 mg/500 mL 0.9% NS IVPB ([facility])  Every 24 Hours      12/14/20 0635    12/15/20 0749  albuterol (PROVENTIL) nebulizer solution 0.083% 2.5 mg/3mL  Every 4 Hours PRN      12/15/20 0751    12/15/20 0749  ipratropium-albuterol (DUO-NEB) nebulizer solution 3 mL  Every 4 Hours PRN      12/15/20  0751    12/14/20 1800  nicotine (NICODERM CQ) 21 MG/24HR patch 1 patch  Every 24 Hours Scheduled      12/14/20 1650    12/14/20 0730  insulin aspart (novoLOG) injection 0-7 Units  3 Times Daily Before Meals      12/13/20 1925 12/14/20 0600  pantoprazole (PROTONIX) EC tablet 40 mg  Every Early Morning      12/13/20 1925 12/14/20 0048  acetaminophen (TYLENOL) tablet 650 mg  Every 6 Hours PRN      12/14/20 0049    12/13/20 2300  ipratropium-albuterol (DUO-NEB) nebulizer solution 3 mL  Every 8 Hours - RT      12/13/20 1925 12/13/20 2200  POC Glucose 4x Daily AC & at Bedtime  4 Times Daily Before Meals & at Bedtime     Comments: If bedtime blood glucose is greater than 350 mg/dl, call MD.      12/13/20 1925 12/13/20 2100  sodium chloride 0.9 % flush 10 mL  Every 12 Hours Scheduled      12/13/20 1925 12/13/20 2100  enoxaparin (LOVENOX) syringe 40 mg  Every 24 Hours      12/13/20 1925 12/13/20 2100  methylPREDNISolone sodium succinate (SOLU-Medrol) injection 40 mg  Every 12 Hours,   Status:  Discontinued      12/13/20 1925 12/13/20 2045  polyethylene glycol (MIRALAX) packet 17 g  Daily      12/13/20 1925 12/13/20 2015  sodium chloride 0.9 % infusion  Continuous      12/13/20 1925 12/13/20 2015  bisacodyl (DULCOLAX) suppository 10 mg  Once      12/13/20 1925 12/13/20 2015  arformoterol (BROVANA) nebulizer solution 15 mcg  2 Times Daily - RT      12/13/20 1925 12/13/20 2015  budesonide (PULMICORT) nebulizer solution 0.5 mg  2 Times Daily - RT      12/13/20 1925 12/13/20 2000  Vital Signs  Every 4 Hours      12/13/20 1925 12/13/20 1926  Intake & Output  Every Shift      12/13/20 1925 12/13/20 1926  Basic Metabolic Panel  Daily      12/13/20 1925 12/13/20 1926  Magnesium  Daily      12/13/20 1925 12/13/20 1926  CBC & Differential  Daily      12/13/20 1925 12/13/20 1925  dextrose (GLUTOSE) oral gel 15 g  Every 15 Minutes PRN      12/13/20 1925 12/13/20 1925  dextrose (D50W)  25 g/ 50mL Intravenous Solution 25 g  Every 15 Minutes PRN      12/13/20 1925 12/13/20 1925  glucagon (human recombinant) (GLUCAGEN DIAGNOSTIC) injection 1 mg  Every 15 Minutes PRN      12/13/20 1925 12/13/20 1925  sodium chloride 0.9 % flush 10 mL  As Needed      12/13/20 1925 12/13/20 1925  nitroglycerin (NITROSTAT) SL tablet 0.4 mg  Every 5 Minutes PRN      12/13/20 1925 12/13/20 1413  sodium chloride 0.9 % flush 10 mL  As Needed      12/13/20 1415    Unscheduled  Telemetry - Pulse Oximetry  Continuous PRN     Comments: If Patient Develops Unresponsiveness, Acute Dyspnea, Cyanosis or Suspected Hypoxemia Start Continuous Pulse Ox Monitoring, Apply Oxygen & Notify Provider    12/13/20 1925    Unscheduled  Oxygen Therapy- Nasal Cannula; Titrate for SPO2: 90% - 95%  Continuous PRN     Comments: If Patient Develops Unresponsiveness, Acute Dyspnea, Cyanosis or Suspected Hypoxemia Start Continuous Pulse Ox Monitoring, Apply Oxygen & Notify Provider    12/13/20 1925    Unscheduled  ECG 12 Lead  As Needed     Comments: Nurse to Release if Patient Expericences Acute Chest Pain or Dysrhythmias    12/13/20 1925    Unscheduled  Potassium  As Needed     Comments: For Ventricular Arrhythmias      12/13/20 1925    Unscheduled  Magnesium  As Needed     Comments: For Ventricular Arrhythmias      12/13/20 1925    Unscheduled  Troponin  As Needed     Comments: For Chest Pain      12/13/20 1925    Unscheduled  Digoxin Level  As Needed     Comments: For Atrial Arrhythmias      12/13/20 1925    Unscheduled  Blood Gas, Arterial -With Co-Ox Panel: Yes  As Needed     Comments: Per O2 PolicyNotify Physician      12/13/20 1925    Unscheduled  Up With Assistance  As Needed      12/13/20 1925    --  theophylline (UNIPHYL) 400 MG 24 hr tablet  Daily      12/13/20 1858    --  metoprolol succinate XL (TOPROL-XL) 100 MG 24 hr tablet  Daily      12/13/20 1858    --  isosorbide mononitrate (IMDUR) 30 MG 24 hr tablet  Daily      12/13/20  1858    --  montelukast (SINGULAIR) 10 MG tablet  Nightly      12/13/20 1858    --  predniSONE (DELTASONE) 5 MG tablet  Daily      12/13/20 1858              vancomycin 1500 mg/500 mL 0.9% NS IVPB ([facility]) [[id number]] (Order [id number])  Order  Date: 12/17/2020 Department: [address] Ordering/Authorizing: Juan Daniel Perdue DO   Medication  vancomycin 1500 mg/500 mL 0.9% NS IVPB ([facility]) [[id number]]  Order History  Outpatient  Date/Time Action Taken User Additional Information   12/17/20 1507 Sign Juan Daniel Perdue DO Reorder from Order: [id number]   Outpatient Morphine Equivalent Daily Dose (MEDD)  Expand All  Collapse All  None   vancomycin 1500 mg/500 mL 0.9% NS IVPB ([facility]) [[id number]]     Order Details  Dose: 1,500 mg Route: Intravenous Frequency: Every 24 Hours   Indications of Use: Bacteremia   Dispense Quantity: 5,500 mL Refills: 0 Fills remaining: --           Sig: Infuse 500 mL into a venous catheter Daily for 11 doses. Indications: Bacteria in the Blood          Written Date: 12/17/20 Expiration Date: 12/17/21     Start Date: 12/15/20 0800 End Date: 12/29/20 after 11 doses            Ordering Provider: Juan Daniel Perdue DO Phone:  [phone] Fax:  [phone]    Address:  [address] [place] [address] NPI:  [id number]            Authorizing Provider: Juan Daniel Perdue DO Phone:  [phone] Fax:  [phone]    Address:  [address] [place] [address] NPI:  [id number]            Ordering User:  Juan Daniel Perdue DO               Pharmacy:  [place] [address] [address] [facility] - [phone]  - [phone]   Phone:  [phone]   Fax:  [phone]   [facility] [address] [place], [place] [address]   Pharmacy Comments: --          Fill quantity remaining: -- Fill quantity used: -- Next fill due: --       Orders with any of the following pharmaceutical classes: Misc. Antiinfectives    Name Dose Frequency Start Date End  Date Medication Warnings Interventions? Order Mode    vancomycin 1500 mg/500 mL 0.9% NS IVPB ([facility]) 1,500 mg Every 24 Hours 12/15/20 0800 12/28/20 2359  Yes Inpatient    vancomycin 1750 mg/500 mL 0.9% NS IVPB ([facility]) 20 mg/kg Once 12/14/20 0700 12/14/20 0910   Inpatient    Warnings History    No Interaction Warnings Shown    Pharmacist Clinical Review History    This prescription has not been clinically reviewed.   Order Reconciliation Actions       Order Reconciliation Actions   E-Prescribing Status    Outpatient Medication Detail    vancomycin 1500 mg/500 mL 0.9% NS IVPB ([facility])        Sig: Infuse 500 mL into a venous catheter Daily for 11 doses. Indications: Bacteria in the Blood        Class: No Print        Route: Intravenous        Event History       Event History   Tracking Reports    Cosign Tracking       After Visit Summary  Aaron Domínguez  MRN: [id number]     Shortness of breath     12/13/2020 - 12/17/2020     [address]   Your Next Steps    Ask    ·   Ask how to get these medications  ? vancomycin  Read    ·   Read these attachments  ? Chronic Obstructive Pulmonary Disease Exacerbation  Easy-to-Read (English)  ? Acute Respiratory Failure  Adult (English)  ? Hypertension  Adult  Easy-to-Read (English)  After Visit Summary  Instructions    ·   Your medications have changed    START taking:  ? vancomycin   Start taking on: December 18, 2020  Review your updated medication list below.  Your Next Steps  Ask  Read  If you have any questions about your recovery, please call the [facility] Nurse Call Center at [phone]. A registered nurse is available 24 hours a day 7 days a week to assist you.   If you have any COVID-19 related questions, please call [phone].  ·   Other instructions    Discharge Follow-up with PCP   Currently Documented PCP:   Bran Woodruff MD   PCP Phone Number:   [phone]   What's Next    What's Next          Follow up with Bran Woodruff MD  please  follow up with pcp in 3-4 weeks.JAN 18 @ 10:15 AM [address] [facility] [place] [address]  [phone]          Ambulatory Referral to Home Health   Home Health Services    ·   Additional Information       VANI JAN 18 @ 10:15   Your Allergies  Date Reviewed: 12/13/2020  Your Allergies   No active allergies   Patient Belongings Returned    Document Return of Belongings Flowsheet    Were the patient bedside belongings sent home? --   Medications Retrieved from Pharmacy & Sent Home --   Belongings Sent to Safe --   Belongings sent with: --   Belongings Retrieved from Security & Sent Home --       MyChart Signup    Our records indicate that you have declined [facility] signup. If you would like to sign up for PANOSOL, please email [email or web address] or call [phone] to obtain an activation code.  Medication List  Medication List     Morning Afternoon Evening Bedtime As Needed    albuterol sulfate  (90 Base) MCG/ACT inhaler  Commonly known as: ProAir HFA  Inhale 2 puffs Every 4 (Four) Hours As Needed for Shortness of Air.         amitriptyline 25 MG tablet  Commonly known as: ELAVIL  Take 25 mg by mouth Every Night.  Last time this was given: 25 mg on December 16, 2020  9:08 PM         amLODIPine-benazepril 5-20 MG per capsule  Commonly known as: LOTREL 5-20  Take 1 capsule by mouth Daily. for blood pressure         atorvastatin 10 MG tablet  Commonly known as: LIPITOR  Take 10 mg by mouth Every Night.  Last time this was given: 10 mg on December 16, 2020  9:08 PM         clopidogrel 75 MG tablet  Commonly known as: PLAVIX  Take 75 mg by mouth Daily. as directed  Last time this was given: 75 mg on December 17, 2020  9:04 AM         doxazosin 8 MG tablet  Commonly known as: CARDURA  Take 8 mg by mouth Every Night.         Fluticasone-Umeclidin-Vilant 100-62.5-25 MCG/INH aerosol powder   Inhale 1 each Daily.         ipratropium-albuterol 0.5-2.5 mg/3 ml nebulizer  Commonly known as:  DUO-NEB  Take 3 mL by nebulization Every 4 (Four) Hours As Needed for Wheezing.  Last time this was given: 3 mL on December 17, 2020  2:23 PM         isosorbide mononitrate 30 MG 24 hr tablet  Commonly known as: IMDUR  Take 30 mg by mouth Daily.  Last time this was given: 30 mg on December 17, 2020  9:05 AM         metoprolol succinate  MG 24 hr tablet  Commonly known as: TOPROL-XL  Take 100 mg by mouth Daily.  Last time this was given: 100 mg on December 17, 2020  9:04 AM         montelukast 10 MG tablet  Commonly known as: SINGULAIR  Take 10 mg by mouth Every Night.  Last time this was given: 10 mg on December 16, 2020  9:08 PM         predniSONE 5 MG tablet  Commonly known as: DELTASONE  Take 5 mg by mouth Daily.  Last time this was given: 40 mg on December 17, 2020  9:04 AM         theophylline 400 MG 24 hr tablet  Commonly known as: UNIPHYL  Take 400 mg by mouth Daily.  Last time this was given: 400 mg on December 17, 2020  9:04 AM         vancomycin  Start taking on: December 18, 2020  Infuse 500 mL into a venous catheter Daily for 11 doses. Indications: Bacteria in the Blood  For: Bacteria in the Blood  Last time this was given: 1,500 mg on December 17, 2020  9:08 AM         vitamin D 1.25 MG (06787 UT) capsule capsule  Commonly known as: ERGOCALCIFEROL  Take 50,000 Units by mouth 1 (One) Time Per Week.        Where to  your medications    ·   Ask your doctor where to  these medications    ? • vancomycin   Always carry an updated list of your medications with you. If there is an emergency, a responder can quickly see what medications you are taking. Take this paperwork with you the next time you see your health care provider.        Attached Information  Chronic Obstructive Pulmonary Disease Exacerbation  Easy-to-Read (English)  Chronic Obstructive Pulmonary Disease Exacerbation  ?  Chronic obstructive pulmonary disease (COPD) is a long-term (chronic) lung problem. In COPD, the flow of air  from the lungs is limited. COPD exacerbations are times that breathing gets worse and you need more than your normal treatment. Without treatment, they can be life threatening. If they happen often, your lungs can become more damaged. If your COPD gets worse, your doctor may treat you with:  · Medicines.  · Oxygen.  · Different ways to clear your airway, such as using a mask.  Follow these instructions at home:  Medicines  · Take over-the-counter and prescription medicines only as told by your doctor.  · If you take an antibiotic or steroid medicine, do not stop taking the medicine even if you start to feel better.  · Keep up with shots (vaccinations) as told by your doctor. Be sure to get a yearly (annual) flu shot.  Lifestyle  · Do not smoke. If you need help quitting, ask your doctor.  · Eat healthy foods.  · Exercise regularly.  · Get plenty of sleep.  · Avoid tobacco smoke and other things that can bother your lungs.  · Wash your hands often with soap and water. This will help keep you from getting an infection. If you cannot use soap and water, use hand .  · During flu season, avoid areas that are crowded with people.  General instructions  · Drink enough fluid to keep your pee (urine) clear or pale yellow. Do not do this if your doctor has told you not to.  · Use a cool mist machine (vaporizer).  · If you use oxygen or a machine that turns medicine into a mist (nebulizer), continue to use it as told.  · Follow all instructions for rehabilitation. These are steps you can take to make your body work better.  · Keep all follow-up visits as told by your doctor. This is important.  Contact a doctor if:  · Your COPD symptoms get worse than normal.  Get help right away if:  · You are short of breath and it gets worse.  · You have trouble talking.  · You have chest pain.  · You cough up blood.  · You have a fever.  · You keep throwing up (vomiting).  · You feel weak or you pass out (faint).  · You feel  confused.  · You are not able to sleep because of your symptoms.  · You are not able to do daily activities.  Summary  · COPD exacerbations are times that breathing gets worse and you need more treatment than normal.  · COPD exacerbations can be very serious and may cause your lungs to become more damaged.  · Do not smoke. If you need help quitting, ask your doctor.  · Stay up-to-date on your shots. Get a flu shot every year.  This information is not intended to replace advice given to you by your health care provider. Make sure you discuss any questions you have with your health care provider.  Document Revised: 11/30/2018 Document Reviewed: 01/22/2018  [employer] Patient Education © 2020 [employer]     Attached Information  Acute Respiratory Failure  Adult (English)  Acute Respiratory Failure, Adult  ?     Acute respiratory failure occurs when there is not enough oxygen passing from your lungs to your body. When this happens, your lungs have trouble removing carbon dioxide from the blood. This causes your blood oxygen level to drop too low as carbon dioxide builds up.  Acute respiratory failure is a medical emergency. It can develop quickly, but it is temporary if treated promptly. Your lung capacity, or how much air your lungs can hold, may improve with time, exercise, and treatment.  What are the causes?  There are many possible causes of acute respiratory failure, including:  · Lung injury.  · Chest injury or damage to the ribs or tissues near the lungs.  · Lung conditions that affect the flow of air and blood into and out of the lungs, such as pneumonia, acute respiratory distress syndrome, and cystic fibrosis.  · Medical conditions, such as strokes or spinal cord injuries, that affect the muscles and nerves that control breathing.  · Blood infection (sepsis).  · Inflammation of the pancreas (pancreatitis).  · A blood clot in the lungs (pulmonary embolism).  · A large-volume blood  transfusion.  · Burns.  · Near-drowning.  · Seizure.  · Smoke inhalation.  · Reaction to medicines.  · Alcohol or drug overdose.  What increases the risk?  This condition is more likely to develop in people who have:  · A blocked airway.  · Asthma.  · A condition or disease that damages or weakens the muscles, nerves, bones, or tissues that are involved in breathing.  · A serious infection.  · A health problem that blocks the unconscious reflex that is involved in breathing, such as hypothyroidism or sleep apnea.  · A lung injury or trauma.  What are the signs or symptoms?  Trouble breathing is the main symptom of acute respiratory failure. Symptoms may also include:  · Rapid breathing.  · Restlessness or anxiety.  · Skin, lips, or fingernails that appear blue (cyanosis).  · Rapid heart rate.  · Abnormal heart rhythms (arrhythmias).  · Confusion or changes in behavior.  · Tiredness or loss of energy.  · Feeling sleepy or having a loss of consciousness.  How is this diagnosed?  Your health care provider can diagnose acute respiratory failure with a medical history and physical exam. During the exam, your health care provider will listen to your heart and check for crackling or wheezing sounds in your lungs. Your may also have tests to confirm the diagnosis and determine what is causing respiratory failure. These tests may include:  · Measuring the amount of oxygen in your blood (pulse oximetry). The measurement comes from a small device that is placed on your finger, earlobe, or toe.  · Other blood tests to measure blood gases and to look for signs of infection.  · Sampling your cerebral spinal fluid or tracheal fluid to check for infections.  · Chest X-ray to look for fluid in spaces that should be filled with air.  · Electrocardiogram (ECG) to look at the heart's electrical activity.  How is this treated?  Treatment for this condition usually takes places in a hospital intensive care unit (ICU). Treatment depends  on what is causing the condition. It may include one or more treatments until your symptoms improve. Treatment may include:  · Supplemental oxygen. Extra oxygen is given through a tube in the nose, a face mask, or a vitale.  · A device such as a continuous positive airway pressure (CPAP) or bi-level positive airway pressure (BiPAP or BPAP) machine. This treatment uses mild air pressure to keep the airways open. A mask or other device will be placed over your nose or mouth. A tube that is connected to a motor will deliver oxygen through the mask.  · Ventilator. This treatment helps move air into and out of the lungs. This may be done with a bag and mask or a machine. For this treatment, a tube is placed in your windpipe (trachea) so air and oxygen can flow to the lungs.  · Extracorporeal membrane oxygenation (ECMO). This treatment temporarily takes over the function of the heart and lungs, supplying oxygen and removing carbon dioxide. ECMO gives the lungs a chance to recover. It may be used if a ventilator is not effective.  · Tracheostomy. This is a procedure that creates a hole in the neck to insert a breathing tube.  · Receiving fluids and medicines.  · Rocking the bed to help breathing.  Follow these instructions at home:  · Take over-the-counter and prescription medicines only as told by your health care provider.  · Return to normal activities as told by your health care provider. Ask your health care provider what activities are safe for you.  · Keep all follow-up visits as told by your health care provider. This is important.  How is this prevented?  Treating infections and medical conditions that may lead to acute respiratory failure can help prevent the condition from developing.  Contact a health care provider if:  · You have a fever.  · Your symptoms do not improve or they get worse.  Get help right away if:  · You are having trouble breathing.  · You lose consciousness.  · Your have cyanosis or turn  blue.  · You develop a rapid heart rate.  · You are confused.  These symptoms may represent a serious problem that is an emergency. Do not wait to see if the symptoms will go away. Get medical help right away. Call your local emergency services (911 in the [place] Do not drive yourself to the hospital.  This information is not intended to replace advice given to you by your health care provider. Make sure you discuss any questions you have with your health care provider.  Document Revised: 11/30/2018 Document Reviewed: 07/05/2017  [employer] Patient Education © 2020 [employer]     Attached Information  Hypertension  Adult  Easy-to-Read (English)  Hypertension, Adult  Hypertension is another name for high blood pressure. High blood pressure forces your heart to work harder to pump blood. This can cause problems over time.  There are two numbers in a blood pressure reading. There is a top number (systolic) over a bottom number (diastolic). It is best to have a blood pressure that is below 120/80. Healthy choices can help lower your blood pressure, or you may need medicine to help lower it.  What are the causes?  The cause of this condition is not known. Some conditions may be related to high blood pressure.  What increases the risk?  · Smoking.  · Having type 2 diabetes mellitus, high cholesterol, or both.  · Not getting enough exercise or physical activity.  · Being overweight.  · Having too much fat, sugar, calories, or salt (sodium) in your diet.  · Drinking too much alcohol.  · Having long-term (chronic) kidney disease.  · Having a family history of high blood pressure.  · Age. Risk increases with age.  · Race. You may be at higher risk if you are .  · Gender. Men are at higher risk than women before age 45. After age 65, women are at higher risk than men.  · Having obstructive sleep apnea.  · Stress.  What are the signs or symptoms?  · High blood pressure may not cause symptoms. Very high blood  pressure (hypertensive crisis) may cause:  ? Headache.  ? Feelings of worry or nervousness (anxiety).  ? Shortness of breath.  ? Nosebleed.  ? A feeling of being sick to your stomach (nausea).  ? Throwing up (vomiting).  ? Changes in how you see.  ? Very bad chest pain.  ? Seizures.  How is this treated?  · This condition is treated by making healthy lifestyle changes, such as:  ? Eating healthy foods.  ? Exercising more.  ? Drinking less alcohol.  · Your health care provider may prescribe medicine if lifestyle changes are not enough to get your blood pressure under control, and if:  ? Your top number is above 130.  ? Your bottom number is above 80.  · Your personal target blood pressure may vary.  Follow these instructions at home:  Eating and drinking  ?     · If told, follow the DASH eating plan. To follow this plan:  ? Fill one half of your plate at each meal with fruits and vegetables.  ? Fill one fourth of your plate at each meal with whole grains. Whole grains include whole-wheat pasta, brown rice, and whole-grain bread.  ? Eat or drink low-fat dairy products, such as skim milk or low-fat yogurt.  ? Fill one fourth of your plate at each meal with low-fat (lean) proteins. Low-fat proteins include fish, chicken without skin, eggs, beans, and tofu.  ? Avoid fatty meat, cured and processed meat, or chicken with skin.  ? Avoid pre-made or processed food.  · Eat less than 1,500 mg of salt each day.  · Do not drink alcohol if:  ? Your doctor tells you not to drink.  ? You are pregnant, may be pregnant, or are planning to become pregnant.  · If you drink alcohol:  ? Limit how much you use to:  § 0-1 drink a day for women.  § 0-2 drinks a day for men.  ? Be aware of how much alcohol is in your drink. In the [place] one drink equals one 12 oz bottle of beer (355 mL), one 5 oz glass of wine (148 mL), or one 1½ oz glass of hard liquor (44 mL).  Lifestyle  ?     · Work with your doctor to stay at a healthy weight or to lose  weight. Ask your doctor what the best weight is for you.  · Get at least 30 minutes of exercise most days of the week. This may include walking, swimming, or biking.  · Get at least 30 minutes of exercise that strengthens your muscles (resistance exercise) at least 3 days a week. This may include lifting weights or doing Pilates.  · Do not use any products that contain nicotine or tobacco, such as cigarettes, e-cigarettes, and chewing tobacco. If you need help quitting, ask your doctor.  · Check your blood pressure at home as told by your doctor.  · Keep all follow-up visits as told by your doctor. This is important.  Medicines  · Take over-the-counter and prescription medicines only as told by your doctor. Follow directions carefully.  · Do not skip doses of blood pressure medicine. The medicine does not work as well if you skip doses. Skipping doses also puts you at risk for problems.  · Ask your doctor about side effects or reactions to medicines that you should watch for.  Contact a doctor if you:  · Think you are having a reaction to the medicine you are taking.  · Have headaches that keep coming back (recurring).  · Feel dizzy.  · Have swelling in your ankles.  · Have trouble with your vision.  Get help right away if you:  · Get a very bad headache.  · Start to feel mixed up (confused).  · Feel weak or numb.  · Feel faint.  · Have very bad pain in your:  ? Chest.  ? Belly (abdomen).  · Throw up more than once.  · Have trouble breathing.  Summary  · Hypertension is another name for high blood pressure.  · High blood pressure forces your heart to work harder to pump blood.  · For most people, a normal blood pressure is less than 120/80.  · Making healthy choices can help lower blood pressure. If your blood pressure does not get lower with healthy choices, you may need to take medicine.  This information is not intended to replace advice given to you by your health care provider. Make sure you discuss any  questions you have with your health care provider.  Document Revised: 08/28/2019 Document Reviewed: 08/28/2019  [employer] Patient Education © 2020 [employer]     Pneumonia Vaccination    Please follow up with your primary care provider or retail pharmacy to see if you are eligible for a pneumonia vaccination.        Opioid Resource    If you or someone you know needs information on substance abuse, please visit   https://www.findhelpnBetterflyky.org/ for listings of facilities and resources across [place].  Stroke Symptoms    · Call 911 or have someone take you to the Emergency Department if you have any of the following:  · Sudden numbness or weakness of your face, arm or leg especially on one side of the body  · Sudden confusion, difficulty speaking or trouble understanding   · Changes in your vision or loss of sight in one eye  · Sudden severe headache with no known cause  · Sudden dizziness, trouble walking, loss of balance or coordination     It is important to seek emergency care right away if you have further stroke symptoms. If you get emergency help quickly, the powerful clot-dissolving medicines can reduce the disabilities caused by a stroke.      For more information:  American Stroke Association  5-235-8-STROKE  www.strokeassociation.org  Smoking Cessation    IF YOU SMOKE OR USE TOBACCO PLEASE READ THE FOLLOWING:  Why is smoking bad for me?  Smoking increases the risk of heart disease, lung disease, vascular disease, stroke, and cancer. If you smoke, STOP!     For more information:  Quit Now [place]  1-800-QUIT-NOW  https://[place].quitlogix.org/en-[place]/  Suicidal Feelings    If you feel like life is too tough and are thinking of suicide or injuring yourself, get help right away!  · Call 911  · Call a suicide hotline to speak to a counselor. 1-564-945-TALK or 6-170-TEPGMNV   Patient Experience    Thank you for choosing [facility]. You may receive a survey following your visit. Please take a moment to  share what went well, where we need improvement, and which staff members deserve recognition. We value your input.           YOU ARE THE MOST IMPORTANT FACTOR IN YOUR RECOVERY.      Follow all instructions carefully.      I have reviewed my discharge instructions with my nurse, including the following information, if applicable:                 Information about my illness and diagnosis              Follow up appointments (including lab draws)              Wound Care              Equipment Needs              Medications (new and continuing) along with side effects              Preventative information such as vaccines and smoking cessations              Diet              Pain              I know when to contact my Doctor's office or seek emergency care        I want my nurse to describe the side effects of my medications: YES NO   If the answer is no, I understand the side effects of my medications: YES NO   My nurse described the side effects of my medications in a way that I could understand: YES NO   I have taken my personal belongings and my own medications with me at discharge: YES NO       I have received this information and my questions have been answered. I have discussed any concerns I see with this plan with the nurse or physician. I understand these instructions.     Signature of Patient or Responsible Person: _____________________________________     Date: _________________  Time: __________________     Signature of Healthcare Provider: _______________________________________  Date: _________________  Time: __________________      [address] [address]  Phone:  [phone]  Fax:  [phone] Date: Dec 17, 2020      Ambulatory Referral to Home Health     Patient:  Aaron Domínguez MRN:  [id number]   [address]  [place] [address] :  1929  SSN:    Phone: [phone] Sex:  M      INSURANCE PAYOR PLAN GROUP # SUBSCRIBER ID   Primary:    [employer] REPLACEMENT [id number]  [id number]      Referring Provider Information:  JUAN DANIEL CLARKE Phone: [phone] Fax: [phone]      Referral Information:   # Visits:  1 Referral Type: Home Health [42]   Urgency:  Routine Referral Reason: Specialty Services Required   Start Date: Dec 17, 2020 End Date:  To be determined by Insurer   Diagnosis: MRSA bacteremia (R78.81,B95.62 [ICD-10-CM] 790.7,041.12 [ICD-9-CM])      Refer to Dept:   Refer to Provider:   Refer to Facility:       Face to Face Visit Date: 12/17/2020  Follow-up provider for Plan of Care? I treated the patient in an acute care facility and will not continue treatment after discharge.  Follow-up provider: ELYSSA LUDWIG [[id number]]  Reason/Clinical Findings: MRSA Bacteremia  Describe mobility limitations that make leaving home difficult: Advanced Age and MRSA bacteremia  Nursing/Therapeutic Services Requested: Skilled Nursing  Skilled nursing orders: Infusion therapy  Frequency: 1 Week 1     This document serves as a request of services and does not constitute Insurance authorization or approval of services.  To determine eligibility, please contact the members Insurance carrier to verify and review coverage.     If you have medical questions regarding this request for services. Please contact [address] at [phone] during normal business hours.       Authorizing Provider:Juan Daniel Clarke DO  Authorizing Provider's NPI: [id number]  Order Entered By: Juan Daniel Clarke DO 12/17/2020  3:07 PM     Electronically signed by: Juan Daniel Clarke DO 12/17/2020  3:07 PM            Discharge Summary    No notes of this type exist for this encounter.         Discharge Order (From admission, onward)     Start     Ordered    12/17/20 1503  Discharge patient  Once     Expected Discharge Date: 12/17/20    Expected Discharge Time: Afternoon    Discharge Disposition: Home or Self Care    Physician of Record for Attribution - Please select  from Treatment Team: LORA CLARKE [[id number]]    Review needed by CMO to determine Physician of Record: No       Question Answer Comment   Physician of Record for Attribution - Please select from Treatment Team LORA CLARKE    Review needed by CMO to determine Physician of Record No        12/17/20 0959

## 2020-12-17 NOTE — DISCHARGE PLACEMENT REQUEST
"Estelita Domínguez (89 y.o. 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)   Code Status: CPR    Ht: 182.9 cm (72\")   Wt: 92.2 kg (203 lb 3.2 oz)    Admission Cmt: None   Principal Problem: Dyspnea [R06.00]                 Active Insurance as of 12/13/2020     Primary Coverage     Payor Plan Insurance Group Employer/Plan Group    HUMANA MEDICARE REPLACEMENT HUMANA MEDICARE REPLACEMENT [id number]     Payor Plan Address Payor Plan Phone Number Payor Plan Fax Number Effective Dates    [address] [address] [phone]  1/1/2015 - None Entered    [facility] [id number]       Subscriber Name Subscriber Birth Date Member ID       ESTELITA DOMÍNGUEZ 2/27/1929 [id number]                 Emergency Contacts      (Rel.) Home Phone Work Phone Mobile Phone    YonyLois (Power of ) [phone] -- --    Rossana Retana (Power of ) [phone] -- --            Emergency Contact Information     Name Relation Home Work Mobile    YonyLois Power of  [phone]      Rossana Retana Power of  [phone]            Insurance Information                HUMANA MEDICARE REPLACEMENT/HUMANA MEDICARE REPLACEMENT Phone: [phone]    Subscriber: Estelita Domínguez Subscriber#: [id number]    Group#: [id number] Precert#:           Treatment Team     Provider Relationship Specialty Contact    Juan Daniel Perdue DO Attending, Physician " "of Record Internal Medicine  [phone]    Anna Steiner RN Registered Nurse --  [phone]    Hector Sandhu, RN Registered Nurse --     Azam Stephenson MD Consulting Physician Infectious Diseases  [phone]    Susana Sultana, RRT Respiratory Therapist --  [phone]    Luis Angel Montalvo, PT Physical Therapist Physical Therapy     Lashon Nguyen Patient Care Technician --                History & Physical      Kamini Covington MD at 20 1652              [facility]    Patient Identification:  Name:  Aaron Domínguez  Age:  89 y.o.  Sex:  male  :  1929  MRN:  [id number]   Visit Number:  [id number]  Admit Date: 2020   Room number:  115/15  Primary Care Physician:  Bran Woodruff MD     Chief complaint:    Chief Complaint   Patient presents with   • Fall   • Shortness of Breath     History of presenting illness:  Patient seen and examined, assisted by Gogo Oliveros RN.  Daughter is also assisting with history.  Patient is 89 years old, he has history of COPD, chronic hypoxic respite failure, coronary disease stent placement x2, he lives alone, was brought to the emergency room due to increased shortness of breath and frequent falls.  As per patient he has been experiencing more short of breath lately this past 5 days associated with increased coughing whitish sputum production no fever chills cough.  He claims his appetite has not changed.  As per daughter patient fell yesterday which he reports due to blacking out when he tried to stand up this morning he fell again because he \"tripped\" he was brought into the emergency room, he was having difficulty breathing, not confused, skeletal survey no acute fracture, he has multiple bruises superficial hematoma and skin tear from fall.  Troponin is indeterminate.  No acute EKG changes.    Because of status he was admitted for frequent fall, indeterminate troponin and COPD exacerbation.  " Patient reports he has chronic urinary hesitance, he denies constipation, last bowel movement was yesterday morning described as soft.  We performed post voiding bladder scan at the emergency room and result was 141 mL.    ---------------------------------------------------------------------------------------------------------------------   Review of Systems   Constitutional: Negative for activity change, appetite change, chills, diaphoresis, fatigue, fever and unexpected weight change.   HENT: Negative.    Eyes: Negative.    Respiratory: Positive for cough, shortness of breath and wheezing. Negative for apnea, choking and chest tightness.    Cardiovascular: Negative.    Gastrointestinal: Negative.    Endocrine: Negative.    Genitourinary: Positive for difficulty urinating and testicular pain. Negative for decreased urine volume, discharge, dysuria, enuresis, flank pain, frequency, genital sores, hematuria, penile pain, penile swelling, scrotal swelling and urgency.   Musculoskeletal: Negative.    Skin: Negative.    Allergic/Immunologic: Negative.    Neurological: Negative.    Hematological: Negative.    Psychiatric/Behavioral: Negative.         ---------------------------------------------------------------------------------------------------------------------   Past Medical History:   Diagnosis Date   • COPD (chronic obstructive pulmonary disease) (CMS/formerly [facility])    • Diabetes mellitus (CMS/formerly [facility])    • Hypertension      Past Surgical History:   Procedure Laterality Date   • CORONARY STENT PLACEMENT       Family History   Problem Relation Age of Onset   • Heart failure Mother    • Diabetes Father    • Heart failure Father      Social History     Socioeconomic History   • Marital status:      Spouse name: Not on file   • Number of children: Not on file   • Years of education: Not on file   • Highest education level: Not on file   Tobacco Use   • Smoking status: Former Smoker     Packs/day: 2.00     Years: 60.00      Pack years: 120.00     Types: Cigarettes     Quit date: 1998     Years since quittin.3   • Smokeless tobacco: Current User     Types: Chew   Substance and Sexual Activity   • Alcohol use: No   • Drug use: No   • Sexual activity: Defer     ---------------------------------------------------------------------------------------------------------------------   Allergies:  Patient has no known allergies.  ---------------------------------------------------------------------------------------------------------------------   Prior to Admission Medications     Prescriptions Last Dose Informant Patient Reported? Taking?    albuterol (PROAIR HFA) 108 (90 Base) MCG/ACT inhaler   No No    Inhale 2 puffs Every 4 (Four) Hours As Needed for Shortness of Air.    amitriptyline (ELAVIL) 25 MG tablet   Yes No    TAKE ONE TABLET BY MOUTH EVERY NIGHT AT BEDTIME AS DIRECTED    amLODIPine-benazepril (LOTREL 5-20) 5-20 MG per capsule   Yes No    Take  by mouth Daily. for blood pressure    atorvastatin (LIPITOR) 10 MG tablet   Yes No    TAKE ONE TABLET BY MOUTH EVERY DAY TO LOWER CHOLESTEROL    clopidogrel (PLAVIX) 75 MG tablet   Yes No    Take 75 mg by mouth Daily. as directed    doxazosin (CARDURA) 8 MG tablet   Yes No    TAKE ONE TABLET BY MOUTH EVERY NIGHT AT BEDTIME AS DIRECTED    Fluticasone-Umeclidin-Vilant 100-62.5-25 MCG/INH aerosol powder    No No    Inhale 1 each Daily.    gabapentin (NEURONTIN) 100 MG capsule   Yes No    TAKE ONE CAPSULE BY MOUTH EVERY DAY AT BEDTIME AS NEEDED FOR NERVE PAIN    ipratropium-albuterol (DUO-NEB) 0.5-2.5 mg/3 ml nebulizer   No No    Take 3 mL by nebulization Every 4 (Four) Hours As Needed for Wheezing.    SYMBICORT 160-4.5 MCG/ACT inhaler   Yes No    INHALE 2 PUFFS INTO LUNGS TWO TIMES A DAY AS NEEDED FOR BREATHING    VENTOLIN  (90 Base) MCG/ACT inhaler   Yes No    INHALE 2 PUFFS INTO LUNGS EVERY 4 TO 6 HOURS AS NEEDED FOR BREATHING    vitamin D (ERGOCALCIFEROL) 99941 units capsule  capsule   Yes No    Take 50,000 Units by mouth 1 (One) Time Per Week.        ---------------------------------------------------------------------------------------------------------------------   Vital Signs:  Temp:  [98.4 °F (36.9 °C)] 98.4 °F (36.9 °C)  Heart Rate:  [75-81] 81  Resp:  [24] 24  BP: (148-186)/(79-97) 148/91    Mean Arterial Pressure (Non-Invasive) for the past 24 hrs (Last 3 readings):   Noninvasive MAP (mmHg)   12/13/20 1649 116   12/13/20 1604 119   12/13/20 1449 123     SpO2:  [97 %-100 %] 97 %  on  Flow (L/min):  [5] 5;   Device (Oxygen Therapy): nasal cannula  Body mass index is 26.32 kg/m².    Wt Readings from Last 3 Encounters:   12/13/20 93 kg (205 lb)   10/16/18 89.8 kg (198 lb)   08/08/18 89.8 kg (198 lb)               ---------------------------------------------------------------------------------------------------------------------   Physical Exam:  Constitutional:  Well-developed and well-nourished.  Chronically ill-appearing, tachypneic but not in respiratory distress.  Is able to answer question appropriately, no confusion.       HENT:  Head: Normocephalic and atraumatic.  Mouth: Dry mucous membranes.    Eyes:  Conjunctivae and EOM are normal.  Pupils are equal, round, and reactive to light.  No scleral icterus.  Neck:  Neck supple.  No JVD present.    No bruit  Cardiovascular:  Normal rate, regular rhythm and normal heart sounds with no murmur.  Pulmonary/Chest: No wheezing, occasional rhonchi noted no expiratory lag, equal chest expansion.  He has anterior chest wall bruising and purpura mostly in the left anterior chest wall.  No flail chest or splinting.    Abdominal:  Soft.  Bowel sounds are normal.  No distension and no tenderness.   Musculoskeletal:  No edema, no tenderness, and no deformity.  No red or swollen joints anywhere.    Neurological:  Alert and oriented to person, place, and time.  No cranial nerve deficit.  No tongue deviation.  No facial droop.  No slurred  speech.   Skin:  Skin is warm and dry.  No rash noted.  No pallor.   Peripheral vascular:  No edema and strong pulses on all 4 extremities.  Genitourinary: He has no Rodriguez catheter.  ---------------------------------------------------------------------------------------------------------------------  EKG:          Telemetry: Sinus rhythm 98 bpm, O2 saturation on 5 L nasal cannula is 98%  I have personally looked at both the EKG and the telemetry strips.  --------------------------------------------------------------------------------------------------------------------  Results from last 7 days   Lab Units 12/13/20  1417   CK TOTAL U/L 414*   TROPONIN T ng/mL 0.058*     Results from last 7 days   Lab Units 12/13/20  1417   LACTATE mmol/L 1.3   WBC 10*3/mm3 7.15   HEMOGLOBIN g/dL 11.6*   HEMATOCRIT % 35.6*   MCV fL 94.7   MCHC g/dL 32.6   PLATELETS 10*3/mm3 217   INR  0.89*     Results from last 7 days   Lab Units 12/13/20  1417   SODIUM mmol/L 135*   POTASSIUM mmol/L 4.9   CHLORIDE mmol/L 96*   CO2 mmol/L 31.6*   BUN mg/dL 24*   CREATININE mg/dL 0.95   EGFR IF NONAFRICN AM mL/min/1.73 74   CALCIUM mg/dL 10.3*   GLUCOSE mg/dL 312*   ALBUMIN g/dL 4.03   BILIRUBIN mg/dL 0.4   ALK PHOS U/L 107   AST (SGOT) U/L 38   ALT (SGPT) U/L 29   Estimated Creatinine Clearance: 66.6 mL/min (by C-G formula based on SCr of 0.95 mg/dL).    Glucose   Date/Time Value Ref Range Status   12/13/2020 1549 243 (H) 70 - 130 mg/dL Final     No results found for: AMMONIA        No results found for: BLOODCXNo results found for: RESPCXNo results found for: URINECXNo results found for: WOUNDCXNo results found for: BODYFLDCXNo results found for: STOOLCX  pH pH, Arterial   Date Value Ref Range Status   12/13/2020 7.424 7.350 - 7.450 pH units Final      pO2 pO2, Arterial   Date Value Ref Range Status   12/13/2020 92.7 83.0 - 108.0 mm Hg Final      pCO2 pCO2, Arterial   Date Value Ref Range Status   12/13/2020 48.5 (H) 35.0 - 45.0 mm Hg Final       HCO3 HCO3, Arterial   Date Value Ref Range Status   12/13/2020 31.7 (H) 20.0 - 26.0 mmol/L Final     Comment:     83 Value above reference range        I have personally looked at the labs and they are summarized above.  ----------------------------------------------------------------------------------------------------------------------  Imaging Results (Last 24 Hours)     Procedure Component Value Units Date/Time    XR Pelvis 1 or 2 View [[id number]] Collected: 12/13/20 1533     Updated: 12/13/20 1536    Narrative:      EXAMINATION: XR PELVIS 1 OR 2 VW-      CLINICAL INDICATION: fall        COMPARISON: None available     FINDINGS:  2 views of the pelvis show no acute fracture or dislocation.     Moderate to large stool burden.       Impression:      No acute fracture      This report was finalized on 12/13/2020 3:34 PM by Dr. Keanu La MD.       XR Chest 1 View [[id number]] Collected: 12/13/20 1532     Updated: 12/13/20 1535    Narrative:      XR CHEST 1 VW-     CLINICAL INDICATION: CHF/COPD Protocol        COMPARISON: CT chest dated 12/13/2020      TECHNIQUE: Single frontal view of the chest.     FINDINGS:     Right basilar consolidation.  No pneumothorax  Elevation of the right hemidiaphragm  There is no evidence of an acute osseous abnormality.   There are no suspicious-appearing parenchymal soft tissue nodules.          Impression:      Right basilar atelectasis  No pneumothorax     This report was finalized on 12/13/2020 3:33 PM by Dr. Keanu La MD.       CT Chest With Contrast [[id number]] Collected: 12/13/20 1520     Updated: 12/13/20 1525    Narrative:      EXAM: CT CHEST W CONTRAST-      CLINICAL INDICATION:fall      COMPARISON: NONE.     TECHNIQUE: Multiple axial CT images were obtained from lung apex through  upper abdomen WITH administration of IV contrast. Reformatted images in  the coronal and/or sagittal plane(s) were generated from the axial data  set to facilitate diagnostic accuracy and/or  surgical planning.     Radiation dose reduction techniques were utilized per ALARA protocol.  Automated exposure control was initiated through either or CareDose or  DoseRight software packages by  protocol.    DOSE (DLP mGy-cm):        FINDINGS:     LUNGS: Minimal right basilar atelectasis and trace right pleural  effusion  No pneumothorax  COPD     HEART: Coronary artery calcifications are present     MEDIASTINUM: No mediastinal hematoma     PLEURA: No pleural effusion. No pleural mass or abnormal calcification.  No pneumothorax.     VASCULATURE: No evidence of aneurysm. No evidence of pulmonary embolism.     BONES: No acute bony abnormality.     VISUALIZED UPPER ABDOMEN:The upper abdomen is unremarkable as  visualized.     Other: None.       Impression:         1. No acute posttraumatic changes in the chest  2. COPD  3. Trace right effusion and right basilar atelectasis     This report was finalized on 12/13/2020 3:22 PM by Dr. Keanu La MD.       CT Thoracic Spine Without Contrast [[id number]] Collected: 12/13/20 1517     Updated: 12/13/20 1524    Narrative:      EXAMINATION: CT THORACIC SPINE WO CONTRAST-      CLINICAL INDICATION: fall        COMPARISON: None immediately available.     Radiation dose reduction techniques were utilized per ALARA protocol.  Automated exposure control was initiated through either or CareDose or  DoseRight software packages by  protocol.           PROCEDURE: Axial images were acquired through the thoracic spine without  any IV contrast. Reformatted images were created.     FINDINGS: Today's study does show trace right pleural effusion and  minimal right basilar atelectasis.     The thoracic vertebral body heights are maintained     Anterior bridging spurs are present     No paraspinal hematoma.       Impression:      1. No acute thoracic fracture  2. Minimal right basilar atelectasis  3. Arthritic change      This report was finalized on 12/13/2020 3:18  PM by Dr. Keanu La MD.       CT Lumbar Spine Without Contrast [[id number]] Collected: 12/13/20 1518     Updated: 12/13/20 1523    Narrative:      EXAMINATION: CT LUMBAR SPINE WO CONTRAST-      CLINICAL INDICATION: fall        COMPARISON: None available.     Radiation dose reduction techniques were utilized per ALARA protocol.  Automated exposure control was initiated through either or CareDose or  DoseRight software packages by  protocol.           PROCEDURE: Axial images through the lumbar spine were acquired without  any IV contrast. Reformatted images were created.     FINDINGS: Today's study shows demineralization and chronic arthritic  change, but no evidence of an acute compression abnormality or  paraspinal hematoma.     Disc bulges at L3-4 and L4-5       Impression:      Arthritic change, but no acute fracture      This report was finalized on 12/13/2020 3:19 PM by Dr. Keanu La MD.       CT Head Without Contrast [[id number]] Collected: 12/13/20 1511     Updated: 12/13/20 1523    Narrative:      CT HEAD WO CONTRAST-     CLINICAL INDICATION: fall        COMPARISON: None available      TECHNIQUE: Axial images of the brain were obtained with out intravenous  contrast.  Reformatted images were created in the sagittal and coronal  planes.     DOSE:     Radiation dose reduction techniques were utilized per ALARA protocol.  Automated exposure control was initiated through either or CareDose or  DoseRight software packages by  protocol.           FINDINGS:   Today's study shows no mass, hemorrhage, or midline shift.   Cerebral atrophy  There is no evidence of acute ischemia.  I do not see epidural or subdural hematoma.  The gray-white differentiation is appropriate.   The bone window setting images show no destructive calvarial lesion or  acute calvarial fracture.   The posterior fossa is unremarkable.          Impression:         1. Global cerebral atrophy  2. No parenchymal mass,  hemorrhage, or midline shift.  3. No epidural or subdural hematoma     This report was finalized on 12/13/2020 3:11 PM by Dr. Keanu La MD.       CT Cervical Spine Without Contrast [[id number]] Collected: 12/13/20 1511     Updated: 12/13/20 1522    Narrative:      CT CERVICAL SPINE WO CONTRAST-     CLINICAL INDICATION: fall        COMPARISON: None available      TECHNIQUE: Axial images of the cervical spine were acquired with out any  intravenous contrast. Reformatted images were then created in the  sagittal and coronal planes.     DOSE:      Radiation dose reduction techniques were utilized per ALARA protocol.  Automated exposure control was initiated through either or [employer]berry or  DoseRight software packages by  protocol.           FINDINGS:   The provided study demonstrates preservation of the vertebral body  heights in the sagittally reconstructed images.     There is no prevertebral soft tissue swelling.     Alignment is near anatomic.     Disc space narrowing at C2-3, C4-5, C5-C6, C7-T1     I see no acute cervical spine fracture.       Impression:         1. Diffuse arthritic change     2. No acute fracture     This report was finalized on 12/13/2020 3:12 PM by Dr. Keanu La MD.           I have personally reviewed the radiology images and read the final radiology report.  ----------------------------------------------------------------------------------------------------------------------  Assessment and Plan:  -Frequent falls  -COPD exacerbation  -Chronic hypoxic respiratory failure  -Multiple superficial skin tear left elbow from fall  -Multiple bruises from fall  -Advanced age  -Chronic debility  -Constipation  -BPH on Cardura  -Essential hypertension  -Atherosclerotic cardiovascular disease status post stent placement x2  -Dyslipidemia  -Dehydration  -Mild CPK elevation likely from trauma will trend,  -Mild hypercalcemia, likely secondary to dehydration, will monitor after  hydration.  -Indeterminate troponin,  -Normocytic anemia    Address constipation, gentle hydration, fall precautions, wound care, inhaled corticosteroids and long-acting beta agonist, DuoNeb, empirically start patient on doxycycline, short course of Solu-Medrol, oxygen supplementation.  Physical therapy.  Cultures will be ordered, respite panel will be ordered.  Accu-Chek and sliding scale, diabetic diet.  Monitor CPK, serial troponin.  Serial EKG, aspirin, 2D echo.  Carotid Doppler.  Blood culture.  anemia work-up.    Plan outlined to the patient together with patient's daughter and agreed, further management may change as condition evolves    Patient is high risk 89-year-old with respiratory failure he will be admitted requiring 2 nights of stay for treatment that includes above.        Kamini Covington MD  12/13/20  16:52 EST    Electronically signed by Kamini Covington MD at 12/13/20 8691       Vital Signs (last day)     Date/Time   Temp   Temp src   Pulse   Resp   BP   Patient Position   SpO2    12/17/20 1433   --   --   --   --   --   --   94    12/17/20 1423   --   --   89   18   --   --   94    12/17/20 1029   98.2 (36.8)   Axillary   84   18   120/64   Lying   93    12/17/20 1015   --   --   --   --   --   --   95    12/17/20 1005   --   --   92   18   --   --   95    12/17/20 0707   --   --   --   --   --   --   96    12/17/20 0659   --   --   95   18   --   --   96    12/17/20 0643   98 (36.7)   Axillary   98   18   116/60   Lying   96    12/17/20 0347   97.2 (36.2)   Oral   95   18   136/68   --   --    12/17/20 0008   --   --   78   18   --   --   --    12/16/20 2358   --   --   77   18   --   --   98    12/16/20 2352   --   --   69   --   --   --   98    12/16/20 1940   --   --   66   18   --   --   --    12/16/20 1931   --   --   68   18   --   --   96    12/16/20 1851   97.9 (36.6)   Oral   72   18   170/78   --   97    12/16/20 1534   --   --   62   18   --   --   98    12/16/20 1459   97.6  (36.4)   Oral   59   18   133/56   Lying   98    12/16/20 1027   98 (36.7)   Oral   65   18   152/91   Lying   97    12/16/20 0916   --   --   68   --   162/90   --   --    12/16/20 0700   --   --   73   18   --   --   --    12/16/20 0655   98 (36.7)   Oral   72   18   158/92   Lying   99    12/16/20 0653   --   --   70   18   --   --   99    12/16/20 0242   97.9 (36.6)   Oral   79   18   144/58   Lying   97              Lines, Drains & Airways    Active LDAs     Name:   Placement date:   Placement time:   Site:   Days:    PICC Single Lumen 12/17/20 Right Basilic   12/17/20    1540    Basilic   less than 1    Peripheral IV 12/15/20 0423 Right Wrist   12/15/20    0423    Wrist   2                  Current Facility-Administered Medications   Medication Dose Route Frequency Provider Last Rate Last Admin   • acetaminophen (TYLENOL) tablet 650 mg  650 mg Oral Q6H PRN Merline Downs PA-C   650 mg at 12/14/20 0121   • albuterol (PROVENTIL) nebulizer solution 0.083% 2.5 mg/3mL  2.5 mg Nebulization Q4H PRN Juan Daniel Perdue DO       • amitriptyline (ELAVIL) tablet 25 mg  25 mg Oral Nightly Juan Daniel Perdue DO   25 mg at 12/16/20 2108   • amLODIPine (NORVASC) 5 mg, lisinopril (PRINIVIL,ZESTRIL) 20 mg   Oral Q24H Juan Daniel Perdue DO   Given at 12/17/20 0904   • arformoterol (BROVANA) nebulizer solution 15 mcg  15 mcg Nebulization BID - RT Kamini Covington MD   15 mcg at 12/17/20 1005   • atorvastatin (LIPITOR) tablet 10 mg  10 mg Oral Nightly Juan Daniel Perdue DO   10 mg at 12/16/20 2108   • bisacodyl (DULCOLAX) suppository 10 mg  10 mg Rectal Once Kamini Covington MD       • budesonide (PULMICORT) nebulizer solution 0.5 mg  0.5 mg Nebulization BID - RT Kamini Covington MD   0.5 mg at 12/17/20 1005   • cholecalciferol (VITAMIN D3) capsule 50,000 Units  50,000 Units Oral Weekly Juan Daniel Perdue DO   50,000 Units at 12/15/20 1326   • clopidogrel (PLAVIX) tablet 75 mg  75  mg Oral Daily Juan Daniel Perdue DO   75 mg at 12/17/20 0904   • dextrose (D50W) 25 g/ 50mL Intravenous Solution 25 g  25 g Intravenous Q15 Min PRN Kamini Covington MD       • dextrose (GLUTOSE) oral gel 15 g  15 g Oral Q15 Min PRN Kamini Covington MD       • enoxaparin (LOVENOX) syringe 40 mg  40 mg Subcutaneous Q24H Kamini Covington MD   40 mg at 12/16/20 2108   • glucagon (human recombinant) (GLUCAGEN DIAGNOSTIC) injection 1 mg  1 mg Subcutaneous Q15 Min PRN Kamini Covington MD       • insulin aspart (novoLOG) injection 0-7 Units  0-7 Units Subcutaneous TID AC Kamini Covington MD   4 Units at 12/17/20 1139   • ipratropium-albuterol (DUO-NEB) nebulizer solution 3 mL  3 mL Nebulization Q8H - RT Kamini Covington MD   3 mL at 12/17/20 1423   • ipratropium-albuterol (DUO-NEB) nebulizer solution 3 mL  3 mL Nebulization Q4H PRN Juan Daniel Perdue DO       • isosorbide mononitrate (IMDUR) 24 hr tablet 30 mg  30 mg Oral Daily Juan Daniel Perdue DO   30 mg at 12/17/20 0905   • metoprolol succinate XL (TOPROL-XL) 24 hr tablet 100 mg  100 mg Oral Daily Juan Daniel Perdue DO   100 mg at 12/17/20 0904   • montelukast (SINGULAIR) tablet 10 mg  10 mg Oral Nightly Juan Daniel Perdue DO   10 mg at 12/16/20 2108   • nicotine (NICODERM CQ) 21 MG/24HR patch 1 patch  1 patch Transdermal Q24H Juan Daniel Perdue DO   1 patch at 12/16/20 0917   • nitroglycerin (NITROSTAT) SL tablet 0.4 mg  0.4 mg Sublingual Q5 Min PRN Kamini Covington MD       • ondansetron (ZOFRAN) injection 4 mg  4 mg Intravenous Q6H PRN Juan Daniel Perdue DO   4 mg at 12/15/20 2313   • pantoprazole (PROTONIX) EC tablet 40 mg  40 mg Oral Q AM Kamini Covington MD   40 mg at 12/17/20 0571   • polyethylene glycol (MIRALAX) packet 17 g  17 g Oral Daily Kamini Covington MD   17 g at 12/17/20 0904   • predniSONE (DELTASONE) tablet 40 mg  40 mg Oral Daily With Breakfast Juan Daniel Perdue,     40 mg at 12/17/20 0904   • sodium chloride 0.9 % flush 10 mL  10 mL Intravenous PRN Rayray Olivier MD       • sodium chloride 0.9 % flush 10 mL  10 mL Intravenous Q12H Kamini Covington MD   10 mL at 12/17/20 0908   • sodium chloride 0.9 % flush 10 mL  10 mL Intravenous PRN Kamini Covington MD       • sodium chloride 0.9 % infusion  100 mL/hr Intravenous Continuous Kamini Covington  mL/hr at 12/17/20 0513 100 mL/hr at 12/17/20 0513   • terazosin (HYTRIN) capsule 5 mg  5 mg Oral Q12H Juan Daniel Perdue DO   5 mg at 12/17/20 0904   • theophylline (UNIPHYL) 24 hr tablet 400 mg  400 mg Oral Daily Juan Daniel Perdue DO   400 mg at 12/17/20 0904   • vancomycin 1500 mg/500 mL 0.9% NS IVPB ([facility])  1,500 mg Intravenous Q24H Paola Ochoa APRN   1,500 mg at 12/17/20 0908       Lab Results (last 24 hours)     Procedure Component Value Units Date/Time    Blood Culture - Blood, Arm, Left [[id number]] Collected: 12/15/20 1516    Specimen: Blood from Arm, Left Updated: 12/17/20 1530     Blood Culture No growth at 2 days    Blood Culture - Blood, Arm, Right [[id number]] Collected: 12/15/20 1518    Specimen: Blood from Arm, Right Updated: 12/17/20 1530     Blood Culture No growth at 2 days    Blood Culture - Blood, Arm, Left [[id number]] Collected: 12/13/20 1415    Specimen: Blood from Arm, Left Updated: 12/17/20 1445     Blood Culture No growth at 4 days    POC Glucose Once [[id number]]  (Abnormal) Collected: 12/17/20 1032    Specimen: Blood Updated: 12/17/20 1053     Glucose 270 mg/dL     Vancomycin, Trough [[id number]]  (Normal) Collected: 12/17/20 0700    Specimen: Blood Updated: 12/17/20 0737     Vancomycin Trough 8.20 mcg/mL     POC Glucose Once [[id number]]  (Abnormal) Collected: 12/17/20 0644    Specimen: Blood Updated: 12/17/20 0710     Glucose 278 mg/dL     Theophylline Level [[id number]]  (Abnormal) Collected: 12/17/20 0205    Specimen: Blood Updated: 12/17/20 0259     Theophylline Level <0.8  mcg/mL     Basic Metabolic Panel [[id number]]  (Abnormal) Collected: 12/17/20 0205    Specimen: Blood Updated: 12/17/20 0239     Glucose 298 mg/dL      BUN 30 mg/dL      Creatinine 0.90 mg/dL      Sodium 134 mmol/L      Potassium 5.2 mmol/L      Chloride 101 mmol/L      CO2 28.4 mmol/L      Calcium 8.7 mg/dL      eGFR Non African Amer 79 mL/min/1.73      BUN/Creatinine Ratio 33.3     Anion Gap 4.6 mmol/L     Narrative:      GFR Normal >60  Chronic Kidney Disease <60  Kidney Failure <15      Magnesium [[id number]]  (Normal) Collected: 12/17/20 0205    Specimen: Blood Updated: 12/17/20 0239     Magnesium 2.1 mg/dL     C-reactive Protein [[id number]]  (Abnormal) Collected: 12/17/20 0205    Specimen: Blood Updated: 12/17/20 0239     C-Reactive Protein 0.52 mg/dL     CBC & Differential [[id number]]  (Abnormal) Collected: 12/17/20 0205    Specimen: Blood Updated: 12/17/20 0220    Narrative:      The following orders were created for panel order CBC & Differential.  Procedure                               Abnormality         Status                     ---------                               -----------         ------                     CBC Auto Differential[[id number]]        Abnormal            Final result                 Please view results for these tests on the individual orders.    CBC Auto Differential [[id number]]  (Abnormal) Collected: 12/17/20 0205    Specimen: Blood Updated: 12/17/20 0220     WBC 8.25 10*3/mm3      RBC 3.01 10*6/mm3      Hemoglobin 9.3 g/dL      Hematocrit 29.8 %      MCV 99.0 fL      MCH 30.9 pg      MCHC 31.2 g/dL      RDW 14.0 %      RDW-SD 50.9 fl      MPV 10.2 fL      Platelets 194 10*3/mm3      Neutrophil % 78.4 %      Lymphocyte % 8.0 %      Monocyte % 13.2 %      Eosinophil % 0.0 %      Basophil % 0.0 %      Immature Grans % 0.4 %      Neutrophils, Absolute 6.47 10*3/mm3      Lymphocytes, Absolute 0.66 10*3/mm3      Monocytes, Absolute 1.09 10*3/mm3      Eosinophils, Absolute 0.00 10*3/mm3       Basophils, Absolute 0.00 10*3/mm3      Immature Grans, Absolute 0.03 10*3/mm3      nRBC 0.0 /100 WBC     POC Glucose Once [[id number]]  (Abnormal) Collected: 12/16/20 2119    Specimen: Blood Updated: 12/16/20 2125     Glucose 333 mg/dL     Blood Culture - Blood, Hand, Left [[id number]] Collected: 12/13/20 1947    Specimen: Blood from Hand, Left Updated: 12/16/20 2000     Blood Culture No growth at 3 days    POC Glucose Once [[id number]]  (Abnormal) Collected: 12/16/20 1648    Specimen: Blood Updated: 12/16/20 1658     Glucose 196 mg/dL         Orders (last 24 hrs)      Start     Ordered    12/18/20 0600  Theophylline Level  Morning Draw      12/17/20 0938    12/18/20 0000  vancomycin 1500 mg/500 mL 0.9% NS IVPB ([facility])  Every 24 Hours      12/17/20 1507    12/17/20 1506  Discontinue IV  Once      12/17/20 1507    12/17/20 1503  Discharge patient  Once      12/17/20 1507    12/17/20 1431  Verify Informed Consent for PICC Line Placement  Once      12/17/20 1430    12/17/20 1431  Insert PICC Using Ultrasound Guidance  Once     Provider:  (Not yet assigned)    12/17/20 1430    12/17/20 1431  No Dilantin Through PICC  Continuous      12/17/20 1430    12/17/20 1054  POC Glucose Once  Once      12/17/20 1032    12/17/20 0800  predniSONE (DELTASONE) tablet 40 mg  Daily With Breakfast      12/16/20 1628    12/17/20 0711  POC Glucose Once  Once      12/17/20 0644    12/17/20 0700  Vancomycin, Trough  Timed      12/16/20 0915    12/17/20 0700  Vancomycin level at 7am Thursday, hold the 8am dose of vancomycin if the level is > 20 mcg/ml.  Nursing Communication  Once     Comments: Vancomycin level at 7am Thursday, hold the 8am dose of vancomycin if the level is > 20 mcg/ml.    12/16/20 0915    12/17/20 0600  Theophylline Level  Morning Draw      12/16/20 0901    12/17/20 0600  CBC (No Diff)  Morning Draw,   Status:  Canceled      12/16/20 1633    12/17/20 0600  CBC Auto Differential  PROCEDURE ONCE      12/17/20 0002    12/17/20  0000  Discharge Follow-up with PCP      12/17/20 1507    12/17/20 0000  Ambulatory Referral to Home Health      12/17/20 1507    12/16/20 2126  POC Glucose Once  Once      12/16/20 2119    12/16/20 1659  POC Glucose Once  Once      12/16/20 1648    12/16/20 0600  C-reactive Protein  Daily      12/15/20 1226    12/15/20 2100  amitriptyline (ELAVIL) tablet 25 mg  Nightly      12/15/20 0751    12/15/20 2100  atorvastatin (LIPITOR) tablet 10 mg  Nightly      12/15/20 0751    12/15/20 2100  montelukast (SINGULAIR) tablet 10 mg  Nightly      12/15/20 0751    12/15/20 1134  ondansetron (ZOFRAN) injection 4 mg  Every 6 Hours PRN      12/15/20 1134    12/15/20 1000  amLODIPine (NORVASC) 5 mg, lisinopril (PRINIVIL,ZESTRIL) 20 mg  Every 24 Hours Scheduled      12/15/20 0751    12/15/20 1000  clopidogrel (PLAVIX) tablet 75 mg  Daily      12/15/20 0751    12/15/20 1000  terazosin (HYTRIN) capsule 5 mg  Every 12 Hours Scheduled      12/15/20 0751    12/15/20 1000  isosorbide mononitrate (IMDUR) 24 hr tablet 30 mg  Daily      12/15/20 0751    12/15/20 1000  metoprolol succinate XL (TOPROL-XL) 24 hr tablet 100 mg  Daily      12/15/20 0751    12/15/20 1000  theophylline (UNIPHYL) 24 hr tablet 400 mg  Daily      12/15/20 0751    12/15/20 1000  cholecalciferol (VITAMIN D3) capsule 50,000 Units  Weekly      12/15/20 0751    12/15/20 0800  vancomycin 1500 mg/500 mL 0.9% NS IVPB ([facility])  Every 24 Hours      12/14/20 0635    12/15/20 0749  albuterol (PROVENTIL) nebulizer solution 0.083% 2.5 mg/3mL  Every 4 Hours PRN      12/15/20 0751    12/15/20 0749  ipratropium-albuterol (DUO-NEB) nebulizer solution 3 mL  Every 4 Hours PRN      12/15/20 0751    12/14/20 1800  nicotine (NICODERM CQ) 21 MG/24HR patch 1 patch  Every 24 Hours Scheduled      12/14/20 1650    12/14/20 0730  insulin aspart (novoLOG) injection 0-7 Units  3 Times Daily Before Meals      12/13/20 1925    12/14/20 0600  pantoprazole (PROTONIX) EC tablet 40 mg  Every Early Morning       12/13/20 1925 12/14/20 0048  acetaminophen (TYLENOL) tablet 650 mg  Every 6 Hours PRN      12/14/20 0049    12/13/20 2300  ipratropium-albuterol (DUO-NEB) nebulizer solution 3 mL  Every 8 Hours - RT      12/13/20 1925 12/13/20 2200  POC Glucose 4x Daily AC & at Bedtime  4 Times Daily Before Meals & at Bedtime     Comments: If bedtime blood glucose is greater than 350 mg/dl, call MD.      12/13/20 1925 12/13/20 2100  sodium chloride 0.9 % flush 10 mL  Every 12 Hours Scheduled      12/13/20 1925 12/13/20 2100  enoxaparin (LOVENOX) syringe 40 mg  Every 24 Hours      12/13/20 1925 12/13/20 2100  methylPREDNISolone sodium succinate (SOLU-Medrol) injection 40 mg  Every 12 Hours,   Status:  Discontinued      12/13/20 1925 12/13/20 2045  polyethylene glycol (MIRALAX) packet 17 g  Daily      12/13/20 1925 12/13/20 2015  sodium chloride 0.9 % infusion  Continuous      12/13/20 1925 12/13/20 2015  bisacodyl (DULCOLAX) suppository 10 mg  Once      12/13/20 1925 12/13/20 2015  arformoterol (BROVANA) nebulizer solution 15 mcg  2 Times Daily - RT      12/13/20 1925 12/13/20 2015  budesonide (PULMICORT) nebulizer solution 0.5 mg  2 Times Daily - RT      12/13/20 1925 12/13/20 2000  Vital Signs  Every 4 Hours      12/13/20 1925 12/13/20 1926  Intake & Output  Every Shift      12/13/20 1925 12/13/20 1926  Basic Metabolic Panel  Daily      12/13/20 1925 12/13/20 1926  Magnesium  Daily      12/13/20 1925 12/13/20 1926  CBC & Differential  Daily      12/13/20 1925 12/13/20 1925  dextrose (GLUTOSE) oral gel 15 g  Every 15 Minutes PRN      12/13/20 1925 12/13/20 1925  dextrose (D50W) 25 g/ 50mL Intravenous Solution 25 g  Every 15 Minutes PRN      12/13/20 1925 12/13/20 1925  glucagon (human recombinant) (GLUCAGEN DIAGNOSTIC) injection 1 mg  Every 15 Minutes PRN      12/13/20 1925 12/13/20 1925  sodium chloride 0.9 % flush 10 mL  As Needed      12/13/20 1925 12/13/20 1925   nitroglycerin (NITROSTAT) SL tablet 0.4 mg  Every 5 Minutes PRN      12/13/20 1925 12/13/20 1413  sodium chloride 0.9 % flush 10 mL  As Needed      12/13/20 1415    Unscheduled  Telemetry - Pulse Oximetry  Continuous PRN     Comments: If Patient Develops Unresponsiveness, Acute Dyspnea, Cyanosis or Suspected Hypoxemia Start Continuous Pulse Ox Monitoring, Apply Oxygen & Notify Provider    12/13/20 1925    Unscheduled  Oxygen Therapy- Nasal Cannula; Titrate for SPO2: 90% - 95%  Continuous PRN     Comments: If Patient Develops Unresponsiveness, Acute Dyspnea, Cyanosis or Suspected Hypoxemia Start Continuous Pulse Ox Monitoring, Apply Oxygen & Notify Provider    12/13/20 1925    Unscheduled  ECG 12 Lead  As Needed     Comments: Nurse to Release if Patient Expericences Acute Chest Pain or Dysrhythmias    12/13/20 1925    Unscheduled  Potassium  As Needed     Comments: For Ventricular Arrhythmias      12/13/20 1925    Unscheduled  Magnesium  As Needed     Comments: For Ventricular Arrhythmias      12/13/20 1925    Unscheduled  Troponin  As Needed     Comments: For Chest Pain      12/13/20 1925    Unscheduled  Digoxin Level  As Needed     Comments: For Atrial Arrhythmias      12/13/20 1925    Unscheduled  Blood Gas, Arterial -With Co-Ox Panel: Yes  As Needed     Comments: Per O2 PolicyNotify Physician      12/13/20 1925    Unscheduled  Up With Assistance  As Needed      12/13/20 1925    --  theophylline (UNIPHYL) 400 MG 24 hr tablet  Daily      12/13/20 1858    --  metoprolol succinate XL (TOPROL-XL) 100 MG 24 hr tablet  Daily      12/13/20 1858    --  isosorbide mononitrate (IMDUR) 30 MG 24 hr tablet  Daily      12/13/20 1858    --  montelukast (SINGULAIR) 10 MG tablet  Nightly      12/13/20 1858    --  predniSONE (DELTASONE) 5 MG tablet  Daily      12/13/20 1858                Operative/Procedure Notes (last 24 hours) (Notes from 12/16/20 1549 through 12/17/20 1549)    No notes of this type exist for this  encounter.            Physician Progress Notes (last 24 hours) (Notes from 20 154 through 20 154)      Paola Ochoa APRN at 20 1206                     PROGRESS NOTE         Patient Identification:  Name:  Aaron Domínguez  Age:  89 y.o.  Sex:  male  :  1929  MRN:  [id number]  Visit Number:  [id number]  Primary Care Provider:  Bran Woodruff MD         LOS: 4 days       ----------------------------------------------------------------------------------------------------------------------  Subjective       Chief Complaints:    Fall and Shortness of Breath        Interval History:      Patient resting in bed.  Feels much better today.  Continues on 5 L nasal cannula with no apparent distress.  WBC normal.  CRP improving at 0.52.    Review of Systems:    Constitutional: no fever, chills and night sweats. No appetite change or unexpected weight change. No fatigue.  Eyes: no eye drainage, itching or redness.  HEENT: no mouth sores, dysphagia or nose bleed.  Respiratory: Positive shortness of breath, no cough or production of sputum.  Cardiovascular: no chest pain, no palpitations, no orthopnea.  Gastrointestinal: no nausea, vomiting or diarrhea. No abdominal pain, hematemesis or rectal bleeding.  Genitourinary: no dysuria or polyuria.  Hematologic/lymphatic: no lymph node abnormalities, no easy bruising or easy bleeding.  Musculoskeletal: Generalized soreness secondary to falls.  Skin: No rash and no itching.  Neurological: no loss of consciousness, no seizure, no headache.  Behavioral/Psych: no depression or suicidal ideation.  Endocrine: no hot flashes.  Immunologic: negative.    ----------------------------------------------------------------------------------------------------------------------      Objective       [facility] Meds:  amitriptyline, 25 mg, Oral, Nightly  amLODIPine-lisinopril 5-20 mg combo dose, , Oral, Q24H  arformoterol, 15 mcg, Nebulization, BID -  RT  atorvastatin, 10 mg, Oral, Nightly  bisacodyl, 10 mg, Rectal, Once  budesonide, 0.5 mg, Nebulization, BID - RT  cholecalciferol, 50,000 Units, Oral, Weekly  clopidogrel, 75 mg, Oral, Daily  enoxaparin, 40 mg, Subcutaneous, Q24H  insulin aspart, 0-7 Units, Subcutaneous, TID AC  ipratropium-albuterol, 3 mL, Nebulization, Q8H - RT  isosorbide mononitrate, 30 mg, Oral, Daily  metoprolol succinate XL, 100 mg, Oral, Daily  montelukast, 10 mg, Oral, Nightly  nicotine, 1 patch, Transdermal, Q24H  pantoprazole, 40 mg, Oral, Q AM  polyethylene glycol, 17 g, Oral, Daily  predniSONE, 40 mg, Oral, Daily With Breakfast  sodium chloride, 10 mL, Intravenous, Q12H  terazosin, 5 mg, Oral, Q12H  theophylline, 400 mg, Oral, Daily  vancomycin, 1,500 mg, Intravenous, Q24H      sodium chloride, 100 mL/hr, Last Rate: 100 mL/hr (12/17/20 0513)      ----------------------------------------------------------------------------------------------------------------------    Vital Signs:  Temp:  [97.2 °F (36.2 °C)-98.2 °F (36.8 °C)] 98.2 °F (36.8 °C)  Heart Rate:  [59-98] 84  Resp:  [18] 18  BP: (116-170)/(56-78) 120/64  Mean Arterial Pressure (Non-Invasive) for the past 24 hrs (Last 3 readings):   Noninvasive MAP (mmHg)   12/16/20 1459 93     SpO2 Percentage    12/17/20 0707 12/17/20 1005 12/17/20 1029   SpO2: 96% 95% 93%     SpO2:  [93 %-98 %] 93 %  on  Flow (L/min):  [5] 5;   Device (Oxygen Therapy): humidified;nasal cannula    Body mass index is 27.56 kg/m².  Wt Readings from Last 3 Encounters:   12/17/20 92.2 kg (203 lb 3.2 oz)   10/16/18 89.8 kg (198 lb)   08/08/18 89.8 kg (198 lb)        Intake/Output Summary (Last 24 hours) at 12/17/2020 1206  Last data filed at 12/17/2020 0900  Gross per 24 hour   Intake 3133.71 ml   Output 2150 ml   Net 983.71 ml     Diet Regular; Consistent Carbohydrate  ----------------------------------------------------------------------------------------------------------------------      Physical  Exam:    Constitutional:  Well-developed and well-nourished.  No respiratory distress.      HENT:  Head: Normocephalic and atraumatic.  Mouth:  Moist mucous membranes.    Eyes:  Conjunctivae and EOM are normal.  No scleral icterus.  Neck:  Neck supple.  No JVD present.    Cardiovascular:  Normal rate, regular rhythm and normal heart sounds with no murmur. No edema.  Pulmonary/Chest:  No respiratory distress, no wheezes, no crackles, with decreased lung sounds in the bases.  Abdominal:  Soft.  Bowel sounds are normal.  No distension and no tenderness.   Musculoskeletal:  No edema, no tenderness, and no deformity.  No swelling or redness of joints.  Neurological:  Alert and oriented to person, place, and time.  No facial droop.  No slurred speech.   Skin:  Skin is warm and dry.  No rash noted.  No pallor.  Scattered bruising to chest wall and bilateral upper extremities.  Left elbow skin tear with bruising noted.  Psychiatric:  Normal mood and affect.  Behavior is normal.    ----------------------------------------------------------------------------------------------------------------------  Results from last 7 days   Lab Units 12/15/20  2321 12/14/20  0500 12/14/20  0059  12/13/20  1417   CK TOTAL U/L  --   --  412*  --  414*   TROPONIN T ng/mL 0.040* 0.039* 0.056*   < > 0.058*    < > = values in this interval not displayed.     Results from last 7 days   Lab Units 12/13/20  1417   PROBNP pg/mL 396.1       Results from last 7 days   Lab Units 12/13/20  1431   PH, ARTERIAL pH units 7.424   PO2 ART mm Hg 92.7   PCO2, ARTERIAL mm Hg 48.5*   HCO3 ART mmol/L 31.7*     Results from last 7 days   Lab Units 12/17/20  0205 12/16/20  0510 12/15/20  0352  12/13/20  1417   CRP mg/dL 0.52* 0.69* 1.28*   < >  --    LACTATE mmol/L  --   --   --   --  1.3   WBC 10*3/mm3 8.25 6.62 7.04   < > 7.15   HEMOGLOBIN g/dL 9.3* 9.5* 9.9*   < > 11.6*   HEMATOCRIT % 29.8* 29.9* 31.0*   < > 35.6*   MCV fL 99.0* 98.0* 96.6   < > 94.7   MCHC g/dL  31.2* 31.8 31.9   < > 32.6   PLATELETS 10*3/mm3 194 185 192   < > 217   INR   --   --   --   --  0.89*    < > = values in this interval not displayed.     Results from last 7 days   Lab Units 12/17/20  0205 12/16/20  0510 12/15/20  0352  12/13/20  1417   SODIUM mmol/L 134* 133* 131*   < > 135*   POTASSIUM mmol/L 5.2 5.6* 5.0   < > 4.9   MAGNESIUM mg/dL 2.1 2.1 1.9   < >  --    CHLORIDE mmol/L 101 100 98   < > 96*   CO2 mmol/L 28.4 26.3 25.2   < > 31.6*   BUN mg/dL 30* 28* 27*   < > 24*   CREATININE mg/dL 0.90 0.73* 0.89   < > 0.95   EGFR IF NONAFRICN AM mL/min/1.73 79 101 80   < > 74   CALCIUM mg/dL 8.7 8.7 9.1   < > 10.3*   GLUCOSE mg/dL 298* 350* 343*   < > 312*   ALBUMIN g/dL  --   --   --   --  4.03   BILIRUBIN mg/dL  --   --   --   --  0.4   ALK PHOS U/L  --   --   --   --  107   AST (SGOT) U/L  --   --   --   --  38   ALT (SGPT) U/L  --   --   --   --  29    < > = values in this interval not displayed.   Estimated Creatinine Clearance: 69.7 mL/min (by C-G formula based on SCr of 0.9 mg/dL).  No results found for: AMMONIA    Glucose   Date/Time Value Ref Range Status   12/17/2020 1032 270 (H) 70 - 130 mg/dL Final   12/17/2020 0644 278 (H) 70 - 130 mg/dL Final   12/16/2020 2119 333 (H) 70 - 130 mg/dL Final   12/16/2020 1648 196 (H) 70 - 130 mg/dL Final   12/16/2020 1028 363 (H) 70 - 130 mg/dL Final   12/16/2020 0658 344 (H) 70 - 130 mg/dL Final   12/15/2020 2310 307 (H) 70 - 130 mg/dL Final   12/15/2020 2159 304 (H) 70 - 130 mg/dL Final     No results found for: HGBA1C  No results found for: TSH, FREET4    Blood Culture   Date Value Ref Range Status   12/13/2020 No growth at 24 hours  Preliminary   12/13/2020 Staphylococcus aureus, MRSA (C)  Preliminary     Comment:     Infectious disease consultation is highly recommended to rule out distant foci of infection.  Methicillin resistant Staphylococcus aureus, Patient may be an isolation risk.   12/13/2020 No growth at 24 hours  Preliminary     No results found for:  URINECX  No results found for: WOUNDCX  No results found for: STOOLCX  No results found for: RESPCX  Pain Management Panel     There is no flowsheet data to display.            ----------------------------------------------------------------------------------------------------------------------  Imaging Results (Last 24 Hours)     ** No results found for the last 24 hours. **          ----------------------------------------------------------------------------------------------------------------------    Assessment/Plan       Assessment/Plan     ASSESSMENT:    1.  Sepsis   2.  MRSA bacteremia    PLAN:    Patient resting in bed.  Feels much better today.  Continues on 5 L nasal cannula with no apparent distress.  WBC normal.  CRP improving at 0.52.    2D echo unremarkable for vegetation.    Respiratory panel PCR negative.  CT of the head reports no acute abnormality.  CT of the C-spine reports no acute fracture.  CT of the L-spine reports no acute fracture.  CT of the T-spine reports no acute fracture.  CT of the chest reports COPD, trace right effusion and right basilar atelectasis.  Chest x-ray reports right basilar atelectasis. X-ray of the pelvis reports no evidence of fracture.  Blood cultures from 12/13/2020 1 out of 2 sets positive for MRSA.  COVID-19 PCR negative.  Influenza PCR negative.    Recommend to continue vancomycin monotherapy for 2 full week course through 12/28/2020.  Patient stable from ID standpoint.    Code Status:   Code Status and Medical Interventions:   Ordered at: 12/13/20 1722     Level Of Support Discussed With:    Patient     Code Status:    CPR     Medical Interventions (Level of Support Prior to Arrest):    Full           SARAH Vasquez  12/17/20  12:06 EST         Electronically signed by Paola Ochoa APRN at 12/17/20 1207     Juan Daniel Perdue DO at 12/16/20 1626              [facility]     Patient Identification:  Name:  Aaron AGRAWAL  Catrachita  Age:  89 y.o.  Sex:  male  :  1929  MRN:  [id number]  Visit Number:  [id number]  Primary Care Provider:  Bran Woodruff MD    Length of stay:  3    Chief complaint: None    Subjective:    Patient again states that he feels back to his baseline functioning status.  Did discuss with patient the finding of 1 of 3 blood cultures positive for MRSA.  Patient expressed his understanding.  Patient is being scheduled for PICC line placement and will be discharged home in the near future on IV vancomycin for the next 2 weeks.  Otherwise, no new events overnight.  ----------------------------------------------------------------------------------------------------------------------  Current Hospital Meds:  amitriptyline, 25 mg, Oral, Nightly  amLODIPine-lisinopril 5-20 mg combo dose, , Oral, Q24H  arformoterol, 15 mcg, Nebulization, BID - RT  atorvastatin, 10 mg, Oral, Nightly  bisacodyl, 10 mg, Rectal, Once  budesonide, 0.5 mg, Nebulization, BID - RT  cholecalciferol, 50,000 Units, Oral, Weekly  clopidogrel, 75 mg, Oral, Daily  enoxaparin, 40 mg, Subcutaneous, Q24H  insulin aspart, 0-7 Units, Subcutaneous, TID AC  ipratropium-albuterol, 3 mL, Nebulization, Q8H - RT  isosorbide mononitrate, 30 mg, Oral, Daily  methylPREDNISolone sodium succinate, 40 mg, Intravenous, Q12H  metoprolol succinate XL, 100 mg, Oral, Daily  montelukast, 10 mg, Oral, Nightly  nicotine, 1 patch, Transdermal, Q24H  pantoprazole, 40 mg, Oral, Q AM  polyethylene glycol, 17 g, Oral, Daily  sodium chloride, 10 mL, Intravenous, Q12H  terazosin, 5 mg, Oral, Q12H  theophylline, 400 mg, Oral, Daily  vancomycin, 1,500 mg, Intravenous, Q24H      sodium chloride, 100 mL/hr, Last Rate: 100 mL/hr (20 0919)      ----------------------------------------------------------------------------------------------------------------------  Vital Signs:  Temp:  [97.6 °F (36.4 °C)-98.2 °F (36.8 °C)] 97.6 °F (36.4 °C)  Heart Rate:  [59-96] 62  Resp:   [16-20] 18  BP: (130-162)/(53-92) 133/56      12/14/20  0442 12/15/20  0451 12/16/20  0500   Weight: 87.7 kg (193 lb 4.8 oz) 89.1 kg (196 lb 6.4 oz) 91.2 kg (201 lb)     Body mass index is 27.26 kg/m².    Intake/Output Summary (Last 24 hours) at 12/16/2020 1626  Last data filed at 12/16/2020 1459  Gross per 24 hour   Intake 2450.97 ml   Output 2250 ml   Net 200.97 ml     Diet Regular; Consistent Carbohydrate  ----------------------------------------------------------------------------------------------------------------------  Physical exam:  Constitutional: Elderly appearing  male in no apparent distress.     HENT:  Head:  Normocephalic and atraumatic.  Mouth:  Moist mucous membranes.    Eyes:  Conjunctivae and EOM are normal.  Pupils are equal, round, and reactive to light.  No scleral icterus.    Neck:  Neck supple. No thyromegaly.  No JVD present.    Cardiovascular:  Regular rate and rhythm with no murmurs, rubs, clicks or gallops appreciated.  Pulmonary/Chest:  Clear to auscultation bilaterally with no crackles, wheezes or rhonchi appreciated.  Abdominal:  Soft. Nontender. Nondistended  Bowel sounds are normal in all four quadrants. No organomegally appreciated.   Musculoskeletal:  No edema, no tenderness, and no deformity.  No red or swollen joints anywhere.    Neurological:  Alert and oriented to person, place, and time. Cranial nerves II-XII intact with no focal deficits.  No facial droop.  No slurred speech.   Skin:  Warm and dry to palpation, multiple abrasions and bruises noted on bilateral upper extremities  Peripheral vascular:  2+ radial and pedal pulses in bilateral upper and lower extremities.  Psychiatric:  Alert and oriented x3, demonstrates appropriate judgement and insight.  ----------------------------------------------------------------------------------------------------------------------  Tele:     ----------------------------------------------------------------------------------------------------------------------  Results from last 7 days   Lab Units 12/15/20  2321 12/14/20  0500 12/14/20  0059  12/13/20  1417   CK TOTAL U/L  --   --  412*  --  414*   TROPONIN T ng/mL 0.040* 0.039* 0.056*   < > 0.058*    < > = values in this interval not displayed.     Results from last 7 days   Lab Units 12/16/20  0510 12/15/20  0352 12/14/20  0500 12/14/20  0059  12/13/20  1417   CRP mg/dL 0.69* 1.28* 1.83*  --   --   --    LACTATE mmol/L  --   --   --   --   --  1.3   WBC 10*3/mm3 6.62 7.04  --  7.94   < > 7.15   HEMOGLOBIN g/dL 9.5* 9.9*  --  10.8*   < > 11.6*   HEMATOCRIT % 29.9* 31.0*  --  32.4*   < > 35.6*   MCV fL 98.0* 96.6  --  94.2   < > 94.7   MCHC g/dL 31.8 31.9  --  33.3   < > 32.6   PLATELETS 10*3/mm3 185 192  --  195   < > 217   INR   --   --   --   --   --  0.89*    < > = values in this interval not displayed.     Results from last 7 days   Lab Units 12/13/20  1431   PH, ARTERIAL pH units 7.424   PO2 ART mm Hg 92.7   PCO2, ARTERIAL mm Hg 48.5*   HCO3 ART mmol/L 31.7*     Results from last 7 days   Lab Units 12/16/20  0510 12/15/20  0352 12/14/20  0059  12/13/20  1417   SODIUM mmol/L 133* 131* 132*   < > 135*   POTASSIUM mmol/L 5.6* 5.0 4.4   < > 4.9   MAGNESIUM mg/dL 2.1 1.9 2.0   < >  --    CHLORIDE mmol/L 100 98 97*   < > 96*   CO2 mmol/L 26.3 25.2 30.5*   < > 31.6*   BUN mg/dL 28* 27* 25*   < > 24*   CREATININE mg/dL 0.73* 0.89 0.95   < > 0.95   EGFR IF NONAFRICN AM mL/min/1.73 101 80 74   < > 74   CALCIUM mg/dL 8.7 9.1 9.6   < > 10.3*   GLUCOSE mg/dL 350* 343* 202*   < > 312*   ALBUMIN g/dL  --   --   --   --  4.03   BILIRUBIN mg/dL  --   --   --   --  0.4   ALK PHOS U/L  --   --   --   --  107   AST (SGOT) U/L  --   --   --   --  38   ALT (SGPT) U/L  --   --   --   --  29    < > = values in this interval not displayed.   Estimated Creatinine Clearance: 77.6 mL/min (A) (by C-G formula based on SCr of  0.73 mg/dL (L)).    No results found for: AMMONIA      Blood Culture   Date Value Ref Range Status   12/13/2020 Abnormal Stain (C)  Preliminary   12/13/2020 No growth at 24 hours  Preliminary     No results found for: URINECX  No results found for: WOUNDCX  No results found for: STOOLCX    I have personally looked at the labs and they are summarized above.  ----------------------------------------------------------------------------------------------------------------------  Imaging Results (Last 24 Hours)     ** No results found for the last 24 hours. **        ----------------------------------------------------------------------------------------------------------------------  Assessment and Plan:    1.  COPD exacerbation -continue inhaled corticosteroids, long-acting beta agonist and duo nebs.  Will de-escalate Solu-Medrol to prednisone today.    2.  General deconditioning - patient with recurrent falls, continue physical therapy.  Physical therapy recommending patient be placed in skilled nursing facility or inpatient rehab.  However, patient requesting to be discharged home with home health physical therapy    3.  Possible MRSA bacteremia -appreciate infectious disease recommendations, patient will have PICC line placed and case management has been consulted for IV vancomycin administration at home.    4.  Chronic hypoxic respiratory failure -patient has been weaned down to his baseline home oxygen    5.  Hyperlipidemia -statin    6.  Essential hypertension -monitor closely, will likely need adjustment of antihypertensive medications prior to discharge    7.  Normocytic anemia -no indication for transfusion at this time.  Continue to monitor closely.    8.  Advanced age            Juan Daniel Perdue DO  12/16/20  16:26 EST    Electronically signed by Juan Daniel Perdue DO at 12/16/20 1630       Consult Notes (last 24 hours) (Notes from 12/16/20 1549 through 12/17/20 1549)    No notes of this type  exist for this encounter.            Physical Therapy Notes (last 24 hours) (Notes from 12/16/20 1549 through 12/17/20 1549)      Heena Jacinto, PT Student at 12/17/20 1458  Version 1 of 1            12/17/20 1458   OTHER   Discipline physical therapist   Rehab Time/Intention   Session Not Performed patient unavailable for treatment  (Pt attempted to be seen by PT twice this day)   Recommendation   PT - Next Appointment 12/18/20       Electronically signed by Heena Jacinto, PT Student at 12/17/20 1065       Occupational Therapy Notes (last 24 hours) (Notes from 12/16/20 1549 through 12/17/20 1549)    No notes exist for this encounter.         Speech Language Pathology Notes (last 24 hours) (Notes from 12/16/20 1549 through 12/17/20 1549)    No notes exist for this encounter.         vancomycin 1500 mg/500 mL 0.9% NS IVPB ([facility]) [[id number]] (Order [id number])  Order  Date: 12/17/2020 Department: [address] Ordering/Authorizing: Juan Daniel Perdue DO   Medication  vancomycin 1500 mg/500 mL 0.9% NS IVPB ([facility]) [[id number]]  Order History  Outpatient  Date/Time Action Taken User Additional Information   12/17/20 1507 Sign Juan Daniel Perdue DO Reorder from Order: [id number]   Outpatient Morphine Equivalent Daily Dose (MEDD)  Expand All  Collapse All  None   vancomycin 1500 mg/500 mL 0.9% NS IVPB ([facility]) [[id number]]     Order Details  Dose: 1,500 mg Route: Intravenous Frequency: Every 24 Hours   Indications of Use: Bacteremia   Dispense Quantity: 5,500 mL Refills: 0 Fills remaining: --           Sig: Infuse 500 mL into a venous catheter Daily for 11 doses. Indications: Bacteria in the Blood          Written Date: 12/17/20 Expiration Date: 12/17/21     Start Date: 12/15/20 0800 End Date: 12/29/20 after 11 doses            Ordering Provider: Juan Daniel Perdue DO Phone:  [phone] Fax:  [phone]    Address:  [address] [place] [address] NPI:  [id number]             Authorizing Provider: Juan Daniel Perdue DO Phone:  [phone] Fax:  [phone]    Address:  [address] [place] [id number] NPI:  [id number]            Ordering User:  Juan Daniel Perdue DO               Pharmacy:  [facility] [address] - [phone]  - [phone]   Phone:  [phone]   Fax:  [phone]   Address:  [address] [facility] [address] [id number]   Pharmacy Comments: --          Fill quantity remaining: -- Fill quantity used: -- Next fill due: --       Orders with any of the following pharmaceutical classes: Misc. Antiinfectives    Name Dose Frequency Start Date End Date Medication Warnings Interventions? Order Mode    vancomycin 1500 mg/500 mL 0.9% NS IVPB ([facility]) 1,500 mg Every 24 Hours 12/15/20 0800 12/28/20 2359  Yes Inpatient    vancomycin 1750 mg/500 mL 0.9% NS IVPB ([facility]) 20 mg/kg Once 12/14/20 0700 12/14/20 0910   Inpatient    Warnings History    No Interaction Warnings Shown    Pharmacist Clinical Review History    This prescription has not been clinically reviewed.   Order Reconciliation Actions       Order Reconciliation Actions   E-Prescribing Status    Outpatient Medication Detail    vancomycin 1500 mg/500 mL 0.9% NS IVPB ([facility])        Sig: Infuse 500 mL into a venous catheter Daily for 11 doses. Indications: Bacteria in the Blood        Class: No Print        Route: Intravenous        Event History       Event History   Tracking Reports    Cosign Tracking       After Visit Summary  Aaron Domínguez  MRN: [id number]     Shortness of breath     12/13/2020 - 12/17/2020     [facility] [address]   Your Next Steps    Ask    ·   Ask how to get these medications  ? vancomycin  Read    ·   Read these attachments  ? Chronic Obstructive Pulmonary Disease Exacerbation  Easy-to-Read (English)  ? Acute Respiratory Failure  Adult (English)  ? Hypertension  Adult  Easy-to-Read (English)  After Visit Summary  Instructions    ·   Your  medications have changed    START taking:  ? vancomycin   Start taking on: December 18, 2020  Review your updated medication list below.  Your Next Steps  Ask  Read  If you have any questions about your recovery, please call the [facility] Nurse Call Center at [phone]. A registered nurse is available 24 hours a day 7 days a week to assist you.   If you have any COVID-19 related questions, please call [phone].  ·   Other instructions    Discharge Follow-up with PCP   Currently Documented PCP:   Bran Woodruff MD   PCP Phone Number:   [phone]   What's Next    What's Next          Follow up with Bran Woodruff MD  please follow up with pcp in 3-4 weeks.JAN 18 @ 10:15 AM [address] [facility] [address]  [phone]          Ambulatory Referral to Home Health   Home Health Services    ·   Additional Information       VANI JAN 18 @ 10:15   Your Allergies  Date Reviewed: 12/13/2020  Your Allergies   No active allergies   Patient Belongings Returned    Document Return of Belongings Flowsheet    Were the patient bedside belongings sent home? --   Medications Retrieved from Pharmacy & Sent Home --   Belongings Sent to Safe --   Belongings sent with: --   Belongings Retrieved from Security & Sent Home --       MyChart Signup    Our records indicate that you have declined [facility] MyChart signup. If you would like to sign up for [employer], please email [email or web address] or call [phone] to obtain an activation code.  Medication List  Medication List     Morning Afternoon Evening Bedtime As Needed    albuterol sulfate  (90 Base) MCG/ACT inhaler  Commonly known as: ProAir HFA  Inhale 2 puffs Every 4 (Four) Hours As Needed for Shortness of Air.         amitriptyline 25 MG tablet  Commonly known as: ELAVIL  Take 25 mg by mouth Every Night.  Last time this was given: 25 mg on December 16, 2020  9:08 PM         amLODIPine-benazepril 5-20 MG per capsule  Commonly  known as: LOTREL 5-20  Take 1 capsule by mouth Daily. for blood pressure         atorvastatin 10 MG tablet  Commonly known as: LIPITOR  Take 10 mg by mouth Every Night.  Last time this was given: 10 mg on December 16, 2020  9:08 PM         clopidogrel 75 MG tablet  Commonly known as: PLAVIX  Take 75 mg by mouth Daily. as directed  Last time this was given: 75 mg on December 17, 2020  9:04 AM         doxazosin 8 MG tablet  Commonly known as: CARDURA  Take 8 mg by mouth Every Night.         Fluticasone-Umeclidin-Vilant 100-62.5-25 MCG/INH aerosol powder   Inhale 1 each Daily.         ipratropium-albuterol 0.5-2.5 mg/3 ml nebulizer  Commonly known as: DUO-NEB  Take 3 mL by nebulization Every 4 (Four) Hours As Needed for Wheezing.  Last time this was given: 3 mL on December 17, 2020  2:23 PM         isosorbide mononitrate 30 MG 24 hr tablet  Commonly known as: IMDUR  Take 30 mg by mouth Daily.  Last time this was given: 30 mg on December 17, 2020  9:05 AM         metoprolol succinate  MG 24 hr tablet  Commonly known as: TOPROL-XL  Take 100 mg by mouth Daily.  Last time this was given: 100 mg on December 17, 2020  9:04 AM         montelukast 10 MG tablet  Commonly known as: SINGULAIR  Take 10 mg by mouth Every Night.  Last time this was given: 10 mg on December 16, 2020  9:08 PM         predniSONE 5 MG tablet  Commonly known as: DELTASONE  Take 5 mg by mouth Daily.  Last time this was given: 40 mg on December 17, 2020  9:04 AM         theophylline 400 MG 24 hr tablet  Commonly known as: UNIPHYL  Take 400 mg by mouth Daily.  Last time this was given: 400 mg on December 17, 2020  9:04 AM         vancomycin  Start taking on: December 18, 2020  Infuse 500 mL into a venous catheter Daily for 11 doses. Indications: Bacteria in the Blood  For: Bacteria in the Blood  Last time this was given: 1,500 mg on December 17, 2020  9:08 AM         vitamin D 1.25 MG (34349 UT) capsule capsule  Commonly known as: ERGOCALCIFEROL  Take  50,000 Units by mouth 1 (One) Time Per Week.        Where to  your medications    ·   Ask your doctor where to  these medications    ? • vancomycin   Always carry an updated list of your medications with you. If there is an emergency, a responder can quickly see what medications you are taking. Take this paperwork with you the next time you see your health care provider.        Attached Information  Chronic Obstructive Pulmonary Disease Exacerbation  Easy-to-Read (English)  Chronic Obstructive Pulmonary Disease Exacerbation  ?  Chronic obstructive pulmonary disease (COPD) is a long-term (chronic) lung problem. In COPD, the flow of air from the lungs is limited. COPD exacerbations are times that breathing gets worse and you need more than your normal treatment. Without treatment, they can be life threatening. If they happen often, your lungs can become more damaged. If your COPD gets worse, your doctor may treat you with:  · Medicines.  · Oxygen.  · Different ways to clear your airway, such as using a mask.  Follow these instructions at home:  Medicines  · Take over-the-counter and prescription medicines only as told by your doctor.  · If you take an antibiotic or steroid medicine, do not stop taking the medicine even if you start to feel better.  · Keep up with shots (vaccinations) as told by your doctor. Be sure to get a yearly (annual) flu shot.  Lifestyle  · Do not smoke. If you need help quitting, ask your doctor.  · Eat healthy foods.  · Exercise regularly.  · Get plenty of sleep.  · Avoid tobacco smoke and other things that can bother your lungs.  · Wash your hands often with soap and water. This will help keep you from getting an infection. If you cannot use soap and water, use hand .  · During flu season, avoid areas that are crowded with people.  General instructions  · Drink enough fluid to keep your pee (urine) clear or pale yellow. Do not do this if your doctor has told you not  to.  · Use a cool mist machine (vaporizer).  · If you use oxygen or a machine that turns medicine into a mist (nebulizer), continue to use it as told.  · Follow all instructions for rehabilitation. These are steps you can take to make your body work better.  · Keep all follow-up visits as told by your doctor. This is important.  Contact a doctor if:  · Your COPD symptoms get worse than normal.  Get help right away if:  · You are short of breath and it gets worse.  · You have trouble talking.  · You have chest pain.  · You cough up blood.  · You have a fever.  · You keep throwing up (vomiting).  · You feel weak or you pass out (faint).  · You feel confused.  · You are not able to sleep because of your symptoms.  · You are not able to do daily activities.  Summary  · COPD exacerbations are times that breathing gets worse and you need more treatment than normal.  · COPD exacerbations can be very serious and may cause your lungs to become more damaged.  · Do not smoke. If you need help quitting, ask your doctor.  · Stay up-to-date on your shots. Get a flu shot every year.  This information is not intended to replace advice given to you by your health care provider. Make sure you discuss any questions you have with your health care provider.  Document Revised: 11/30/2018 Document Reviewed: 01/22/2018  [employer] Patient Education © 2020 [employer]     Attached Information  Acute Respiratory Failure  Adult (English)  Acute Respiratory Failure, Adult  ?     Acute respiratory failure occurs when there is not enough oxygen passing from your lungs to your body. When this happens, your lungs have trouble removing carbon dioxide from the blood. This causes your blood oxygen level to drop too low as carbon dioxide builds up.  Acute respiratory failure is a medical emergency. It can develop quickly, but it is temporary if treated promptly. Your lung capacity, or how much air your lungs can hold, may improve with time, exercise,  and treatment.  What are the causes?  There are many possible causes of acute respiratory failure, including:  · Lung injury.  · Chest injury or damage to the ribs or tissues near the lungs.  · Lung conditions that affect the flow of air and blood into and out of the lungs, such as pneumonia, acute respiratory distress syndrome, and cystic fibrosis.  · Medical conditions, such as strokes or spinal cord injuries, that affect the muscles and nerves that control breathing.  · Blood infection (sepsis).  · Inflammation of the pancreas (pancreatitis).  · A blood clot in the lungs (pulmonary embolism).  · A large-volume blood transfusion.  · Burns.  · Near-drowning.  · Seizure.  · Smoke inhalation.  · Reaction to medicines.  · Alcohol or drug overdose.  What increases the risk?  This condition is more likely to develop in people who have:  · A blocked airway.  · Asthma.  · A condition or disease that damages or weakens the muscles, nerves, bones, or tissues that are involved in breathing.  · A serious infection.  · A health problem that blocks the unconscious reflex that is involved in breathing, such as hypothyroidism or sleep apnea.  · A lung injury or trauma.  What are the signs or symptoms?  Trouble breathing is the main symptom of acute respiratory failure. Symptoms may also include:  · Rapid breathing.  · Restlessness or anxiety.  · Skin, lips, or fingernails that appear blue (cyanosis).  · Rapid heart rate.  · Abnormal heart rhythms (arrhythmias).  · Confusion or changes in behavior.  · Tiredness or loss of energy.  · Feeling sleepy or having a loss of consciousness.  How is this diagnosed?  Your health care provider can diagnose acute respiratory failure with a medical history and physical exam. During the exam, your health care provider will listen to your heart and check for crackling or wheezing sounds in your lungs. Your may also have tests to confirm the diagnosis and determine what is causing respiratory  failure. These tests may include:  · Measuring the amount of oxygen in your blood (pulse oximetry). The measurement comes from a small device that is placed on your finger, earlobe, or toe.  · Other blood tests to measure blood gases and to look for signs of infection.  · Sampling your cerebral spinal fluid or tracheal fluid to check for infections.  · Chest X-ray to look for fluid in spaces that should be filled with air.  · Electrocardiogram (ECG) to look at the heart's electrical activity.  How is this treated?  Treatment for this condition usually takes places in a hospital intensive care unit (ICU). Treatment depends on what is causing the condition. It may include one or more treatments until your symptoms improve. Treatment may include:  · Supplemental oxygen. Extra oxygen is given through a tube in the nose, a face mask, or a vitale.  · A device such as a continuous positive airway pressure (CPAP) or bi-level positive airway pressure (BiPAP or BPAP) machine. This treatment uses mild air pressure to keep the airways open. A mask or other device will be placed over your nose or mouth. A tube that is connected to a motor will deliver oxygen through the mask.  · Ventilator. This treatment helps move air into and out of the lungs. This may be done with a bag and mask or a machine. For this treatment, a tube is placed in your windpipe (trachea) so air and oxygen can flow to the lungs.  · Extracorporeal membrane oxygenation (ECMO). This treatment temporarily takes over the function of the heart and lungs, supplying oxygen and removing carbon dioxide. ECMO gives the lungs a chance to recover. It may be used if a ventilator is not effective.  · Tracheostomy. This is a procedure that creates a hole in the neck to insert a breathing tube.  · Receiving fluids and medicines.  · Rocking the bed to help breathing.  Follow these instructions at home:  · Take over-the-counter and prescription medicines only as told by your  health care provider.  · Return to normal activities as told by your health care provider. Ask your health care provider what activities are safe for you.  · Keep all follow-up visits as told by your health care provider. This is important.  How is this prevented?  Treating infections and medical conditions that may lead to acute respiratory failure can help prevent the condition from developing.  Contact a health care provider if:  · You have a fever.  · Your symptoms do not improve or they get worse.  Get help right away if:  · You are having trouble breathing.  · You lose consciousness.  · Your have cyanosis or turn blue.  · You develop a rapid heart rate.  · You are confused.  These symptoms may represent a serious problem that is an emergency. Do not wait to see if the symptoms will go away. Get medical help right away. Call your local emergency services (911 in the [place] Do not drive yourself to the hospital.  This information is not intended to replace advice given to you by your health care provider. Make sure you discuss any questions you have with your health care provider.  Document Revised: 11/30/2018 Document Reviewed: 07/05/2017  [employer] Patient Education © 2020 [employer]     Attached Information  Hypertension  Adult  Easy-to-Read (English)  Hypertension, Adult  Hypertension is another name for high blood pressure. High blood pressure forces your heart to work harder to pump blood. This can cause problems over time.  There are two numbers in a blood pressure reading. There is a top number (systolic) over a bottom number (diastolic). It is best to have a blood pressure that is below 120/80. Healthy choices can help lower your blood pressure, or you may need medicine to help lower it.  What are the causes?  The cause of this condition is not known. Some conditions may be related to high blood pressure.  What increases the risk?  · Smoking.  · Having type 2 diabetes mellitus, high cholesterol, or  both.  · Not getting enough exercise or physical activity.  · Being overweight.  · Having too much fat, sugar, calories, or salt (sodium) in your diet.  · Drinking too much alcohol.  · Having long-term (chronic) kidney disease.  · Having a family history of high blood pressure.  · Age. Risk increases with age.  · Race. You may be at higher risk if you are .  · Gender. Men are at higher risk than women before age 45. After age 65, women are at higher risk than men.  · Having obstructive sleep apnea.  · Stress.  What are the signs or symptoms?  · High blood pressure may not cause symptoms. Very high blood pressure (hypertensive crisis) may cause:  ? Headache.  ? Feelings of worry or nervousness (anxiety).  ? Shortness of breath.  ? Nosebleed.  ? A feeling of being sick to your stomach (nausea).  ? Throwing up (vomiting).  ? Changes in how you see.  ? Very bad chest pain.  ? Seizures.  How is this treated?  · This condition is treated by making healthy lifestyle changes, such as:  ? Eating healthy foods.  ? Exercising more.  ? Drinking less alcohol.  · Your health care provider may prescribe medicine if lifestyle changes are not enough to get your blood pressure under control, and if:  ? Your top number is above 130.  ? Your bottom number is above 80.  · Your personal target blood pressure may vary.  Follow these instructions at home:  Eating and drinking  ?     · If told, follow the DASH eating plan. To follow this plan:  ? Fill one half of your plate at each meal with fruits and vegetables.  ? Fill one fourth of your plate at each meal with whole grains. Whole grains include whole-wheat pasta, brown rice, and whole-grain bread.  ? Eat or drink low-fat dairy products, such as skim milk or low-fat yogurt.  ? Fill one fourth of your plate at each meal with low-fat (lean) proteins. Low-fat proteins include fish, chicken without skin, eggs, beans, and tofu.  ? Avoid fatty meat, cured and processed meat, or  chicken with skin.  ? Avoid pre-made or processed food.  · Eat less than 1,500 mg of salt each day.  · Do not drink alcohol if:  ? Your doctor tells you not to drink.  ? You are pregnant, may be pregnant, or are planning to become pregnant.  · If you drink alcohol:  ? Limit how much you use to:  § 0-1 drink a day for women.  § 0-2 drinks a day for men.  ? Be aware of how much alcohol is in your drink. In the [place] one drink equals one 12 oz bottle of beer (355 mL), one 5 oz glass of wine (148 mL), or one 1½ oz glass of hard liquor (44 mL).  Lifestyle  ?     · Work with your doctor to stay at a healthy weight or to lose weight. Ask your doctor what the best weight is for you.  · Get at least 30 minutes of exercise most days of the week. This may include walking, swimming, or biking.  · Get at least 30 minutes of exercise that strengthens your muscles (resistance exercise) at least 3 days a week. This may include lifting weights or doing Pilates.  · Do not use any products that contain nicotine or tobacco, such as cigarettes, e-cigarettes, and chewing tobacco. If you need help quitting, ask your doctor.  · Check your blood pressure at home as told by your doctor.  · Keep all follow-up visits as told by your doctor. This is important.  Medicines  · Take over-the-counter and prescription medicines only as told by your doctor. Follow directions carefully.  · Do not skip doses of blood pressure medicine. The medicine does not work as well if you skip doses. Skipping doses also puts you at risk for problems.  · Ask your doctor about side effects or reactions to medicines that you should watch for.  Contact a doctor if you:  · Think you are having a reaction to the medicine you are taking.  · Have headaches that keep coming back (recurring).  · Feel dizzy.  · Have swelling in your ankles.  · Have trouble with your vision.  Get help right away if you:  · Get a very bad headache.  · Start to feel mixed up (confused).  · Feel  weak or numb.  · Feel faint.  · Have very bad pain in your:  ? Chest.  ? Belly (abdomen).  · Throw up more than once.  · Have trouble breathing.  Summary  · Hypertension is another name for high blood pressure.  · High blood pressure forces your heart to work harder to pump blood.  · For most people, a normal blood pressure is less than 120/80.  · Making healthy choices can help lower blood pressure. If your blood pressure does not get lower with healthy choices, you may need to take medicine.  This information is not intended to replace advice given to you by your health care provider. Make sure you discuss any questions you have with your health care provider.  Document Revised: 08/28/2019 Document Reviewed: 08/28/2019  [employer] Patient Education © 2020 [employer]     Pneumonia Vaccination    Please follow up with your primary care provider or retail pharmacy to see if you are eligible for a pneumonia vaccination.        Opioid Resource    If you or someone you know needs information on substance abuse, please visit   https://www.[facility].org/ for listings of facilities and resources across [place].  Stroke Symptoms    · Call 911 or have someone take you to the Emergency Department if you have any of the following:  · Sudden numbness or weakness of your face, arm or leg especially on one side of the body  · Sudden confusion, difficulty speaking or trouble understanding   · Changes in your vision or loss of sight in one eye  · Sudden severe headache with no known cause  · Sudden dizziness, trouble walking, loss of balance or coordination     It is important to seek emergency care right away if you have further stroke symptoms. If you get emergency help quickly, the powerful clot-dissolving medicines can reduce the disabilities caused by a stroke.      For more information:  American Stroke Association  4-656-9-STROKE  www.strokeassociation.org  Smoking Cessation    IF YOU SMOKE OR USE TOBACCO PLEASE READ  THE FOLLOWING:  Why is smoking bad for me?  Smoking increases the risk of heart disease, lung disease, vascular disease, stroke, and cancer. If you smoke, STOP!     For more information:  Quit Now [place]  1-800-QUIT-NOW  https://[place].quitlogix.org/en-[place]/  Suicidal Feelings    If you feel like life is too tough and are thinking of suicide or injuring yourself, get help right away!  · Call 911  · Call a suicide hotline to speak to a counselor. 8-481-411-TALK or 1-482-QWJDSZO   Patient Experience    Thank you for choosing [facility]. You may receive a survey following your visit. Please take a moment to share what went well, where we need improvement, and which staff members deserve recognition. We value your input.           YOU ARE THE MOST IMPORTANT FACTOR IN YOUR RECOVERY.      Follow all instructions carefully.      I have reviewed my discharge instructions with my nurse, including the following information, if applicable:                 Information about my illness and diagnosis              Follow up appointments (including lab draws)              Wound Care              Equipment Needs              Medications (new and continuing) along with side effects              Preventative information such as vaccines and smoking cessations              Diet              Pain              I know when to contact my Doctor's office or seek emergency care        I want my nurse to describe the side effects of my medications: YES NO   If the answer is no, I understand the side effects of my medications: YES NO   My nurse described the side effects of my medications in a way that I could understand: YES NO   I have taken my personal belongings and my own medications with me at discharge: YES NO       I have received this information and my questions have been answered. I have discussed any concerns I see with this plan with the nurse or physician. I understand these instructions.     Signature of Patient or  Responsible Person: _____________________________________     Date: _________________  Time: __________________     Signature of Healthcare Provider: _______________________________________  Date: _________________  Time: __________________      [address] [place] [place] [address]  Phone:  [phone]  Fax:  [phone] Date: Dec 17, 2020      Ambulatory Referral to Home Health     Patient:  Aaron Domínguez MRN:  [id number]   [address]  [facility] [address] :  1929  SSN:    Phone: [phone] Sex:  M      INSURANCE PAYOR PLAN GROUP # SUBSCRIBER ID   Primary:    [employer] MEDICARE REPLACEMENT [id number]      Referring Provider Information:  LORA CLARKE Phone: [phone] Fax: [phone]      Referral Information:   # Visits:  1 Referral Type: Home Health [42]   Urgency:  Routine Referral Reason: Specialty Services Required   Start Date: Dec 17, 2020 End Date:  To be determined by Insurer   Diagnosis: MRSA bacteremia (R78.81,B95.62 [ICD-10-CM] 790.7,041.12 [ICD-9-CM])      Refer to Dept:   Refer to Provider:   Refer to Facility:       Face to Face Visit Date: 2020  Follow-up provider for Plan of Care? I treated the patient in an acute care facility and will not continue treatment after discharge.  Follow-up provider: ELYSSA LUDWIG [[id number]]  Reason/Clinical Findings: MRSA Bacteremia  Describe mobility limitations that make leaving home difficult: Advanced Age and MRSA bacteremia  Nursing/Therapeutic Services Requested: Skilled Nursing  Skilled nursing orders: Infusion therapy  Frequency: 1 Week 1     This document serves as a request of services and does not constitute Insurance authorization or approval of services.  To determine eligibility, please contact the members Insurance carrier to verify and review coverage.     If you have medical questions regarding this request for services. Please contact [address] at [phone] during normal  business hours.       Authorizing Provider:Juan Daniel Clarke DO  Authorizing Provider's NPI: [id number]  Order Entered By: Juan Daniel Clarke DO 12/17/2020  3:07 PM     Electronically signed by: Juan Daniel Clarke DO 12/17/2020  3:07 PM          Discharge Summary    No notes of this type exist for this encounter.         Discharge Order (From admission, onward)     Start     Ordered    12/17/20 1503  Discharge patient  Once     Expected Discharge Date: 12/17/20    Expected Discharge Time: Afternoon    Discharge Disposition: Home or Self Care    Physician of Record for Attribution - Please select from Treatment Team: JUAN DANIEL CLARKE [[id number]]    Review needed by CMO to determine Physician of Record: No       Question Answer Comment   Physician of Record for Attribution - Please select from Treatment Team JUAN DANIEL CLARKE    Review needed by CMO to determine Physician of Record No        12/17/20 1501

## 2020-12-17 NOTE — PLAN OF CARE
Goal Outcome Evaluation:  Plan of Care Reviewed With: patient  Progress: improving     Pt confused to time and situation, cooperative, some urinary incontinence noted, no sign of shortness of air, nasal cannula in place as ordered

## 2020-12-17 NOTE — SIGNIFICANT NOTE
12/17/20 1458   OTHER   Discipline physical therapist   Rehab Time/Intention   Session Not Performed patient unavailable for treatment  (Pt attempted to be seen by PT twice this day)   Recommendation   PT - Next Appointment 12/18/20

## 2020-12-17 NOTE — CONSULTS
Assessment:  Diagnosis: ltab    No Known Allergies    Order Date/Time: 10/17/20 1420  Indications: Long Term Antibiotics   LABS:  Lab Results   Component Value Date    INR 0.89 (L) 12/13/2020    PROTIME 11.8 (L) 12/13/2020     No results found for: PTT  Lab Results   Component Value Date    WBC 8.25 12/17/2020    HGB 9.3 (L) 12/17/2020    HCT 29.8 (L) 12/17/2020    MCV 99.0 (H) 12/17/2020     12/17/2020     Lab Results   Component Value Date    BUN 30 (H) 12/17/2020     Lab Results   Component Value Date    CREATININE 0.90 12/17/2020     Lab Results   Component Value Date    EGFRIFNONA 79 12/17/2020     Labs Reviewed: WNL    Contraindications for PICC/Midline:  No Contraindication      Recommendations:  Peripherally inserted central catheter    Procedure Time Out:  Time out Time: 12/17/20 1420  Correct Patient Identity: Yes  Correct Surgical Side and Site Are Marked: Yes  Agreement on Procedure to be done: Yes  Antibiotic Given: N/A  RN: aiyana anthony RN  RN: Kacie Steiner RN  Other: N/A      Aiyana Anthony, RN

## 2020-12-17 NOTE — PROGRESS NOTES
Kinetics :  Vancomycin  Day 4    The pre-dose vancomycin level confirms adequate management with acceptable auc and trough projections.  Plan to continue the same for now and monitor with you.

## 2020-12-17 NOTE — PROGRESS NOTES
Discharge Planning Assessment   [facility]     Patient Name: Aaron Domínguez  MRN: [id number]  Today's Date: 12/17/2020    Admit Date: 12/13/2020        Discharge Plan     Row Name 12/17/20 1609       Plan    Plan  Pt to be discharged home on this date with Physician ordered home health and home IV antibiotics.  Home IV antibiotics arranged via [facility] per Franck.  Zarina to contact pt's daughter Lois Bhakta with details regarding delivery of IV medication.  SS made referral to [facility] per Kacie Cobb and faxed pt information to agency at [phone].  RN to call report to [facility] at [phone].  Dtr Lois to transport pt home via private auto.    Final Discharge Disposition Code  06 - home with home health care    Final Note  Pt to be discharged home with [facility] at [phone] per Kacie Cobb and [facility] per Franck.  Pt to be transported via private auto.          CHASE PatriciaW

## 2020-12-18 NOTE — OUTREACH NOTE
Prep Survey      Responses   [facility] facility patient discharged from?  [facility]   Is LACE score < 7 ?  No   Eligibility  Readm Mgmt   Discharge diagnosis   COPD exacerbation MRSA bacteremia   Does the patient have one of the following disease processes/diagnoses(primary or secondary)?  COPD/Pneumonia   Does the patient have Home health ordered?  Yes   What is the Home health agency?   [facility]  and [facility] for antibiotic therapy   Is there a DME ordered?  No   Comments regarding appointments   Patient had a PICC line placed on 12/17/2020    Prep survey completed?  Yes          Sharon Palmer RN

## 2020-12-18 NOTE — PAYOR COMM NOTE
"[facility]  NPI:[id number]    Utilization Review  Contact: Isabella Garcia RN  Phone: [phone]  Fax:[phone]    DISCHARGE NOTIFICATION   [id number]          Estelita Domínguez (89 y.o. Male)     Date of Birth Social Security Number Address Home Phone MRN    [id number]  [address]  [place] [address] [phone] [id number]    Hoahaoism Marital Status          [facility]        Admission Date Admission Type Admitting Provider Attending Provider Department, Room/Bed    20 Emergency OculamKamini MD  [facility] 3 [facility], 3318/1P    Discharge Date Discharge Disposition Discharge Destination        2020 Home or Self Care              Attending Provider: (none)   Allergies: No Known Allergies    Isolation: Contact   Infection: MRSA (20)   Code Status: Prior    Ht: 182.9 cm (72\")   Wt: 92.2 kg (203 lb 3.2 oz)    Admission Cmt: None   Principal Problem: Dyspnea [R06.00]                 Active Insurance as of 2020     Primary Coverage     Payor Plan Insurance Group Employer/Plan Group    HUMANA MEDICARE REPLACEMENT HUMANA MEDICARE REPLACEMENT [id number]     Payor Plan Address Payor Plan Phone Number Payor Plan Fax Number Effective Dates    [address] [address] [phone]  2015 - None Entered    [facility] [id number]       Subscriber Name Subscriber Birth Date Member ID       ESTELITA DOMÍNGUEZ 1929 [id number]                 Emergency Contacts      (Rel.) Home Phone Work Phone Mobile Phone    Lois Bhakta (Power of ) [phone] -- --    Rossana Retana (Power of ) [phone] -- --               Discharge Summary      Juan Daniel Perdue DO at 20 1507              [facility] HOSPITALIST MEDICINE DISCHARGE SUMMARY    Patient Identification:  Name:  Estelita Domínguez  Age:  89 y.o.  Sex:  male  :  1929  MRN:  [id number]  Visit Number:  [id number]    Date of Admission: 2020  Date of Discharge:  " 12/17/2020     PCP: Bran Woodruff MD    DISCHARGE DIAGNOSIS   1.  COPD exacerbation  2.  MRSA bacteremia  3.  Chronic hypoxic respiratory failure  4.  Essential hypertension  5.  Hyperlipidemia  6.  Normocytic anemia  7.  General deconditioning  8.  Advanced age      CONSULTS  1. Dr. Stephenson, ID      PROCEDURES PERFORMED   1.  Patient had a PICC line placed on 12/17/2020 secondary to need for prolonged IV antibiotic administration.  Patient tolerated the procedure well with no postprocedural complications.      HOSPITAL COURSE  Mr. Domínguez is a 89 y.o. male who presented to [facility] ED on 12/13/2020 with a chief complaint of generalized weakness with a mechanical fall and shortness of breath.  Patient has a past medical history remarkable for COPD, type 2 diabetes mellitus and essential hypertension.  Patient does live at home alone and was brought to the emergency department secondary to shortness of breath and recurrent falls at home.  Patient reported being more short of breath in the 5 days preceding evaluation in the emergency department.  He also endorsed increased coughing with a whitish sputum but denied any fevers or chills.  Patient's daughter reported the patient fell the day prior to evaluation in the emergency department when he jeanette from a seated position.  The patient reports he tripped over his feet and had a mechanical fall.  Nevertheless, patient was brought to the emergency department for further treatment and evaluation of recurrent falls and shortness of breath.  Initial evaluation in the emergency department did consist of basic laboratory work as well as physical exam and vital signs.  Please see initial H&P for specific details of initial encounter during admission.  Overall, patient was diagnosed with mild COPD exacerbation and recurrent falls.  Patient was admitted for further treatment and evaluation of mild COPD exacerbation.    In regards to COPD exacerbation, patient was  started on inhaled corticosteroids with long-acting beta agonist, duo nebs and doxycycline as well as a short course of Solu-Medrol.  In regards to patient's recurrent falls, physical therapy and Occupational Therapy were consulted.  After thorough evaluation, recommendation was made for skilled nursing facility or inpatient rehab.  This was presented to the patient but he politely declined and wished to return home.  Patient was to be discharged home on 12/15/2020 after this conversation.  However, patient did have return of a single blood culture with MRSA bacteremia.  Patient did have 3 blood cultures performed in total with 1 of 3 being positive for MRSA.  Patient's white blood cell count remained within normal limits, patient was afebrile and CRP had returned to close to normal.  It was felt this was likely secondary to contaminated specimen, however infectious disease was consulted for a formal opinion.  After thorough evaluation by infectious disease, recommendations were made to treat patient's 1 of 3 blood cultures as not contaminated but real.  Recommendation was made for patient to be treated for 2 weeks with IV vancomycin.  As such, patient was scheduled to have a PICC line placed which was accomplished on 12/17/2020.  Case management was consulted who has arranged for home health with IV vancomycin administration.  With this in mind, it is felt patient has reached maximum medical management of current hospitalization and will be discharged home in stable condition today.  The beforementioned plan was thoroughly discussed with the patient and he expressed his understanding and willingness to proceed with the beforementioned plan.    VITAL SIGNS:      12/15/20  0451 12/16/20  0500 12/17/20  0500   Weight: 89.1 kg (196 lb 6.4 oz) 91.2 kg (201 lb) 92.2 kg (203 lb 3.2 oz)     Body mass index is 27.56 kg/m².    PHYSICAL EXAM:  Constitutional: Elderly appearing  male in no apparent distress.     HENT:   Head:  Normocephalic and atraumatic.  Mouth:  Moist mucous membranes.    Eyes:  Conjunctivae and EOM are normal.  Pupils are equal, round, and reactive to light.  No scleral icterus.    Neck:  Neck supple. No thyromegaly.  No JVD present.    Cardiovascular:  Regular rate and rhythm with no murmurs, rubs, clicks or gallops appreciated.  Pulmonary/Chest:  Clear to auscultation bilaterally with no crackles, wheezes or rhonchi appreciated.  Abdominal:  Soft. Nontender. Nondistended  Bowel sounds are normal in all four quadrants. No organomegally appreciated.   Musculoskeletal:  No edema, no tenderness, and no deformity.  No red or swollen joints anywhere.    Neurological:  Alert and oriented to person, place, and time. Cranial nerves II-XII intact with no focal deficits.  No facial droop.  No slurred speech.   Skin:  Warm and dry to palpation, multiple abrasions and bruises noted on bilateral upper extremities and chest  Peripheral vascular:  2+ radial and pedal pulses in bilateral upper and lower extremities.  Psychiatric:  Alert and oriented x3, demonstrates appropriate judgement and insight.    DISCHARGE DISPOSITION   Stable    DISCHARGE MEDICATIONS:     Discharge Medications      New Medications      Instructions Start Date   vancomycin   1,500 mg, Intravenous, Every 24 Hours   Start Date: December 18, 2020        Continue These Medications      Instructions Start Date   albuterol sulfate  (90 Base) MCG/ACT inhaler  Commonly known as: ProAir HFA   2 puffs, Inhalation, Every 4 Hours PRN      amitriptyline 25 MG tablet  Commonly known as: ELAVIL   25 mg, Oral, Nightly      amLODIPine-benazepril 5-20 MG per capsule  Commonly known as: LOTREL 5-20   1 capsule, Oral, Daily, for blood pressure      atorvastatin 10 MG tablet  Commonly known as: LIPITOR   10 mg, Oral, Nightly      clopidogrel 75 MG tablet  Commonly known as: PLAVIX   75 mg, Oral, Daily, as directed      doxazosin 8 MG tablet  Commonly known as:  CARDURA   8 mg, Oral, Nightly      Fluticasone-Umeclidin-Vilant 100-62.5-25 MCG/INH aerosol powder    1 each, Inhalation, Daily      ipratropium-albuterol 0.5-2.5 mg/3 ml nebulizer  Commonly known as: DUO-NEB   3 mL, Nebulization, Every 4 Hours PRN      isosorbide mononitrate 30 MG 24 hr tablet  Commonly known as: IMDUR   30 mg, Oral, Daily      metoprolol succinate  MG 24 hr tablet  Commonly known as: TOPROL-XL   100 mg, Oral, Daily      montelukast 10 MG tablet  Commonly known as: SINGULAIR   10 mg, Oral, Nightly      predniSONE 5 MG tablet  Commonly known as: DELTASONE   5 mg, Oral, Daily      theophylline 400 MG 24 hr tablet  Commonly known as: UNIPHYL   400 mg, Oral, Daily      vitamin D 1.25 MG (99641 UT) capsule capsule  Commonly known as: ERGOCALCIFEROL   50,000 Units, Oral, Weekly                   Additional Instructions for the Follow-ups that You Need to Schedule     Ambulatory Referral to Home Health   As directed      Face to Face Visit Date: 12/17/2020    Follow-up provider for Plan of Care?: I treated the patient in an acute care facility and will not continue treatment after discharge.    Follow-up provider: ELYSSA WOODRUFF [[id number]]    Reason/Clinical Findings: MRSA Bacteremia    Describe mobility limitations that make leaving home difficult: Advanced Age and MRSA bacteremia    Nursing/Therapeutic Services Requested: Skilled Nursing    Skilled nursing orders: Infusion therapy    Frequency: 1 Week 1         Discharge Follow-up with PCP   As directed       Currently Documented PCP:    Elyssa Woodruff MD    PCP Phone Number:    [phone]     Follow Up Details: please follow up with pcp in 3-4 weeks           Follow-up Information     Elyssa Woodruff MD .    Specialty: Internal Medicine  Why: please follow up with pcp in 3-4 weeks  Contact information:  [address] [facility] [address] [phone]                   TEST  RESULTS PENDING AT DISCHARGE  Pending Labs     Order  Current Status    Blood Culture - Blood, Arm, Left Preliminary result    Blood Culture - Blood, Arm, Left Preliminary result    Blood Culture - Blood, Arm, Right Preliminary result    Blood Culture - Blood, Hand, Left Preliminary result           Juan Daniel Pedrue DO  12/17/20  15:07 EST    Please note that this discharge summary required more than 30 minutes to complete.    Please send a copy of this dictation to the following providers:  Bran Woodruff MD    Electronically signed by Juan Daniel Perdue DO at 12/17/20 0342

## 2020-12-20 PROBLEM — E16.2 HYPOGLYCEMIA: Status: ACTIVE | Noted: 2020-01-01
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
## 2020-12-20 NOTE — OUTREACH NOTE
COPD/PN Week 1 Survey      Responses   [facility] patient discharged from?  Juan   Does the patient have one of the following disease processes/diagnoses(primary or secondary)?  COPD/Pneumonia   Week 1 attempt successful?  No   Revoke  Readmitted          Karyna Beaulieu RN

## 2021-01-01 ENCOUNTER — APPOINTMENT (OUTPATIENT)
Dept: GENERAL RADIOLOGY | Facility: HOSPITAL | Age: 86
End: 2021-01-01

## 2021-01-01 ENCOUNTER — APPOINTMENT (OUTPATIENT)
Dept: CT IMAGING | Facility: HOSPITAL | Age: 86
End: 2021-01-01

## 2021-01-01 VITALS
DIASTOLIC BLOOD PRESSURE: 50 MMHG | BODY MASS INDEX: 27.07 KG/M2 | WEIGHT: 193.4 LBS | OXYGEN SATURATION: 92 % | HEIGHT: 71 IN | TEMPERATURE: 96.4 F | SYSTOLIC BLOOD PRESSURE: 94 MMHG

## 2021-01-01 LAB
A-A DO2: 102.8 MMHG (ref 0–300)
A-A DO2: 181.5 MMHG (ref 0–300)
A-A DO2: 322.8 MMHG (ref 0–300)
A-A DO2: 333 MMHG (ref 0–300)
A-A DO2: 341.3 MMHG (ref 0–300)
A-A DO2: 343.3 MMHG (ref 0–300)
A-A DO2: 531.4 MMHG (ref 0–300)
ALBUMIN SERPL-MCNC: 2.68 G/DL (ref 3.5–5.2)
ALBUMIN SERPL-MCNC: 2.68 G/DL (ref 3.5–5.2)
ALBUMIN SERPL-MCNC: 2.82 G/DL (ref 3.5–5.2)
ALBUMIN SERPL-MCNC: 2.88 G/DL (ref 3.5–5.2)
ALBUMIN SERPL-MCNC: 2.98 G/DL (ref 3.5–5.2)
ALBUMIN SERPL-MCNC: 2.98 G/DL (ref 3.5–5.2)
ALBUMIN/GLOB SERPL: 0.8 G/DL
ALBUMIN/GLOB SERPL: 0.9 G/DL
ALP SERPL-CCNC: 129 U/L (ref 39–117)
ALP SERPL-CCNC: 138 U/L (ref 39–117)
ALP SERPL-CCNC: 140 U/L (ref 39–117)
ALP SERPL-CCNC: 142 U/L (ref 39–117)
ALP SERPL-CCNC: 146 U/L (ref 39–117)
ALP SERPL-CCNC: 172 U/L (ref 39–117)
ALT SERPL W P-5'-P-CCNC: 22 U/L (ref 1–41)
ALT SERPL W P-5'-P-CCNC: 22 U/L (ref 1–41)
ALT SERPL W P-5'-P-CCNC: 23 U/L (ref 1–41)
AMMONIA BLD-SCNC: 19 UMOL/L (ref 16–60)
ANION GAP SERPL CALCULATED.3IONS-SCNC: 10.1 MMOL/L (ref 5–15)
ANION GAP SERPL CALCULATED.3IONS-SCNC: 10.5 MMOL/L (ref 5–15)
ANION GAP SERPL CALCULATED.3IONS-SCNC: 13.3 MMOL/L (ref 5–15)
ANION GAP SERPL CALCULATED.3IONS-SCNC: 6.1 MMOL/L (ref 5–15)
ANION GAP SERPL CALCULATED.3IONS-SCNC: 7 MMOL/L (ref 5–15)
ANION GAP SERPL CALCULATED.3IONS-SCNC: 7.7 MMOL/L (ref 5–15)
ANION GAP SERPL CALCULATED.3IONS-SCNC: 8.8 MMOL/L (ref 5–15)
ANION GAP SERPL CALCULATED.3IONS-SCNC: 9.7 MMOL/L (ref 5–15)
ANION GAP SERPL CALCULATED.3IONS-SCNC: 9.8 MMOL/L (ref 5–15)
ARTERIAL PATENCY WRIST A: ABNORMAL
ARTERIAL PATENCY WRIST A: ABNORMAL
ARTERIAL PATENCY WRIST A: POSITIVE
AST SERPL-CCNC: 18 U/L (ref 1–40)
AST SERPL-CCNC: 20 U/L (ref 1–40)
AST SERPL-CCNC: 21 U/L (ref 1–40)
AST SERPL-CCNC: 22 U/L (ref 1–40)
AST SERPL-CCNC: 23 U/L (ref 1–40)
AST SERPL-CCNC: 25 U/L (ref 1–40)
ATMOSPHERIC PRESS: 722 MMHG
ATMOSPHERIC PRESS: 724 MMHG
ATMOSPHERIC PRESS: 724 MMHG
ATMOSPHERIC PRESS: 725 MMHG
ATMOSPHERIC PRESS: 727 MMHG
ATMOSPHERIC PRESS: 727 MMHG
ATMOSPHERIC PRESS: 728 MMHG
BACTERIA SPEC AEROBE CULT: NO GROWTH
BACTERIA SPEC RESP CULT: NORMAL
BACTERIA SPEC RESP CULT: NORMAL
BACTERIA UR QL AUTO: ABNORMAL /HPF
BASE EXCESS BLDA CALC-SCNC: 11.6 MMOL/L (ref 0–2)
BASE EXCESS BLDA CALC-SCNC: 12.9 MMOL/L (ref 0–2)
BASE EXCESS BLDA CALC-SCNC: 14.9 MMOL/L (ref 0–2)
BASE EXCESS BLDA CALC-SCNC: 8.3 MMOL/L (ref 0–2)
BASE EXCESS BLDA CALC-SCNC: 8.6 MMOL/L (ref 0–2)
BASE EXCESS BLDA CALC-SCNC: 9.2 MMOL/L (ref 0–2)
BASE EXCESS BLDA CALC-SCNC: 9.6 MMOL/L (ref 0–2)
BASOPHILS # BLD AUTO: 0.01 10*3/MM3 (ref 0–0.2)
BASOPHILS # BLD AUTO: 0.03 10*3/MM3 (ref 0–0.2)
BASOPHILS NFR BLD AUTO: 0.1 % (ref 0–1.5)
BASOPHILS NFR BLD AUTO: 0.2 % (ref 0–1.5)
BASOPHILS NFR BLD AUTO: 0.4 % (ref 0–1.5)
BASOPHILS NFR BLD AUTO: 0.4 % (ref 0–1.5)
BASOPHILS NFR BLD AUTO: 0.5 % (ref 0–1.5)
BDY SITE: ABNORMAL
BILIRUB SERPL-MCNC: 0.2 MG/DL (ref 0–1.2)
BILIRUB SERPL-MCNC: 0.3 MG/DL (ref 0–1.2)
BILIRUB UR QL STRIP: ABNORMAL
BODY TEMPERATURE: 0 C
BUN SERPL-MCNC: 33 MG/DL (ref 8–23)
BUN SERPL-MCNC: 37 MG/DL (ref 8–23)
BUN SERPL-MCNC: 44 MG/DL (ref 8–23)
BUN SERPL-MCNC: 50 MG/DL (ref 8–23)
BUN SERPL-MCNC: 58 MG/DL (ref 8–23)
BUN SERPL-MCNC: 62 MG/DL (ref 8–23)
BUN SERPL-MCNC: 67 MG/DL (ref 8–23)
BUN SERPL-MCNC: 92 MG/DL (ref 8–23)
BUN SERPL-MCNC: 94 MG/DL (ref 8–23)
BUN/CREAT SERPL: 29.7 (ref 7–25)
BUN/CREAT SERPL: 30.5 (ref 7–25)
BUN/CREAT SERPL: 30.6 (ref 7–25)
BUN/CREAT SERPL: 34.4 (ref 7–25)
BUN/CREAT SERPL: 38.2 (ref 7–25)
BUN/CREAT SERPL: 39.7 (ref 7–25)
BUN/CREAT SERPL: 43.5 (ref 7–25)
BUN/CREAT SERPL: 45 (ref 7–25)
BUN/CREAT SERPL: 55.8 (ref 7–25)
CALCIUM SPEC-SCNC: 10 MG/DL (ref 8.2–9.6)
CALCIUM SPEC-SCNC: 8.8 MG/DL (ref 8.2–9.6)
CALCIUM SPEC-SCNC: 8.9 MG/DL (ref 8.2–9.6)
CALCIUM SPEC-SCNC: 9 MG/DL (ref 8.2–9.6)
CALCIUM SPEC-SCNC: 9.1 MG/DL (ref 8.2–9.6)
CALCIUM SPEC-SCNC: 9.3 MG/DL (ref 8.2–9.6)
CALCIUM SPEC-SCNC: 9.3 MG/DL (ref 8.2–9.6)
CALCIUM SPEC-SCNC: 9.4 MG/DL (ref 8.2–9.6)
CALCIUM SPEC-SCNC: 9.6 MG/DL (ref 8.2–9.6)
CHLORIDE SERPL-SCNC: 100 MMOL/L (ref 98–107)
CHLORIDE SERPL-SCNC: 100 MMOL/L (ref 98–107)
CHLORIDE SERPL-SCNC: 104 MMOL/L (ref 98–107)
CHLORIDE SERPL-SCNC: 89 MMOL/L (ref 98–107)
CHLORIDE SERPL-SCNC: 91 MMOL/L (ref 98–107)
CHLORIDE SERPL-SCNC: 92 MMOL/L (ref 98–107)
CHLORIDE SERPL-SCNC: 92 MMOL/L (ref 98–107)
CHLORIDE SERPL-SCNC: 94 MMOL/L (ref 98–107)
CHLORIDE SERPL-SCNC: 94 MMOL/L (ref 98–107)
CK SERPL-CCNC: 68 U/L (ref 20–200)
CK SERPL-CCNC: 93 U/L (ref 20–200)
CLARITY UR: ABNORMAL
CO2 BLDA-SCNC: 38.5 MMOL/L (ref 22–33)
CO2 BLDA-SCNC: 38.7 MMOL/L (ref 22–33)
CO2 BLDA-SCNC: 38.8 MMOL/L (ref 22–33)
CO2 BLDA-SCNC: 38.9 MMOL/L (ref 22–33)
CO2 BLDA-SCNC: 39.8 MMOL/L (ref 22–33)
CO2 BLDA-SCNC: 40.5 MMOL/L (ref 22–33)
CO2 BLDA-SCNC: 43.8 MMOL/L (ref 22–33)
CO2 SERPL-SCNC: 31.7 MMOL/L (ref 22–29)
CO2 SERPL-SCNC: 32.5 MMOL/L (ref 22–29)
CO2 SERPL-SCNC: 32.9 MMOL/L (ref 22–29)
CO2 SERPL-SCNC: 33.3 MMOL/L (ref 22–29)
CO2 SERPL-SCNC: 34.2 MMOL/L (ref 22–29)
CO2 SERPL-SCNC: 34.3 MMOL/L (ref 22–29)
CO2 SERPL-SCNC: 35.2 MMOL/L (ref 22–29)
CO2 SERPL-SCNC: 36 MMOL/L (ref 22–29)
CO2 SERPL-SCNC: 36.9 MMOL/L (ref 22–29)
COHGB MFR BLD: 1.1 % (ref 0–5)
COHGB MFR BLD: 1.2 % (ref 0–5)
COHGB MFR BLD: 1.4 % (ref 0–5)
COHGB MFR BLD: 1.6 % (ref 0–5)
COHGB MFR BLD: 1.8 % (ref 0–5)
COLOR UR: ABNORMAL
CREAT SERPL-MCNC: 1.11 MG/DL (ref 0.76–1.27)
CREAT SERPL-MCNC: 1.21 MG/DL (ref 0.76–1.27)
CREAT SERPL-MCNC: 1.28 MG/DL (ref 0.76–1.27)
CREAT SERPL-MCNC: 1.49 MG/DL (ref 0.76–1.27)
CREAT SERPL-MCNC: 1.52 MG/DL (ref 0.76–1.27)
CREAT SERPL-MCNC: 1.56 MG/DL (ref 0.76–1.27)
CREAT SERPL-MCNC: 1.64 MG/DL (ref 0.76–1.27)
CREAT SERPL-MCNC: 1.65 MG/DL (ref 0.76–1.27)
CREAT SERPL-MCNC: 2.16 MG/DL (ref 0.76–1.27)
CRP SERPL-MCNC: 10.6 MG/DL (ref 0–0.5)
CRP SERPL-MCNC: 2.7 MG/DL (ref 0–0.5)
CRP SERPL-MCNC: 3.86 MG/DL (ref 0–0.5)
CRP SERPL-MCNC: 5.02 MG/DL (ref 0–0.5)
CRP SERPL-MCNC: 5.41 MG/DL (ref 0–0.5)
CRP SERPL-MCNC: 6.09 MG/DL (ref 0–0.5)
CRP SERPL-MCNC: 6.33 MG/DL (ref 0–0.5)
CRP SERPL-MCNC: 7.1 MG/DL (ref 0–0.5)
CRP SERPL-MCNC: 7.82 MG/DL (ref 0–0.5)
D DIMER PPP FEU-MCNC: 0.83 MCGFEU/ML (ref 0–0.5)
D DIMER PPP FEU-MCNC: 1.77 MCGFEU/ML (ref 0–0.5)
D DIMER PPP FEU-MCNC: 1.98 MCGFEU/ML (ref 0–0.5)
DEPRECATED RDW RBC AUTO: 51.8 FL (ref 37–54)
DEPRECATED RDW RBC AUTO: 51.9 FL (ref 37–54)
DEPRECATED RDW RBC AUTO: 52 FL (ref 37–54)
DEPRECATED RDW RBC AUTO: 52 FL (ref 37–54)
DEPRECATED RDW RBC AUTO: 52.2 FL (ref 37–54)
DEPRECATED RDW RBC AUTO: 52.8 FL (ref 37–54)
DEPRECATED RDW RBC AUTO: 53.1 FL (ref 37–54)
DEPRECATED RDW RBC AUTO: 53.4 FL (ref 37–54)
EOSINOPHIL # BLD AUTO: 0 10*3/MM3 (ref 0–0.4)
EOSINOPHIL # BLD AUTO: 0.02 10*3/MM3 (ref 0–0.4)
EOSINOPHIL NFR BLD AUTO: 0 % (ref 0.3–6.2)
EOSINOPHIL NFR BLD AUTO: 0.3 % (ref 0.3–6.2)
EPAP: 6
EPAP: 6
ERYTHROCYTE [DISTWIDTH] IN BLOOD BY AUTOMATED COUNT: 13.9 % (ref 12.3–15.4)
ERYTHROCYTE [DISTWIDTH] IN BLOOD BY AUTOMATED COUNT: 14 % (ref 12.3–15.4)
ERYTHROCYTE [DISTWIDTH] IN BLOOD BY AUTOMATED COUNT: 14.1 % (ref 12.3–15.4)
ERYTHROCYTE [DISTWIDTH] IN BLOOD BY AUTOMATED COUNT: 14.2 % (ref 12.3–15.4)
ERYTHROCYTE [DISTWIDTH] IN BLOOD BY AUTOMATED COUNT: 14.3 % (ref 12.3–15.4)
ERYTHROCYTE [DISTWIDTH] IN BLOOD BY AUTOMATED COUNT: 14.3 % (ref 12.3–15.4)
ERYTHROCYTE [DISTWIDTH] IN BLOOD BY AUTOMATED COUNT: 14.5 % (ref 12.3–15.4)
ERYTHROCYTE [DISTWIDTH] IN BLOOD BY AUTOMATED COUNT: 14.6 % (ref 12.3–15.4)
FERRITIN SERPL-MCNC: 321.5 NG/ML (ref 30–400)
FERRITIN SERPL-MCNC: 336.4 NG/ML (ref 30–400)
FERRITIN SERPL-MCNC: 453.6 NG/ML (ref 30–400)
FERRITIN SERPL-MCNC: 625.3 NG/ML (ref 30–400)
FIBRINOGEN PPP-MCNC: 496 MG/DL (ref 173–524)
FIBRINOGEN PPP-MCNC: 548 MG/DL (ref 173–524)
GAS FLOW AIRWAY: 4 LPM
GAS FLOW AIRWAY: 50 LPM
GAS FLOW AIRWAY: 50 LPM
GAS FLOW AIRWAY: 6 LPM
GFR SERPL CREATININE-BSD FRML MDRD: 29 ML/MIN/1.73
GFR SERPL CREATININE-BSD FRML MDRD: 39 ML/MIN/1.73
GFR SERPL CREATININE-BSD FRML MDRD: 40 ML/MIN/1.73
GFR SERPL CREATININE-BSD FRML MDRD: 42 ML/MIN/1.73
GFR SERPL CREATININE-BSD FRML MDRD: 43 ML/MIN/1.73
GFR SERPL CREATININE-BSD FRML MDRD: 44 ML/MIN/1.73
GFR SERPL CREATININE-BSD FRML MDRD: 53 ML/MIN/1.73
GFR SERPL CREATININE-BSD FRML MDRD: 56 ML/MIN/1.73
GFR SERPL CREATININE-BSD FRML MDRD: 62 ML/MIN/1.73
GLOBULIN UR ELPH-MCNC: 3.1 GM/DL
GLOBULIN UR ELPH-MCNC: 3.2 GM/DL
GLOBULIN UR ELPH-MCNC: 3.3 GM/DL
GLOBULIN UR ELPH-MCNC: 3.3 GM/DL
GLOBULIN UR ELPH-MCNC: 3.4 GM/DL
GLOBULIN UR ELPH-MCNC: 3.4 GM/DL
GLUCOSE BLDC GLUCOMTR-MCNC: 116 MG/DL (ref 70–130)
GLUCOSE BLDC GLUCOMTR-MCNC: 137 MG/DL (ref 70–130)
GLUCOSE BLDC GLUCOMTR-MCNC: 146 MG/DL (ref 70–130)
GLUCOSE BLDC GLUCOMTR-MCNC: 155 MG/DL (ref 70–130)
GLUCOSE BLDC GLUCOMTR-MCNC: 158 MG/DL (ref 70–130)
GLUCOSE BLDC GLUCOMTR-MCNC: 160 MG/DL (ref 70–130)
GLUCOSE BLDC GLUCOMTR-MCNC: 161 MG/DL (ref 70–130)
GLUCOSE BLDC GLUCOMTR-MCNC: 164 MG/DL (ref 70–130)
GLUCOSE BLDC GLUCOMTR-MCNC: 168 MG/DL (ref 70–130)
GLUCOSE BLDC GLUCOMTR-MCNC: 171 MG/DL (ref 70–130)
GLUCOSE BLDC GLUCOMTR-MCNC: 180 MG/DL (ref 70–130)
GLUCOSE BLDC GLUCOMTR-MCNC: 190 MG/DL (ref 70–130)
GLUCOSE BLDC GLUCOMTR-MCNC: 208 MG/DL (ref 70–130)
GLUCOSE BLDC GLUCOMTR-MCNC: 223 MG/DL (ref 70–130)
GLUCOSE BLDC GLUCOMTR-MCNC: 229 MG/DL (ref 70–130)
GLUCOSE BLDC GLUCOMTR-MCNC: 230 MG/DL (ref 70–130)
GLUCOSE BLDC GLUCOMTR-MCNC: 232 MG/DL (ref 70–130)
GLUCOSE BLDC GLUCOMTR-MCNC: 238 MG/DL (ref 70–130)
GLUCOSE BLDC GLUCOMTR-MCNC: 238 MG/DL (ref 70–130)
GLUCOSE BLDC GLUCOMTR-MCNC: 244 MG/DL (ref 70–130)
GLUCOSE BLDC GLUCOMTR-MCNC: 249 MG/DL (ref 70–130)
GLUCOSE BLDC GLUCOMTR-MCNC: 259 MG/DL (ref 70–130)
GLUCOSE BLDC GLUCOMTR-MCNC: 281 MG/DL (ref 70–130)
GLUCOSE BLDC GLUCOMTR-MCNC: 287 MG/DL (ref 70–130)
GLUCOSE BLDC GLUCOMTR-MCNC: 288 MG/DL (ref 70–130)
GLUCOSE BLDC GLUCOMTR-MCNC: 295 MG/DL (ref 70–130)
GLUCOSE BLDC GLUCOMTR-MCNC: 298 MG/DL (ref 70–130)
GLUCOSE BLDC GLUCOMTR-MCNC: 325 MG/DL (ref 70–130)
GLUCOSE BLDC GLUCOMTR-MCNC: 353 MG/DL (ref 70–130)
GLUCOSE BLDC GLUCOMTR-MCNC: 370 MG/DL (ref 70–130)
GLUCOSE BLDC GLUCOMTR-MCNC: 386 MG/DL (ref 70–130)
GLUCOSE BLDC GLUCOMTR-MCNC: 398 MG/DL (ref 70–130)
GLUCOSE BLDC GLUCOMTR-MCNC: 406 MG/DL (ref 70–130)
GLUCOSE BLDC GLUCOMTR-MCNC: 486 MG/DL (ref 70–130)
GLUCOSE SERPL-MCNC: 205 MG/DL (ref 65–99)
GLUCOSE SERPL-MCNC: 217 MG/DL (ref 65–99)
GLUCOSE SERPL-MCNC: 228 MG/DL (ref 65–99)
GLUCOSE SERPL-MCNC: 228 MG/DL (ref 65–99)
GLUCOSE SERPL-MCNC: 231 MG/DL (ref 65–99)
GLUCOSE SERPL-MCNC: 247 MG/DL (ref 65–99)
GLUCOSE SERPL-MCNC: 273 MG/DL (ref 65–99)
GLUCOSE SERPL-MCNC: 344 MG/DL (ref 65–99)
GLUCOSE SERPL-MCNC: 363 MG/DL (ref 65–99)
GLUCOSE UR STRIP-MCNC: NEGATIVE MG/DL
GRAM STN SPEC: NORMAL
HCO3 BLDA-SCNC: 36.3 MMOL/L (ref 20–26)
HCO3 BLDA-SCNC: 36.7 MMOL/L (ref 20–26)
HCO3 BLDA-SCNC: 38 MMOL/L (ref 20–26)
HCO3 BLDA-SCNC: 38.7 MMOL/L (ref 20–26)
HCO3 BLDA-SCNC: 41.8 MMOL/L (ref 20–26)
HCT VFR BLD AUTO: 28.3 % (ref 37.5–51)
HCT VFR BLD AUTO: 28.6 % (ref 37.5–51)
HCT VFR BLD AUTO: 28.6 % (ref 37.5–51)
HCT VFR BLD AUTO: 29.1 % (ref 37.5–51)
HCT VFR BLD AUTO: 29.8 % (ref 37.5–51)
HCT VFR BLD AUTO: 29.8 % (ref 37.5–51)
HCT VFR BLD AUTO: 30.3 % (ref 37.5–51)
HCT VFR BLD AUTO: 30.6 % (ref 37.5–51)
HCT VFR BLD CALC: 27.9 % (ref 38–51)
HCT VFR BLD CALC: 27.9 % (ref 38–51)
HCT VFR BLD CALC: 28.1 % (ref 38–51)
HCT VFR BLD CALC: 28.2 % (ref 38–51)
HCT VFR BLD CALC: 28.4 % (ref 38–51)
HCT VFR BLD CALC: 28.6 % (ref 38–51)
HCT VFR BLD CALC: 30.3 % (ref 38–51)
HGB BLD-MCNC: 8.7 G/DL (ref 13–17.7)
HGB BLD-MCNC: 8.9 G/DL (ref 13–17.7)
HGB BLD-MCNC: 9.1 G/DL (ref 13–17.7)
HGB BLD-MCNC: 9.2 G/DL (ref 13–17.7)
HGB BLD-MCNC: 9.6 G/DL (ref 13–17.7)
HGB BLDA-MCNC: 9.1 G/DL (ref 14–18)
HGB BLDA-MCNC: 9.1 G/DL (ref 14–18)
HGB BLDA-MCNC: 9.2 G/DL (ref 14–18)
HGB BLDA-MCNC: 9.2 G/DL (ref 14–18)
HGB BLDA-MCNC: 9.3 G/DL (ref 14–18)
HGB BLDA-MCNC: 9.3 G/DL (ref 14–18)
HGB BLDA-MCNC: 9.9 G/DL (ref 14–18)
HGB UR QL STRIP.AUTO: ABNORMAL
HYALINE CASTS UR QL AUTO: ABNORMAL /LPF
IMM GRANULOCYTES # BLD AUTO: 0.02 10*3/MM3 (ref 0–0.05)
IMM GRANULOCYTES # BLD AUTO: 0.03 10*3/MM3 (ref 0–0.05)
IMM GRANULOCYTES # BLD AUTO: 0.05 10*3/MM3 (ref 0–0.05)
IMM GRANULOCYTES # BLD AUTO: 0.05 10*3/MM3 (ref 0–0.05)
IMM GRANULOCYTES # BLD AUTO: 0.06 10*3/MM3 (ref 0–0.05)
IMM GRANULOCYTES # BLD AUTO: 0.1 10*3/MM3 (ref 0–0.05)
IMM GRANULOCYTES # BLD AUTO: 0.1 10*3/MM3 (ref 0–0.05)
IMM GRANULOCYTES # BLD AUTO: 0.11 10*3/MM3 (ref 0–0.05)
IMM GRANULOCYTES NFR BLD AUTO: 0.3 % (ref 0–0.5)
IMM GRANULOCYTES NFR BLD AUTO: 0.5 % (ref 0–0.5)
IMM GRANULOCYTES NFR BLD AUTO: 0.6 % (ref 0–0.5)
IMM GRANULOCYTES NFR BLD AUTO: 0.6 % (ref 0–0.5)
IMM GRANULOCYTES NFR BLD AUTO: 0.7 % (ref 0–0.5)
IMM GRANULOCYTES NFR BLD AUTO: 1.1 % (ref 0–0.5)
IMM GRANULOCYTES NFR BLD AUTO: 1.1 % (ref 0–0.5)
IMM GRANULOCYTES NFR BLD AUTO: 1.5 % (ref 0–0.5)
INHALED O2 CONCENTRATION: 100 %
INHALED O2 CONCENTRATION: 36 %
INHALED O2 CONCENTRATION: 45 %
INHALED O2 CONCENTRATION: 68 %
INHALED O2 CONCENTRATION: 70 %
INHALED O2 CONCENTRATION: 70 %
INHALED O2 CONCENTRATION: 80 %
IPAP: 18
IPAP: 18
KETONES UR QL STRIP: NEGATIVE
LDH SERPL-CCNC: 303 U/L (ref 135–225)
LDH SERPL-CCNC: 335 U/L (ref 135–225)
LEUKOCYTE ESTERASE UR QL STRIP.AUTO: ABNORMAL
LYMPHOCYTES # BLD AUTO: 0.26 10*3/MM3 (ref 0.7–3.1)
LYMPHOCYTES # BLD AUTO: 0.44 10*3/MM3 (ref 0.7–3.1)
LYMPHOCYTES # BLD AUTO: 0.44 10*3/MM3 (ref 0.7–3.1)
LYMPHOCYTES # BLD AUTO: 0.45 10*3/MM3 (ref 0.7–3.1)
LYMPHOCYTES # BLD AUTO: 0.46 10*3/MM3 (ref 0.7–3.1)
LYMPHOCYTES # BLD AUTO: 0.48 10*3/MM3 (ref 0.7–3.1)
LYMPHOCYTES # BLD AUTO: 0.54 10*3/MM3 (ref 0.7–3.1)
LYMPHOCYTES # BLD AUTO: 0.6 10*3/MM3 (ref 0.7–3.1)
LYMPHOCYTES NFR BLD AUTO: 2.4 % (ref 19.6–45.3)
LYMPHOCYTES NFR BLD AUTO: 4.4 % (ref 19.6–45.3)
LYMPHOCYTES NFR BLD AUTO: 5.1 % (ref 19.6–45.3)
LYMPHOCYTES NFR BLD AUTO: 5.1 % (ref 19.6–45.3)
LYMPHOCYTES NFR BLD AUTO: 5.8 % (ref 19.6–45.3)
LYMPHOCYTES NFR BLD AUTO: 7.3 % (ref 19.6–45.3)
LYMPHOCYTES NFR BLD AUTO: 8.4 % (ref 19.6–45.3)
LYMPHOCYTES NFR BLD AUTO: 8.5 % (ref 19.6–45.3)
Lab: ABNORMAL
MAGNESIUM SERPL-MCNC: 1.9 MG/DL (ref 1.7–2.3)
MAGNESIUM SERPL-MCNC: 2.1 MG/DL (ref 1.7–2.3)
MAGNESIUM SERPL-MCNC: 2.2 MG/DL (ref 1.7–2.3)
MAGNESIUM SERPL-MCNC: 2.8 MG/DL (ref 1.7–2.3)
MAGNESIUM SERPL-MCNC: 3.1 MG/DL (ref 1.7–2.3)
MCH RBC QN AUTO: 30.4 PG (ref 26.6–33)
MCH RBC QN AUTO: 30.5 PG (ref 26.6–33)
MCH RBC QN AUTO: 30.6 PG (ref 26.6–33)
MCH RBC QN AUTO: 30.7 PG (ref 26.6–33)
MCH RBC QN AUTO: 30.7 PG (ref 26.6–33)
MCH RBC QN AUTO: 30.9 PG (ref 26.6–33)
MCH RBC QN AUTO: 31.1 PG (ref 26.6–33)
MCH RBC QN AUTO: 31.4 PG (ref 26.6–33)
MCHC RBC AUTO-ENTMCNC: 29.4 G/DL (ref 31.5–35.7)
MCHC RBC AUTO-ENTMCNC: 29.9 G/DL (ref 31.5–35.7)
MCHC RBC AUTO-ENTMCNC: 30.4 G/DL (ref 31.5–35.7)
MCHC RBC AUTO-ENTMCNC: 30.5 G/DL (ref 31.5–35.7)
MCHC RBC AUTO-ENTMCNC: 31.1 G/DL (ref 31.5–35.7)
MCHC RBC AUTO-ENTMCNC: 31.4 G/DL (ref 31.5–35.7)
MCHC RBC AUTO-ENTMCNC: 31.4 G/DL (ref 31.5–35.7)
MCHC RBC AUTO-ENTMCNC: 31.6 G/DL (ref 31.5–35.7)
MCV RBC AUTO: 100 FL (ref 79–97)
MCV RBC AUTO: 101.7 FL (ref 79–97)
MCV RBC AUTO: 102.1 FL (ref 79–97)
MCV RBC AUTO: 104.5 FL (ref 79–97)
MCV RBC AUTO: 97.3 FL (ref 79–97)
MCV RBC AUTO: 97.8 FL (ref 79–97)
MCV RBC AUTO: 99.3 FL (ref 79–97)
MCV RBC AUTO: 99.3 FL (ref 79–97)
METHGB BLD QL: 0 % (ref 0–3)
METHGB BLD QL: 0.1 % (ref 0–3)
METHGB BLD QL: 0.3 % (ref 0–3)
METHGB BLD QL: 0.6 % (ref 0–3)
METHGB BLD QL: <1 % (ref 0–3)
MODALITY: ABNORMAL
MONOCYTES # BLD AUTO: 0.28 10*3/MM3 (ref 0.1–0.9)
MONOCYTES # BLD AUTO: 0.64 10*3/MM3 (ref 0.1–0.9)
MONOCYTES # BLD AUTO: 0.69 10*3/MM3 (ref 0.1–0.9)
MONOCYTES # BLD AUTO: 0.71 10*3/MM3 (ref 0.1–0.9)
MONOCYTES # BLD AUTO: 0.79 10*3/MM3 (ref 0.1–0.9)
MONOCYTES # BLD AUTO: 0.81 10*3/MM3 (ref 0.1–0.9)
MONOCYTES # BLD AUTO: 0.94 10*3/MM3 (ref 0.1–0.9)
MONOCYTES # BLD AUTO: 0.94 10*3/MM3 (ref 0.1–0.9)
MONOCYTES NFR BLD AUTO: 10.7 % (ref 5–12)
MONOCYTES NFR BLD AUTO: 13.3 % (ref 5–12)
MONOCYTES NFR BLD AUTO: 2.6 % (ref 5–12)
MONOCYTES NFR BLD AUTO: 9 % (ref 5–12)
MONOCYTES NFR BLD AUTO: 9 % (ref 5–12)
MONOCYTES NFR BLD AUTO: 9.1 % (ref 5–12)
MONOCYTES NFR BLD AUTO: 9.2 % (ref 5–12)
MONOCYTES NFR BLD AUTO: 9.9 % (ref 5–12)
NEUTROPHILS NFR BLD AUTO: 10.06 10*3/MM3 (ref 1.7–7)
NEUTROPHILS NFR BLD AUTO: 5.22 10*3/MM3 (ref 1.7–7)
NEUTROPHILS NFR BLD AUTO: 5.32 10*3/MM3 (ref 1.7–7)
NEUTROPHILS NFR BLD AUTO: 5.46 10*3/MM3 (ref 1.7–7)
NEUTROPHILS NFR BLD AUTO: 6.43 10*3/MM3 (ref 1.7–7)
NEUTROPHILS NFR BLD AUTO: 7.32 10*3/MM3 (ref 1.7–7)
NEUTROPHILS NFR BLD AUTO: 7.51 10*3/MM3 (ref 1.7–7)
NEUTROPHILS NFR BLD AUTO: 77.2 % (ref 42.7–76)
NEUTROPHILS NFR BLD AUTO: 8.9 10*3/MM3 (ref 1.7–7)
NEUTROPHILS NFR BLD AUTO: 80.3 % (ref 42.7–76)
NEUTROPHILS NFR BLD AUTO: 80.7 % (ref 42.7–76)
NEUTROPHILS NFR BLD AUTO: 84.1 % (ref 42.7–76)
NEUTROPHILS NFR BLD AUTO: 84.6 % (ref 42.7–76)
NEUTROPHILS NFR BLD AUTO: 85 % (ref 42.7–76)
NEUTROPHILS NFR BLD AUTO: 85.4 % (ref 42.7–76)
NEUTROPHILS NFR BLD AUTO: 94.3 % (ref 42.7–76)
NITRITE UR QL STRIP: NEGATIVE
NOTE: ABNORMAL
NOTIFIED BY: ABNORMAL
NOTIFIED WHO: ABNORMAL
NRBC BLD AUTO-RTO: 0 /100 WBC (ref 0–0.2)
NT-PROBNP SERPL-MCNC: 1152 PG/ML (ref 0–1800)
NT-PROBNP SERPL-MCNC: 1440 PG/ML (ref 0–1800)
NT-PROBNP SERPL-MCNC: 3756 PG/ML (ref 0–1800)
OXYHGB MFR BLDV: 87.7 % (ref 94–99)
OXYHGB MFR BLDV: 89.6 % (ref 94–99)
OXYHGB MFR BLDV: 91 % (ref 94–99)
OXYHGB MFR BLDV: 91.2 % (ref 94–99)
OXYHGB MFR BLDV: 92 % (ref 94–99)
OXYHGB MFR BLDV: 93.9 % (ref 94–99)
OXYHGB MFR BLDV: 97.5 % (ref 94–99)
PCO2 BLDA: 56.6 MM HG (ref 35–45)
PCO2 BLDA: 60.3 MM HG (ref 35–45)
PCO2 BLDA: 65.4 MM HG (ref 35–45)
PCO2 BLDA: 65.6 MM HG (ref 35–45)
PCO2 BLDA: 69 MM HG (ref 35–45)
PCO2 BLDA: 72.4 MM HG (ref 35–45)
PCO2 BLDA: 74 MM HG (ref 35–45)
PCO2 TEMP ADJ BLD: ABNORMAL MM[HG]
PH BLDA: 7.3 PH UNITS (ref 7.35–7.45)
PH BLDA: 7.31 PH UNITS (ref 7.35–7.45)
PH BLDA: 7.33 PH UNITS (ref 7.35–7.45)
PH BLDA: 7.36 PH UNITS (ref 7.35–7.45)
PH BLDA: 7.41 PH UNITS (ref 7.35–7.45)
PH BLDA: 7.41 PH UNITS (ref 7.35–7.45)
PH BLDA: 7.44 PH UNITS (ref 7.35–7.45)
PH UR STRIP.AUTO: <=5 [PH] (ref 5–8)
PH, TEMP CORRECTED: ABNORMAL
PLATELET # BLD AUTO: 192 10*3/MM3 (ref 140–450)
PLATELET # BLD AUTO: 199 10*3/MM3 (ref 140–450)
PLATELET # BLD AUTO: 204 10*3/MM3 (ref 140–450)
PLATELET # BLD AUTO: 218 10*3/MM3 (ref 140–450)
PLATELET # BLD AUTO: 238 10*3/MM3 (ref 140–450)
PLATELET # BLD AUTO: 247 10*3/MM3 (ref 140–450)
PLATELET # BLD AUTO: 265 10*3/MM3 (ref 140–450)
PLATELET # BLD AUTO: 293 10*3/MM3 (ref 140–450)
PMV BLD AUTO: 10 FL (ref 6–12)
PMV BLD AUTO: 10 FL (ref 6–12)
PMV BLD AUTO: 10.1 FL (ref 6–12)
PMV BLD AUTO: 10.2 FL (ref 6–12)
PMV BLD AUTO: 10.5 FL (ref 6–12)
PMV BLD AUTO: 10.7 FL (ref 6–12)
PMV BLD AUTO: 10.8 FL (ref 6–12)
PMV BLD AUTO: 9.9 FL (ref 6–12)
PO2 BLDA: 128 MM HG (ref 83–108)
PO2 BLDA: 61.9 MM HG (ref 83–108)
PO2 BLDA: 61.9 MM HG (ref 83–108)
PO2 BLDA: 67.5 MM HG (ref 83–108)
PO2 BLDA: 67.8 MM HG (ref 83–108)
PO2 BLDA: 71.1 MM HG (ref 83–108)
PO2 BLDA: 80.1 MM HG (ref 83–108)
PO2 TEMP ADJ BLD: ABNORMAL MM[HG]
POTASSIUM SERPL-SCNC: 4.1 MMOL/L (ref 3.5–5.2)
POTASSIUM SERPL-SCNC: 4.4 MMOL/L (ref 3.5–5.2)
POTASSIUM SERPL-SCNC: 4.5 MMOL/L (ref 3.5–5.2)
POTASSIUM SERPL-SCNC: 4.6 MMOL/L (ref 3.5–5.2)
PROT SERPL-MCNC: 5.8 G/DL (ref 6–8.5)
PROT SERPL-MCNC: 6.1 G/DL (ref 6–8.5)
PROT SERPL-MCNC: 6.1 G/DL (ref 6–8.5)
PROT SERPL-MCNC: 6.2 G/DL (ref 6–8.5)
PROT SERPL-MCNC: 6.2 G/DL (ref 6–8.5)
PROT SERPL-MCNC: 6.4 G/DL (ref 6–8.5)
PROT UR QL STRIP: ABNORMAL
QT INTERVAL: 314 MS
QT INTERVAL: 354 MS
QT INTERVAL: 370 MS
QTC INTERVAL: 386 MS
QTC INTERVAL: 434 MS
QTC INTERVAL: 492 MS
RBC # BLD AUTO: 2.86 10*6/MM3 (ref 4.14–5.8)
RBC # BLD AUTO: 2.88 10*6/MM3 (ref 4.14–5.8)
RBC # BLD AUTO: 2.9 10*6/MM3 (ref 4.14–5.8)
RBC # BLD AUTO: 2.91 10*6/MM3 (ref 4.14–5.8)
RBC # BLD AUTO: 2.92 10*6/MM3 (ref 4.14–5.8)
RBC # BLD AUTO: 2.93 10*6/MM3 (ref 4.14–5.8)
RBC # BLD AUTO: 2.93 10*6/MM3 (ref 4.14–5.8)
RBC # BLD AUTO: 3.13 10*6/MM3 (ref 4.14–5.8)
RBC # UR: ABNORMAL /HPF
REF LAB TEST METHOD: ABNORMAL
SAO2 % BLDCOA: 88.7 % (ref 94–99)
SAO2 % BLDCOA: 91.5 % (ref 94–99)
SAO2 % BLDCOA: 92.7 % (ref 94–99)
SAO2 % BLDCOA: 92.7 % (ref 94–99)
SAO2 % BLDCOA: 93.2 % (ref 94–99)
SAO2 % BLDCOA: 95.1 % (ref 94–99)
SAO2 % BLDCOA: 99 % (ref 94–99)
SET MECH RESP RATE: 20
SET MECH RESP RATE: 20
SODIUM SERPL-SCNC: 134 MMOL/L (ref 136–145)
SODIUM SERPL-SCNC: 134 MMOL/L (ref 136–145)
SODIUM SERPL-SCNC: 135 MMOL/L (ref 136–145)
SODIUM SERPL-SCNC: 136 MMOL/L (ref 136–145)
SODIUM SERPL-SCNC: 136 MMOL/L (ref 136–145)
SODIUM SERPL-SCNC: 137 MMOL/L (ref 136–145)
SODIUM SERPL-SCNC: 143 MMOL/L (ref 136–145)
SODIUM SERPL-SCNC: 144 MMOL/L (ref 136–145)
SODIUM SERPL-SCNC: 147 MMOL/L (ref 136–145)
SP GR UR STRIP: 1.02 (ref 1–1.03)
SQUAMOUS #/AREA URNS HPF: ABNORMAL /HPF
THEOPHYLLINE SERPL-MCNC: 11.9 MCG/ML (ref 10–20)
THEOPHYLLINE SERPL-MCNC: 9.9 MCG/ML (ref 10–20)
TROPONIN T SERPL-MCNC: 0.14 NG/ML (ref 0–0.03)
UROBILINOGEN UR QL STRIP: ABNORMAL
VENTILATOR MODE: ABNORMAL
WBC # BLD AUTO: 10.42 10*3/MM3 (ref 3.4–10.8)
WBC # BLD AUTO: 10.67 10*3/MM3 (ref 3.4–10.8)
WBC # BLD AUTO: 6.46 10*3/MM3 (ref 3.4–10.8)
WBC # BLD AUTO: 6.62 10*3/MM3 (ref 3.4–10.8)
WBC # BLD AUTO: 7.07 10*3/MM3 (ref 3.4–10.8)
WBC # BLD AUTO: 7.64 10*3/MM3 (ref 3.4–10.8)
WBC # BLD AUTO: 8.61 10*3/MM3 (ref 3.4–10.8)
WBC # BLD AUTO: 8.88 10*3/MM3 (ref 3.4–10.8)
WBC UR QL AUTO: ABNORMAL /HPF

## 2021-01-01 PROCEDURE — 80053 COMPREHEN METABOLIC PANEL: CPT | Performed by: HOSPITALIST

## 2021-01-01 PROCEDURE — 83735 ASSAY OF MAGNESIUM: CPT | Performed by: INTERNAL MEDICINE

## 2021-01-01 PROCEDURE — 83050 HGB METHEMOGLOBIN QUAN: CPT

## 2021-01-01 PROCEDURE — 63710000001 PREDNISONE PER 5 MG: Performed by: INTERNAL MEDICINE

## 2021-01-01 PROCEDURE — 82962 GLUCOSE BLOOD TEST: CPT

## 2021-01-01 PROCEDURE — 94799 UNLISTED PULMONARY SVC/PX: CPT

## 2021-01-01 PROCEDURE — 25010000002 DEXAMETHASONE SODIUM PHOSPHATE 20 MG/5ML SOLUTION: Performed by: HOSPITALIST

## 2021-01-01 PROCEDURE — 86140 C-REACTIVE PROTEIN: CPT | Performed by: NURSE PRACTITIONER

## 2021-01-01 PROCEDURE — 85025 COMPLETE CBC W/AUTO DIFF WBC: CPT | Performed by: INTERNAL MEDICINE

## 2021-01-01 PROCEDURE — 71045 X-RAY EXAM CHEST 1 VIEW: CPT | Performed by: RADIOLOGY

## 2021-01-01 PROCEDURE — 25010000002 CEFTRIAXONE PER 250 MG: Performed by: INTERNAL MEDICINE

## 2021-01-01 PROCEDURE — 63710000001 INSULIN ASPART PER 5 UNITS: Performed by: PHYSICIAN ASSISTANT

## 2021-01-01 PROCEDURE — 83735 ASSAY OF MAGNESIUM: CPT | Performed by: HOSPITALIST

## 2021-01-01 PROCEDURE — 80198 ASSAY OF THEOPHYLLINE: CPT | Performed by: HOSPITALIST

## 2021-01-01 PROCEDURE — 36600 WITHDRAWAL OF ARTERIAL BLOOD: CPT

## 2021-01-01 PROCEDURE — 85025 COMPLETE CBC W/AUTO DIFF WBC: CPT | Performed by: HOSPITALIST

## 2021-01-01 PROCEDURE — 93005 ELECTROCARDIOGRAM TRACING: CPT | Performed by: HOSPITALIST

## 2021-01-01 PROCEDURE — 83880 ASSAY OF NATRIURETIC PEPTIDE: CPT | Performed by: HOSPITALIST

## 2021-01-01 PROCEDURE — 82550 ASSAY OF CK (CPK): CPT | Performed by: HOSPITALIST

## 2021-01-01 PROCEDURE — 93010 ELECTROCARDIOGRAM REPORT: CPT | Performed by: INTERNAL MEDICINE

## 2021-01-01 PROCEDURE — 25010000002 ENOXAPARIN PER 10 MG: Performed by: INTERNAL MEDICINE

## 2021-01-01 PROCEDURE — 82375 ASSAY CARBOXYHB QUANT: CPT

## 2021-01-01 PROCEDURE — XW033E5 INTRODUCTION OF REMDESIVIR ANTI-INFECTIVE INTO PERIPHERAL VEIN, PERCUTANEOUS APPROACH, NEW TECHNOLOGY GROUP 5: ICD-10-PCS | Performed by: HOSPITALIST

## 2021-01-01 PROCEDURE — 25010000002 MORPHINE PER 10 MG: Performed by: HOSPITALIST

## 2021-01-01 PROCEDURE — 71250 CT THORAX DX C-: CPT | Performed by: RADIOLOGY

## 2021-01-01 PROCEDURE — 25010000003 MAGNESIUM SULFATE 4 GM/100ML SOLUTION: Performed by: HOSPITALIST

## 2021-01-01 PROCEDURE — 99232 SBSQ HOSP IP/OBS MODERATE 35: CPT | Performed by: NURSE PRACTITIONER

## 2021-01-01 PROCEDURE — 25010000002 DEXAMETHASONE SODIUM PHOSPHATE 20 MG/5ML SOLUTION: Performed by: INTERNAL MEDICINE

## 2021-01-01 PROCEDURE — 85384 FIBRINOGEN ACTIVITY: CPT | Performed by: HOSPITALIST

## 2021-01-01 PROCEDURE — 25010000002 LORAZEPAM PER 2 MG: Performed by: HOSPITALIST

## 2021-01-01 PROCEDURE — 71045 X-RAY EXAM CHEST 1 VIEW: CPT

## 2021-01-01 PROCEDURE — 63710000001 DIPHENHYDRAMINE PER 50 MG: Performed by: HOSPITALIST

## 2021-01-01 PROCEDURE — 99222 1ST HOSP IP/OBS MODERATE 55: CPT | Performed by: INTERNAL MEDICINE

## 2021-01-01 PROCEDURE — 82805 BLOOD GASES W/O2 SATURATION: CPT

## 2021-01-01 PROCEDURE — 99231 SBSQ HOSP IP/OBS SF/LOW 25: CPT | Performed by: INTERNAL MEDICINE

## 2021-01-01 PROCEDURE — 85379 FIBRIN DEGRADATION QUANT: CPT | Performed by: HOSPITALIST

## 2021-01-01 PROCEDURE — 94660 CPAP INITIATION&MGMT: CPT

## 2021-01-01 PROCEDURE — 99232 SBSQ HOSP IP/OBS MODERATE 35: CPT | Performed by: HOSPITALIST

## 2021-01-01 PROCEDURE — 97535 SELF CARE MNGMENT TRAINING: CPT

## 2021-01-01 PROCEDURE — 80048 BASIC METABOLIC PNL TOTAL CA: CPT | Performed by: INTERNAL MEDICINE

## 2021-01-01 PROCEDURE — 71250 CT THORAX DX C-: CPT

## 2021-01-01 PROCEDURE — 99231 SBSQ HOSP IP/OBS SF/LOW 25: CPT | Performed by: HOSPITALIST

## 2021-01-01 PROCEDURE — 92610 EVALUATE SWALLOWING FUNCTION: CPT

## 2021-01-01 PROCEDURE — 87070 CULTURE OTHR SPECIMN AEROBIC: CPT | Performed by: HOSPITALIST

## 2021-01-01 PROCEDURE — 83615 LACTATE (LD) (LDH) ENZYME: CPT | Performed by: HOSPITALIST

## 2021-01-01 PROCEDURE — 25010000002 DIPHENHYDRAMINE PER 50 MG: Performed by: HOSPITALIST

## 2021-01-01 PROCEDURE — 81001 URINALYSIS AUTO W/SCOPE: CPT | Performed by: HOSPITALIST

## 2021-01-01 PROCEDURE — 99233 SBSQ HOSP IP/OBS HIGH 50: CPT | Performed by: HOSPITALIST

## 2021-01-01 PROCEDURE — 97530 THERAPEUTIC ACTIVITIES: CPT

## 2021-01-01 PROCEDURE — 63710000001 INSULIN DETEMIR PER 5 UNITS: Performed by: HOSPITALIST

## 2021-01-01 PROCEDURE — 82728 ASSAY OF FERRITIN: CPT | Performed by: HOSPITALIST

## 2021-01-01 PROCEDURE — 87205 SMEAR GRAM STAIN: CPT | Performed by: HOSPITALIST

## 2021-01-01 PROCEDURE — 84484 ASSAY OF TROPONIN QUANT: CPT | Performed by: INTERNAL MEDICINE

## 2021-01-01 PROCEDURE — 99232 SBSQ HOSP IP/OBS MODERATE 35: CPT | Performed by: INTERNAL MEDICINE

## 2021-01-01 PROCEDURE — 85379 FIBRIN DEGRADATION QUANT: CPT | Performed by: INTERNAL MEDICINE

## 2021-01-01 PROCEDURE — 82140 ASSAY OF AMMONIA: CPT | Performed by: HOSPITALIST

## 2021-01-01 PROCEDURE — 94640 AIRWAY INHALATION TREATMENT: CPT

## 2021-01-01 PROCEDURE — 94760 N-INVAS EAR/PLS OXIMETRY 1: CPT

## 2021-01-01 PROCEDURE — 25010000002 FUROSEMIDE PER 20 MG: Performed by: HOSPITALIST

## 2021-01-01 PROCEDURE — 93005 ELECTROCARDIOGRAM TRACING: CPT | Performed by: PHYSICIAN ASSISTANT

## 2021-01-01 PROCEDURE — 80053 COMPREHEN METABOLIC PANEL: CPT | Performed by: INTERNAL MEDICINE

## 2021-01-01 PROCEDURE — 87205 SMEAR GRAM STAIN: CPT | Performed by: INTERNAL MEDICINE

## 2021-01-01 PROCEDURE — 87070 CULTURE OTHR SPECIMN AEROBIC: CPT | Performed by: INTERNAL MEDICINE

## 2021-01-01 PROCEDURE — 87086 URINE CULTURE/COLONY COUNT: CPT | Performed by: HOSPITALIST

## 2021-01-01 RX ORDER — GLYCOPYRROLATE 0.2 MG/ML
0.1 INJECTION INTRAMUSCULAR; INTRAVENOUS EVERY 4 HOURS PRN
Status: DISCONTINUED | OUTPATIENT
Start: 2021-01-01 | End: 2021-01-01 | Stop reason: HOSPADM

## 2021-01-01 RX ORDER — METOPROLOL SUCCINATE 50 MG/1
100 TABLET, EXTENDED RELEASE ORAL 2 TIMES DAILY
Status: DISCONTINUED | OUTPATIENT
Start: 2021-01-01 | End: 2021-01-01

## 2021-01-01 RX ORDER — OLANZAPINE 5 MG/1
7.5 TABLET, ORALLY DISINTEGRATING ORAL 2 TIMES DAILY
Status: DISCONTINUED | OUTPATIENT
Start: 2021-01-01 | End: 2021-01-01

## 2021-01-01 RX ORDER — CHOLECALCIFEROL (VITAMIN D3) 125 MCG
5 CAPSULE ORAL NIGHTLY
Status: DISCONTINUED | OUTPATIENT
Start: 2021-01-01 | End: 2021-01-01

## 2021-01-01 RX ORDER — ACETAZOLAMIDE 250 MG/1
375 TABLET ORAL ONCE
Status: COMPLETED | OUTPATIENT
Start: 2021-01-01 | End: 2021-01-01

## 2021-01-01 RX ORDER — GUAIFENESIN 600 MG/1
1200 TABLET, EXTENDED RELEASE ORAL EVERY 12 HOURS SCHEDULED
Status: DISCONTINUED | OUTPATIENT
Start: 2021-01-01 | End: 2021-01-01

## 2021-01-01 RX ORDER — LORAZEPAM 2 MG/ML
1 INJECTION INTRAMUSCULAR
Status: DISCONTINUED | OUTPATIENT
Start: 2021-01-01 | End: 2021-01-01 | Stop reason: HOSPADM

## 2021-01-01 RX ORDER — DEXAMETHASONE SODIUM PHOSPHATE 4 MG/ML
6 INJECTION, SOLUTION INTRA-ARTICULAR; INTRALESIONAL; INTRAMUSCULAR; INTRAVENOUS; SOFT TISSUE DAILY
Status: DISCONTINUED | OUTPATIENT
Start: 2021-01-01 | End: 2021-01-01

## 2021-01-01 RX ORDER — ASCORBIC ACID 500 MG
1500 TABLET ORAL DAILY
Status: DISCONTINUED | OUTPATIENT
Start: 2021-01-01 | End: 2021-01-01

## 2021-01-01 RX ORDER — FUROSEMIDE 10 MG/ML
40 INJECTION INTRAMUSCULAR; INTRAVENOUS ONCE
Status: COMPLETED | OUTPATIENT
Start: 2021-01-01 | End: 2021-01-01

## 2021-01-01 RX ORDER — OLANZAPINE 5 MG/1
2.5 TABLET, ORALLY DISINTEGRATING ORAL 2 TIMES DAILY
Status: DISCONTINUED | OUTPATIENT
Start: 2021-01-01 | End: 2021-01-01

## 2021-01-01 RX ORDER — ISOSORBIDE MONONITRATE 60 MG/1
60 TABLET, EXTENDED RELEASE ORAL
Status: DISCONTINUED | OUTPATIENT
Start: 2021-01-01 | End: 2021-01-01

## 2021-01-01 RX ORDER — DIPHENHYDRAMINE HCL 25 MG
25 CAPSULE ORAL EVERY 6 HOURS PRN
Status: DISCONTINUED | OUTPATIENT
Start: 2021-01-01 | End: 2021-01-01

## 2021-01-01 RX ORDER — MAGNESIUM SULFATE HEPTAHYDRATE 40 MG/ML
4 INJECTION, SOLUTION INTRAVENOUS ONCE
Status: COMPLETED | OUTPATIENT
Start: 2021-01-01 | End: 2021-01-01

## 2021-01-01 RX ORDER — MORPHINE SULFATE 2 MG/ML
2 INJECTION, SOLUTION INTRAMUSCULAR; INTRAVENOUS EVERY 4 HOURS
Status: DISCONTINUED | OUTPATIENT
Start: 2021-01-01 | End: 2021-01-01 | Stop reason: HOSPADM

## 2021-01-01 RX ORDER — MAGNESIUM SULFATE 1 G/100ML
1 INJECTION INTRAVENOUS ONCE
Status: DISCONTINUED | OUTPATIENT
Start: 2021-01-01 | End: 2021-01-01

## 2021-01-01 RX ORDER — ASPIRIN 325 MG
325 TABLET, DELAYED RELEASE (ENTERIC COATED) ORAL ONCE
Status: COMPLETED | OUTPATIENT
Start: 2021-01-01 | End: 2021-01-01

## 2021-01-01 RX ORDER — AMLODIPINE BESYLATE 5 MG/1
5 TABLET ORAL
Status: DISCONTINUED | OUTPATIENT
Start: 2021-01-01 | End: 2021-01-01

## 2021-01-01 RX ORDER — MIDODRINE HYDROCHLORIDE 2.5 MG/1
5 TABLET ORAL ONCE
Status: COMPLETED | OUTPATIENT
Start: 2021-01-01 | End: 2021-01-01

## 2021-01-01 RX ORDER — OLANZAPINE 5 MG/1
5 TABLET, ORALLY DISINTEGRATING ORAL 2 TIMES DAILY PRN
Status: DISCONTINUED | OUTPATIENT
Start: 2021-01-01 | End: 2021-01-01

## 2021-01-01 RX ORDER — LORAZEPAM 2 MG/ML
1 INJECTION INTRAMUSCULAR EVERY 6 HOURS
Status: DISCONTINUED | OUTPATIENT
Start: 2021-01-01 | End: 2021-01-01 | Stop reason: HOSPADM

## 2021-01-01 RX ORDER — ACETAZOLAMIDE 250 MG/1
250 TABLET ORAL ONCE
Status: COMPLETED | OUTPATIENT
Start: 2021-01-01 | End: 2021-01-01

## 2021-01-01 RX ORDER — HYDROXYZINE HYDROCHLORIDE 10 MG/1
10 TABLET, FILM COATED ORAL ONCE
Status: DISCONTINUED | OUTPATIENT
Start: 2021-01-01 | End: 2021-01-01

## 2021-01-01 RX ORDER — FUROSEMIDE 10 MG/ML
60 INJECTION INTRAMUSCULAR; INTRAVENOUS ONCE
Status: COMPLETED | OUTPATIENT
Start: 2021-01-01 | End: 2021-01-01

## 2021-01-01 RX ORDER — OLANZAPINE 5 MG/1
5 TABLET, ORALLY DISINTEGRATING ORAL 2 TIMES DAILY
Status: DISCONTINUED | OUTPATIENT
Start: 2021-01-01 | End: 2021-01-01

## 2021-01-01 RX ORDER — IRON POLYSACCHARIDE COMPLEX 150 MG
150 CAPSULE ORAL
Status: DISCONTINUED | OUTPATIENT
Start: 2021-01-01 | End: 2021-01-01

## 2021-01-01 RX ORDER — MORPHINE SULFATE 2 MG/ML
2 INJECTION, SOLUTION INTRAMUSCULAR; INTRAVENOUS
Status: DISCONTINUED | OUTPATIENT
Start: 2021-01-01 | End: 2021-01-01 | Stop reason: HOSPADM

## 2021-01-01 RX ORDER — DIPHENHYDRAMINE HYDROCHLORIDE 50 MG/ML
25 INJECTION INTRAMUSCULAR; INTRAVENOUS EVERY 6 HOURS PRN
Status: DISCONTINUED | OUTPATIENT
Start: 2021-01-01 | End: 2021-01-01 | Stop reason: HOSPADM

## 2021-01-01 RX ORDER — FAMOTIDINE 20 MG/1
20 TABLET, FILM COATED ORAL
Status: DISCONTINUED | OUTPATIENT
Start: 2021-01-01 | End: 2021-01-01

## 2021-01-01 RX ORDER — MORPHINE SULFATE 2 MG/ML
1 INJECTION, SOLUTION INTRAMUSCULAR; INTRAVENOUS EVERY 4 HOURS PRN
Status: DISCONTINUED | OUTPATIENT
Start: 2021-01-01 | End: 2021-01-01

## 2021-01-01 RX ORDER — OLANZAPINE 5 MG/1
5 TABLET, ORALLY DISINTEGRATING ORAL ONCE
Status: COMPLETED | OUTPATIENT
Start: 2021-01-01 | End: 2021-01-01

## 2021-01-01 RX ORDER — L.ACID,PARA/B.BIFIDUM/S.THERM 8B CELL
1 CAPSULE ORAL DAILY
Status: DISCONTINUED | OUTPATIENT
Start: 2021-01-01 | End: 2021-01-01

## 2021-01-01 RX ORDER — ASPIRIN 81 MG/1
81 TABLET ORAL DAILY
Status: DISCONTINUED | OUTPATIENT
Start: 2021-01-01 | End: 2021-01-01

## 2021-01-01 RX ORDER — ACETAMINOPHEN 650 MG/1
650 SUPPOSITORY RECTAL EVERY 4 HOURS PRN
Status: DISCONTINUED | OUTPATIENT
Start: 2021-01-01 | End: 2021-01-01 | Stop reason: HOSPADM

## 2021-01-01 RX ADMIN — Medication: at 08:08

## 2021-01-01 RX ADMIN — INSULIN ASPART 8 UNITS: 100 INJECTION, SOLUTION INTRAVENOUS; SUBCUTANEOUS at 17:45

## 2021-01-01 RX ADMIN — ENOXAPARIN SODIUM 40 MG: 80 INJECTION SUBCUTANEOUS at 05:05

## 2021-01-01 RX ADMIN — INSULIN DETEMIR 5 UNITS: 100 INJECTION, SOLUTION SUBCUTANEOUS at 22:14

## 2021-01-01 RX ADMIN — METFORMIN HYDROCHLORIDE 1000 MG: 500 TABLET ORAL at 08:05

## 2021-01-01 RX ADMIN — Medication: at 08:04

## 2021-01-01 RX ADMIN — TERAZOSIN HYDROCHLORIDE 5 MG: 5 CAPSULE ORAL at 08:59

## 2021-01-01 RX ADMIN — FAMOTIDINE 20 MG: 20 TABLET, FILM COATED ORAL at 08:08

## 2021-01-01 RX ADMIN — GUAIFENESIN 1200 MG: 600 TABLET, EXTENDED RELEASE ORAL at 19:34

## 2021-01-01 RX ADMIN — DEXAMETHASONE SODIUM PHOSPHATE 6 MG: 4 INJECTION, SOLUTION INTRAMUSCULAR; INTRAVENOUS at 11:43

## 2021-01-01 RX ADMIN — METOPROLOL SUCCINATE 100 MG: 50 TABLET, EXTENDED RELEASE ORAL at 20:19

## 2021-01-01 RX ADMIN — METOPROLOL SUCCINATE 100 MG: 50 TABLET, EXTENDED RELEASE ORAL at 08:07

## 2021-01-01 RX ADMIN — Medication: at 20:06

## 2021-01-01 RX ADMIN — FAMOTIDINE 20 MG: 20 TABLET, FILM COATED ORAL at 09:13

## 2021-01-01 RX ADMIN — GUAIFENESIN 1200 MG: 600 TABLET, EXTENDED RELEASE ORAL at 20:19

## 2021-01-01 RX ADMIN — CEFTRIAXONE 2 G: 2 INJECTION, POWDER, FOR SOLUTION INTRAMUSCULAR; INTRAVENOUS at 11:49

## 2021-01-01 RX ADMIN — DOXYCYCLINE 100 MG: 100 INJECTION, POWDER, LYOPHILIZED, FOR SOLUTION INTRAVENOUS at 00:03

## 2021-01-01 RX ADMIN — TERAZOSIN HYDROCHLORIDE 5 MG: 5 CAPSULE ORAL at 09:55

## 2021-01-01 RX ADMIN — MORPHINE SULFATE 2 MG: 2 INJECTION, SOLUTION INTRAMUSCULAR; INTRAVENOUS at 10:37

## 2021-01-01 RX ADMIN — TERAZOSIN HYDROCHLORIDE 5 MG: 5 CAPSULE ORAL at 08:07

## 2021-01-01 RX ADMIN — OLANZAPINE 5 MG: 5 TABLET, ORALLY DISINTEGRATING ORAL at 13:42

## 2021-01-01 RX ADMIN — OLANZAPINE 2.5 MG: 5 TABLET, ORALLY DISINTEGRATING ORAL at 07:51

## 2021-01-01 RX ADMIN — ATORVASTATIN CALCIUM 10 MG: 10 TABLET, FILM COATED ORAL at 20:14

## 2021-01-01 RX ADMIN — OLANZAPINE 5 MG: 5 TABLET, ORALLY DISINTEGRATING ORAL at 20:04

## 2021-01-01 RX ADMIN — SODIUM CHLORIDE, PRESERVATIVE FREE 3 ML: 5 INJECTION INTRAVENOUS at 20:20

## 2021-01-01 RX ADMIN — DOXYCYCLINE 100 MG: 100 INJECTION, POWDER, LYOPHILIZED, FOR SOLUTION INTRAVENOUS at 01:30

## 2021-01-01 RX ADMIN — METOPROLOL SUCCINATE 100 MG: 50 TABLET, EXTENDED RELEASE ORAL at 07:52

## 2021-01-01 RX ADMIN — DOXYCYCLINE 100 MG: 100 INJECTION, POWDER, LYOPHILIZED, FOR SOLUTION INTRAVENOUS at 01:36

## 2021-01-01 RX ADMIN — Medication: at 08:18

## 2021-01-01 RX ADMIN — SODIUM CHLORIDE, PRESERVATIVE FREE 3 ML: 5 INJECTION INTRAVENOUS at 21:57

## 2021-01-01 RX ADMIN — Medication 5 MG: at 20:19

## 2021-01-01 RX ADMIN — PREDNISONE 5 MG: 5 TABLET ORAL at 10:22

## 2021-01-01 RX ADMIN — SODIUM CHLORIDE, PRESERVATIVE FREE 3 ML: 5 INJECTION INTRAVENOUS at 08:27

## 2021-01-01 RX ADMIN — INSULIN ASPART 6 UNITS: 100 INJECTION, SOLUTION INTRAVENOUS; SUBCUTANEOUS at 12:49

## 2021-01-01 RX ADMIN — BUMETANIDE 1 MG: 1 TABLET ORAL at 08:06

## 2021-01-01 RX ADMIN — DOCUSATE SODIUM 100 MG: 100 CAPSULE ORAL at 09:14

## 2021-01-01 RX ADMIN — GUAIFENESIN 1200 MG: 600 TABLET, EXTENDED RELEASE ORAL at 08:08

## 2021-01-01 RX ADMIN — FAMOTIDINE 20 MG: 20 TABLET, FILM COATED ORAL at 17:24

## 2021-01-01 RX ADMIN — THEOPHYLLINE 400 MG: 400 TABLET, EXTENDED RELEASE ORAL at 07:52

## 2021-01-01 RX ADMIN — AMLODIPINE BESYLATE 5 MG: 5 TABLET ORAL at 08:59

## 2021-01-01 RX ADMIN — TERAZOSIN HYDROCHLORIDE 5 MG: 5 CAPSULE ORAL at 19:34

## 2021-01-01 RX ADMIN — ALBUTEROL SULFATE 2 PUFF: 90 AEROSOL, METERED RESPIRATORY (INHALATION) at 18:13

## 2021-01-01 RX ADMIN — ATORVASTATIN CALCIUM 10 MG: 10 TABLET, FILM COATED ORAL at 20:04

## 2021-01-01 RX ADMIN — GUAIFENESIN 1200 MG: 600 TABLET, EXTENDED RELEASE ORAL at 08:59

## 2021-01-01 RX ADMIN — LORAZEPAM 1 MG: 2 INJECTION INTRAMUSCULAR; INTRAVENOUS at 23:33

## 2021-01-01 RX ADMIN — Medication 150 MG: at 09:14

## 2021-01-01 RX ADMIN — PREDNISONE 5 MG: 5 TABLET ORAL at 08:59

## 2021-01-01 RX ADMIN — FAMOTIDINE 20 MG: 20 TABLET, FILM COATED ORAL at 07:51

## 2021-01-01 RX ADMIN — METOPROLOL SUCCINATE 100 MG: 50 TABLET, EXTENDED RELEASE ORAL at 17:40

## 2021-01-01 RX ADMIN — Medication: at 20:20

## 2021-01-01 RX ADMIN — TERAZOSIN HYDROCHLORIDE 5 MG: 5 CAPSULE ORAL at 07:53

## 2021-01-01 RX ADMIN — SODIUM CHLORIDE, PRESERVATIVE FREE 3 ML: 5 INJECTION INTRAVENOUS at 07:54

## 2021-01-01 RX ADMIN — FUROSEMIDE 40 MG: 10 INJECTION, SOLUTION INTRAMUSCULAR; INTRAVENOUS at 10:48

## 2021-01-01 RX ADMIN — METFORMIN HYDROCHLORIDE 1000 MG: 500 TABLET ORAL at 17:45

## 2021-01-01 RX ADMIN — Medication 1 CAPSULE: at 11:46

## 2021-01-01 RX ADMIN — TERAZOSIN HYDROCHLORIDE 5 MG: 5 CAPSULE ORAL at 09:13

## 2021-01-01 RX ADMIN — METFORMIN HYDROCHLORIDE 1000 MG: 500 TABLET ORAL at 09:22

## 2021-01-01 RX ADMIN — Medication 5 MG: at 19:34

## 2021-01-01 RX ADMIN — ATORVASTATIN CALCIUM 10 MG: 10 TABLET, FILM COATED ORAL at 20:57

## 2021-01-01 RX ADMIN — OXYCODONE HYDROCHLORIDE AND ACETAMINOPHEN 1500 MG: 500 TABLET ORAL at 08:07

## 2021-01-01 RX ADMIN — METFORMIN HYDROCHLORIDE 1000 MG: 500 TABLET ORAL at 18:13

## 2021-01-01 RX ADMIN — DOXYCYCLINE 100 MG: 100 INJECTION, POWDER, LYOPHILIZED, FOR SOLUTION INTRAVENOUS at 00:57

## 2021-01-01 RX ADMIN — Medication 1 CAPSULE: at 08:10

## 2021-01-01 RX ADMIN — Medication: at 19:36

## 2021-01-01 RX ADMIN — CLOPIDOGREL 75 MG: 75 TABLET, FILM COATED ORAL at 08:58

## 2021-01-01 RX ADMIN — ENOXAPARIN SODIUM 40 MG: 80 INJECTION SUBCUTANEOUS at 05:04

## 2021-01-01 RX ADMIN — ALBUTEROL SULFATE 2 PUFF: 90 AEROSOL, METERED RESPIRATORY (INHALATION) at 20:57

## 2021-01-01 RX ADMIN — ISOSORBIDE MONONITRATE 60 MG: 60 TABLET ORAL at 10:21

## 2021-01-01 RX ADMIN — GUAIFENESIN 1200 MG: 600 TABLET, EXTENDED RELEASE ORAL at 07:51

## 2021-01-01 RX ADMIN — DOCUSATE SODIUM 100 MG: 100 CAPSULE ORAL at 08:08

## 2021-01-01 RX ADMIN — MONTELUKAST SODIUM 10 MG: 10 TABLET, COATED ORAL at 20:14

## 2021-01-01 RX ADMIN — INSULIN ASPART 6 UNITS: 100 INJECTION, SOLUTION INTRAVENOUS; SUBCUTANEOUS at 09:22

## 2021-01-01 RX ADMIN — INSULIN ASPART 2 UNITS: 100 INJECTION, SOLUTION INTRAVENOUS; SUBCUTANEOUS at 13:42

## 2021-01-01 RX ADMIN — INSULIN ASPART 4 UNITS: 100 INJECTION, SOLUTION INTRAVENOUS; SUBCUTANEOUS at 16:28

## 2021-01-01 RX ADMIN — THEOPHYLLINE 400 MG: 400 TABLET, EXTENDED RELEASE ORAL at 09:22

## 2021-01-01 RX ADMIN — INSULIN ASPART 6 UNITS: 100 INJECTION, SOLUTION INTRAVENOUS; SUBCUTANEOUS at 08:34

## 2021-01-01 RX ADMIN — INSULIN ASPART 2 UNITS: 100 INJECTION, SOLUTION INTRAVENOUS; SUBCUTANEOUS at 12:09

## 2021-01-01 RX ADMIN — DOXYCYCLINE 100 MG: 100 INJECTION, POWDER, LYOPHILIZED, FOR SOLUTION INTRAVENOUS at 01:01

## 2021-01-01 RX ADMIN — LORAZEPAM 1 MG: 2 INJECTION INTRAMUSCULAR; INTRAVENOUS at 16:43

## 2021-01-01 RX ADMIN — OLANZAPINE 7.5 MG: 5 TABLET, ORALLY DISINTEGRATING ORAL at 08:28

## 2021-01-01 RX ADMIN — Medication: at 08:37

## 2021-01-01 RX ADMIN — CLOPIDOGREL 75 MG: 75 TABLET, FILM COATED ORAL at 09:13

## 2021-01-01 RX ADMIN — AMLODIPINE BESYLATE 5 MG: 5 TABLET ORAL at 14:27

## 2021-01-01 RX ADMIN — DOXYCYCLINE 100 MG: 100 INJECTION, POWDER, LYOPHILIZED, FOR SOLUTION INTRAVENOUS at 00:37

## 2021-01-01 RX ADMIN — TERAZOSIN HYDROCHLORIDE 5 MG: 5 CAPSULE ORAL at 20:20

## 2021-01-01 RX ADMIN — FAMOTIDINE 20 MG: 20 TABLET, FILM COATED ORAL at 16:42

## 2021-01-01 RX ADMIN — INSULIN ASPART 9 UNITS: 100 INJECTION, SOLUTION INTRAVENOUS; SUBCUTANEOUS at 12:32

## 2021-01-01 RX ADMIN — MORPHINE SULFATE 2 MG: 2 INJECTION, SOLUTION INTRAMUSCULAR; INTRAVENOUS at 23:33

## 2021-01-01 RX ADMIN — FAMOTIDINE 20 MG: 20 TABLET, FILM COATED ORAL at 11:48

## 2021-01-01 RX ADMIN — CEFTRIAXONE 2 G: 2 INJECTION, POWDER, FOR SOLUTION INTRAMUSCULAR; INTRAVENOUS at 13:42

## 2021-01-01 RX ADMIN — ALBUTEROL SULFATE 2 PUFF: 90 AEROSOL, METERED RESPIRATORY (INHALATION) at 12:30

## 2021-01-01 RX ADMIN — Medication: at 20:15

## 2021-01-01 RX ADMIN — ASPIRIN 81 MG: 81 TABLET, COATED ORAL at 07:51

## 2021-01-01 RX ADMIN — DOCUSATE SODIUM 100 MG: 100 CAPSULE ORAL at 08:58

## 2021-01-01 RX ADMIN — DOXYCYCLINE 100 MG: 100 INJECTION, POWDER, LYOPHILIZED, FOR SOLUTION INTRAVENOUS at 17:30

## 2021-01-01 RX ADMIN — CEFTRIAXONE 2 G: 2 INJECTION, POWDER, FOR SOLUTION INTRAMUSCULAR; INTRAVENOUS at 12:31

## 2021-01-01 RX ADMIN — ASPIRIN 81 MG: 81 TABLET, COATED ORAL at 11:48

## 2021-01-01 RX ADMIN — CEFTRIAXONE 2 G: 2 INJECTION, POWDER, FOR SOLUTION INTRAMUSCULAR; INTRAVENOUS at 12:09

## 2021-01-01 RX ADMIN — DIPHENHYDRAMINE HYDROCHLORIDE 25 MG: 50 INJECTION, SOLUTION INTRAMUSCULAR; INTRAVENOUS at 17:39

## 2021-01-01 RX ADMIN — DOCUSATE SODIUM 100 MG: 100 CAPSULE ORAL at 10:22

## 2021-01-01 RX ADMIN — ASPIRIN 81 MG: 81 TABLET, COATED ORAL at 08:08

## 2021-01-01 RX ADMIN — OXYCODONE HYDROCHLORIDE AND ACETAMINOPHEN 1500 MG: 500 TABLET ORAL at 07:52

## 2021-01-01 RX ADMIN — SODIUM CHLORIDE, PRESERVATIVE FREE 3 ML: 5 INJECTION INTRAVENOUS at 09:23

## 2021-01-01 RX ADMIN — CEFTRIAXONE 2 G: 2 INJECTION, POWDER, FOR SOLUTION INTRAMUSCULAR; INTRAVENOUS at 11:18

## 2021-01-01 RX ADMIN — METOPROLOL SUCCINATE 100 MG: 50 TABLET, EXTENDED RELEASE ORAL at 20:57

## 2021-01-01 RX ADMIN — ALBUTEROL SULFATE 2 PUFF: 90 AEROSOL, METERED RESPIRATORY (INHALATION) at 17:03

## 2021-01-01 RX ADMIN — MONTELUKAST SODIUM 10 MG: 10 TABLET, COATED ORAL at 20:57

## 2021-01-01 RX ADMIN — DOXYCYCLINE 100 MG: 100 INJECTION, POWDER, LYOPHILIZED, FOR SOLUTION INTRAVENOUS at 14:24

## 2021-01-01 RX ADMIN — ISOSORBIDE MONONITRATE 60 MG: 60 TABLET ORAL at 18:13

## 2021-01-01 RX ADMIN — OLANZAPINE 5 MG: 5 TABLET, ORALLY DISINTEGRATING ORAL at 14:31

## 2021-01-01 RX ADMIN — AMLODIPINE BESYLATE 5 MG: 5 TABLET ORAL at 08:06

## 2021-01-01 RX ADMIN — ISOSORBIDE MONONITRATE 60 MG: 60 TABLET ORAL at 08:05

## 2021-01-01 RX ADMIN — SODIUM CHLORIDE, PRESERVATIVE FREE 3 ML: 5 INJECTION INTRAVENOUS at 20:05

## 2021-01-01 RX ADMIN — Medication 1 CAPSULE: at 08:35

## 2021-01-01 RX ADMIN — ISOSORBIDE MONONITRATE 60 MG: 60 TABLET ORAL at 08:08

## 2021-01-01 RX ADMIN — ENOXAPARIN SODIUM 40 MG: 80 INJECTION SUBCUTANEOUS at 04:06

## 2021-01-01 RX ADMIN — REMDESIVIR 100 MG: 100 INJECTION, POWDER, LYOPHILIZED, FOR SOLUTION INTRAVENOUS at 16:42

## 2021-01-01 RX ADMIN — ASPIRIN 81 MG: 81 TABLET, COATED ORAL at 08:06

## 2021-01-01 RX ADMIN — METOPROLOL SUCCINATE 100 MG: 50 TABLET, EXTENDED RELEASE ORAL at 09:13

## 2021-01-01 RX ADMIN — OLANZAPINE 7.5 MG: 5 TABLET, ORALLY DISINTEGRATING ORAL at 11:46

## 2021-01-01 RX ADMIN — ACETAMINOPHEN 650 MG: 325 TABLET ORAL at 20:18

## 2021-01-01 RX ADMIN — Medication 150 MG: at 11:48

## 2021-01-01 RX ADMIN — BUMETANIDE 1 MG: 1 TABLET ORAL at 08:59

## 2021-01-01 RX ADMIN — CEFTRIAXONE 2 G: 2 INJECTION, POWDER, FOR SOLUTION INTRAMUSCULAR; INTRAVENOUS at 10:47

## 2021-01-01 RX ADMIN — AMITRIPTYLINE HYDROCHLORIDE 25 MG: 25 TABLET, FILM COATED ORAL at 19:34

## 2021-01-01 RX ADMIN — METOPROLOL SUCCINATE 100 MG: 50 TABLET, EXTENDED RELEASE ORAL at 08:59

## 2021-01-01 RX ADMIN — MONTELUKAST SODIUM 10 MG: 10 TABLET, COATED ORAL at 19:34

## 2021-01-01 RX ADMIN — DOXYCYCLINE 100 MG: 100 INJECTION, POWDER, LYOPHILIZED, FOR SOLUTION INTRAVENOUS at 10:47

## 2021-01-01 RX ADMIN — AMITRIPTYLINE HYDROCHLORIDE 25 MG: 25 TABLET, FILM COATED ORAL at 20:57

## 2021-01-01 RX ADMIN — THEOPHYLLINE 400 MG: 400 TABLET, EXTENDED RELEASE ORAL at 08:05

## 2021-01-01 RX ADMIN — MORPHINE SULFATE 2 MG: 2 INJECTION, SOLUTION INTRAMUSCULAR; INTRAVENOUS at 15:28

## 2021-01-01 RX ADMIN — ALBUTEROL SULFATE 2 PUFF: 90 AEROSOL, METERED RESPIRATORY (INHALATION) at 12:31

## 2021-01-01 RX ADMIN — REMDESIVIR 100 MG: 100 INJECTION, POWDER, LYOPHILIZED, FOR SOLUTION INTRAVENOUS at 14:31

## 2021-01-01 RX ADMIN — ENOXAPARIN SODIUM 40 MG: 80 INJECTION SUBCUTANEOUS at 04:54

## 2021-01-01 RX ADMIN — LORAZEPAM 1 MG: 2 INJECTION INTRAMUSCULAR; INTRAVENOUS at 10:37

## 2021-01-01 RX ADMIN — ACETAZOLAMIDE 250 MG: 250 TABLET ORAL at 10:48

## 2021-01-01 RX ADMIN — TERAZOSIN HYDROCHLORIDE 5 MG: 5 CAPSULE ORAL at 09:22

## 2021-01-01 RX ADMIN — CLOPIDOGREL 75 MG: 75 TABLET, FILM COATED ORAL at 07:51

## 2021-01-01 RX ADMIN — DOXYCYCLINE 100 MG: 100 INJECTION, POWDER, LYOPHILIZED, FOR SOLUTION INTRAVENOUS at 12:09

## 2021-01-01 RX ADMIN — ACETAMINOPHEN 650 MG: 325 TABLET ORAL at 20:19

## 2021-01-01 RX ADMIN — Medication: at 10:19

## 2021-01-01 RX ADMIN — DOCUSATE SODIUM 100 MG: 100 CAPSULE ORAL at 08:06

## 2021-01-01 RX ADMIN — BUMETANIDE 1 MG: 1 TABLET ORAL at 09:22

## 2021-01-01 RX ADMIN — CLOPIDOGREL 75 MG: 75 TABLET, FILM COATED ORAL at 10:22

## 2021-01-01 RX ADMIN — GUAIFENESIN 1200 MG: 600 TABLET, EXTENDED RELEASE ORAL at 20:04

## 2021-01-01 RX ADMIN — MAGNESIUM SULFATE HEPTAHYDRATE 4 G: 40 INJECTION, SOLUTION INTRAVENOUS at 21:58

## 2021-01-01 RX ADMIN — DOCUSATE SODIUM 100 MG: 100 CAPSULE ORAL at 07:53

## 2021-01-01 RX ADMIN — DOCUSATE SODIUM 100 MG: 100 CAPSULE ORAL at 20:05

## 2021-01-01 RX ADMIN — METFORMIN HYDROCHLORIDE 1000 MG: 500 TABLET ORAL at 09:55

## 2021-01-01 RX ADMIN — ALBUTEROL SULFATE 2 PUFF: 90 AEROSOL, METERED RESPIRATORY (INHALATION) at 17:24

## 2021-01-01 RX ADMIN — GUAIFENESIN 1200 MG: 600 TABLET, EXTENDED RELEASE ORAL at 20:13

## 2021-01-01 RX ADMIN — SODIUM CHLORIDE, PRESERVATIVE FREE 3 ML: 5 INJECTION INTRAVENOUS at 09:00

## 2021-01-01 RX ADMIN — OFLOXACIN 50000 UNITS: 300 TABLET, COATED ORAL at 09:14

## 2021-01-01 RX ADMIN — ENOXAPARIN SODIUM 40 MG: 80 INJECTION SUBCUTANEOUS at 05:17

## 2021-01-01 RX ADMIN — METOPROLOL SUCCINATE 100 MG: 50 TABLET, EXTENDED RELEASE ORAL at 10:23

## 2021-01-01 RX ADMIN — METOPROLOL SUCCINATE 100 MG: 50 TABLET, EXTENDED RELEASE ORAL at 20:13

## 2021-01-01 RX ADMIN — Medication: at 20:56

## 2021-01-01 RX ADMIN — Medication 5 MG: at 20:04

## 2021-01-01 RX ADMIN — DEXAMETHASONE SODIUM PHOSPHATE 6 MG: 4 INJECTION, SOLUTION INTRAMUSCULAR; INTRAVENOUS at 08:09

## 2021-01-01 RX ADMIN — ASPIRIN 81 MG: 81 TABLET, COATED ORAL at 08:59

## 2021-01-01 RX ADMIN — DOCUSATE SODIUM 100 MG: 100 CAPSULE ORAL at 20:13

## 2021-01-01 RX ADMIN — ACETAZOLAMIDE 375 MG: 250 TABLET ORAL at 11:47

## 2021-01-01 RX ADMIN — DOXYCYCLINE 100 MG: 100 INJECTION, POWDER, LYOPHILIZED, FOR SOLUTION INTRAVENOUS at 12:12

## 2021-01-01 RX ADMIN — OXYCODONE HYDROCHLORIDE AND ACETAMINOPHEN 1500 MG: 500 TABLET ORAL at 09:14

## 2021-01-01 RX ADMIN — THEOPHYLLINE 400 MG: 400 TABLET, EXTENDED RELEASE ORAL at 08:58

## 2021-01-01 RX ADMIN — DEXAMETHASONE SODIUM PHOSPHATE 6 MG: 4 INJECTION, SOLUTION INTRAMUSCULAR; INTRAVENOUS at 11:49

## 2021-01-01 RX ADMIN — INSULIN ASPART 7 UNITS: 100 INJECTION, SOLUTION INTRAVENOUS; SUBCUTANEOUS at 09:00

## 2021-01-01 RX ADMIN — THEOPHYLLINE 400 MG: 400 TABLET, EXTENDED RELEASE ORAL at 08:08

## 2021-01-01 RX ADMIN — METOPROLOL SUCCINATE 100 MG: 50 TABLET, EXTENDED RELEASE ORAL at 19:33

## 2021-01-01 RX ADMIN — AMITRIPTYLINE HYDROCHLORIDE 25 MG: 25 TABLET, FILM COATED ORAL at 20:20

## 2021-01-01 RX ADMIN — Medication: at 09:26

## 2021-01-01 RX ADMIN — SODIUM CHLORIDE, PRESERVATIVE FREE 3 ML: 5 INJECTION INTRAVENOUS at 08:08

## 2021-01-01 RX ADMIN — ISOSORBIDE MONONITRATE 60 MG: 60 TABLET ORAL at 07:52

## 2021-01-01 RX ADMIN — DEXAMETHASONE SODIUM PHOSPHATE 6 MG: 4 INJECTION, SOLUTION INTRAMUSCULAR; INTRAVENOUS at 07:53

## 2021-01-01 RX ADMIN — AMITRIPTYLINE HYDROCHLORIDE 25 MG: 25 TABLET, FILM COATED ORAL at 20:04

## 2021-01-01 RX ADMIN — OLANZAPINE 2.5 MG: 5 TABLET, ORALLY DISINTEGRATING ORAL at 12:09

## 2021-01-01 RX ADMIN — DOCUSATE SODIUM 100 MG: 100 CAPSULE ORAL at 09:22

## 2021-01-01 RX ADMIN — Medication: at 21:00

## 2021-01-01 RX ADMIN — FAMOTIDINE 20 MG: 20 TABLET, FILM COATED ORAL at 16:48

## 2021-01-01 RX ADMIN — METOPROLOL SUCCINATE 100 MG: 50 TABLET, EXTENDED RELEASE ORAL at 11:47

## 2021-01-01 RX ADMIN — DEXAMETHASONE SODIUM PHOSPHATE 6 MG: 4 INJECTION, SOLUTION INTRAMUSCULAR; INTRAVENOUS at 09:12

## 2021-01-01 RX ADMIN — CARBIDOPA AND LEVODOPA 5 MG: 50; 200 TABLET, EXTENDED RELEASE ORAL at 09:13

## 2021-01-01 RX ADMIN — Medication: at 09:00

## 2021-01-01 RX ADMIN — ALBUTEROL SULFATE 2 PUFF: 90 AEROSOL, METERED RESPIRATORY (INHALATION) at 21:30

## 2021-01-01 RX ADMIN — Medication 150 MG: at 08:35

## 2021-01-01 RX ADMIN — ACETAZOLAMIDE 375 MG: 250 TABLET ORAL at 12:06

## 2021-01-01 RX ADMIN — METOPROLOL SUCCINATE 100 MG: 50 TABLET, EXTENDED RELEASE ORAL at 04:11

## 2021-01-01 RX ADMIN — ENOXAPARIN SODIUM 40 MG: 80 INJECTION SUBCUTANEOUS at 05:01

## 2021-01-01 RX ADMIN — METFORMIN HYDROCHLORIDE 1000 MG: 500 TABLET ORAL at 08:58

## 2021-01-01 RX ADMIN — MONTELUKAST SODIUM 10 MG: 10 TABLET, COATED ORAL at 20:19

## 2021-01-01 RX ADMIN — GUAIFENESIN 1200 MG: 600 TABLET, EXTENDED RELEASE ORAL at 23:04

## 2021-01-01 RX ADMIN — CLOPIDOGREL 75 MG: 75 TABLET, FILM COATED ORAL at 09:22

## 2021-01-01 RX ADMIN — INSULIN ASPART 2 UNITS: 100 INJECTION, SOLUTION INTRAVENOUS; SUBCUTANEOUS at 11:58

## 2021-01-01 RX ADMIN — DOCUSATE SODIUM 100 MG: 100 CAPSULE ORAL at 19:34

## 2021-01-01 RX ADMIN — DOCUSATE SODIUM 100 MG: 100 CAPSULE ORAL at 20:19

## 2021-01-01 RX ADMIN — AMITRIPTYLINE HYDROCHLORIDE 25 MG: 25 TABLET, FILM COATED ORAL at 20:14

## 2021-01-01 RX ADMIN — SODIUM CHLORIDE, PRESERVATIVE FREE 3 ML: 5 INJECTION INTRAVENOUS at 20:13

## 2021-01-01 RX ADMIN — DIPHENHYDRAMINE HYDROCHLORIDE 25 MG: 25 CAPSULE ORAL at 12:49

## 2021-01-01 RX ADMIN — TERAZOSIN HYDROCHLORIDE 5 MG: 5 CAPSULE ORAL at 11:48

## 2021-01-01 RX ADMIN — ALBUTEROL SULFATE 2 PUFF: 90 AEROSOL, METERED RESPIRATORY (INHALATION) at 16:30

## 2021-01-01 RX ADMIN — ENOXAPARIN SODIUM 40 MG: 80 INJECTION SUBCUTANEOUS at 05:31

## 2021-01-01 RX ADMIN — GUAIFENESIN 1200 MG: 600 TABLET, EXTENDED RELEASE ORAL at 09:14

## 2021-01-01 RX ADMIN — INSULIN ASPART 2 UNITS: 100 INJECTION, SOLUTION INTRAVENOUS; SUBCUTANEOUS at 12:31

## 2021-01-01 RX ADMIN — INSULIN ASPART 2 UNITS: 100 INJECTION, SOLUTION INTRAVENOUS; SUBCUTANEOUS at 18:26

## 2021-01-01 RX ADMIN — Medication: at 09:16

## 2021-01-01 RX ADMIN — SODIUM CHLORIDE, PRESERVATIVE FREE 3 ML: 5 INJECTION INTRAVENOUS at 08:07

## 2021-01-01 RX ADMIN — DOXYCYCLINE 100 MG: 100 INJECTION, POWDER, LYOPHILIZED, FOR SOLUTION INTRAVENOUS at 13:42

## 2021-01-01 RX ADMIN — ALBUTEROL SULFATE 2 PUFF: 90 AEROSOL, METERED RESPIRATORY (INHALATION) at 06:40

## 2021-01-01 RX ADMIN — TERAZOSIN HYDROCHLORIDE 5 MG: 5 CAPSULE ORAL at 20:14

## 2021-01-01 RX ADMIN — CLOPIDOGREL 75 MG: 75 TABLET, FILM COATED ORAL at 11:48

## 2021-01-01 RX ADMIN — Medication: at 21:58

## 2021-01-01 RX ADMIN — ALBUTEROL SULFATE 2 PUFF: 90 AEROSOL, METERED RESPIRATORY (INHALATION) at 21:58

## 2021-01-01 RX ADMIN — ATORVASTATIN CALCIUM 10 MG: 10 TABLET, FILM COATED ORAL at 20:19

## 2021-01-01 RX ADMIN — ALBUTEROL SULFATE 2 PUFF: 90 AEROSOL, METERED RESPIRATORY (INHALATION) at 10:19

## 2021-01-01 RX ADMIN — INSULIN ASPART 4 UNITS: 100 INJECTION, SOLUTION INTRAVENOUS; SUBCUTANEOUS at 08:56

## 2021-01-01 RX ADMIN — REMDESIVIR 200 MG: 100 INJECTION, POWDER, LYOPHILIZED, FOR SOLUTION INTRAVENOUS at 15:05

## 2021-01-01 RX ADMIN — ALBUTEROL SULFATE 2 PUFF: 90 AEROSOL, METERED RESPIRATORY (INHALATION) at 08:05

## 2021-01-01 RX ADMIN — ALBUTEROL SULFATE 2 PUFF: 90 AEROSOL, METERED RESPIRATORY (INHALATION) at 16:42

## 2021-01-01 RX ADMIN — ALBUTEROL SULFATE 2 PUFF: 90 AEROSOL, METERED RESPIRATORY (INHALATION) at 12:12

## 2021-01-01 RX ADMIN — MONTELUKAST SODIUM 10 MG: 10 TABLET, COATED ORAL at 20:04

## 2021-01-01 RX ADMIN — MORPHINE SULFATE 2 MG: 2 INJECTION, SOLUTION INTRAMUSCULAR; INTRAVENOUS at 02:10

## 2021-01-01 RX ADMIN — GUAIFENESIN 1200 MG: 600 TABLET, EXTENDED RELEASE ORAL at 08:06

## 2021-01-01 RX ADMIN — DOCUSATE SODIUM 100 MG: 100 CAPSULE ORAL at 20:57

## 2021-01-01 RX ADMIN — ATORVASTATIN CALCIUM 10 MG: 10 TABLET, FILM COATED ORAL at 20:20

## 2021-01-01 RX ADMIN — METFORMIN HYDROCHLORIDE 1000 MG: 500 TABLET ORAL at 18:27

## 2021-01-01 RX ADMIN — ACETAMINOPHEN 650 MG: 325 TABLET ORAL at 20:57

## 2021-01-01 RX ADMIN — ASPIRIN 325 MG: 325 TABLET, COATED ORAL at 09:25

## 2021-01-01 RX ADMIN — Medication 5 MG: at 20:57

## 2021-01-01 RX ADMIN — THEOPHYLLINE 400 MG: 400 TABLET, EXTENDED RELEASE ORAL at 10:21

## 2021-01-01 RX ADMIN — SODIUM CHLORIDE, PRESERVATIVE FREE 3 ML: 5 INJECTION INTRAVENOUS at 20:57

## 2021-01-01 RX ADMIN — ATORVASTATIN CALCIUM 10 MG: 10 TABLET, FILM COATED ORAL at 19:33

## 2021-01-01 RX ADMIN — REMDESIVIR 100 MG: 100 INJECTION, POWDER, LYOPHILIZED, FOR SOLUTION INTRAVENOUS at 17:27

## 2021-01-01 RX ADMIN — TERAZOSIN HYDROCHLORIDE 5 MG: 5 CAPSULE ORAL at 08:08

## 2021-01-01 RX ADMIN — INSULIN ASPART 4 UNITS: 100 INJECTION, SOLUTION INTRAVENOUS; SUBCUTANEOUS at 08:28

## 2021-01-01 RX ADMIN — DEXAMETHASONE SODIUM PHOSPHATE 6 MG: 4 INJECTION, SOLUTION INTRAMUSCULAR; INTRAVENOUS at 08:08

## 2021-01-01 RX ADMIN — AMITRIPTYLINE HYDROCHLORIDE 25 MG: 25 TABLET, FILM COATED ORAL at 20:19

## 2021-01-01 RX ADMIN — ALBUTEROL SULFATE 2 PUFF: 90 AEROSOL, METERED RESPIRATORY (INHALATION) at 19:28

## 2021-01-01 RX ADMIN — ALBUTEROL SULFATE 2 PUFF: 90 AEROSOL, METERED RESPIRATORY (INHALATION) at 12:15

## 2021-01-01 RX ADMIN — ALBUTEROL SULFATE 2 PUFF: 90 AEROSOL, METERED RESPIRATORY (INHALATION) at 18:52

## 2021-01-01 RX ADMIN — ISOSORBIDE MONONITRATE 60 MG: 60 TABLET ORAL at 08:59

## 2021-01-01 RX ADMIN — ASPIRIN 81 MG: 81 TABLET, COATED ORAL at 09:13

## 2021-01-01 RX ADMIN — ENOXAPARIN SODIUM 40 MG: 80 INJECTION SUBCUTANEOUS at 05:29

## 2021-01-01 RX ADMIN — Medication 150 MG: at 08:08

## 2021-01-01 RX ADMIN — INSULIN DETEMIR 5 UNITS: 100 INJECTION, SOLUTION SUBCUTANEOUS at 21:01

## 2021-01-01 RX ADMIN — INSULIN ASPART 9 UNITS: 100 INJECTION, SOLUTION INTRAVENOUS; SUBCUTANEOUS at 17:24

## 2021-01-01 RX ADMIN — DIPHENHYDRAMINE HYDROCHLORIDE 25 MG: 50 INJECTION, SOLUTION INTRAMUSCULAR; INTRAVENOUS at 01:30

## 2021-01-01 RX ADMIN — SODIUM CHLORIDE, PRESERVATIVE FREE 3 ML: 5 INJECTION INTRAVENOUS at 10:24

## 2021-01-01 RX ADMIN — TERAZOSIN HYDROCHLORIDE 5 MG: 5 CAPSULE ORAL at 20:05

## 2021-01-01 RX ADMIN — ALBUTEROL SULFATE 2 PUFF: 90 AEROSOL, METERED RESPIRATORY (INHALATION) at 09:22

## 2021-01-01 RX ADMIN — PREDNISONE 5 MG: 5 TABLET ORAL at 08:07

## 2021-01-01 RX ADMIN — THEOPHYLLINE 400 MG: 400 TABLET, EXTENDED RELEASE ORAL at 09:13

## 2021-01-01 RX ADMIN — SODIUM CHLORIDE, PRESERVATIVE FREE 3 ML: 5 INJECTION INTRAVENOUS at 21:01

## 2021-01-01 RX ADMIN — REMDESIVIR 100 MG: 100 INJECTION, POWDER, LYOPHILIZED, FOR SOLUTION INTRAVENOUS at 15:29

## 2021-01-01 RX ADMIN — MORPHINE SULFATE 2 MG: 2 INJECTION, SOLUTION INTRAMUSCULAR; INTRAVENOUS at 17:51

## 2021-01-01 RX ADMIN — FUROSEMIDE 60 MG: 10 INJECTION, SOLUTION INTRAMUSCULAR; INTRAVENOUS at 12:10

## 2021-01-01 RX ADMIN — GUAIFENESIN 1200 MG: 600 TABLET, EXTENDED RELEASE ORAL at 11:46

## 2021-01-01 RX ADMIN — INSULIN ASPART 4 UNITS: 100 INJECTION, SOLUTION INTRAVENOUS; SUBCUTANEOUS at 11:17

## 2021-01-01 RX ADMIN — INSULIN ASPART 4 UNITS: 100 INJECTION, SOLUTION INTRAVENOUS; SUBCUTANEOUS at 08:07

## 2021-01-01 RX ADMIN — SODIUM CHLORIDE, PRESERVATIVE FREE 3 ML: 5 INJECTION INTRAVENOUS at 09:15

## 2021-01-01 RX ADMIN — FUROSEMIDE 60 MG: 10 INJECTION, SOLUTION INTRAMUSCULAR; INTRAVENOUS at 09:12

## 2021-01-01 RX ADMIN — ASPIRIN 81 MG: 81 TABLET, COATED ORAL at 10:22

## 2021-01-01 RX ADMIN — PREDNISONE 5 MG: 5 TABLET ORAL at 09:22

## 2021-01-01 RX ADMIN — Medication 1 CAPSULE: at 09:13

## 2021-01-01 RX ADMIN — TERAZOSIN HYDROCHLORIDE 5 MG: 5 CAPSULE ORAL at 20:19

## 2021-01-01 RX ADMIN — CLOPIDOGREL 75 MG: 75 TABLET, FILM COATED ORAL at 08:07

## 2021-01-01 RX ADMIN — ALBUTEROL SULFATE 2 PUFF: 90 AEROSOL, METERED RESPIRATORY (INHALATION) at 09:02

## 2021-01-01 RX ADMIN — FUROSEMIDE 40 MG: 10 INJECTION, SOLUTION INTRAMUSCULAR; INTRAVENOUS at 18:37

## 2021-01-01 RX ADMIN — BUMETANIDE 1 MG: 1 TABLET ORAL at 09:55

## 2021-01-01 RX ADMIN — MONTELUKAST SODIUM 10 MG: 10 TABLET, COATED ORAL at 20:20

## 2021-01-01 RX ADMIN — Medication: at 20:21

## 2021-01-01 RX ADMIN — DIPHENHYDRAMINE HYDROCHLORIDE 25 MG: 50 INJECTION, SOLUTION INTRAMUSCULAR; INTRAVENOUS at 11:49

## 2021-01-01 RX ADMIN — INSULIN ASPART 6 UNITS: 100 INJECTION, SOLUTION INTRAVENOUS; SUBCUTANEOUS at 08:04

## 2021-01-01 RX ADMIN — TERAZOSIN HYDROCHLORIDE 5 MG: 5 CAPSULE ORAL at 20:57

## 2021-01-01 RX ADMIN — METOPROLOL SUCCINATE 100 MG: 50 TABLET, EXTENDED RELEASE ORAL at 20:04

## 2021-01-01 RX ADMIN — CLOPIDOGREL 75 MG: 75 TABLET, FILM COATED ORAL at 08:06

## 2021-01-01 NOTE — PLAN OF CARE
Goal Outcome Evaluation:  Plan of Care Reviewed With: patient  Progress: improving    Patient has been very pleasant today. Compliant with all medications and care. Patient does seem to be more fatigued today than yesterday, however he stated he feels fine.Patient is alert but disoriented to time and situation. Patient did have a critical Troponin of 0.136. MD notified and Cardiology was consulted. Patient has been incontinent of bladder during shift, good output noted. Currently on 3.5 liters nasal cannula, saturations are maintaining above 94%. Patient denied chest pain and SOB during shift. Spoke to family during shift and updated on patient's status and plan of care. Will continue to monitor and follow plan of care.

## 2021-01-01 NOTE — PROGRESS NOTES
LOS: 12 days     Name: Aaron Domínguez  Age/Sex: 89 y.o. male  :  1929        PCP: Bran Woodruff MD  REF: No ref. provider found    Principal Problem:    Hypoglycemia  Active Problems:    Fall at home      Reason for follow-up: Chest pain    Subjective       Subjective     Aaron Domínguez is a 89-year-old male who presented to the ER after being found unresponsive at home.  He was found to be hypoglycemic.  Patient also developed new onset atrial fibrillation and was found to have COVID-19 incidentally on screening test.    Interval History: Patient was seen via iPad due to COVID-19.  He is alert and able to provide some history but does not oriented to time.  Patient does report intermittent chest pain last night but denies any current chest pain.  Initial troponin on admission noted be 0.09 for now trending up to 0.136.  EKG shows no acute changes.      Vital Signs  Temp:  [97.9 °F (36.6 °C)-99.3 °F (37.4 °C)] 98 °F (36.7 °C)  Heart Rate:  [81-92] 89  Resp:  [20-24] 22  BP: (134-186)/(56-70) 134/56  Vital Signs (last 72 hrs)        0700  -   0659  07  -   0659  07  -   0659  07  -   1159   Most Recent    Temp (°F) 96.6 -  98.3    97.4 -  98.1    97.8 -  99.3      98     98 (36.7)    Heart Rate 82 -  97    81 -  109    81 -  92      89     89    Resp 18 -  20    18 -  22    20 -  24      22     22    /56 -  162/60    108/54 -  162/72    150/56 -  186/70      134/56     134/56    SpO2 (%) 91 -  98    94 -  98    91 -  97      96     96        Body mass index is 28.69 kg/m².    Intake/Output Summary (Last 24 hours) at 2021 1159  Last data filed at 2021 0919  Gross per 24 hour   Intake 720 ml   Output --   Net 720 ml     Objective    Objective       Physical Exam:     General Appearance:    Alert, cooperative, in no acute distress   Head:    Normocephalic, without obvious abnormality, atraumatic   Eyes:            Conjunctivae and sclerae normal,  no   icterus, no pallor, corneas clear.   Neck:   No adenopathy, supple, trachea midline, no thyromegaly,    Lungs:     ,respirations regular, even and unlabored       Chest Wall:    No abnormalities observed       Extremities:   Moves all extremities well, no edema, no cyanosis, no             redness       Skin:   No bleeding, bruising or rash       Neurologic:   Alert    Patient was seen via IPAD due to COVID-19  Results review       Results Review:   Results from last 7 days   Lab Units 01/01/21  0152 12/30/20  0230 12/29/20  0334 12/28/20  0030 12/27/20  0237 12/26/20  0034   WBC 10*3/mm3 7.07 6.38 5.55 6.52 5.35 6.32   HEMOGLOBIN g/dL 9.6* 10.0* 9.8* 9.6* 9.6* 9.6*   PLATELETS 10*3/mm3 204 215 205 228 210 206     Results from last 7 days   Lab Units 01/01/21  0152 12/30/20  0127 12/29/20  1716 12/29/20  0334 12/28/20  0030 12/27/20  0237 12/26/20  0034   SODIUM mmol/L 137 131*  --  137 138 137 134*   POTASSIUM mmol/L 4.1 4.5 4.1 3.4* 3.8 3.8 3.9   CHLORIDE mmol/L 94* 92*  --  92* 92* 92* 90*   CO2 mmol/L 36.0* 30.3*  --  39.8* 40.1* 36.5* 35.6*   BUN mg/dL 33* 23  --  21 25* 26* 24*   CREATININE mg/dL 1.11 1.04  --  0.97 1.17 1.10 0.94   CALCIUM mg/dL 9.0 9.0  --  9.3 9.2 8.9 9.2   GLUCOSE mg/dL 231* 173*  --  48* 119* 164* 205*     Results from last 7 days   Lab Units 01/01/21  0759 12/31/20 2058   TROPONIN T ng/mL 0.136* 0.096*     Lab Results   Component Value Date    INR 0.89 (L) 12/13/2020     Lab Results   Component Value Date    MG 2.2 01/01/2021    MG 1.6 (L) 12/31/2020    MG 1.6 (L) 12/28/2020     Lab Results   Component Value Date    TSH 2.690 12/20/2020      Imaging Results (Last 48 Hours)     Procedure Component Value Units Date/Time    XR Chest 1 View [[id number]] Collected: 01/01/21 1135     Updated: 01/01/21 1137    Narrative:      XR CHEST 1 VW-     CLINICAL INDICATION: chest pain; E16.2-Hypoglycemia, unspecified;  T68.XXXA-Hypothermia, initial encounter        COMPARISON: 12/30/2020       TECHNIQUE: Single frontal view of the chest.     FINDINGS:     Right basilar atelectasis  Elevation of the right hemidiaphragm  There is no evidence of an acute osseous abnormality.   There are no suspicious-appearing parenchymal soft tissue nodules.          Impression:      Elevation of the right hemidiaphragm.  Right basilar atelectasis     This report was finalized on 1/1/2021 11:35 AM by Dr. Keanu La MD.       XR Chest 1 View [[id number]] Collected: 12/30/20 1542     Updated: 12/30/20 1617    Narrative:      XR CHEST 1 VW-     CLINICAL INDICATION: COVID; E16.2-Hypoglycemia, unspecified;  T68.XXXA-Hypothermia, initial encounter        COMPARISON: 12/27/2020      TECHNIQUE: Single frontal view of the chest.     FINDINGS:     Minimal bibasilar airspace disease. Elevation of the right hemidiaphragm  The cardiac silhouette is normal. The pulmonary vasculature is  unremarkable.  There is no evidence of an acute osseous abnormality.   There are no suspicious-appearing parenchymal soft tissue nodules.          Impression:      Minimal bibasilar airspace disease.     This report was finalized on 12/30/2020 3:43 PM by Dr. Keanu La MD.           Echo   Results for orders placed during the hospital encounter of 12/13/20   Adult Transthoracic Echo Complete W/ Cont if Necessary Per Protocol    Narrative · Left ventricular ejection fraction appears to be 61 - 65%.  · Left ventricular wall thickness is consistent with concentric   hypertrophy.  · Left ventricular diastolic function is consistent with (grade I)   impaired relaxation.  · There is moderate calcification of the aortic valve mainly affecting the   non-coronary cusp(s).  · There is moderate thickening of the non-coronary cusp(s) of the aortic   valve. The aortic valve appears trileaflet.  · No aortic valve regurgitation or stenosis is present  · The mitral valve is structurally normal with no regurgitation or   significant stenosis present.  · Mild  tricuspid valve regurgitation is present. Estimated right   ventricular systolic pressure from tricuspid regurgitation is moderately   elevated (45-55 mmHg).  · Moderate pulmonary hypertension is present.  · There is no evidence of pericardial effusion.         I reviewed the patient's new clinical results.    Telemetry: Normal sinus rhythm 80s       Medication Review:   albuterol sulfate HFA, 2 puff, Inhalation, 4x Daily - RT  amitriptyline, 25 mg, Oral, Nightly  atorvastatin, 10 mg, Oral, Nightly  bumetanide, 1 mg, Oral, Daily  cholecalciferol, 50,000 Units, Oral, Weekly  clopidogrel, 75 mg, Oral, Daily  docusate sodium, 100 mg, Oral, BID  enoxaparin, 40 mg, Subcutaneous, Q24H  insulin aspart, 0-9 Units, Subcutaneous, TID AC  insulin detemir, 3 Units, Subcutaneous, Once  magic barrier cream, , Topical, BID  metFORMIN, 1,000 mg, Oral, BID With Meals  metoprolol succinate XL, 100 mg, Oral, BID  montelukast, 10 mg, Oral, Nightly  predniSONE, 5 mg, Oral, Daily  sodium chloride, 3 mL, Intravenous, Q12H  terazosin, 5 mg, Oral, Q12H  theophylline, 400 mg, Oral, Daily             Assessment      Assessment:  1. Chest pain, initial troponin 0 0.04 trending up to 0.136, EKG tracing reviewed and shows no acute ischemia  2. New onset paroxysmal atrial flutter, now in normal sinus rhythm, not on anticoagulation due to patient having multiple falls at home and family feels that anticoagulation therapy will put him in unnecessary risk  3. Chronic diastolic just heart failure, clinically compensated  4. History of coronary artery disease with history of PCI, details unavailable  5. Essential hypertension, controlled     Plan     Recommendations:  1. Chest pain/ elevated troponin  · Patient reports episode of chest pain last night.  Troponin trending upward to 0.136.  EKG tracing reviewed and shows no acute ischemia.  Patient's case was discussed with the patient's daughter Lois Bhakta and she states that she would like to proceed  with conservative management given her father's age.  Therefore will continue with medical management at this time for chest pain and elevated troponin.  · Increase toprol XL to 100 mg PO BID. Add low dose aspirin and imdur 60mg daily. Will consider adding ACE/ARB also if he tolerates medication changes.       I discussed the patients findings and my recommendations with patient and family      Eli SARAH Garcia  01/01/21  11:59 EST    Please note that portions of this note were completed with a voice recognition program.

## 2021-01-01 NOTE — SIGNIFICANT NOTE
[facility]  SIGNIFICANT EVENT NOTE        EVENT:    Contacted per Miah SOLORZANO. Patient had a brief episode of chest pain. Troponin T obtained resulted at 0.096 which is stable and appears chronically elevated. EKG obtained, reviewed with attending Dr. Birch, no acute ischemic changes appreciated when compared to previous EKG. Patient noted to intermittently be in aflutter, cardiology previously signed off. HR is controlled. Per Miah RN, patient with no further complaints of chest pain at this time without any intervention. He is on Plavix chronically 75 mg daily.    OBJECTIVE DATA:  Vitals:    12/31/20 2037   BP:    Pulse:    Resp:    Temp:    SpO2: 96%       ACTION TAKEN/MEDICATION CHANGES:    · Continue plavix and statin   · Continue current medication regimen (metoprolol XL, bumex)   · Continue telemetry monitoring   · PRN troponin for chest pain  · Serial EKG      Alyssa Burgos PA-C  Hospitalist Service -- [facility]   Pager: [phone]    12/31/20  22:01 EST    Attending Physician: Aaron Calderón MD

## 2021-01-01 NOTE — PLAN OF CARE
Goal Outcome Evaluation:  Patient resting well during shift. Patient complained of chest pressure at beginning of shift. Vitals signs obtained. STAT ekg and troponin obtained. Alyssa ALVAREZ and Dr. Birch notified. Will follow plan of care. No distress noted will continue to monitor.

## 2021-01-01 NOTE — THERAPY EVALUATION
Acute Care - Speech Language Pathology   Swallow Initial Evaluation [facility]   CLINICAL DYSPHAGIA ASSESSMENT     Patient Name: Aaron Domínguez  : 1929  MRN: [id number]  Today's Date: 2021  Onset of Illness/Injury or Date of Surgery: 20     Referring Physician: Dr. Birch      Admit Date: 2020    Visit Dx:     ICD-10-CM ICD-9-CM   1. Hypoglycemia  E16.2 251.2   2. Hypothermia, initial encounter  T68.XXXA 991.6     Patient Active Problem List   Diagnosis   • Hypoglycemia   • Fall at home     Past Medical History:   Diagnosis Date   • COPD (chronic obstructive pulmonary disease) (CMS/[facility])    • Diabetes mellitus (CMS/[facility])    • Hypertension      Past Surgical History:   Procedure Laterality Date   • CORONARY STENT PLACEMENT       Aaron Domínguez  is seen at bedside this am on 3S to assess safety/efficacy of swallowing fnx, determine safest/least restrictive diet tolerance.     He is familiar to SLP for recent Clinical Dysphagia Assessment 20 during this admission, as well as MBS 20, w/ wfl oropharyngeal swallow. He is on a mechancial soft diet at his request per personal preference. He is referred today 2/2 episode of choking last pm w/ chopped turkey per RN. He is noted w/ status change today in comparison to previous SLP f/u, now COVID-19 positive. He does appear overall generally weak and fatigued today.     Social History     Socioeconomic History   • Marital status:      Spouse name: Not on file   • Number of children: Not on file   • Years of education: Not on file   • Highest education level: Not on file   Tobacco Use   • Smoking status: Former Smoker     Packs/day: 2.00     Years: 60.00     Pack years: 120.00     Types: Cigarettes     Quit date: 1998     Years since quittin.4   • Smokeless tobacco: Current User     Types: Chew   Substance and Sexual Activity   • Alcohol use: No   • Drug use: No   • Sexual activity: Defer      XR CHEST 1 VW-     CLINICAL  INDICATION: chest pain; E16.2-Hypoglycemia, unspecified;  T68.XXXA-Hypothermia, initial encounter        COMPARISON: 12/30/2020      TECHNIQUE: Single frontal view of the chest.     FINDINGS:     Right basilar atelectasis  Elevation of the right hemidiaphragm  There is no evidence of an acute osseous abnormality.   There are no suspicious-appearing parenchymal soft tissue nodules.        IMPRESSION:  Elevation of the right hemidiaphragm.  Right basilar atelectasis     This report was finalized on 1/1/2021 11:35 AM by Dr. Keanu La MD.    Diet Orders (active) (From admission, onward)     Start     Ordered    01/01/21 1115  Diet Pureed; Thin; Consistent Carbohydrate, Low Sodium, Daily Fluid Restriction; Other; 1,200; 2,000 mg Na  Diet Effective Now      01/01/21 1114    12/28/20 1800  Dietary Nutrition Supplements Boost Plus (Ensure Enlive, Ensure Plus)  Daily With Lunch & Dinner      12/28/20 1453              He is observed on 4L O2 via NC w/o complications.     Pt is positioned upright and centered in bed to accept multiple po presentations of ice chips, solid cracker, puree and thin liquids via spoon, cup and straw.  He does not self feed during this assessment 2/2 generalized weakness.     Facial/oral structures are symmetrical upon observation. Lingual protrusion reveals no deviation. Oral mucosa are moderately xerostomic, pink, and clean. Secretions are clear, tacky, and controlled. OROM/KARON is generally weak to imitate oral postures. Gag is not assessed. Volitional cough is intact w/ adequate intensity, congestive in quality, non-productive. He does evidence w/ baseline sporadic congestive cough before, during and after this assessment, no direct correlation to po presentations. Voice is adequate in intensity, clear in quality w/ intelligible speech. He is oriented to person, requires min-mod cues for location, somewhat confused in general. He is able to follow simple directives and verbally respond to  simple y/n and oe questions.     Upon po presentations, adequate bolus anticipation and acceptance w/ good labial seal for bolus clearance via spoon bowl, cup rim stability and suction via straw. Bolus formation, manipulation and control are generally weak w/ rolling manipulation of solid cracker, he is able to expel from oral cavity. A-p transit is mildly delayed w/o significant oral residue. No overt s/s aspiration evidenced before the swallow.     Pharyngeal swallow is timely w/ adequate hyolaryngeal elevation per palpation. No overt s/s aspiration evidenced across this assessment. No silent aspiration suspected as pt is w/o changes in vocal quality, respirations or secretions post po presentations. Pt denies odynophagia.    Impression: Per this assessment, Mr. Domínguez presents w/ a moderate oral weakness, felt to be most likely related to ams and fatigue per current medical status, wfl pharyngeal swallow w/o s/s aspiration. No s/s indicative of silent aspiration. Recommend modified po diet of puree consistency, thin liquids. 1:1 assistance w/ po intake.     EDUCATION  The patient has been educated in the following areas:   Dysphagia (Swallowing Impairment) Modified Diet Instruction.    SLP Recommendation and Plan  1. Puree consistency, thin liquids.    2. Meds whole in puree, crush PRN.   3. Upright and centered for all po intake.  4. EVAN precautions.  5. Oral care protocol.  6. 1:1 assistance w/ po intake.   SLP to f/u for diet safety/assessment for upgrade.     D/w pt results and recommendations w/ verbal agreement.    D/w RN results and recommendations w/ verbal agreement.    Thank you for allowing me to participate in the care of your patient-  Danna Rangel M.A., CCC-SLP       Time Calculation:   Time Calculation- SLP     Row Name 01/01/21 1435             Time Calculation- SLP    SLP Received On  01/01/21  -MARYLIN      SLP - Next Appointment  01/04/21  -        User Key  (r) = Recorded By, (t) = Taken By,  (c) = Cosigned By    Initials Name Provider Type    CJ Danna Rangel MA,CCC-SLP Speech and Language Pathologist        Therapy Charges for Today     Code Description Service Date Service Provider Modifiers Qty    [id number] HC ST EVAL ORAL PHARYNG SWALLOW 6 1/1/2021 Danna Rangel MA,CCC-SLP GN 1        Patient was not wearing a face mask during this therapy encounter. Therapist used appropriate personal protective equipment including gown, eye protection, mask and gloves.  Mask used was N95/duckbill. Appropriate PPE was worn during the entire therapy session. The patient coughed during this evaluation. Therapist was within 6 feet for 15 minutes or more during the evaluation. Hand hygiene was completed before and after therapy session. Patient is in enhanced droplet precautions.       Danna Rangel MA,CCC-SLP  1/1/2021

## 2021-01-02 NOTE — PROGRESS NOTES
PROGRESS NOTE         Patient Identification:  Name:  Aaron Domínguez  Age:  89 y.o.  Sex:  male  :  1929  MRN:  [id number]  Visit Number:  [id number]  Primary Care Provider:  Bran Woodruff MD         LOS: 13 days       ----------------------------------------------------------------------------------------------------------------------  Subjective       Chief Complaints:    Hypoglycemia and Loss of Consciousness        Interval History:      Case discussed with primary RN.  Patient is stable on 4 L nasal cannula.  Afebrile, no diarrhea.  Nurse reports that he is eating better this morning.  CRP is stable at 6.33.  Chest x-ray on 2020 showed elevation of the right hemidiaphragm.  Right basilar atelectasis.    Review of Systems:    Constitutional: no fever, chills and night sweats. No appetite change or unexpected weight change. No fatigue.  Eyes: no eye drainage, itching or redness.  HEENT: no mouth sores, dysphagia or nose bleed.  Respiratory: Chronic cough and shortness of breath.  Cardiovascular: no chest pain, no palpitations, no orthopnea.  Gastrointestinal: no nausea, vomiting or diarrhea. No abdominal pain, hematemesis or rectal bleeding.  Genitourinary: no dysuria or polyuria.  Hematologic/lymphatic: no lymph node abnormalities, no easy bruising or easy bleeding.  Musculoskeletal: Chronic back pain, arthralgias, and gait problems.  Skin: No rash and no itching.  Neurological: Syncope.  Behavioral/Psych: no depression or suicidal ideation.  Endocrine: no hot flashes.  Immunologic: negative.    ----------------------------------------------------------------------------------------------------------------------      Objective       Current Hospital Meds:  albuterol sulfate HFA, 2 puff, Inhalation, 4x Daily - RT  amitriptyline, 25 mg, Oral, Nightly  aspirin, 81 mg, Oral, Daily  atorvastatin, 10 mg, Oral, Nightly  bumetanide, 1 mg, Oral, Daily  cholecalciferol, 50,000 Units, Oral,  Weekly  clopidogrel, 75 mg, Oral, Daily  docusate sodium, 100 mg, Oral, BID  enoxaparin, 40 mg, Subcutaneous, Q24H  insulin aspart, 0-9 Units, Subcutaneous, TID AC  insulin detemir, 3 Units, Subcutaneous, Once  isosorbide mononitrate, 60 mg, Oral, Q24H  magic barrier cream, , Topical, BID  metFORMIN, 1,000 mg, Oral, BID With Meals  metoprolol succinate XL, 100 mg, Oral, BID  montelukast, 10 mg, Oral, Nightly  predniSONE, 5 mg, Oral, Daily  sodium chloride, 3 mL, Intravenous, Q12H  terazosin, 5 mg, Oral, Q12H  theophylline, 400 mg, Oral, Daily         ----------------------------------------------------------------------------------------------------------------------    Vital Signs:  Temp:  [97.5 °F (36.4 °C)-98 °F (36.7 °C)] 97.9 °F (36.6 °C)  Heart Rate:  [75-89] 86  Resp:  [20-24] 20  BP: (122-150)/(50-84) 150/80  No data found.  SpO2 Percentage    01/01/21 1858 01/02/21 0310 01/02/21 0700   SpO2: 96% 94% 96%     SpO2:  [92 %-96 %] 96 %  on  Flow (L/min):  [4] 4;   Device (Oxygen Therapy): nasal cannula;humidified    Body mass index is 28.42 kg/m².  Wt Readings from Last 3 Encounters:   01/02/21 92.4 kg (203 lb 12.8 oz)   12/17/20 92.2 kg (203 lb 3.2 oz)   10/16/18 89.8 kg (198 lb)        Intake/Output Summary (Last 24 hours) at 1/2/2021 1031  Last data filed at 1/2/2021 0900  Gross per 24 hour   Intake 900 ml   Output 150 ml   Net 750 ml     Diet Pureed; Thin; Consistent Carbohydrate, Low Sodium, Daily Fluid Restriction; Other; 1,200; 2,000 mg Na  ----------------------------------------------------------------------------------------------------------------------      Physical Exam:    Deferred due to COVID-19 isolation.  ----------------------------------------------------------------------------------------------------------------------  Results from last 7 days   Lab Units 01/01/21  0759 12/31/20 2058   TROPONIN T ng/mL 0.136* 0.096*           Results from last 7 days   Lab Units 01/01/21  171   PH,  ARTERIAL pH units 7.413   PO2 ART mm Hg 67.8*   PCO2, ARTERIAL mm Hg 65.6*   HCO3 ART mmol/L 41.8*     Results from last 7 days   Lab Units 01/02/21  0818 01/02/21 0418 01/01/21 0152 12/31/20  0038 12/30/20  0230   CRP mg/dL  --  6.33* 5.02* 3.31*  --    WBC 10*3/mm3 7.64  --  7.07  --  6.38   HEMOGLOBIN g/dL 8.9*  --  9.6*  --  10.0*   HEMATOCRIT % 28.3*  --  30.6*  --  31.2*   MCV fL 97.3*  --  97.8*  --  95.4   MCHC g/dL 31.4*  --  31.4*  --  32.1   PLATELETS 10*3/mm3 199  --  204  --  215     Results from last 7 days   Lab Units 01/02/21  0818 01/01/21 0152 12/31/20 0229 12/30/20  0127  12/28/20  0030   SODIUM mmol/L 135* 137  --  131*   < > 138   POTASSIUM mmol/L 4.5 4.1  --  4.5   < > 3.8   MAGNESIUM mg/dL  --  2.2 1.6*  --   --  1.6*   CHLORIDE mmol/L 92* 94*  --  92*   < > 92*   CO2 mmol/L 36.9* 36.0*  --  30.3*   < > 40.1*   BUN mg/dL 37* 33*  --  23   < > 25*   CREATININE mg/dL 1.21 1.11  --  1.04   < > 1.17   EGFR IF NONAFRICN AM mL/min/1.73 56* 62  --  67   < > 58*   CALCIUM mg/dL 8.9 9.0  --  9.0   < > 9.2   GLUCOSE mg/dL 228* 231*  --  173*   < > 119*    < > = values in this interval not displayed.   Estimated Creatinine Clearance: 46.2 mL/min (by C-G formula based on SCr of 1.21 mg/dL).  No results found for: AMMONIA    Glucose   Date/Time Value Ref Range Status   01/02/2021 0828 238 (H) 70 - 130 mg/dL Final   01/01/2021 2029 259 (H) 70 - 130 mg/dL Final   01/01/2021 1602 116 70 - 130 mg/dL Final   01/01/2021 1137 180 (H) 70 - 130 mg/dL Final   01/01/2021 0634 281 (H) 70 - 130 mg/dL Final   12/31/2020 1912 306 (H) 70 - 130 mg/dL Final   12/31/2020 1730 259 (H) 70 - 130 mg/dL Final   12/31/2020 1126 168 (H) 70 - 130 mg/dL Final     Lab Results   Component Value Date    HGBA1C 9.10 (H) 12/20/2020     Lab Results   Component Value Date    TSH 2.690 12/20/2020       Blood Culture   Date Value Ref Range Status   12/20/2020 No growth at 24 hours  Preliminary   12/20/2020 No growth at 24 hours   Preliminary   12/15/2020 No growth at 5 days  Final   12/15/2020 No growth at 5 days  Final     No results found for: URINECX  No results found for: WOUNDCX  No results found for: STOOLCX  No results found for: RESPCX  Pain Management Panel     There is no flowsheet data to display.            ----------------------------------------------------------------------------------------------------------------------  Imaging Results (Last 24 Hours)     Procedure Component Value Units Date/Time    XR Chest 1 View [[id number]] Resulted: 01/02/21 0918     Updated: 01/02/21 0942    XR Chest 1 View [[id number]] Collected: 01/01/21 1135     Updated: 01/01/21 1137    Narrative:      XR CHEST 1 VW-     CLINICAL INDICATION: chest pain; E16.2-Hypoglycemia, unspecified;  T68.XXXA-Hypothermia, initial encounter        COMPARISON: 12/30/2020      TECHNIQUE: Single frontal view of the chest.     FINDINGS:     Right basilar atelectasis  Elevation of the right hemidiaphragm  There is no evidence of an acute osseous abnormality.   There are no suspicious-appearing parenchymal soft tissue nodules.          Impression:      Elevation of the right hemidiaphragm.  Right basilar atelectasis     This report was finalized on 1/1/2021 11:35 AM by Dr. Keanu La MD.             ----------------------------------------------------------------------------------------------------------------------    Assessment/Plan       Assessment/Plan     ASSESSMENT:    1.  COVID-19 pneumonia  2.  MRSA bacteremia, treatment completed    PLAN:    Case discussed with primary RN.  Patient is stable on 4 L nasal cannula.  Afebrile, no diarrhea.  Nurse reports that he is eating better this morning.  CRP is stable at 6.33.  Chest x-ray on 12/1/2020 showed elevation of the right hemidiaphragm.  Right basilar atelectasis.    COVID-19 PCR from 12/30/2020 positive.  Influenza and RSV PCR negative.    Blood cultures on 12/20/2020 are so far showing no growth.    COVID-19  and influenza PCR negative on 12/20/2020.  Urinalysis unremarkable.  Left upper extremity venous duplex reports no evidence of DVT.  X-ray of the left shoulder reports no acute findings.  CT of the L-spine reports no acute findings.  CT of the T-spine reports no acute abnormalities.  CT of the C-spine reports 3.7 cm mass to the right neck either enlarged lymph node or primary neck mass, 3.2 mm colloid nodule in the left upper lobe.  X-ray of the pelvis reports no acute abnormalities.  CT of the head reports no acute abnormalities, chronic left maxillary sinusitis.  Chest x-ray from 12/20/2020 reports chronic volume loss of the right lung base, no acute findings.     Patient completed bacteremia treatment of vancomycin pharmacy to dose on 12/28/2020.    For now recommend to hold Decadron and remdesivir as patient is near baseline of oxygen requirements.    If any respiratory worsening, we recommend initiation of Decadron and remdesivir.  We will continue to follow closely and adjust antibiotic therapy as needed.    There will be no ID coverage on 1/1/2021.    Code Status:   Code Status and Medical Interventions:   Ordered at: 12/20/20 1527     Level Of Support Discussed With:    Patient     Code Status:    CPR     Medical Interventions (Level of Support Prior to Arrest):    Full     KIM Hughes  01/02/21  10:31 EST

## 2021-01-02 NOTE — PROGRESS NOTES
[facility] HOSPITALIST PROGRESS NOTE     Patient Identification:  Name:  Aaron Domínguez  Age:  89 y.o.  Sex:  male  :  1929  MRN:  [id number]  Visit Number:  [id number]  ROOM: [address] [place] [address]     Primary Care Provider:  Bran Woodruff MD    Length of stay in inpatient status:  12    Subjective     Chief Compliant:    Chief Complaint   Patient presents with   • Hypoglycemia   • Loss of Consciousness       History of Presenting Illness:    Patient reported some chest pain overnight. No chest pain reported today and was unable to explain discomfort. Reported he was more tired today but otherwise no other complaints.     ROS:  Otherwise 10 point ROS negative other than documented above in HPI.     Objective     Current Hospital Meds:albuterol sulfate HFA, 2 puff, Inhalation, 4x Daily - RT  amitriptyline, 25 mg, Oral, Nightly  [START ON 2021] aspirin, 81 mg, Oral, Daily  atorvastatin, 10 mg, Oral, Nightly  bumetanide, 1 mg, Oral, Daily  cholecalciferol, 50,000 Units, Oral, Weekly  clopidogrel, 75 mg, Oral, Daily  docusate sodium, 100 mg, Oral, BID  enoxaparin, 40 mg, Subcutaneous, Q24H  insulin aspart, 0-9 Units, Subcutaneous, TID AC  insulin detemir, 3 Units, Subcutaneous, Once  isosorbide mononitrate, 60 mg, Oral, Q24H  magic barrier cream, , Topical, BID  metFORMIN, 1,000 mg, Oral, BID With Meals  metoprolol succinate XL, 100 mg, Oral, BID  montelukast, 10 mg, Oral, Nightly  predniSONE, 5 mg, Oral, Daily  sodium chloride, 3 mL, Intravenous, Q12H  terazosin, 5 mg, Oral, Q12H  theophylline, 400 mg, Oral, Daily         Current Antimicrobial Therapy:  Anti-Infectives (From admission, onward)    Ordered     Dose/Rate Route Frequency Start Stop    20 0752  vancomycin 1500 mg/500 mL 0.9% NS IVPB ([facility])     Paola Ochoa APRN reviewed the order on 20 0900.   Ordering Provider: Paola Ochoa APRN    1,500 mg  over 120 Minutes Intravenous Every 24 Hours 20 1600 20 6424     12/20/20 2129  vancomycin 2000 mg/500 mL 0.9% NS IVPB ([facility])     Ordering Provider: Dodie Baldwin DO    20 mg/kg × 96.3 kg  over 120 Minutes Intravenous Once 12/20/20 2230 12/21/20 0035        Current Diuretic Therapy:  Diuretics (From admission, onward)    Ordered     Dose/Rate Route Frequency Start Stop    12/27/20 1135  bumetanide (BUMEX) tablet 1 mg     Ordering Provider: Beatrice Birch MD    1 mg Oral Daily 12/28/20 0900          ----------------------------------------------------------------------------------------------------------------------  Vital Signs:  Temp:  [97.9 °F (36.6 °C)-98.1 °F (36.7 °C)] 97.9 °F (36.6 °C)  Heart Rate:  [75-92] 89  Resp:  [20-24] 24  BP: (122-186)/(50-70) 130/70  SpO2:  [91 %-97 %] 96 %  on  Flow (L/min):  [3.5-4] 4;   Device (Oxygen Therapy): nasal cannula  Body mass index is 28.69 kg/m².    Wt Readings from Last 3 Encounters:   01/01/21 93.3 kg (205 lb 11.2 oz)   12/17/20 92.2 kg (203 lb 3.2 oz)   10/16/18 89.8 kg (198 lb)     Intake & Output (last 3 days)       12/30 0701 - 12/31 0700 12/31 0701 - 01/01 0700 01/01 0701 - 01/02 0700    P.O. 600 720 600    Total Intake(mL/kg) 600 (6.4) 720 (7.7) 600 (6.4)    Urine (mL/kg/hr) 200 (0.1)      Total Output 200      Net +400 +720 +600           Urine Unmeasured Occurrence 10 x 7 x 1 x        Diet Pureed; Thin; Consistent Carbohydrate, Low Sodium, Daily Fluid Restriction; Other; 1,200; 2,000 mg Na  ----------------------------------------------------------------------------------------------------------------------  Physical exam:  This physical exam has been personally performed remotely in the unit aided by real-time audio/visual communication tools. RASHAD Ybarra present at bedside during this exam and assisted during exam. The use of a video visit has been reviewed with the patient and verbal informed consent has been obtained.     Physical Exam:  General: Patient appears awake, alert, and in no acute distress.  Head:  Normocephalic, atraumatic  Eyes: EOMI. Conjunctivae and sclerae normal.  Ears: Ears appear intact with no abnormalities noted.   Neck: Trachea midline. No obvious JVD.  Lungs: Respirations appear to be regular, even and unlabored with no signs of respiratory distress. No audible wheezing.  Abdomen: No obvious abdominal distension.  MS: Muscle tone appears normal. No gross deformities.  Extremities: No clubbing, cyanosis or edema noted.  Skin: No visible bleeding, bruising, or rash.  Neurologic: Alert and oriented to person. Drowsy. No focal deficit.   ----------------------------------------------------------------------------------------------------------------------  Tele:    ----------------------------------------------------------------------------------------------------------------------  Results from last 7 days   Lab Units 01/01/21  0152 12/31/20  0038 12/30/20  0230 12/30/20  0127 12/29/20  0334   CRP mg/dL 5.02* 3.31*  --  2.23* 1.59*   WBC 10*3/mm3 7.07  --  6.38  --  5.55   HEMOGLOBIN g/dL 9.6*  --  10.0*  --  9.8*   HEMATOCRIT % 30.6*  --  31.2*  --  30.5*   MCV fL 97.8*  --  95.4  --  95.0   MCHC g/dL 31.4*  --  32.1  --  32.1   PLATELETS 10*3/mm3 204  --  215  --  205     Results from last 7 days   Lab Units 01/01/21  1715   PH, ARTERIAL pH units 7.413   PO2 ART mm Hg 67.8*   PCO2, ARTERIAL mm Hg 65.6*   HCO3 ART mmol/L 41.8*     Results from last 7 days   Lab Units 01/01/21  0152 12/31/20  0229 12/30/20  0127 12/29/20  1716 12/29/20  0334 12/28/20  0030   SODIUM mmol/L 137  --  131*  --  137 138   POTASSIUM mmol/L 4.1  --  4.5 4.1 3.4* 3.8   MAGNESIUM mg/dL 2.2 1.6*  --   --   --  1.6*   CHLORIDE mmol/L 94*  --  92*  --  92* 92*   CO2 mmol/L 36.0*  --  30.3*  --  39.8* 40.1*   BUN mg/dL 33*  --  23  --  21 25*   CREATININE mg/dL 1.11  --  1.04  --  0.97 1.17   EGFR IF NONAFRICN AM mL/min/1.73 62  --  67  --  73 58*   CALCIUM mg/dL 9.0  --  9.0  --  9.3 9.2   PHOSPHORUS mg/dL  --   --   --   --   --   3.1   GLUCOSE mg/dL 231*  --  173*  --  48* 119*   Estimated Creatinine Clearance: 50.6 mL/min (by C-G formula based on SCr of 1.11 mg/dL).  No results found for: AMMONIA  Results from last 7 days   Lab Units 01/01/21  0759 12/31/20 2058   TROPONIN T ng/mL 0.136* 0.096*             Glucose   Date/Time Value Ref Range Status   01/01/2021 2029 259 (H) 70 - 130 mg/dL Final   01/01/2021 1602 116 70 - 130 mg/dL Final   01/01/2021 1137 180 (H) 70 - 130 mg/dL Final   01/01/2021 0634 281 (H) 70 - 130 mg/dL Final   12/31/2020 1912 306 (H) 70 - 130 mg/dL Final   12/31/2020 1730 259 (H) 70 - 130 mg/dL Final   12/31/2020 1126 168 (H) 70 - 130 mg/dL Final   12/31/2020 0831 231 (H) 70 - 130 mg/dL Final     Lab Results   Component Value Date    TSH 2.690 12/20/2020     No results found for: PREGTESTUR, PREGSERUM, HCG, HCGQUANT  Pain Management Panel     There is no flowsheet data to display.        Brief Urine Lab Results  (Last result in the past 365 days)      Color   Clarity   Blood   Leuk Est   Nitrite   Protein   CREAT   Urine HCG        12/20/20 1142 Yellow Clear Trace Negative Negative 100 mg/dL (2+)             No results found for: BLOODCX  No results found for: URINECX  No results found for: WOUNDCX  No results found for: STOOLCX  No results found for: RESPCX  No results found for: AFBCX  Results from last 7 days   Lab Units 01/01/21  0152 12/31/20  0038 12/30/20  0127 12/29/20  0334 12/28/20  0030 12/27/20  0237 12/26/20  0034   CRP mg/dL 5.02* 3.31* 2.23* 1.59* 1.92* 2.43* 3.01*       I have personally looked at the labs and they are summarized above.  ----------------------------------------------------------------------------------------------------------------------  Detailed radiology reports for the last 24 hours:    Imaging Results (Last 24 Hours)     Procedure Component Value Units Date/Time    XR Chest 1 View [[id number]] Collected: 01/01/21 1135     Updated: 01/01/21 1137    Narrative:      XR CHEST 1 VW-      CLINICAL INDICATION: chest pain; E16.2-Hypoglycemia, unspecified;  T68.XXXA-Hypothermia, initial encounter        COMPARISON: 12/30/2020      TECHNIQUE: Single frontal view of the chest.     FINDINGS:     Right basilar atelectasis  Elevation of the right hemidiaphragm  There is no evidence of an acute osseous abnormality.   There are no suspicious-appearing parenchymal soft tissue nodules.          Impression:      Elevation of the right hemidiaphragm.  Right basilar atelectasis     This report was finalized on 1/1/2021 11:35 AM by Dr. Keanu La MD.           Assessment & Plan    #Hypoglycemia found prior to admission, causing an episode of unresponsiveness with a fall in a patient with known insulin dependent type 2 diabetes mellitus  #Incidentally found to be COVID positive on 12/30   # SIRS that was present on admission (Temp 92.4F, , WBC 13,660), suspect due to the hypoglycemia and fall   #New onset paroxysmal Afib/Aflutter  #Recent MRSA bacteremia admission, currently completing treatment  #PICC line RUE, placed 12/17/2020  #Elevated troponin with a flat trend, suspect chronic  #Normocytic anemia, flat trend while hospitalized   #History of CAD with prior PCI/stenting  #History of hyperlipidemia  #3.7 cm right neck mass, enlarged lymph node versus primary neck mass   #3.2 mm colloid nodule left upper lobe  #History of advanced COPD with chronic hypoxic respiratory failure   #History of moderate pulmonary hypertension, RVSP 45-55mmHg  #History of essential hypertension     Patient with chest pain overnight and troponin had significant delta up to 0.136. EKG with nonspecific changes. Patient denies chest pain today. I loaded patient on ASA. Cardiology consulted They discussed with family and have opted for medical management at this time. They increased toprolol, added low dose ASA and imdur.     Patient found to have COVID on 12/30 screening exam. Was negative on admission. Currently asymptomatic on  home O2. Agree with ID to hold on treatments at this time. I considered bamlanivimab, that has shown benefit in this population, however currently not recommended for or studied specifically in inpatients. Plain film of chest shows some minimal bibasilar airspace disease that was on prior exams.     Cr stable at 1.04 on maintenance diuretics.     Discussed potential inpatient FNA of neck mass with radiology but they report it is an outpatient procedure. Will have patient f/u with ENT to expedite work-up.     Patient more drowsy today, reported not resting well last night. ABG revealed baseline hypercapnia. Will continue to monitor.     No further hypoglycemia after stopping basal insulin. Reasonably controlled.       VTE Prophylaxis:   Mechanical Order History:      Ordered        12/20/20 1626  Place Sequential Compression Device  Once         12/20/20 1626  Maintain Sequential Compression Device  Continuous                 Pharmalogical Order History:      Ordered     Dose Route Frequency Stop    12/28/20 1341  enoxaparin (LOVENOX) syringe 40 mg      40 mg SC Every 24 Hours --    12/25/20 1557  enoxaparin (LOVENOX) syringe 80 mg  Status:  Discontinued      80 mg SC Every 12 Hours 12/28/20 1341    12/24/20 1715  enoxaparin (LOVENOX) syringe 40 mg  Status:  Discontinued      40 mg SC Every 24 Hours 12/25/20 1557                Aaron Calderón MD  [facility]  01/01/21  21:22 EST

## 2021-01-02 NOTE — PROGRESS NOTES
This physical exam has been personally performed remotely in the unit aided by real-time audio/visual communication tools.  Patient did not complain of any chest pain.  He is still very confused    Physical Exam:  General: Patient appears awake, alert, and in no acute distress.  Head: Normocephalic, atraumatic  Eyes: EOMI. Conjunctivae and sclerae normal.  Ears: Ears appear intact with no abnormalities noted.   Neck: Trachea midline. No obvious JVD.  Heart: Tele reveals normal sinus rhythm.  Blood pressure still elevated.  Heart rate 88 bpm  Lungs: Respirations appear to be regular, even and unlabored with no signs of respiratory distress. No audible wheezing.  Abdomen: No obvious abdominal distension.  MS: Muscle tone appears normal. No gross deformities.  Extremities: No clubbing, cyanosis or edema noted.  Skin: No visible bleeding, bruising, or rash.  Neurologic: Hard of hearing and confused    Assessment  Probably coronary artery disease with non-STEMI.  At this point patient is pain-free.  Family member does not want any further invasive procedures.  We will continue medical therapy.  Blood pressure medication will be adjusted.  We will sign off

## 2021-01-03 NOTE — PROGRESS NOTES
PROGRESS NOTE         Patient Identification:  Name:  Aaron Domínguez  Age:  89 y.o.  Sex:  male  :  1929  MRN:  [id number]  Visit Number:  [id number]  Primary Care Provider:  Bran Woodruff MD         LOS: 14 days       ----------------------------------------------------------------------------------------------------------------------  Subjective       Chief Complaints:    Hypoglycemia and Loss of Consciousness        Interval History:      Case discussed with primary RN.  Patient has been increased to 8 L bubble nasal cannula.  Has a productive cough and shortness of breath.  Afebrile, no diarrhea.  CRP is worsening at 7.82.  WBC remains normal.    Review of Systems:    Constitutional: no fever, chills and night sweats. No appetite change or unexpected weight change. No fatigue.  Eyes: no eye drainage, itching or redness.  HEENT: no mouth sores, dysphagia or nose bleed.  Respiratory: Chronic cough and shortness of breath.  Cardiovascular: no chest pain, no palpitations, no orthopnea.  Gastrointestinal: no nausea, vomiting or diarrhea. No abdominal pain, hematemesis or rectal bleeding.  Genitourinary: no dysuria or polyuria.  Hematologic/lymphatic: no lymph node abnormalities, no easy bruising or easy bleeding.  Musculoskeletal: Chronic back pain, arthralgias, and gait problems.  Skin: No rash and no itching.  Neurological: Syncope.  Behavioral/Psych: no depression or suicidal ideation.  Endocrine: no hot flashes.  Immunologic: negative.    ----------------------------------------------------------------------------------------------------------------------      Objective       Current [facility] Meds:  albuterol sulfate HFA, 2 puff, Inhalation, 4x Daily - RT  amitriptyline, 25 mg, Oral, Nightly  amLODIPine, 5 mg, Oral, Q24H  aspirin, 81 mg, Oral, Daily  atorvastatin, 10 mg, Oral, Nightly  bumetanide, 1 mg, Oral, Daily  cefTRIAXone, 2 g, Intravenous, Q24H  cholecalciferol, 50,000 Units, Oral,  Weekly  clopidogrel, 75 mg, Oral, Daily  dexamethasone, 6 mg, Intravenous, Daily  docusate sodium, 100 mg, Oral, BID  doxycycline, 100 mg, Intravenous, Q12H  enoxaparin, 40 mg, Subcutaneous, Q24H  guaiFENesin, 1,200 mg, Oral, Q12H  insulin aspart, 0-9 Units, Subcutaneous, TID AC  insulin detemir, 3 Units, Subcutaneous, Once  isosorbide mononitrate, 60 mg, Oral, Q24H  magic barrier cream, , Topical, BID  metFORMIN, 1,000 mg, Oral, BID With Meals  metoprolol succinate XL, 100 mg, Oral, BID  montelukast, 10 mg, Oral, Nightly  predniSONE, 5 mg, Oral, Daily  sodium chloride, 3 mL, Intravenous, Q12H  terazosin, 5 mg, Oral, Q12H  theophylline, 400 mg, Oral, Daily         ----------------------------------------------------------------------------------------------------------------------    Vital Signs:  Temp:  [97.3 °F (36.3 °C)-98.4 °F (36.9 °C)] 97.4 °F (36.3 °C)  Heart Rate:  [] 89  Resp:  [20] 20  BP: (110-152)/(60-78) 116/70  No data found.  SpO2 Percentage    01/03/21 0329 01/03/21 0400 01/03/21 0826   SpO2: 99% 99% 91%     SpO2:  [79 %-99 %] 91 %  on  Flow (L/min):  [4-10] 8;   Device (Oxygen Therapy): bubble;high-flow nasal cannula    Body mass index is 28.56 kg/m².  Wt Readings from Last 3 Encounters:   01/03/21 92.9 kg (204 lb 12.8 oz)   12/17/20 92.2 kg (203 lb 3.2 oz)   10/16/18 89.8 kg (198 lb)        Intake/Output Summary (Last 24 hours) at 1/3/2021 1019  Last data filed at 1/2/2021 2130  Gross per 24 hour   Intake 970 ml   Output --   Net 970 ml     Diet Pureed; Thin; Consistent Carbohydrate, Low Sodium, Daily Fluid Restriction; Other; 1,200; 2,000 mg Na  ----------------------------------------------------------------------------------------------------------------------      Physical Exam:    Deferred due to COVID-19 isolation.  ----------------------------------------------------------------------------------------------------------------------  Results from last 7 days   Lab Units 01/01/21  0757  12/31/20 2058   TROPONIN T ng/mL 0.136* 0.096*           Results from last 7 days   Lab Units 01/02/21  2145   PH, ARTERIAL pH units 7.443   PO2 ART mm Hg 61.9*   PCO2, ARTERIAL mm Hg 56.6*   HCO3 ART mmol/L 38.7*     Results from last 7 days   Lab Units 01/03/21 0437 01/02/21 0818 01/02/21 0418 01/01/21 0152   CRP mg/dL 7.82*  --  6.33* 5.02*   WBC 10*3/mm3 6.46 7.64  --  7.07   HEMOGLOBIN g/dL 8.7* 8.9*  --  9.6*   HEMATOCRIT % 28.6* 28.3*  --  30.6*   MCV fL 100.0* 97.3*  --  97.8*   MCHC g/dL 30.4* 31.4*  --  31.4*   PLATELETS 10*3/mm3 192 199  --  204     Results from last 7 days   Lab Units 01/03/21 0437 01/02/21 0818 01/01/21 0152 12/31/20 0229 12/28/20  0030   SODIUM mmol/L 136 135* 137  --    < > 138   POTASSIUM mmol/L 4.4 4.5 4.1  --    < > 3.8   MAGNESIUM mg/dL  --   --  2.2 1.6*  --  1.6*   CHLORIDE mmol/L 94* 92* 94*  --    < > 92*   CO2 mmol/L 34.3* 36.9* 36.0*  --    < > 40.1*   BUN mg/dL 44* 37* 33*  --    < > 25*   CREATININE mg/dL 1.28* 1.21 1.11  --    < > 1.17   EGFR IF NONAFRICN AM mL/min/1.73 53* 56* 62  --    < > 58*   CALCIUM mg/dL 8.8 8.9 9.0  --    < > 9.2   GLUCOSE mg/dL 205* 228* 231*  --    < > 119*    < > = values in this interval not displayed.   Estimated Creatinine Clearance: 43.8 mL/min (A) (by C-G formula based on SCr of 1.28 mg/dL (H)).  No results found for: AMMONIA    Glucose   Date/Time Value Ref Range Status   01/03/2021 0823 232 (H) 70 - 130 mg/dL Final   01/02/2021 2102 223 (H) 70 - 130 mg/dL Final   01/02/2021 1728 190 (H) 70 - 130 mg/dL Final   01/02/2021 1101 295 (H) 70 - 130 mg/dL Final   01/02/2021 0828 238 (H) 70 - 130 mg/dL Final   01/01/2021 2029 259 (H) 70 - 130 mg/dL Final   01/01/2021 1602 116 70 - 130 mg/dL Final   01/01/2021 1137 180 (H) 70 - 130 mg/dL Final     Lab Results   Component Value Date    HGBA1C 9.10 (H) 12/20/2020     Lab Results   Component Value Date    TSH 2.690 12/20/2020       Blood Culture   Date Value Ref Range Status   12/20/2020 No  growth at 24 hours  Preliminary   12/20/2020 No growth at 24 hours  Preliminary   12/15/2020 No growth at 5 days  Final   12/15/2020 No growth at 5 days  Final     No results found for: URINECX  No results found for: WOUNDCX  No results found for: STOOLCX  No results found for: RESPCX  Pain Management Panel     There is no flowsheet data to display.            ----------------------------------------------------------------------------------------------------------------------  Imaging Results (Last 24 Hours)     Procedure Component Value Units Date/Time    XR Chest 1 View [[id number]] Collected: 01/03/21 0959     Updated: 01/03/21 1002    Narrative:      CR Chest 1 Vw    INDICATION:   Hypoxia     COMPARISON:    1/2/2021    FINDINGS:  Single portable AP view(s) of the chest.    Normal cardiomediastinal contours. Worsening bilateral patchy airspace disease, left chest worse than right. No pleural effusion. No pneumothorax. No acute bony pathology..       Impression:      Patchy multifocal airspace disease. Consider diagnoses such as Covid.    Signer Name: KAL CHI MD   Signed: 1/3/2021 9:59 AM   [facility]    [facility]    XR Chest 1 View [[id number]] Collected: 01/02/21 1050     Updated: 01/02/21 1053    Narrative:      XR CHEST 1 VW-     CLINICAL INDICATION: covid; E16.2-Hypoglycemia, unspecified;  T68.XXXA-Hypothermia, initial encounter        COMPARISON: 01/01/2021      TECHNIQUE: Single frontal view of the chest.     FINDINGS:     Minimal bibasilar airspace disease  The cardiac silhouette is normal. The pulmonary vasculature is  unremarkable.  There is no evidence of an acute osseous abnormality.   There are no suspicious-appearing parenchymal soft tissue nodules.          Impression:      Minimal bibasilar airspace disease     This report was finalized on 1/2/2021 10:51 AM by Dr. Keanu La MD.              ----------------------------------------------------------------------------------------------------------------------    Assessment/Plan       Assessment/Plan     ASSESSMENT:    1.  COVID-19 pneumonia  2.  MRSA bacteremia, treatment completed    PLAN:    Case discussed with primary RN.  Patient has been increased to 8 L bubble nasal cannula.  Has a productive cough and shortness of breath.  Afebrile, no diarrhea.  CRP is worsening at 7.82.  WBC remains normal.    COVID-19 PCR from 12/30/2020 positive.  Influenza and RSV PCR negative.    Blood cultures on 12/20/2020 are so far showing no growth.    COVID-19 and influenza PCR negative on 12/20/2020.  Urinalysis unremarkable.  Left upper extremity venous duplex reports no evidence of DVT.  X-ray of the left shoulder reports no acute findings.  CT of the L-spine reports no acute findings.  CT of the T-spine reports no acute abnormalities.  CT of the C-spine reports 3.7 cm mass to the right neck either enlarged lymph node or primary neck mass, 3.2 mm colloid nodule in the left upper lobe.  X-ray of the pelvis reports no acute abnormalities.  CT of the head reports no acute abnormalities, chronic left maxillary sinusitis.  Chest x-ray from 12/20/2020 reports chronic volume loss of the right lung base, no acute findings.     Patient completed bacteremia treatment of vancomycin pharmacy to dose on 12/28/2020.    We are agreeable to Decadron as the patient's oxygen requirements have increased.  Would hold remdesivir based on elevated creatinine.    The setting of worsening CRP, bibasilar infiltrates, and advanced COPD, empiric therapy was initiated with Rocephin 2 g IV every 24 hours and doxycycline 100 mg IV every 12 hours.    Code Status:   Code Status and Medical Interventions:   Ordered at: 12/20/20 1527     Level Of Support Discussed With:    Patient     Code Status:    CPR     Medical Interventions (Level of Support Prior to Arrest):    Frantz Art  KIM Wilks  01/03/21  10:19 EST

## 2021-01-03 NOTE — PLAN OF CARE
Goal Outcome Evaluation:  Plan of Care Reviewed With: patient  Progress: no change  Outcome Summary: Pt O2 desat when coughing. Now on 10L bubble high flow and O2 sat maintaining well with exertion. No furthe s/s of distress noted. Will continue to monitor.

## 2021-01-03 NOTE — PROGRESS NOTES
[facility] HOSPITALIST PROGRESS NOTE     Patient Identification:  Name:  Aaron Domínguez  Age:  89 y.o.  Sex:  male  :  1929  MRN:  [id number]  Visit Number:  [id number]  ROOM: [address] [place] [address]     Primary Care Provider:  Bran Woodruff MD    Length of stay in inpatient status:  13    Subjective     Chief Compliant:    Chief Complaint   Patient presents with   • Hypoglycemia   • Loss of Consciousness       History of Presenting Illness:    Patient denies any chest pain. He has had productive cough. No fevers. Reports he feels his breathing is improved.        ROS:  Otherwise 10 point ROS negative other than documented above in HPI.     Objective     Current Hospital Meds:albuterol sulfate HFA, 2 puff, Inhalation, 4x Daily - RT  amitriptyline, 25 mg, Oral, Nightly  amLODIPine, 5 mg, Oral, Q24H  aspirin, 81 mg, Oral, Daily  atorvastatin, 10 mg, Oral, Nightly  bumetanide, 1 mg, Oral, Daily  cholecalciferol, 50,000 Units, Oral, Weekly  clopidogrel, 75 mg, Oral, Daily  docusate sodium, 100 mg, Oral, BID  enoxaparin, 40 mg, Subcutaneous, Q24H  insulin aspart, 0-9 Units, Subcutaneous, TID AC  insulin detemir, 3 Units, Subcutaneous, Once  isosorbide mononitrate, 60 mg, Oral, Q24H  magic barrier cream, , Topical, BID  metFORMIN, 1,000 mg, Oral, BID With Meals  metoprolol succinate XL, 100 mg, Oral, BID  montelukast, 10 mg, Oral, Nightly  predniSONE, 5 mg, Oral, Daily  sodium chloride, 3 mL, Intravenous, Q12H  terazosin, 5 mg, Oral, Q12H  theophylline, 400 mg, Oral, Daily         Current Antimicrobial Therapy:  Anti-Infectives (From admission, onward)    Ordered     Dose/Rate Route Frequency Start Stop    20 0752  vancomycin 1500 mg/500 mL 0.9% NS IVPB ([facility])     Paola Ochoa APRN reviewed the order on 20 0900.   Ordering Provider: Paola Ochoa APRN    1,500 mg  over 120 Minutes Intravenous Every 24 Hours 20 1600 20  vancomycin 2000 mg/500 mL 0.9% NS IVPB  ([facility])     Ordering Provider: Dodie Baldwin DO    20 mg/kg × 96.3 kg  over 120 Minutes Intravenous Once 12/20/20 2230 12/21/20 0035        Current Diuretic Therapy:  Diuretics (From admission, onward)    Ordered     Dose/Rate Route Frequency Start Stop    12/27/20 1135  bumetanide (BUMEX) tablet 1 mg     Ordering Provider: Beatrice Birch MD    1 mg Oral Daily 12/28/20 0900          ----------------------------------------------------------------------------------------------------------------------  Vital Signs:  Temp:  [97.5 °F (36.4 °C)-98.4 °F (36.9 °C)] 98.4 °F (36.9 °C)  Heart Rate:  [77-89] 86  Resp:  [20] 20  BP: (140-152)/(62-84) 142/66  SpO2:  [94 %-96 %] 96 %  on  Flow (L/min):  [4] 4;   Device (Oxygen Therapy): nasal cannula  Body mass index is 28.42 kg/m².    Wt Readings from Last 3 Encounters:   01/02/21 92.4 kg (203 lb 12.8 oz)   12/17/20 92.2 kg (203 lb 3.2 oz)   10/16/18 89.8 kg (198 lb)     Intake & Output (last 3 days)       12/31 0701 - 01/01 0700 01/01 0701 - 01/02 0700 01/02 0701 - 01/03 0700    P.O. 720 1020 720    I.V. (mL/kg)  0 (0)     Total Intake(mL/kg) 720 (7.7) 1020 (11) 720 (7.8)    Urine (mL/kg/hr)  150 (0.1)     Total Output  150     Net +720 +870 +720           Urine Unmeasured Occurrence 7 x 3 x 4 x        Diet Pureed; Thin; Consistent Carbohydrate, Low Sodium, Daily Fluid Restriction; Other; 1,200; 2,000 mg Na  ----------------------------------------------------------------------------------------------------------------------  Physical exam:  This physical exam has been personally performed remotely in the unit aided by real-time audio/visual communication tools. RASHAD Castillo present at bedside during this exam and assisted during exam. The use of a video visit has been reviewed with the patient and verbal informed consent has been obtained.      Physical Exam:  General: Patient appears awake, alert, and in no acute distress.  Head: Normocephalic, atraumatic  Eyes:  EOMI. Conjunctivae and sclerae normal.  Ears: Ears appear intact with no abnormalities noted.   Neck: Trachea midline. No obvious JVD.  Lungs: Respirations appear to be regular, even and unlabored with no signs of respiratory distress. No audible wheezing.  Abdomen: No obvious abdominal distension.  MS: Muscle tone appears normal. No gross deformities.  Extremities: No clubbing, cyanosis or edema noted.  Skin: No visible bleeding, bruising, or rash.  Neurologic: Alert and oriented x3. No gross focal deficits.   ----------------------------------------------------------------------------------------------------------------------  Tele:    ----------------------------------------------------------------------------------------------------------------------  Results from last 7 days   Lab Units 01/02/21  0818 01/02/21  0418 01/01/21  0152 12/31/20  0038 12/30/20  0230   CRP mg/dL  --  6.33* 5.02* 3.31*  --    WBC 10*3/mm3 7.64  --  7.07  --  6.38   HEMOGLOBIN g/dL 8.9*  --  9.6*  --  10.0*   HEMATOCRIT % 28.3*  --  30.6*  --  31.2*   MCV fL 97.3*  --  97.8*  --  95.4   MCHC g/dL 31.4*  --  31.4*  --  32.1   PLATELETS 10*3/mm3 199  --  204  --  215     Results from last 7 days   Lab Units 01/01/21  1715   PH, ARTERIAL pH units 7.413   PO2 ART mm Hg 67.8*   PCO2, ARTERIAL mm Hg 65.6*   HCO3 ART mmol/L 41.8*     Results from last 7 days   Lab Units 01/02/21  0818 01/01/21  0152 12/31/20  0229 12/30/20  0127  12/28/20  0030   SODIUM mmol/L 135* 137  --  131*   < > 138   POTASSIUM mmol/L 4.5 4.1  --  4.5   < > 3.8   MAGNESIUM mg/dL  --  2.2 1.6*  --   --  1.6*   CHLORIDE mmol/L 92* 94*  --  92*   < > 92*   CO2 mmol/L 36.9* 36.0*  --  30.3*   < > 40.1*   BUN mg/dL 37* 33*  --  23   < > 25*   CREATININE mg/dL 1.21 1.11  --  1.04   < > 1.17   EGFR IF NONAFRICN AM mL/min/1.73 56* 62  --  67   < > 58*   CALCIUM mg/dL 8.9 9.0  --  9.0   < > 9.2   PHOSPHORUS mg/dL  --   --   --   --   --  3.1   GLUCOSE mg/dL 228* 231*  --  173*    < > 119*    < > = values in this interval not displayed.   Estimated Creatinine Clearance: 46.2 mL/min (by C-G formula based on SCr of 1.21 mg/dL).  No results found for: AMMONIA  Results from last 7 days   Lab Units 01/01/21  0759 12/31/20 2058   TROPONIN T ng/mL 0.136* 0.096*             Glucose   Date/Time Value Ref Range Status   01/02/2021 1728 190 (H) 70 - 130 mg/dL Final   01/02/2021 1101 295 (H) 70 - 130 mg/dL Final   01/02/2021 0828 238 (H) 70 - 130 mg/dL Final   01/01/2021 2029 259 (H) 70 - 130 mg/dL Final   01/01/2021 1602 116 70 - 130 mg/dL Final   01/01/2021 1137 180 (H) 70 - 130 mg/dL Final   01/01/2021 0634 281 (H) 70 - 130 mg/dL Final   12/31/2020 1912 306 (H) 70 - 130 mg/dL Final     Lab Results   Component Value Date    TSH 2.690 12/20/2020     No results found for: PREGTESTUR, PREGSERUM, HCG, HCGQUANT  Pain Management Panel     There is no flowsheet data to display.        Brief Urine Lab Results  (Last result in the past 365 days)      Color   Clarity   Blood   Leuk Est   Nitrite   Protein   CREAT   Urine HCG        12/20/20 1142 Yellow Clear Trace Negative Negative 100 mg/dL (2+)             No results found for: BLOODCX  No results found for: URINECX  No results found for: WOUNDCX  No results found for: STOOLCX  No results found for: RESPCX  No results found for: AFBCX  Results from last 7 days   Lab Units 01/02/21  0418 01/01/21  0152 12/31/20  0038 12/30/20  0127 12/29/20  0334 12/28/20  0030 12/27/20  0237   CRP mg/dL 6.33* 5.02* 3.31* 2.23* 1.59* 1.92* 2.43*       I have personally looked at the labs and they are summarized above.  ----------------------------------------------------------------------------------------------------------------------  Detailed radiology reports for the last 24 hours:    Imaging Results (Last 24 Hours)     Procedure Component Value Units Date/Time    XR Chest 1 View [[id number]] Collected: 01/02/21 1050     Updated: 01/02/21 1053    Narrative:      XR CHEST 1  VW-     CLINICAL INDICATION: covid; E16.2-Hypoglycemia, unspecified;  T68.XXXA-Hypothermia, initial encounter        COMPARISON: 01/01/2021      TECHNIQUE: Single frontal view of the chest.     FINDINGS:     Minimal bibasilar airspace disease  The cardiac silhouette is normal. The pulmonary vasculature is  unremarkable.  There is no evidence of an acute osseous abnormality.   There are no suspicious-appearing parenchymal soft tissue nodules.          Impression:      Minimal bibasilar airspace disease     This report was finalized on 1/2/2021 10:51 AM by Dr. Keanu La MD.           Assessment & Plan    #Hypoglycemia found prior to admission, causing an episode of unresponsiveness with a fall in a patient with known insulin dependent type 2 diabetes mellitus  #Incidentally found to be COVID positive on 12/30   # SIRS that was present on admission (Temp 92.4F, , WBC 13,660), suspect due to the hypoglycemia and fall   #New onset paroxysmal Afib/Aflutter  #Recent MRSA bacteremia admission, currently completing treatment  #PICC line RUE, placed 12/17/2020  #Elevated troponin with a flat trend, suspect chronic  #Normocytic anemia, flat trend while hospitalized   #History of CAD with prior PCI/stenting  #History of hyperlipidemia  #3.7 cm right neck mass, enlarged lymph node versus primary neck mass   #3.2 mm colloid nodule left upper lobe  #History of advanced COPD with chronic hypoxic respiratory failure   #History of moderate pulmonary hypertension, RVSP 45-55mmHg  #History of essential hypertension     Patient with chest pain overnight and troponin had significant delta up to 0.136. EKG with nonspecific changes. Patient denies chest pain today. I loaded patient on ASA. Cardiology consulted They discussed with family and have opted for medical management at this time. They increased toprolol, added low dose ASA and imdur.     Patient found to have COVID on 12/30 screening exam. Was negative on admission.  Currently asymptomatic on home O2. Agree with ID to hold on treatments at this time. I considered bamlanivimab, that has shown benefit in this population, however currently not recommended for or studied specifically in inpatients. Plain film of chest shows some minimal bibasilar airspace disease that was on prior exams. Patient with significant sputum production but no imaging evidence of pneumonia, no leukocytosis, no fever. Sputum sent for culture.     Cr stable Slightly up at 1.21 today on maintenance diuretics. Recheck in AM, if trends up again will hold diuretics.     Discussed potential inpatient FNA of neck mass with radiology but they report it is only an outpatient procedure. Will have patient f/u with ENT to expedite work-up.     Patient more drowsy today, reported not resting well last night. ABG revealed baseline hypercapnia. Will continue to monitor.     No further hypoglycemia after stopping basal insulin. Reasonably controlled.       VTE Prophylaxis:   Mechanical Order History:      Ordered        12/20/20 1626  Place Sequential Compression Device  Once         12/20/20 1626  Maintain Sequential Compression Device  Continuous                 Pharmalogical Order History:      Ordered     Dose Route Frequency Stop    12/28/20 1341  enoxaparin (LOVENOX) syringe 40 mg      40 mg SC Every 24 Hours --    12/25/20 1557  enoxaparin (LOVENOX) syringe 80 mg  Status:  Discontinued      80 mg SC Every 12 Hours 12/28/20 1341    12/24/20 1715  enoxaparin (LOVENOX) syringe 40 mg  Status:  Discontinued      40 mg SC Every 24 Hours 12/25/20 1557                Aaron Calderón MD  [facility]  01/02/21  19:03 EST

## 2021-01-03 NOTE — H&P
[facility] HOSPITALIST PROGRESS NOTE     Patient Identification:  Name:  Aaron Domínguez  Age:  89 y.o.  Sex:  male  :  1929  MRN:  [id number]  Visit Number:  [id number]  ROOM: [address] [place] [address]     Primary Care Provider:  Bran Woodruff MD    Length of stay in inpatient status:  13    Subjective     Chief Compliant:    Chief Complaint   Patient presents with   • Hypoglycemia   • Loss of Consciousness       History of Presenting Illness:    Patient denies any chest pain. He has had productive cough. No fevers. Reports he feels his breathing is improved.     ROS:  Otherwise 10 point ROS negative other than documented above in HPI.     Objective     Current Hospital Meds:albuterol sulfate HFA, 2 puff, Inhalation, 4x Daily - RT  amitriptyline, 25 mg, Oral, Nightly  amLODIPine, 5 mg, Oral, Q24H  aspirin, 81 mg, Oral, Daily  atorvastatin, 10 mg, Oral, Nightly  bumetanide, 1 mg, Oral, Daily  cholecalciferol, 50,000 Units, Oral, Weekly  clopidogrel, 75 mg, Oral, Daily  docusate sodium, 100 mg, Oral, BID  enoxaparin, 40 mg, Subcutaneous, Q24H  insulin aspart, 0-9 Units, Subcutaneous, TID AC  insulin detemir, 3 Units, Subcutaneous, Once  isosorbide mononitrate, 60 mg, Oral, Q24H  magic barrier cream, , Topical, BID  metFORMIN, 1,000 mg, Oral, BID With Meals  metoprolol succinate XL, 100 mg, Oral, BID  montelukast, 10 mg, Oral, Nightly  predniSONE, 5 mg, Oral, Daily  sodium chloride, 3 mL, Intravenous, Q12H  terazosin, 5 mg, Oral, Q12H  theophylline, 400 mg, Oral, Daily         Current Antimicrobial Therapy:  Anti-Infectives (From admission, onward)    Ordered     Dose/Rate Route Frequency Start Stop    20 0752  vancomycin 1500 mg/500 mL 0.9% NS IVPB ([facility])     Paola Ochoa APRN reviewed the order on 20 0900.   Ordering Provider: Paola Ochoa APRN    1,500 mg  over 120 Minutes Intravenous Every 24 Hours 20 1600 20  vancomycin 2000 mg/500 mL 0.9% NS IVPB ([facility])      Ordering Provider: Dodie Baldwin DO    20 mg/kg × 96.3 kg  over 120 Minutes Intravenous Once 12/20/20 2230 12/21/20 0035        Current Diuretic Therapy:  Diuretics (From admission, onward)    Ordered     Dose/Rate Route Frequency Start Stop    12/27/20 1135  bumetanide (BUMEX) tablet 1 mg     Ordering Provider: Beatrice Birch MD    1 mg Oral Daily 12/28/20 0900          ----------------------------------------------------------------------------------------------------------------------  Vital Signs:  Temp:  [97.5 °F (36.4 °C)-98.4 °F (36.9 °C)] 98.4 °F (36.9 °C)  Heart Rate:  [77-89] 86  Resp:  [20] 20  BP: (140-152)/(62-84) 142/66  SpO2:  [94 %-96 %] 96 %  on  Flow (L/min):  [4] 4;   Device (Oxygen Therapy): nasal cannula  Body mass index is 28.42 kg/m².    Wt Readings from Last 3 Encounters:   01/02/21 92.4 kg (203 lb 12.8 oz)   12/17/20 92.2 kg (203 lb 3.2 oz)   10/16/18 89.8 kg (198 lb)     Intake & Output (last 3 days)       12/31 0701 - 01/01 0700 01/01 0701 - 01/02 0700 01/02 0701 - 01/03 0700    P.O. 720 1020 720    I.V. (mL/kg)  0 (0)     Total Intake(mL/kg) 720 (7.7) 1020 (11) 720 (7.8)    Urine (mL/kg/hr)  150 (0.1)     Total Output  150     Net +720 +870 +720           Urine Unmeasured Occurrence 7 x 3 x 4 x        Diet Pureed; Thin; Consistent Carbohydrate, Low Sodium, Daily Fluid Restriction; Other; 1,200; 2,000 mg Na  ----------------------------------------------------------------------------------------------------------------------  Physical exam:  This physical exam has been personally performed remotely in the unit aided by real-time audio/visual communication tools. RASHAD Castillo present at bedside during this exam and assisted during exam. The use of a video visit has been reviewed with the patient and verbal informed consent has been obtained.     Physical Exam:  General: Patient appears awake, alert, and in no acute distress.  Head: Normocephalic, atraumatic  Eyes: EOMI.  Conjunctivae and sclerae normal.  Ears: Ears appear intact with no abnormalities noted.   Neck: Trachea midline. No obvious JVD.  Lungs: Respirations appear to be regular, even and unlabored with no signs of respiratory distress. No audible wheezing.  Abdomen: No obvious abdominal distension.  MS: Muscle tone appears normal. No gross deformities.  Extremities: No clubbing, cyanosis or edema noted.  Skin: No visible bleeding, bruising, or rash.  Neurologic: Alert and oriented x3. No gross focal deficits.   ----------------------------------------------------------------------------------------------------------------------  Tele:    ----------------------------------------------------------------------------------------------------------------------  Results from last 7 days   Lab Units 01/02/21  0818 01/02/21  0418 01/01/21  0152 12/31/20  0038 12/30/20  0230   CRP mg/dL  --  6.33* 5.02* 3.31*  --    WBC 10*3/mm3 7.64  --  7.07  --  6.38   HEMOGLOBIN g/dL 8.9*  --  9.6*  --  10.0*   HEMATOCRIT % 28.3*  --  30.6*  --  31.2*   MCV fL 97.3*  --  97.8*  --  95.4   MCHC g/dL 31.4*  --  31.4*  --  32.1   PLATELETS 10*3/mm3 199  --  204  --  215     Results from last 7 days   Lab Units 01/01/21  1715   PH, ARTERIAL pH units 7.413   PO2 ART mm Hg 67.8*   PCO2, ARTERIAL mm Hg 65.6*   HCO3 ART mmol/L 41.8*     Results from last 7 days   Lab Units 01/02/21  0818 01/01/21  0152 12/31/20  0229 12/30/20  0127  12/28/20  0030   SODIUM mmol/L 135* 137  --  131*   < > 138   POTASSIUM mmol/L 4.5 4.1  --  4.5   < > 3.8   MAGNESIUM mg/dL  --  2.2 1.6*  --   --  1.6*   CHLORIDE mmol/L 92* 94*  --  92*   < > 92*   CO2 mmol/L 36.9* 36.0*  --  30.3*   < > 40.1*   BUN mg/dL 37* 33*  --  23   < > 25*   CREATININE mg/dL 1.21 1.11  --  1.04   < > 1.17   EGFR IF NONAFRICN AM mL/min/1.73 56* 62  --  67   < > 58*   CALCIUM mg/dL 8.9 9.0  --  9.0   < > 9.2   PHOSPHORUS mg/dL  --   --   --   --   --  3.1   GLUCOSE mg/dL 228* 231*  --  173*   < >  119*    < > = values in this interval not displayed.   Estimated Creatinine Clearance: 46.2 mL/min (by C-G formula based on SCr of 1.21 mg/dL).  No results found for: AMMONIA  Results from last 7 days   Lab Units 01/01/21  0759 12/31/20 2058   TROPONIN T ng/mL 0.136* 0.096*             Glucose   Date/Time Value Ref Range Status   01/02/2021 1728 190 (H) 70 - 130 mg/dL Final   01/02/2021 1101 295 (H) 70 - 130 mg/dL Final   01/02/2021 0828 238 (H) 70 - 130 mg/dL Final   01/01/2021 2029 259 (H) 70 - 130 mg/dL Final   01/01/2021 1602 116 70 - 130 mg/dL Final   01/01/2021 1137 180 (H) 70 - 130 mg/dL Final   01/01/2021 0634 281 (H) 70 - 130 mg/dL Final   12/31/2020 1912 306 (H) 70 - 130 mg/dL Final     Lab Results   Component Value Date    TSH 2.690 12/20/2020     No results found for: PREGTESTUR, PREGSERUM, HCG, HCGQUANT  Pain Management Panel     There is no flowsheet data to display.        Brief Urine Lab Results  (Last result in the past 365 days)      Color   Clarity   Blood   Leuk Est   Nitrite   Protein   CREAT   Urine HCG        12/20/20 1142 Yellow Clear Trace Negative Negative 100 mg/dL (2+)             No results found for: BLOODCX  No results found for: URINECX  No results found for: WOUNDCX  No results found for: STOOLCX  No results found for: RESPCX  No results found for: AFBCX  Results from last 7 days   Lab Units 01/02/21  0418 01/01/21  0152 12/31/20  0038 12/30/20  0127 12/29/20  0334 12/28/20  0030 12/27/20  0237   CRP mg/dL 6.33* 5.02* 3.31* 2.23* 1.59* 1.92* 2.43*       I have personally looked at the labs and they are summarized above.  ----------------------------------------------------------------------------------------------------------------------  Detailed radiology reports for the last 24 hours:    Imaging Results (Last 24 Hours)     Procedure Component Value Units Date/Time    XR Chest 1 View [[id number]] Collected: 01/02/21 1050     Updated: 01/02/21 1053    Narrative:      XR CHEST 1  VW-     CLINICAL INDICATION: covid; E16.2-Hypoglycemia, unspecified;  T68.XXXA-Hypothermia, initial encounter        COMPARISON: 01/01/2021      TECHNIQUE: Single frontal view of the chest.     FINDINGS:     Minimal bibasilar airspace disease  The cardiac silhouette is normal. The pulmonary vasculature is  unremarkable.  There is no evidence of an acute osseous abnormality.   There are no suspicious-appearing parenchymal soft tissue nodules.          Impression:      Minimal bibasilar airspace disease     This report was finalized on 1/2/2021 10:51 AM by Dr. Keanu La MD.           Assessment & Plan    #Hypoglycemia found prior to admission, causing an episode of unresponsiveness with a fall in a patient with known insulin dependent type 2 diabetes mellitus  #Incidentally found to be COVID positive on 12/30   # SIRS that was present on admission (Temp 92.4F, , WBC 13,660), suspect due to the hypoglycemia and fall   #New onset paroxysmal Afib/Aflutter  #Recent MRSA bacteremia admission, currently completing treatment  #PICC line RUE, placed 12/17/2020  #Elevated troponin with a flat trend, suspect chronic  #Normocytic anemia, flat trend while hospitalized   #History of CAD with prior PCI/stenting  #History of hyperlipidemia  #3.7 cm right neck mass, enlarged lymph node versus primary neck mass   #3.2 mm colloid nodule left upper lobe  #History of advanced COPD with chronic hypoxic respiratory failure   #History of moderate pulmonary hypertension, RVSP 45-55mmHg  #History of essential hypertension     Patient with chest pain overnight and troponin had significant delta up to 0.136. EKG with nonspecific changes. Patient denies chest pain today. I loaded patient on ASA. Cardiology consulted They discussed with family and have opted for medical management at this time. They increased toprolol, added low dose ASA and imdur.     Patient found to have COVID on 12/30 screening exam. Was negative on admission.  Currently asymptomatic on home O2. Agree with ID to hold on treatments at this time. I considered bamlanivimab, that has shown benefit in this population, however currently not recommended for or studied specifically in inpatients. Plain film of chest shows some minimal bibasilar airspace disease that was on prior exams. Patient with significant sputum production but no imaging evidence of pneumonia, no leukocytosis, no fever. Sputum sent for culture.     Cr stable Slightly up at 1.21 today on maintenance diuretics. Recheck in AM, if trends up again will hold diuretics.     Discussed potential inpatient FNA of neck mass with radiology but they report it is only an outpatient procedure. Will have patient f/u with ENT to expedite work-up.     Patient more drowsy today, reported not resting well last night. ABG revealed baseline hypercapnia. Will continue to monitor.     No further hypoglycemia after stopping basal insulin. Reasonably controlled.       VTE Prophylaxis:   Mechanical Order History:      Ordered        12/20/20 1626  Place Sequential Compression Device  Once         12/20/20 1626  Maintain Sequential Compression Device  Continuous                 Pharmalogical Order History:      Ordered     Dose Route Frequency Stop    12/28/20 1341  enoxaparin (LOVENOX) syringe 40 mg      40 mg SC Every 24 Hours --    12/25/20 1557  enoxaparin (LOVENOX) syringe 80 mg  Status:  Discontinued      80 mg SC Every 12 Hours 12/28/20 1341    12/24/20 1715  enoxaparin (LOVENOX) syringe 40 mg  Status:  Discontinued      40 mg SC Every 24 Hours 12/25/20 1557                Aaron Calderón MD  [facility]  01/02/21  19:02 EST

## 2021-01-03 NOTE — PLAN OF CARE
Goal Outcome Evaluation:  Plan of Care Reviewed With: patient  Progress: no change    Patient resting in bed, alert, NAD noted. Patient noted with fluctuating oxygenation this shift, CXR and CT of chest performed with noted bacterial pneumonia and COVID pneumonia. New antiviral and antibiotics this shift. Patient code status changed to DNR/DNI. Cont POC.

## 2021-01-04 NOTE — PROGRESS NOTES
Discharge Planning Assessment  [facility]     Patient Name: Aaron Domínguez  MRN: [id number]  Today's Date: 1/4/2021    Admit Date: 12/20/2020      Discharge Plan     Row Name 01/04/21 1300       Plan    Plan  Pt admitted on 12/20/20.  Pt lives at home alone with daughters assisting with care.  Pt currently uitlizes [facility] and home 02 at 5 liters via [employer].  Pt had bed available at [facility] and was found to be Covid positive on screen prior to discharge to nursing home placement.  Pt currently on high flow oxygen.  SS will follow and assist with discharge needs.        CHASE PatriciaW

## 2021-01-04 NOTE — PLAN OF CARE
Goal Outcome Evaluation:  Plan of Care Reviewed With: patient  Progress: no change  Patient resting in bed at this time with no acute distress noted at this time. Patient had some complaints of back pain and trouble sleeping this shift with PRN medications given. Patient is now on 12L NC bubble high flow oxygen with 02 saturation 96% patient had been up to 15L NC bubble high flow and titrated back down. Will continue to monitor and follow plan of care with interventions implemented as needed.

## 2021-01-04 NOTE — PROGRESS NOTES
[facility] HOSPITALIST PROGRESS NOTE     Patient Identification:  Name:  Aaron Domínguez  Age:  89 y.o.  Sex:  male  :  1929  MRN:  [id number]  Visit Number:  [id number]  ROOM: [address] [place] [address]     Primary Care Provider:  Bran Woodruff MD    Length of stay:  15    Subjective        Chief Compliant follow-up for worsening hypoxia and COVID-19 pneumonia    Patient seen and examined with RASHAD Ochoa at the bedside by telemedicine video.  Patient is resting comfortably in bed.  Has worsening shortness of breath stable since yesterday.  Has productive yellow cough.  Denies any chest pain nausea vomiting abdominal pain.  Has poor appetite and fatigability.  FiO2 requirement has increased steadily in the last 48 hours from 6 L to bubble high flow nasal cannula 13 L this evening.  Afebrile and no events overnight.       Objective     Current Hospital Meds:albuterol sulfate HFA, 2 puff, Inhalation, 4x Daily - RT  amitriptyline, 25 mg, Oral, Nightly  [START ON 2021] ascorbic acid, 1,500 mg, Oral, Daily  aspirin, 81 mg, Oral, Daily  atorvastatin, 10 mg, Oral, Nightly  cefTRIAXone, 2 g, Intravenous, Q24H  cholecalciferol, 50,000 Units, Oral, Weekly  clopidogrel, 75 mg, Oral, Daily  dexamethasone, 6 mg, Intravenous, Daily  docusate sodium, 100 mg, Oral, BID  doxycycline, 100 mg, Intravenous, Q12H  enoxaparin, 40 mg, Subcutaneous, Q24H  famotidine, 20 mg, Oral, BID AC  guaiFENesin, 1,200 mg, Oral, Q12H  insulin aspart, 0-9 Units, Subcutaneous, TID AC  insulin detemir, 5 Units, Subcutaneous, Nightly  [START ON 2021] iron polysaccharides, 150 mg, Oral, Daily  isosorbide mononitrate, 60 mg, Oral, Q24H  [START ON 2021] lactobacillus acidophilus, 1 capsule, Oral, Daily  magic barrier cream, , Topical, BID  metoprolol succinate XL, 100 mg, Oral, BID  montelukast, 10 mg, Oral, Nightly  remdesivir, 100 mg, Intravenous, Q24H  sodium chloride, 3 mL, Intravenous, Q12H  terazosin, 5 mg, Oral, Q12H  theophylline,  400 mg, Oral, Daily    Pharmacy Consult - Remdesivir,       ----------------------------------------------------------------------------------------------------------------------  Vital Signs:  Temp:  [96 °F (35.6 °C)-97.8 °F (36.6 °C)] 96 °F (35.6 °C)  Heart Rate:  [84-92] 84  Resp:  [20] 20  BP: (134-140)/(54-62) 138/60  SpO2:  [93 %-99 %] 97 %  on  Flow (L/min):  [12-15] 13;   Device (Oxygen Therapy): bubble;high-flow nasal cannula  Body mass index is 28.4 kg/m².    Wt Readings from Last 3 Encounters:   01/04/21 92.4 kg (203 lb 9.6 oz)   12/17/20 92.2 kg (203 lb 3.2 oz)   10/16/18 89.8 kg (198 lb)       Intake/Output Summary (Last 24 hours) at 1/4/2021 2038  Last data filed at 1/4/2021 2007  Gross per 24 hour   Intake 391.4 ml   Output --   Net 391.4 ml     Diet Pureed; Thin; Consistent Carbohydrate, Low Sodium, Daily Fluid Restriction; Other; 1,200; 2,000 mg Na  ----------------------------------------------------------------------------------------------------------------------  Physical exam:  This physical exam has been personally performed remotely in the unit aided by real-time audio/visual communication tools. RASHAD Ochoa present at bedside during this exam and assisted during exam. The use of a video visit has been reviewed with the patient and verbal informed consent has been obtained.     Physical Exam:  General: Patient appears awake, alert, and in no acute distress.  Head: Normocephalic, atraumatic  Eyes: EOMI. Conjunctivae and sclerae normal.  Ears: Ears appear intact with no abnormalities noted.   Neck: Trachea midline. No obvious JVD.  Heart: Tele reveals atrial flutter  Lungs: Respirations appear to be regular, even and unlabored with no signs of respiratory distress. No audible wheezing.  Abdomen: No obvious abdominal distension.  MS: Muscle tone appears normal. No gross deformities.  Extremities: No clubbing, cyanosis. Trace edema noted.  Skin: No visible bleeding, bruising, or rash.  Neurologic:  Alert and awake, oriented x3. No gross focal deficits.       ----------------------------------------------------------------------------------------------------------------------  ----------------------------------------------------------------------------------------------------------------------  Results from last 7 days   Lab Units 01/04/21 0143 01/03/21 0437 01/02/21 0818 01/02/21 0418   CRP mg/dL 10.60* 7.82*  --  6.33*   WBC 10*3/mm3 10.67 6.46 7.64  --    HEMOGLOBIN g/dL 8.9* 8.7* 8.9*  --    HEMATOCRIT % 28.6* 28.6* 28.3*  --    MCV fL 99.3* 100.0* 97.3*  --    MCHC g/dL 31.1* 30.4* 31.4*  --    PLATELETS 10*3/mm3 218 192 199  --      Results from last 7 days   Lab Units 01/04/21 0143 01/03/21 0437 01/02/21 0818 01/01/21 0152 12/31/20 0229   SODIUM mmol/L 134* 136 135* 137  --    POTASSIUM mmol/L 4.5 4.4 4.5 4.1  --    MAGNESIUM mg/dL 2.1  --   --  2.2 1.6*   CHLORIDE mmol/L 89* 94* 92* 94*  --    CO2 mmol/L 31.7* 34.3* 36.9* 36.0*  --    BUN mg/dL 50* 44* 37* 33*  --    CREATININE mg/dL 1.64* 1.28* 1.21 1.11  --    EGFR IF NONAFRICN AM mL/min/1.73 40* 53* 56* 62  --    CALCIUM mg/dL 9.3 8.8 8.9 9.0  --    GLUCOSE mg/dL 363* 205* 228* 231*  --    ALBUMIN g/dL 2.88*  --   --   --   --    BILIRUBIN mg/dL 0.2  --   --   --   --    ALK PHOS U/L 138*  --   --   --   --    AST (SGOT) U/L 21  --   --   --   --    ALT (SGPT) U/L 22  --   --   --   --    Estimated Creatinine Clearance: 34.1 mL/min (A) (by C-G formula based on SCr of 1.64 mg/dL (H)).  Results from last 7 days   Lab Units 01/01/21  0759 12/31/20 2058   TROPONIN T ng/mL 0.136* 0.096*     Glucose   Date/Time Value Ref Range Status   01/04/2021 2007 386 (H) 70 - 130 mg/dL Final   01/04/2021 1639 146 (H) 70 - 130 mg/dL Final   01/04/2021 1115 230 (H) 70 - 130 mg/dL Final   01/04/2021 0802 298 (H) 70 - 130 mg/dL Final   01/03/2021 2013 370 (H) 70 - 130 mg/dL Final   01/03/2021 1737 398 (H) 70 - 130 mg/dL Final   01/03/2021 1221 161 (H) 70 - 130  mg/dL Final   01/03/2021 0823 232 (H) 70 - 130 mg/dL Final     No results found for: AMMONIA      No results found for: BLOODCX  Respiratory Culture   Date Value Ref Range Status   01/02/2021   Final    Moderate growth (3+) Normal Respiratory Mary: NO S.aureus/MRSA or Pseudomonas aeruginosa   No results found for: URINECXNo results found for: WOUNDCXNo results found for: BODYFLDCXNo results found for: STOOLCX  I have personally looked at the labs and they are summarized above.  ----------------------------------------------------------------------------------------------------------------------  Imaging Results (Last 24 Hours)     ** No results found for the last 24 hours. **        I have personally reviewed the radiology images and read the final radiology report.    Assessment & Plan      Assessment:  B/L COVID 19 Pneumonia 12/30/2020  Acute on chronic hypoxic respiratory failure  Acute on chronic diastolic CHF exacerbation  YADIRA  NSTEMI with H/O CAD and PCI  New onset atrial fibrillation/flutter  Advanced COPD  DM2, A1C 9.1 with hyperglycemia secondary to steroids  Essential HTN  Anemia, ID  3.7 cm right neck mass, enlarged lymph node versus primary neck mass, OP work up per radiology    Resolved:  MRSA bacteremia, completed abx treatment  Hypoglycemia causing syncope at the time of admission      Plan:  B/L COVID 19 Pneumonia 12/30/2020.  Started on remdesivir 01/03 and continued today since eGFR 40, more than 30 and creatinine clearance more than 30.  Monitor renal function closely and decide about further dosing.  On IV antibiotics Rocephin and doxycycline.  Start on lactobacillus.  Currently patient is afebrile VSS.  CRP worsening no leukocytosis.  Will check ferritin level in a.m.  COVID-19 progression labs not done till today.  Start on vitamin C.    Acute on chronic hypoxic respiratory failure, worsening.  On IV Decadron.  Currently on 13 L bubble high flow nasal cannula.  Wean FIO2 for saturation more  than 94%.  On albuterol inhaler.  Chest x-ray and CT chest done 01/03 reviewed. consult pulmonology.     Acute on chronic diastolic CHF exacerbation, BNP worsening. discontinue p.o. Bumex and will do a dose of IV Lasix this evening.  Monitor renal function, electrolytes, intake output and weight.  2D echocardiogram shows EF of 61 to 65%.  Grade 1 diastolic dysfunction.  Concentric hypertrophy.  Moderate pulmonary hypertension.    YADIRA, Cr trended up to 1.6 today from 0.8-0.9.  Discontinue Bumex and amlodipine.  Discontinue Metformin.  Monitor closely.     NSTEMI with H/O CAD and PCI.  On aspirin and Lipitor.  Cardiology consulted. family decided to continue with medical management.    New onset atrial fibrillation/flutter, on Toprol- mg p.o. twice daily.  Heart rate controlled.  Not on anticoagulation currently.  Anticoagulation was evaluated by cardiology and was discussed with daughter to make further decision.  Will discuss with Daughter Tomorrow.     Advanced COPD, on chronic prednisone. DC po prednisone since on decadron.  On theophylline.  Theophylline level checked and currently within range.     DM2, A1C 9.1 with hyperglycemia secondary to steroids.  On moderate dose sliding scale.  Will start on Levemir.  Discontinue Metformin because of worsening renal function.    Essential HTN, blood pressure controlled.  Hold p.o. Bumex and amlodipine.    Anemia, ID, hemoglobin stable.  Will start on p.o. iron supplement.    Lovenox subcu for DVT prophylaxis  Start on Pepcid for GI prophylaxis.    Management and plans discussed in detail with patient and nursing.      The patient is high risk due to the following diagnoses/reasons:  B/L COVID 19 Pneumonia 12/30/2020, Acute on chronic hypoxic respiratory failure, Acute on chronic diastolic CHF exacerbation, YADIRA    Gracy Franks MD  01/04/21  20:38 EST

## 2021-01-04 NOTE — PLAN OF CARE
Goal Outcome Evaluation:  Plan of Care Reviewed With: patient  Progress: no change       Mr. Domínguez was agitated with me this morning as I tried to do his assessment. He has been refusing his compression devices for DVT prevention and would not participate during assessment (hand , opening eyes for penlight, etc.) The aid and I both tried to get him to eat, but he will not make an attempt. He seems to be lethargic, but will rouse to loud voice and shaking when I say his name. He is oriented to situation, place, and person, but disoriented to time. Patient does not appear to be in distress of any kind and is currently resting in bed. Will continue to monitor and follow plan of care.

## 2021-01-04 NOTE — THERAPY RE-EVALUATION
Acute Care - Speech Language Pathology   Swallow Re-Assessment  Juan   CLINICAL DYSPHAGIA REASSESSMENT     Patient Name: Aaron Domínguez  : 1929  MRN: [id number]  Today's Date: 2021  Onset of Illness/Injury or Date of Surgery: 20     Referring Physician: Dr. Birch      Admit Date: 2020    Visit Dx:     ICD-10-CM ICD-9-CM   1. Hypoglycemia  E16.2 251.2   2. Hypothermia, initial encounter  T68.XXXA 991.6     Patient Active Problem List   Diagnosis   • Hypoglycemia   • Fall at home     Past Medical History:   Diagnosis Date   • COPD (chronic obstructive pulmonary disease) (CMS/[facility])    • Diabetes mellitus (CMS/[facility])    • Hypertension      Past Surgical History:   Procedure Laterality Date   • CORONARY STENT PLACEMENT       Mr. Domínguez is seen at bedside this pm on 3S for diet safety/reassessment. He is s/p most recent clinical dysphagia assessment 21 w/ recommendation for puree diet, thin liquids.     He resting upon SLP entry, easily awakens to verbal stimuli to be a/a and repositioned upright and centered in bed. He continues generally weak and fatigued. He is oriented to person, place and time, follows simple directives and responds to simple y/n and oe questions. He is on 13L O2 via bubble HF NC today w/o complications.     He accepts single ice chips, tsp presentations of applesauce x4, approximately 2 ounces of thin water via straw. No overt s/s aspiration evidenced. No silent aspiration suspected as he is w/o changes in vocal quality, respirations or secretions post po presentations. He declines further po presentations.     Impression: Per this assessment, Mr. Domínguez is accepting his least restrictive modified po diet of puree consistency, thin liquids w/o overt s/s aspiration. SLP to continue to f/u for diet safety/reassessement for upgrade.     EDUCATION  The patient has been educated in the following areas:   Dysphagia (Swallowing Impairment) Modified Diet Instruction.    SLP  Recommendation and Plan  1. Puree consistency, thin liquids.    2. Meds whole in puree, crush PRN.   3. Upright and centered for all po intake.  4. EVAN precautions.  5. Oral care protocol.  6. 1:1 assistance w/ po intake.      D/w pt results and recommendations w/ verbal agreement.     D/w RN results and recommendations w/ verbal agreement. RN reports pt w/ poor motivation for po intake in general. She denies any overt s/s aspiration for current modified diet, as well as w/ po meds today.      Thank you for allowing me to participate in the care of your patient-  Danna Rangel M.A., CCC-SLP           Time Calculation:   Time Calculation- SLP     Row Name 01/04/21 1631             Time Calculation- SLP    SLP - Next Appointment  01/05/21  -MARYLIN        User Key  (r) = Recorded By, (t) = Taken By, (c) = Cosigned By    Initials Name Provider Type    Danna Hicks MA,CCC-SLP Speech and Language Pathologist          Therapy Charges for Today     Code Description Service Date Service Provider Modifiers Qty    [id number] HC ST EVAL ORAL PHARYNG SWALLOW 6 1/4/2021 Danna Rangel MA,CCC-SLP GN 1        Patient was not wearing a face mask during this therapy encounter. Therapist used appropriate personal protective equipment including gown, eye protection, mask and gloves.  Mask used was [id number]/duckbill. Appropriate PPE was worn during the entire therapy session. The patient coughed during this evaluation. Therapist was within 6 feet for 15 minutes or more during the evaluation. Hand hygiene was completed before and after therapy session. Patient is in enhanced droplet precautions.            Danna Rangel MA,CCC-SLP  1/4/2021

## 2021-01-04 NOTE — PROGRESS NOTES
PROGRESS NOTE         Patient Identification:  Name:  Aaron Domínguez  Age:  89 y.o.  Sex:  male  :  1929  MRN:  [id number]  Visit Number:  [id number]  Primary Care Provider:  Bran Woodruff MD         LOS: 15 days       ----------------------------------------------------------------------------------------------------------------------  Subjective       Chief Complaints:    Hypoglycemia and Loss of Consciousness        Interval History:      Case discussed with primary RN.  Patient has been increased to 13 L bubble nasal cannula.  Nurse reports that the patient is more confused today and is not eating very much.  Afebrile, no diarrhea.  CRP is worsening at 10.60.  WBC remains normal.  CT chest without contrast on 1/3/2021 showed appearance is worse.  There is bilateral airspace disease predominantly in the lower lobes, but also in the left upper lobe.  COPD.  Coronary artery calcifications.    Review of Systems:    Constitutional: no fever, chills and night sweats. No appetite change or unexpected weight change. No fatigue.  Eyes: no eye drainage, itching or redness.  HEENT: no mouth sores, dysphagia or nose bleed.  Respiratory: Chronic cough and shortness of breath.  Cardiovascular: no chest pain, no palpitations, no orthopnea.  Gastrointestinal: no nausea, vomiting or diarrhea. No abdominal pain, hematemesis or rectal bleeding.  Genitourinary: no dysuria or polyuria.  Hematologic/lymphatic: no lymph node abnormalities, no easy bruising or easy bleeding.  Musculoskeletal: Chronic back pain, arthralgias, and gait problems.  Skin: No rash and no itching.  Neurological: Syncope.  Behavioral/Psych: no depression or suicidal ideation.  Endocrine: no hot flashes.  Immunologic: negative.    ----------------------------------------------------------------------------------------------------------------------      Objective       Current [facility] Meds:  albuterol sulfate HFA, 2 puff, Inhalation, 4x  Daily - RT  amitriptyline, 25 mg, Oral, Nightly  amLODIPine, 5 mg, Oral, Q24H  aspirin, 81 mg, Oral, Daily  atorvastatin, 10 mg, Oral, Nightly  bumetanide, 1 mg, Oral, Daily  cefTRIAXone, 2 g, Intravenous, Q24H  cholecalciferol, 50,000 Units, Oral, Weekly  clopidogrel, 75 mg, Oral, Daily  dexamethasone, 6 mg, Intravenous, Daily  docusate sodium, 100 mg, Oral, BID  doxycycline, 100 mg, Intravenous, Q12H  enoxaparin, 40 mg, Subcutaneous, Q24H  guaiFENesin, 1,200 mg, Oral, Q12H  insulin aspart, 0-9 Units, Subcutaneous, TID AC  insulin detemir, 3 Units, Subcutaneous, Once  isosorbide mononitrate, 60 mg, Oral, Q24H  magic barrier cream, , Topical, BID  metFORMIN, 1,000 mg, Oral, BID With Meals  metoprolol succinate XL, 100 mg, Oral, BID  montelukast, 10 mg, Oral, Nightly  predniSONE, 5 mg, Oral, Daily  remdesivir, 100 mg, Intravenous, Q24H  sodium chloride, 3 mL, Intravenous, Q12H  terazosin, 5 mg, Oral, Q12H  theophylline, 400 mg, Oral, Daily      Pharmacy Consult - Remdesivir,       ----------------------------------------------------------------------------------------------------------------------    Vital Signs:  Temp:  [97 °F (36.1 °C)-97.7 °F (36.5 °C)] 97 °F (36.1 °C)  Heart Rate:  [87-95] 92  Resp:  [20-24] 20  BP: (126-134)/(54-76) 134/54  No data found.  SpO2 Percentage    01/04/21 0205 01/04/21 0307 01/04/21 0714   SpO2: 99% 96% 96%     SpO2:  [89 %-99 %] 96 %  on  Flow (L/min):  [8-15] 13;   Device (Oxygen Therapy): bubble;high-flow nasal cannula    Body mass index is 28.4 kg/m².  Wt Readings from Last 3 Encounters:   01/04/21 92.4 kg (203 lb 9.6 oz)   12/17/20 92.2 kg (203 lb 3.2 oz)   10/16/18 89.8 kg (198 lb)        Intake/Output Summary (Last 24 hours) at 1/4/2021 1124  Last data filed at 1/4/2021 0300  Gross per 24 hour   Intake 751.4 ml   Output --   Net 751.4 ml     Diet Pureed; Thin; Consistent Carbohydrate, Low Sodium, Daily Fluid Restriction; Other; 1,200; 2,000 mg  Na  ----------------------------------------------------------------------------------------------------------------------      Physical Exam:    Deferred due to COVID-19 isolation.  ----------------------------------------------------------------------------------------------------------------------  Results from last 7 days   Lab Units 01/01/21  0759 12/31/20 2058   TROPONIN T ng/mL 0.136* 0.096*           Results from last 7 days   Lab Units 01/02/21  2145   PH, ARTERIAL pH units 7.443   PO2 ART mm Hg 61.9*   PCO2, ARTERIAL mm Hg 56.6*   HCO3 ART mmol/L 38.7*     Results from last 7 days   Lab Units 01/04/21 0143 01/03/21 0437 01/02/21  0818 01/02/21  0418   CRP mg/dL 10.60* 7.82*  --  6.33*   WBC 10*3/mm3 10.67 6.46 7.64  --    HEMOGLOBIN g/dL 8.9* 8.7* 8.9*  --    HEMATOCRIT % 28.6* 28.6* 28.3*  --    MCV fL 99.3* 100.0* 97.3*  --    MCHC g/dL 31.1* 30.4* 31.4*  --    PLATELETS 10*3/mm3 218 192 199  --      Results from last 7 days   Lab Units 01/04/21 0143 01/03/21 0437 01/02/21  0818 01/01/21  0152 12/31/20  0229   SODIUM mmol/L 134* 136 135* 137  --    POTASSIUM mmol/L 4.5 4.4 4.5 4.1  --    MAGNESIUM mg/dL  --   --   --  2.2 1.6*   CHLORIDE mmol/L 89* 94* 92* 94*  --    CO2 mmol/L 31.7* 34.3* 36.9* 36.0*  --    BUN mg/dL 50* 44* 37* 33*  --    CREATININE mg/dL 1.64* 1.28* 1.21 1.11  --    EGFR IF NONAFRICN AM mL/min/1.73 40* 53* 56* 62  --    CALCIUM mg/dL 9.3 8.8 8.9 9.0  --    GLUCOSE mg/dL 363* 205* 228* 231*  --    ALBUMIN g/dL 2.88*  --   --   --   --    BILIRUBIN mg/dL 0.2  --   --   --   --    ALK PHOS U/L 138*  --   --   --   --    AST (SGOT) U/L 21  --   --   --   --    ALT (SGPT) U/L 22  --   --   --   --    Estimated Creatinine Clearance: 34.1 mL/min (A) (by C-G formula based on SCr of 1.64 mg/dL (H)).  No results found for: AMMONIA    Glucose   Date/Time Value Ref Range Status   01/04/2021 0802 298 (H) 70 - 130 mg/dL Final   01/03/2021 2013 370 (H) 70 - 130 mg/dL Final   01/03/2021 1737 753  (H) 70 - 130 mg/dL Final   01/03/2021 1221 161 (H) 70 - 130 mg/dL Final   01/03/2021 0823 232 (H) 70 - 130 mg/dL Final   01/02/2021 2102 223 (H) 70 - 130 mg/dL Final   01/02/2021 1728 190 (H) 70 - 130 mg/dL Final   01/02/2021 1101 295 (H) 70 - 130 mg/dL Final     Lab Results   Component Value Date    HGBA1C 9.10 (H) 12/20/2020     Lab Results   Component Value Date    TSH 2.690 12/20/2020       Blood Culture   Date Value Ref Range Status   12/20/2020 No growth at 24 hours  Preliminary   12/20/2020 No growth at 24 hours  Preliminary   12/15/2020 No growth at 5 days  Final   12/15/2020 No growth at 5 days  Final     No results found for: URINECX  No results found for: WOUNDCX  No results found for: STOOLCX  No results found for: RESPCX  Pain Management Panel     There is no flowsheet data to display.            ----------------------------------------------------------------------------------------------------------------------  Imaging Results (Last 24 Hours)     ** No results found for the last 24 hours. **          ----------------------------------------------------------------------------------------------------------------------    Assessment/Plan       Assessment/Plan     ASSESSMENT:    1.  COVID-19 pneumonia  2.  MRSA bacteremia, treatment completed    PLAN:    Case discussed with primary RN.  Patient has been increased to 13 L bubble nasal cannula.  Nurse reports that the patient is more confused today and is not eating very much.  Afebrile, no diarrhea.  CRP is worsening at 10.60.  WBC remains normal.  CT chest without contrast on 1/3/2021 showed appearance is worse.  There is bilateral airspace disease predominantly in the lower lobes, but also in the left upper lobe.  COPD.  Coronary artery calcifications.    COVID-19 PCR from 12/30/2020 positive.  Influenza and RSV PCR negative.    Blood cultures on 12/20/2020 are so far showing no growth.    COVID-19 and influenza PCR negative on 12/20/2020.  Urinalysis  unremarkable.  Left upper extremity venous duplex reports no evidence of DVT.  X-ray of the left shoulder reports no acute findings.  CT of the L-spine reports no acute findings.  CT of the T-spine reports no acute abnormalities.  CT of the C-spine reports 3.7 cm mass to the right neck either enlarged lymph node or primary neck mass, 3.2 mm colloid nodule in the left upper lobe.  X-ray of the pelvis reports no acute abnormalities.  CT of the head reports no acute abnormalities, chronic left maxillary sinusitis.  Chest x-ray from 12/20/2020 reports chronic volume loss of the right lung base, no acute findings.     Patient completed bacteremia treatment of vancomycin pharmacy to dose on 12/28/2020.    We are agreeable to Decadron as the patient's oxygen requirements have increased.    Would recommend to continue on Rocephin 2 g IV every 24 hours and doxycycline 100 mg IV every 12 hours.  We will follow the patient closely and adjust antibiotic therapy as appropriate.    Code Status:   Code Status and Medical Interventions:   Ordered at: 01/03/21 1329     Limited Support to NOT Include:    Intubation     Code Status:    No CPR     Medical Interventions (Level of Support Prior to Arrest):    Limited     Scribed for Azam Stephenson MD by KIM Hughes. 1/4/2021  11:26 EST  KIM Hughes  01/04/21  11:24 EST    Physician Attestation:    The documentation recorded by the scribe accurately reflects the service I personally performed and the decisions made by me.    Azam Stephenson MD  Infectious Diseases  01/04/21  15:03 EST

## 2021-01-04 NOTE — PROGRESS NOTES
[facility] HOSPITALIST PROGRESS NOTE     Patient Identification:  Name:  Aaron Domínguez  Age:  89 y.o.  Sex:  male  :  1929  MRN:  [id number]  Visit Number:  [id number]  ROOM: [address] [place] [address]     Primary Care Provider:  Bran Woodruff MD    Length of stay in inpatient status:  14    Subjective     Chief Compliant:    Chief Complaint   Patient presents with   • Hypoglycemia   • Loss of Consciousness       History of Presenting Illness:    Patient reports feeling overall worse today. Reports his breathing has got worse. He has had a productive cough.     ROS:  Otherwise 10 point ROS negative other than documented above in HPI.     Objective     Current Hospital Meds:albuterol sulfate HFA, 2 puff, Inhalation, 4x Daily - RT  amitriptyline, 25 mg, Oral, Nightly  amLODIPine, 5 mg, Oral, Q24H  aspirin, 81 mg, Oral, Daily  atorvastatin, 10 mg, Oral, Nightly  bumetanide, 1 mg, Oral, Daily  cefTRIAXone, 2 g, Intravenous, Q24H  cholecalciferol, 50,000 Units, Oral, Weekly  clopidogrel, 75 mg, Oral, Daily  dexamethasone, 6 mg, Intravenous, Daily  docusate sodium, 100 mg, Oral, BID  doxycycline, 100 mg, Intravenous, Q12H  enoxaparin, 40 mg, Subcutaneous, Q24H  guaiFENesin, 1,200 mg, Oral, Q12H  insulin aspart, 0-9 Units, Subcutaneous, TID AC  insulin detemir, 3 Units, Subcutaneous, Once  isosorbide mononitrate, 60 mg, Oral, Q24H  magic barrier cream, , Topical, BID  metFORMIN, 1,000 mg, Oral, BID With Meals  metoprolol succinate XL, 100 mg, Oral, BID  montelukast, 10 mg, Oral, Nightly  predniSONE, 5 mg, Oral, Daily  [START ON 2021] remdesivir, 100 mg, Intravenous, Q24H  sodium chloride, 3 mL, Intravenous, Q12H  terazosin, 5 mg, Oral, Q12H  theophylline, 400 mg, Oral, Daily    Pharmacy Consult - Remdesivir,         Current Antimicrobial Therapy:  Anti-Infectives (From admission, onward)    Ordered     Dose/Rate Route Frequency Start Stop    21 1328  remdesivir 100 mg in sodium chloride 0.9 % 250 mL  IVPB (powder vial)     Ordering Provider: Aaron Calderón MD    100 mg  over 60 Minutes Intravenous Every 24 Hours 01/04/21 1500 01/08/21 1459    01/03/21 1328  remdesivir 200 mg in sodium chloride 0.9 % 250 mL IVPB (powder vial)     Ordering Provider: Aaron Calderón MD    200 mg  over 60 Minutes Intravenous Every 24 Hours 01/03/21 1515 01/03/21 1605    01/03/21 0954  doxycycline (VIBRAMYCIN) 100 mg/100 mL 0.9% NS MBP     Ordering Provider: Azam Stephenson MD    100 mg  over 60 Minutes Intravenous Every 12 Hours 01/03/21 1300 01/10/21 1259    01/03/21 0954  cefTRIAXone (ROCEPHIN) 2 g/100 mL 0.9% NS IVPB (MBP)     Ordering Provider: Azam Stephenson MD    2 g  over 30 Minutes Intravenous Every 24 Hours 01/03/21 1200 01/10/21 1159    12/21/20 0752  vancomycin 1500 mg/500 mL 0.9% NS IVPB ([facility])     Paola Ochoa APRN reviewed the order on 12/27/20 0900.   Ordering Provider: Paola Ochoa APRN    1,500 mg  over 120 Minutes Intravenous Every 24 Hours 12/21/20 1600 12/27/20 2115    12/20/20 2129  vancomycin 2000 mg/500 mL 0.9% NS IVPB ([facility])     Ordering Provider: Dodie Baldwin DO    20 mg/kg × 96.3 kg  over 120 Minutes Intravenous Once 12/20/20 2230 12/21/20 0035        Current Diuretic Therapy:  Diuretics (From admission, onward)    Ordered     Dose/Rate Route Frequency Start Stop    12/27/20 1135  bumetanide (BUMEX) tablet 1 mg     Ordering Provider: Beatrice Birch MD    1 mg Oral Daily 12/28/20 0900          ----------------------------------------------------------------------------------------------------------------------  Vital Signs:  Temp:  [97.1 °F (36.2 °C)-97.7 °F (36.5 °C)] 97.1 °F (36.2 °C)  Heart Rate:  [] 94  Resp:  [20-24] 20  BP: (110-130)/(58-76) 130/62  SpO2:  [79 %-99 %] 91 %  on  Flow (L/min):  [4-15] 15;   Device (Oxygen Therapy): nasal cannula;bubble;high-flow nasal cannula  Body mass index is 28.56 kg/m².    Wt Readings from Last 3 Encounters:   01/03/21 92.9 kg (204  lb 12.8 oz)   12/17/20 92.2 kg (203 lb 3.2 oz)   10/16/18 89.8 kg (198 lb)     Intake & Output (last 3 days)       01/01 0701 - 01/02 0700 01/02 0701 - 01/03 0700 01/03 0701 - 01/04 0700    P.O. 1020 1090 210    I.V. (mL/kg) 0 (0)  450 (4.8)    Total Intake(mL/kg) 1020 (11) 1090 (11.7) 660 (7.1)    Urine (mL/kg/hr) 150 (0.1)      Total Output 150      Net +870 +1090 +660           Urine Unmeasured Occurrence 3 x 5 x 4 x        Diet Pureed; Thin; Consistent Carbohydrate, Low Sodium, Daily Fluid Restriction; Other; 1,200; 2,000 mg Na  ----------------------------------------------------------------------------------------------------------------------  Physical exam:  This physical exam has been personally performed remotely in the unit aided by real-time audio/visual communication tools. RN present at bedside during this exam and assisted during exam. The use of a video visit has been reviewed with the patient and verbal informed consent has been obtained.     Physical Exam:  General: Patient appears awake but drowsy. NAD.   Head: Normocephalic, atraumatic  Eyes: EOMI. Conjunctivae and sclerae normal.  Ears: Ears appear intact with no abnormalities noted.   Neck: Trachea midline. No obvious JVD.  Lungs: Respirations appear to be regular, even and unlabored with no signs of respiratory distress. No audible wheezing.  Abdomen: No obvious abdominal distension.  MS: Muscle tone appears normal. No gross deformities.  Extremities: No clubbing, cyanosis or edema noted.  Skin: No visible bleeding, bruising, or rash.  Neurologic: Alert and oriented to person. No gross deficits.   ----------------------------------------------------------------------------------------------------------------------  Tele:    ----------------------------------------------------------------------------------------------------------------------  Results from last 7 days   Lab Units 01/03/21  0437 01/02/21  0818 01/02/21  0418 01/01/21  0152   CRP  mg/dL 7.82*  --  6.33* 5.02*   WBC 10*3/mm3 6.46 7.64  --  7.07   HEMOGLOBIN g/dL 8.7* 8.9*  --  9.6*   HEMATOCRIT % 28.6* 28.3*  --  30.6*   MCV fL 100.0* 97.3*  --  97.8*   MCHC g/dL 30.4* 31.4*  --  31.4*   PLATELETS 10*3/mm3 192 199  --  204     Results from last 7 days   Lab Units 01/02/21  2145   PH, ARTERIAL pH units 7.443   PO2 ART mm Hg 61.9*   PCO2, ARTERIAL mm Hg 56.6*   HCO3 ART mmol/L 38.7*     Results from last 7 days   Lab Units 01/03/21  0437 01/02/21  0818 01/01/21  0152 12/31/20  0229  12/28/20  0030   SODIUM mmol/L 136 135* 137  --    < > 138   POTASSIUM mmol/L 4.4 4.5 4.1  --    < > 3.8   MAGNESIUM mg/dL  --   --  2.2 1.6*  --  1.6*   CHLORIDE mmol/L 94* 92* 94*  --    < > 92*   CO2 mmol/L 34.3* 36.9* 36.0*  --    < > 40.1*   BUN mg/dL 44* 37* 33*  --    < > 25*   CREATININE mg/dL 1.28* 1.21 1.11  --    < > 1.17   EGFR IF NONAFRICN AM mL/min/1.73 53* 56* 62  --    < > 58*   CALCIUM mg/dL 8.8 8.9 9.0  --    < > 9.2   PHOSPHORUS mg/dL  --   --   --   --   --  3.1   GLUCOSE mg/dL 205* 228* 231*  --    < > 119*    < > = values in this interval not displayed.   Estimated Creatinine Clearance: 43.8 mL/min (A) (by C-G formula based on SCr of 1.28 mg/dL (H)).  No results found for: AMMONIA  Results from last 7 days   Lab Units 01/01/21  0759 12/31/20 2058   TROPONIN T ng/mL 0.136* 0.096*             Glucose   Date/Time Value Ref Range Status   01/03/2021 2013 370 (H) 70 - 130 mg/dL Final   01/03/2021 1737 398 (H) 70 - 130 mg/dL Final   01/03/2021 1221 161 (H) 70 - 130 mg/dL Final   01/03/2021 0823 232 (H) 70 - 130 mg/dL Final   01/02/2021 2102 223 (H) 70 - 130 mg/dL Final   01/02/2021 1728 190 (H) 70 - 130 mg/dL Final   01/02/2021 1101 295 (H) 70 - 130 mg/dL Final   01/02/2021 0828 238 (H) 70 - 130 mg/dL Final     Lab Results   Component Value Date    TSH 2.690 12/20/2020     No results found for: PREGTESTUR, PREGSERUM, HCG, HCGQUANT  Pain Management Panel     There is no flowsheet data to display.         Brief Urine Lab Results  (Last result in the past 365 days)      Color   Clarity   Blood   Leuk Est   Nitrite   Protein   CREAT   Urine HCG        12/20/20 1142 Yellow Clear Trace Negative Negative 100 mg/dL (2+)             No results found for: BLOODCX  No results found for: URINECX  No results found for: WOUNDCX  No results found for: STOOLCX  Respiratory Culture   Date Value Ref Range Status   01/02/2021   Preliminary    Moderate growth (3+) Normal Respiratory Mary: NO S.aureus/MRSA or Pseudomonas aeruginosa     No results found for: AFBCX  Results from last 7 days   Lab Units 01/03/21  0437 01/02/21  0418 01/01/21  0152 12/31/20  0038 12/30/20  0127 12/29/20  0334 12/28/20  0030   CRP mg/dL 7.82* 6.33* 5.02* 3.31* 2.23* 1.59* 1.92*       I have personally looked at the labs and they are summarized above.  ----------------------------------------------------------------------------------------------------------------------  Detailed radiology reports for the last 24 hours:    Imaging Results (Last 24 Hours)     Procedure Component Value Units Date/Time    CT Chest Without Contrast [[id number]] Collected: 01/03/21 1055     Updated: 01/03/21 1059    Narrative:      EXAM: CT CHEST WO CONTRAST-      CLINICAL INDICATION:Pneumonia, effusion or abscess suspected, xray done;  E16.2-Hypoglycemia, unspecified; T68.XXXA-Hypothermia, initial encounter        COMPARISON: 12/13/2020     TECHNIQUE: Multiple axial CT images were obtained from lung apex through  upper abdomen without the administration of IV contrast. Reformatted  images in the coronal and/or sagittal plane(s) were generated from the  axial data set to facilitate diagnostic accuracy and/or surgical  planning.     Radiation dose reduction techniques were utilized per ALARA protocol.  Automated exposure control was initiated through either or CareDose or  DoseRight software packages by  protocol.    DOSE (DLP mGy-cm):        FINDINGS:     LUNGS:  There is interstitial and airspace disease in the lung bases  bilaterally. Appearance is worse than on the previous exam. Also  groundglass change in the left upper lobe.  Evidence of COPD     HEART: Coronary artery calcifications are present     MEDIASTINUM: No masses. No enlarged lymph nodes.  No fluid collections.     PLEURA: No pleural effusion. No pleural mass or abnormal calcification.  No pneumothorax.     VASCULATURE: No evidence of aneurysm.     BONES: No acute bony abnormality.     VISUALIZED UPPER ABDOMEN:The upper abdomen is unremarkable as  visualized.     Other: None.       Impression:         1. Appearance is worse. There is bilateral airspace disease  predominantly in the lower lobes, but also in the left upper lobe.  2. COPD  3. Coronary artery calcifications           This report was finalized on 1/3/2021 10:56 AM by Dr. Keanu La MD.       XR Chest 1 View [[id number]] Collected: 01/03/21 0959     Updated: 01/03/21 1002    Narrative:      CR Chest 1 Vw    INDICATION:   Hypoxia     COMPARISON:    1/2/2021    FINDINGS:  Single portable AP view(s) of the chest.    Normal cardiomediastinal contours. Worsening bilateral patchy airspace disease, left chest worse than right. No pleural effusion. No pneumothorax. No acute bony pathology..       Impression:      Patchy multifocal airspace disease. Consider diagnoses such as Covid.    Signer Name: KAL CHI MD   Signed: 1/3/2021 9:59 AM   Workstation Name: [id number]    [facility]        Assessment & Plan    #Hypoglycemia found prior to admission, causing an episode of unresponsiveness with a fall in a patient with known insulin dependent type 2 diabetes mellitus  #Incidentally found to be COVID positive on 12/30   # SIRS that was present on admission (Temp 92.4F, , WBC 13,660), suspect due to the hypoglycemia and fall   #New onset paroxysmal Afib/Aflutter  #Recent MRSA bacteremia admission, currently completing  treatment  #PICC line RUE, placed 12/17/2020  #Elevated troponin with a flat trend, suspect chronic  #Normocytic anemia, flat trend while hospitalized   #History of CAD with prior PCI/stenting  #History of hyperlipidemia  #3.7 cm right neck mass, enlarged lymph node versus primary neck mass   #3.2 mm colloid nodule left upper lobe  #History of advanced COPD with chronic hypoxic respiratory failure   #History of moderate pulmonary hypertension, RVSP 45-55mmHg  #History of essential hypertension       Patient found to have COVID on 12/30 screening exam. Was negative on admission. Patient has developed increasing O2 requirement that went from 4 to 8 L. CT chest showed worsening airspace disease bilaterally. Started dexamethsone and remdesivir. ID initiated abx. Has been on dexamethasone, getting D-dimer to further risk stratify per [facility] protocol. Continue prophylactic lovenox.     Patient with chest pain overnight and troponin had significant delta up to 0.136. EKG with nonspecific changes. Patient denies chest pain today. I loaded patient on ASA. Cardiology consulted They discussed with family and have opted for medical management at this time. They increased toprolol, added low dose ASA and imdur.     Cr stable Slightly up at 1.28 today on maintenance diuretics. Recheck in AM, if trends up again will hold diuretics.     Discussed potential inpatient FNA of neck mass with radiology but they report it is only an outpatient procedure. Will have patient f/u with ENT to expedite work-up.     No further hypoglycemia after stopping basal insulin. Reasonably controlled.       Discussed plan of care with patient's family and goals of care. They report he would want to be DNR/DNI. All questions answered.       VTE Prophylaxis:   Mechanical Order History:      Ordered        12/20/20 1626  Place Sequential Compression Device  Once         12/20/20 1626  Maintain Sequential Compression Device  Continuous                  Pharmalogical Order History:      Ordered     Dose Route Frequency Stop    12/28/20 1341  enoxaparin (LOVENOX) syringe 40 mg      40 mg SC Every 24 Hours --    12/25/20 1557  enoxaparin (LOVENOX) syringe 80 mg  Status:  Discontinued      80 mg SC Every 12 Hours 12/28/20 1341    12/24/20 1715  enoxaparin (LOVENOX) syringe 40 mg  Status:  Discontinued      40 mg SC Every 24 Hours 12/25/20 1557                Aaron Calderón MD  [facility]  01/03/21  20:42 EST

## 2021-01-05 NOTE — PROGRESS NOTES
PROGRESS NOTE         Patient Identification:  Name:  Aaron Domínguez  Age:  89 y.o.  Sex:  male  :  1929  MRN:  [id number]  Visit Number:  [id number]  Primary Care Provider:  Bran Woodruff MD         LOS: 16 days       ----------------------------------------------------------------------------------------------------------------------  Subjective       Chief Complaints:    Hypoglycemia and Loss of Consciousness        Interval History:      Case discussed with primary RN.  Patient is much more confused today and has pulled his Rodriguez out.  He is currently on a Olivier hugger at 97.2 °F.  Overnight, he was as low as 96 °F.  No diarrhea.  CRP is improved at 7.10.    Review of Systems:    Constitutional: no fever, chills and night sweats. No appetite change or unexpected weight change. No fatigue.  Eyes: no eye drainage, itching or redness.  HEENT: no mouth sores, dysphagia or nose bleed.  Respiratory: Chronic cough and shortness of breath.  Cardiovascular: no chest pain, no palpitations, no orthopnea.  Gastrointestinal: no nausea, vomiting or diarrhea. No abdominal pain, hematemesis or rectal bleeding.  Genitourinary: no dysuria or polyuria.  Hematologic/lymphatic: no lymph node abnormalities, no easy bruising or easy bleeding.  Musculoskeletal: Chronic back pain, arthralgias, and gait problems.  Skin: No rash and no itching.  Neurological: Syncope.  Behavioral/Psych: no depression or suicidal ideation.  Endocrine: no hot flashes.  Immunologic: negative.    ----------------------------------------------------------------------------------------------------------------------      Objective       Current [facility] Meds:  albuterol sulfate HFA, 2 puff, Inhalation, 4x Daily - RT  amitriptyline, 25 mg, Oral, Nightly  ascorbic acid, 1,500 mg, Oral, Daily  aspirin, 81 mg, Oral, Daily  atorvastatin, 10 mg, Oral, Nightly  cefTRIAXone, 2 g, Intravenous, Q24H  cholecalciferol, 50,000 Units, Oral,  Weekly  clopidogrel, 75 mg, Oral, Daily  dexamethasone, 6 mg, Intravenous, Daily  docusate sodium, 100 mg, Oral, BID  doxycycline, 100 mg, Intravenous, Q12H  enoxaparin, 40 mg, Subcutaneous, Q24H  famotidine, 20 mg, Oral, BID AC  guaiFENesin, 1,200 mg, Oral, Q12H  insulin aspart, 0-9 Units, Subcutaneous, TID AC  insulin detemir, 5 Units, Subcutaneous, Nightly  iron polysaccharides, 150 mg, Oral, Daily With Breakfast  isosorbide mononitrate, 60 mg, Oral, Q24H  lactobacillus acidophilus, 1 capsule, Oral, Daily  magic barrier cream, , Topical, BID  metoprolol succinate XL, 100 mg, Oral, BID  montelukast, 10 mg, Oral, Nightly  OLANZapine zydis, 5 mg, Oral, Once  remdesivir, 100 mg, Intravenous, Q24H  sodium chloride, 3 mL, Intravenous, Q12H  terazosin, 5 mg, Oral, Q12H  theophylline, 400 mg, Oral, Daily      Pharmacy Consult - Remdesivir,       ----------------------------------------------------------------------------------------------------------------------    Vital Signs:  Temp:  [96 °F (35.6 °C)-97.8 °F (36.6 °C)] 97.2 °F (36.2 °C)  Heart Rate:  [] 87  Resp:  [20-24] 20  BP: (100-140)/(58-78) 130/68  No data found.  SpO2 Percentage    01/05/21 0652 01/05/21 0746 01/05/21 1128   SpO2: 95% (!) 65%  Comment: RN AWARE 96%     SpO2:  [65 %-97 %] 96 %  on  Flow (L/min):  [13-50] 50;   Device (Oxygen Therapy): high-flow nasal cannula    Body mass index is 28.22 kg/m².  Wt Readings from Last 3 Encounters:   01/05/21 91.8 kg (202 lb 4.8 oz)   12/17/20 92.2 kg (203 lb 3.2 oz)   10/16/18 89.8 kg (198 lb)        Intake/Output Summary (Last 24 hours) at 1/5/2021 1258  Last data filed at 1/4/2021 2007  Gross per 24 hour   Intake 210 ml   Output --   Net 210 ml     Diet Pureed; Thin; Consistent Carbohydrate, Low Sodium, Daily Fluid Restriction; Other; 1,200; 2,000 mg Na  ----------------------------------------------------------------------------------------------------------------------      Physical Exam:    Deferred due  to COVID-19 isolation.  ----------------------------------------------------------------------------------------------------------------------  Results from last 7 days   Lab Units 01/01/21  0759 12/31/20 2058   TROPONIN T ng/mL 0.136* 0.096*     Results from last 7 days   Lab Units 01/04/21 0143   PROBNP pg/mL 3,756.0*       Results from last 7 days   Lab Units 01/05/21  0559   PH, ARTERIAL pH units 7.408   PO2 ART mm Hg 67.5*   PCO2, ARTERIAL mm Hg 60.3*   HCO3 ART mmol/L 38.0*     Results from last 7 days   Lab Units 01/05/21 0447 01/04/21 0143 01/03/21 0437 01/02/21  0818   CRP mg/dL 7.10* 10.60* 7.82*  --    WBC 10*3/mm3  --  10.67 6.46 7.64   HEMOGLOBIN g/dL  --  8.9* 8.7* 8.9*   HEMATOCRIT %  --  28.6* 28.6* 28.3*   MCV fL  --  99.3* 100.0* 97.3*   MCHC g/dL  --  31.1* 30.4* 31.4*   PLATELETS 10*3/mm3  --  218 192 199     Results from last 7 days   Lab Units 01/05/21 0447 01/04/21 0143 01/03/21 0437 01/01/21 0152 12/31/20  0229   SODIUM mmol/L 134* 134* 136   < > 137  --    POTASSIUM mmol/L 4.4 4.5 4.4   < > 4.1  --    MAGNESIUM mg/dL  --  2.1  --   --  2.2 1.6*   CHLORIDE mmol/L 91* 89* 94*   < > 94*  --    CO2 mmol/L 32.5* 31.7* 34.3*   < > 36.0*  --    BUN mg/dL 62* 50* 44*   < > 33*  --    CREATININE mg/dL 1.56* 1.64* 1.28*   < > 1.11  --    EGFR IF NONAFRICN AM mL/min/1.73 42* 40* 53*   < > 62  --    CALCIUM mg/dL 9.3 9.3 8.8   < > 9.0  --    GLUCOSE mg/dL 217* 363* 205*   < > 231*  --    ALBUMIN g/dL 2.82* 2.88*  --   --   --   --    BILIRUBIN mg/dL 0.2 0.2  --   --   --   --    ALK PHOS U/L 129* 138*  --   --   --   --    AST (SGOT) U/L 23 21  --   --   --   --    ALT (SGPT) U/L 22 22  --   --   --   --     < > = values in this interval not displayed.   Estimated Creatinine Clearance: 35.7 mL/min (A) (by C-G formula based on SCr of 1.56 mg/dL (H)).  No results found for: AMMONIA    Glucose   Date/Time Value Ref Range Status   01/05/2021 1140 208 (H) 70 - 130 mg/dL Final   01/05/2021 0757 164  (H) 70 - 130 mg/dL Final   01/04/2021 2007 386 (H) 70 - 130 mg/dL Final   01/04/2021 1639 146 (H) 70 - 130 mg/dL Final   01/04/2021 1115 230 (H) 70 - 130 mg/dL Final   01/04/2021 0802 298 (H) 70 - 130 mg/dL Final   01/03/2021 2013 370 (H) 70 - 130 mg/dL Final   01/03/2021 1737 398 (H) 70 - 130 mg/dL Final     Lab Results   Component Value Date    HGBA1C 9.10 (H) 12/20/2020     Lab Results   Component Value Date    TSH 2.690 12/20/2020       Blood Culture   Date Value Ref Range Status   12/20/2020 No growth at 24 hours  Preliminary   12/20/2020 No growth at 24 hours  Preliminary   12/15/2020 No growth at 5 days  Final   12/15/2020 No growth at 5 days  Final     No results found for: URINECX  No results found for: WOUNDCX  No results found for: STOOLCX  No results found for: RESPCX  Pain Management Panel     There is no flowsheet data to display.            ----------------------------------------------------------------------------------------------------------------------  Imaging Results (Last 24 Hours)     ** No results found for the last 24 hours. **          ----------------------------------------------------------------------------------------------------------------------    Assessment/Plan       Assessment/Plan     ASSESSMENT:    1.  COVID-19 pneumonia  2.  MRSA bacteremia, treatment completed    PLAN:    Case discussed with primary RN.  Patient is much more confused today and has pulled his Rodriguez out.  He is currently on a Olivier hugger at 97.2 °F.  Overnight, he was as low as 96 °F.  No diarrhea.  CRP is improved at 7.10.    CT chest without contrast on 1/3/2021 showed appearance is worse.  There is bilateral airspace disease predominantly in the lower lobes, but also in the left upper lobe.  COPD.  Coronary artery calcifications.    COVID-19 PCR from 12/30/2020 positive.  Influenza and RSV PCR negative.    Blood cultures on 12/20/2020 are so far showing no growth.    COVID-19 and influenza PCR negative on  12/20/2020.  Urinalysis unremarkable.  Left upper extremity venous duplex reports no evidence of DVT.  X-ray of the left shoulder reports no acute findings.  CT of the L-spine reports no acute findings.  CT of the T-spine reports no acute abnormalities.  CT of the C-spine reports 3.7 cm mass to the right neck either enlarged lymph node or primary neck mass, 3.2 mm colloid nodule in the left upper lobe.  X-ray of the pelvis reports no acute abnormalities.  CT of the head reports no acute abnormalities, chronic left maxillary sinusitis.  Chest x-ray from 12/20/2020 reports chronic volume loss of the right lung base, no acute findings.     Patient completed bacteremia treatment of vancomycin pharmacy to dose on 12/28/2020.    We are agreeable to Decadron as the patient's oxygen requirements have increased.  We are also agreeable to initiating remdesivir at this time, as patient's GFR has improved.    Would recommend to continue on Rocephin 2 g IV every 24 hours and doxycycline 100 mg IV every 12 hours.  We will follow the patient closely and adjust antibiotic therapy as appropriate.    Code Status:   Code Status and Medical Interventions:   Ordered at: 01/03/21 1329     Limited Support to NOT Include:    Intubation     Code Status:    No CPR     Medical Interventions (Level of Support Prior to Arrest):    Limited     Scribed for Azam Stephenson MD by KIM Hughes. 1/5/2021  12:58 EST  KIM Hughes  01/05/21  12:58 EST    Physician Attestation:    The documentation recorded by the scribe accurately reflects the service I personally performed and the decisions made by me.    Azam Stephenson MD  Infectious Diseases  01/05/21  12:58 EST

## 2021-01-05 NOTE — PLAN OF CARE
Goal Outcome Evaluation:  Plan of Care Reviewed With: patient  Progress: no change   Patient resting in bed at this time. Patient has had no complaints of pain this shift. Patient remains on 13L high flow NC with O2 saturations in the upper 90's. Patient has had a low temperature this shift with Alyssa ALVAREZ notified and new orders given. Sputum collected this shift and sent to lab per order. Patient refusing external cath at this time. Will continue to monitor and follow plan of care with interventions implemented as needed.

## 2021-01-05 NOTE — NURSING NOTE
Called by telemetry that patients O2 level was in the 70's when entering the patients room he was noted to have labored and belly breathing and dusky color noted. Patient stated he could not breath. Patient was on 13 L bubble high flow and increased to 15L bubble high flow and still in the 70's at this time patient was placed on 100% NRB and o2 remained in the 70's set patient straight up in bed and percussion to the back given and patient was finally able to cough up to large brown sputum at this point o2 level went to the 90's. Dr Ayers made aware and new orders given.

## 2021-01-05 NOTE — SIGNIFICANT NOTE
01/05/21 1445   OTHER   Discipline physical therapist   Rehab Time/Intention   Session Not Performed other (see comments)  (Pt confused and unable to participate this date)

## 2021-01-05 NOTE — PLAN OF CARE
Problem: Swallowing Impairment  Goal: Improved Swallowing Without Aspiration  Outcome: Ongoing, Not Progressing   F/u this am on 3S. Mr. Domínguez is noted w/ increasing O2 requirements, pending transfer to PCU per current medical status. He continues on a modified po diet of puree consistency, thin liquids. Per medical decline, reassessment of candidacy for diet upgrade deferred today.     D/w RN pt status w/ verbal agreement. She denies pt w/ any overt s/s aspiration w/ po intake of current diet and po meds.     SLP to continue to f/u for diet safety/reassessment for possible upgrade pending improvement in pt status to functionally participate.     Thank you-  Danna Rangel M.A., CCC-SLP

## 2021-01-05 NOTE — NURSING NOTE
Patient has been very confused today, only oriented to self, and pulling on all lines and monitors. Power of  has been updated and notified of patients worsening condition. I have requested an order for a sitter from the doctor and we are waiting for house to accommodate this order. While waiting for the sitter, the patient has pulled out his bauer catheter that was inserted this morning. Dr. Franks is aware of this and aware that the opening of the penis is bleeding a bit as well.

## 2021-01-05 NOTE — CONSULTS
Primary hospitalist: Dr. Franks    Reason for Consultation: Hypoxia    Chief complaint:   Chief Complaint   Patient presents with   • Hypoglycemia   • Loss of Consciousness       History of present illness:     Mr. Domínguez is a 89 year old male with a medical history significant for COPD, diabetes, and hypertension.    He was admitted to this facility from 12/13/2020 through 12/17/2020 and treated for an acute COPD exacerbation.  During his stay he had 1 of 3 blood cultures returned positive for MRSA.  ID was consulted and recommended treatment with IV vancomycin for 2 weeks.  He was discharged home with a PICC line under the care of his daughters.  He was brought back to the emergency department on 12/20/2020 after he was found unresponsive.  He was noted to be hypoglycemic with blood sugar of 21.  He was intubated in route to the hospital, however was extubated shortly after arrival.  The patient reported having pain all over and a mild headache.  His daughter at the time of presentation was requesting skilled nursing facility placement after this admission.  He uses supplemental oxygen at home at 4 L continuously.      On 1/3/2021 the patient was noted to have worsening shortness of breath.  He also has a productive cough.  Within the last 48 hours his supplemental oxygen requirements have increased from 6 L to 13 L on bubble high flow nasal cannula.  Patient was also noted to be positive for COVID-19.  Pulmonology was consulted for hypoxia.      Review of Systems: Review of Systems -unable to obtain due to confusion and agitation    Past medical history, past surgical history, social history and family history:  •  has a past medical history of COPD (chronic obstructive pulmonary disease) (CMS/[facility]), Diabetes mellitus (CMS/[facility]), and Hypertension.  •  has a past surgical history that includes Coronary stent placement.  •  reports that he quit smoking about 22 years ago. His smoking use included cigarettes. He has  a 120.00 pack-year smoking history. His smokeless tobacco use includes chew. He reports that he does not drink alcohol or use drugs.  • family history includes Diabetes in his father; Heart failure in his father and mother.     Medication and allergies:  • Prior to admission  Current Outpatient Medications   Medication Instructions   • albuterol (PROAIR HFA) 108 (90 Base) MCG/ACT inhaler 2 puffs, Inhalation, Every 4 Hours PRN   • amitriptyline (ELAVIL) 25 mg, Oral, Nightly   • amLODIPine-benazepril (LOTREL 5-20) 5-20 MG per capsule 1 capsule, Oral, Daily, for blood pressure   • atorvastatin (LIPITOR) 10 mg, Oral, Nightly   • clopidogrel (PLAVIX) 75 mg, Oral, Daily, as directed   • doxazosin (CARDURA) 8 mg, Oral, Nightly   • Fluticasone-Umeclidin-Vilant 100-62.5-25 MCG/INH aerosol powder  1 each, Inhalation, Daily   • ipratropium-albuterol (DUO-NEB) 0.5-2.5 mg/3 ml nebulizer 3 mL, Nebulization, Every 4 Hours PRN   • isosorbide mononitrate (IMDUR) 30 mg, Oral, Daily   • metoprolol succinate XL (TOPROL-XL) 100 mg, Oral, Daily   • montelukast (SINGULAIR) 10 mg, Oral, Nightly   • predniSONE (DELTASONE) 5 mg, Oral, Daily   • theophylline (UNIPHYL) 400 mg, Oral, Daily   • vitamin D (ERGOCALCIFEROL) 50,000 Units, Oral, Weekly        • Active medication:  albuterol sulfate HFA, 2 puff, Inhalation, 4x Daily - RT  amitriptyline, 25 mg, Oral, Nightly  ascorbic acid, 1,500 mg, Oral, Daily  aspirin, 81 mg, Oral, Daily  atorvastatin, 10 mg, Oral, Nightly  cefTRIAXone, 2 g, Intravenous, Q24H  cholecalciferol, 50,000 Units, Oral, Weekly  clopidogrel, 75 mg, Oral, Daily  dexamethasone, 6 mg, Intravenous, Daily  docusate sodium, 100 mg, Oral, BID  doxycycline, 100 mg, Intravenous, Q12H  enoxaparin, 40 mg, Subcutaneous, Q24H  famotidine, 20 mg, Oral, BID AC  guaiFENesin, 1,200 mg, Oral, Q12H  insulin aspart, 0-9 Units, Subcutaneous, TID AC  insulin detemir, 5 Units, Subcutaneous, Nightly  iron polysaccharides, 150 mg, Oral, Daily With  "Breakfast  isosorbide mononitrate, 60 mg, Oral, Q24H  lactobacillus acidophilus, 1 capsule, Oral, Daily  magic barrier cream, , Topical, BID  metoprolol succinate XL, 100 mg, Oral, BID  montelukast, 10 mg, Oral, Nightly  remdesivir, 100 mg, Intravenous, Q24H  sodium chloride, 3 mL, Intravenous, Q12H  terazosin, 5 mg, Oral, Q12H  theophylline, 400 mg, Oral, Daily        • Continuous Infusions:  Pharmacy Consult - Remdesivir,          Allergies:    Patient has no known allergies.      Vital Signs    height is 180.3 cm (71\") and weight is 91.8 kg (202 lb 4.8 oz). His rectal temperature is 96.4 °F (35.8 °C). His blood pressure is 100/66 and his pulse is 114. His respiration is 20 and oxygen saturation is 65% (abnormal).        Physical Exam: Limited due to COVID-19 isolation.  Constitutional:  oriented to person.  Head: Normocephalic and atraumatic.   Right Ear and left Ear : External ear normal.   Nose: Nose normal.   Neck: Normal range of motion. Neck supple.    Cardiovascular: Atrial flutter on telemetry  Pulmonary: Normal rate and effort noted.  Abdominal: Soft. Bowel sounds are normal.exhibits no distension. There is no tenderness.   Musculoskeletal: Normal range of motion. Exhibits no deformity.   Neurological: Confused and agitated  Skin: Skin is warm and dry. No erythema.   Psychiatric:   Patient is confused and intermittently agitated.      Input output:     Intake & Output (last 3 days)       01/02 0701 - 01/03 0700 01/03 0701 - 01/04 0700 01/04 0701 - 01/05 0700 01/05 0701 - 01/06 0700    P.O. 1090 330 300     I.V. (mL/kg)  541.4 (5.9)      Total Intake(mL/kg) 1090 (11.7) 871.4 (9.4) 300 (3.3)     Urine (mL/kg/hr)        Total Output        Net +1090 +871.4 +300             Urine Unmeasured Occurrence 5 x 5 x 5 x            Diuretic support:  Diuretics (From admission, onward)    Ordered     Dose/Rate Route Frequency Start Stop    01/05/21 0826  furosemide (LASIX) injection 60 mg     Ordering Provider: " Gracy Franks MD    60 mg Intravenous Once 01/05/21 0900 01/05/21 0912    01/04/21 1652  furosemide (LASIX) injection 40 mg     Ordering Provider: Gracy Franks MD    40 mg Intravenous Once 01/04/21 1745 01/04/21 1837          Microbiology data:  Microbiology Results (last 10 days)     Procedure Component Value - Date/Time    Respiratory Culture - Sputum, Cough [[id number]] Collected: 01/05/21 0147    Lab Status: Preliminary result Specimen: Sputum from Cough Updated: 01/05/21 0315     Gram Stain Moderate (3+) WBCs seen      Few (2+) Epithelial cells seen      Moderate (3+) Gram positive bacilli      Moderate (3+) Yeast    Respiratory Culture - Sputum, Cough [[id number]] Collected: 01/02/21 1426    Lab Status: Final result Specimen: Sputum from Cough Updated: 01/04/21 0718     Respiratory Culture Moderate growth (3+) Normal Respiratory Mary: NO S.aureus/MRSA or Pseudomonas aeruginosa     Gram Stain Few (2+) WBCs per low power field      Moderate (3+) Gram positive cocci      Rare (1+) Gram positive bacilli      Rare (1+) Gram negative bacilli      Rare (1+) Yeast    COVID PRE-OP / PRE-PROCEDURE SCREENING ORDER (NO ISOLATION) - Swab, Nasopharynx [[id number]]  (Abnormal) Collected: 12/30/20 0936    Lab Status: Final result Specimen: Swab from Nasopharynx Updated: 12/30/20 1047    Narrative:      The following orders were created for panel order COVID PRE-OP / PRE-PROCEDURE SCREENING ORDER (NO ISOLATION) - Swab, Nasopharynx.  Procedure                               Abnormality         Status                     ---------                               -----------         ------                     COVID-19, FLU A/B, RSV P...[[id number]]  Abnormal            Final result                 Please view results for these tests on the individual orders.    COVID-19, FLU A/B, RSV PCR - Swab, Nasopharynx [[id number]]  (Abnormal) Collected: 12/30/20 0936    Lab Status: Final result Specimen: Swab from Nasopharynx Updated:  12/30/20 1047     COVID19 Detected     Influenza A PCR Not Detected     Influenza B PCR Not Detected     RSV, PCR Not Detected    Narrative:      Fact sheet for providers: https://www.fda.gov/media/110942/download    Fact sheet for patients: https://www.fda.gov/media/835814/download    Test performed by PCR.           Antibiotic support:  Anti-Infectives (From admission, onward)    Ordered     Dose/Rate Route Frequency Start Stop    01/03/21 1328  remdesivir 100 mg in sodium chloride 0.9 % 250 mL IVPB (powder vial)     Ordering Provider: Aaron Calderón MD    100 mg  over 60 Minutes Intravenous Every 24 Hours 01/04/21 1500 01/08/21 1459    01/03/21 1328  remdesivir 200 mg in sodium chloride 0.9 % 250 mL IVPB (powder vial)     Ordering Provider: Aaron Calderón MD    200 mg  over 60 Minutes Intravenous Every 24 Hours 01/03/21 1515 01/03/21 1605    01/03/21 0954  doxycycline (VIBRAMYCIN) 100 mg/100 mL 0.9% NS MBP     Gracy Franks MD reviewed the order on 01/04/21 1657.   Ordering Provider: Azam Stephenson MD    100 mg  over 60 Minutes Intravenous Every 12 Hours 01/03/21 1300 01/10/21 1259    01/03/21 0954  cefTRIAXone (ROCEPHIN) 2 g/100 mL 0.9% NS IVPB (MBP)     Gracy Franks MD reviewed the order on 01/04/21 1657.   Ordering Provider: Azam Stephenson MD    2 g  over 30 Minutes Intravenous Every 24 Hours 01/03/21 1200 01/10/21 1159    12/21/20 0752  vancomycin 1500 mg/500 mL 0.9% NS IVPB ([facility])     Paola Ochoa APRN reviewed the order on 12/27/20 0900.   Ordering Provider: Paola Ochoa APRN    1,500 mg  over 120 Minutes Intravenous Every 24 Hours 12/21/20 1600 12/27/20 2115    12/20/20 2129  vancomycin 2000 mg/500 mL 0.9% NS IVPB ([facility])     Ordering Provider: Dodie Baldwin DO    20 mg/kg × 96.3 kg  over 120 Minutes Intravenous Once 12/20/20 2230 12/21/20 0035           VTE prophylaxis  Mechanical Order History:      Ordered        12/20/20 1626  Place Sequential Compression Device   Once         12/20/20 1626  Maintain Sequential Compression Device  Continuous                 Pharmalogical Order History:      Ordered     Dose Route Frequency Stop    12/28/20 1341  enoxaparin (LOVENOX) syringe 40 mg      40 mg SC Every 24 Hours --    12/25/20 1557  enoxaparin (LOVENOX) syringe 80 mg  Status:  Discontinued      80 mg SC Every 12 Hours 12/28/20 1341    12/24/20 1715  enoxaparin (LOVENOX) syringe 40 mg  Status:  Discontinued      40 mg SC Every 24 Hours 12/25/20 1557                Code status  Code Status and Medical Interventions:   Ordered at: 01/03/21 1329     Limited Support to NOT Include:    Intubation     Code Status:    No CPR     Medical Interventions (Level of Support Prior to Arrest):    Limited        Active diet orders:  Dietary Orders (From admission, onward)     Start     Ordered    01/01/21 1115  Diet Pureed; Thin; Consistent Carbohydrate, Low Sodium, Daily Fluid Restriction; Other; 1,200; 2,000 mg Na  Diet Effective Now     Question Answer Comment   Diet Texture / Consistency Pureed    Fluid Consistency Thin    Common Modifiers Consistent Carbohydrate    Common Modifiers Low Sodium    Common Modifiers Daily Fluid Restriction    Fluid Restriction Per Day: Other    mL Per Day 1200    Low Sodium Restriction: 2,000 mg Na        01/01/21 1114    12/28/20 1800  Dietary Nutrition Supplements Boost Plus (Ensure Enlive, Ensure Plus)  Daily With Lunch & Dinner     Question:  Select Supplement  Answer:  Boost Plus (Ensure Enlive, Ensure Plus)    12/28/20 1990                Scribed for Dr. Kang, by Cherry Mtz, APRN        Result review: ABG showed pH of 7.40, PCO2 of 60 and PO2 of 67 on high flow nasal cannula 70% FiO2 with flow rate of 50 L/min.  Elevated serum creatinine.  CRP trending down.  Uptrending ANC.  Respiratory cultures showed moderate yeast and gram-positive bacilli.  Chest x-ray on 1/3/2021 showed patchy multifocal airspace disease.  I reviewed the images of the CT scan  of the chest that were drawn on 1/3/2021.  Interstitial and airspace disease at lung bases bilaterally.  Changes of COPD noted.    Assessment:  • Acute hypoxic respiratory failure  • Bilateral lower lobe COVID-19 pneumonia  • COPD, centrilobular emphysema subtype  • Advanced age          Plan:  Code Status and Medical Interventions:   Ordered at: 01/03/21 1329     Limited Support to NOT Include:    Intubation     Code Status:    No CPR     Medical Interventions (Level of Support Prior to Arrest):    Limited     Patient CODE STATUS includes no CPR with limited support not to include intubation.  Very poor prognosis.  Patient is currently in the state of delirium and intermittently removing high flow nasal cannula and dropping the saturations to 80s.  He is currently on albuterol inhaler.  On IV dexamethasone.  Patient is receiving intermittent diuresis as per primary team.  On IV doxycycline.  Patient has received 1 dose of olanzapine disintegrating tablet today for altered mental state.  He is on IV remdesivir as per protocol for COVID-19 pneumonia.  If patient's mental state improves but respiratory status decline then recommend starting patient on CPAP 10 cmH2O with 100% FiO2.  Our options for oxygenation and ventilation are limited as patient is no CPR and DNI.  Agree with intermittent diuresis.  On p.o. theophylline. Titrate FiO2 to keep SPO2 around 90%.  I have updated RN Lynette.    Guarded prognosis    I, Tan Kang M.D. attest that the above note accurately reflects the work and decisions made by me.  Patient was seen and evaluated by me, including history of present illness, physical exam, assessment, and treatment plan.  The above note was reviewed and edited by me.    Thank you for involving me in the care of the patient.  Tan Kang M.D  Pulmonary and Critical Care Medicine

## 2021-01-05 NOTE — PROGRESS NOTES
Nutrition Services    Patient Name:  Aaron Domínguez  YOB: 1929  MRN: [id number]  Admit Date:  12/20/2020    Nutrition follow up: noted poor PO intake of 17% over 6 meals and noted patient refusing meals.Will adjust supplements to Boost daily with breakfast and magic cup BID with lunch and dinner.  Will liberalize diet to promote increased intake.    Will continue to follow.  Electronically signed by:  Cynthia Cadena  01/05/21 14:57 EST

## 2021-01-05 NOTE — PROGRESS NOTES
[facility] Medicine Services  CROSS COVER NOTE    Contacted per RN. Patient's temp 96.1, rectal temp obtained and 96.9.  Review of his chart, patient has been running in the 96-97 range on temperature.  Olivier sargent ordered. Continue to monitor vitals closely, further care pending clinical course. Continue orders per chart.       Alyssa Burgos PA-C  Hospitalist Service -- [facility]   Pager: [phone]    01/05/21  00:36 EST    Attending Physician: Gracy Franks MD

## 2021-01-06 NOTE — PROGRESS NOTES
"Chief Complaint:    Chief Complaint   Patient presents with   • Hypoglycemia   • Loss of Consciousness        Interval History: Mr. Domínguez is resting in bed.  Continues to be confused today.  He continues with shortness of breath and productive cough.  Patient has been afebrile.  He is currently on heated high flow nasal cannula at 55% FiO2.  He is saturating 97%.        Review of Systems: Review of Systems -difficult to obtain due to confusion    Vital Signs    height is 180.3 cm (71\") and weight is 92.4 kg (203 lb 9.6 oz). His axillary temperature is 97.3 °F (36.3 °C). His blood pressure is 125/76 and his pulse is 96. His respiration is 21 and oxygen saturation is 94%.        Physical Exam:  Constitutional: Patient is confused. No distress.   Head: Normocephalic and atraumatic.   Right Ear and left Ear : External ear normal.   Nose: Nose normal.   Neck: Normal range of motion. Neck supple.    Cardiovascular: atrial flutter noted on tele   Pulmonary: normal rate and rhythm noted.  Musculoskeletal: Normal range of motion. Exhibits no deformity.   Neurological: confused and unable to assess orientation. No cranial nerve deficit. Coordination normal.   Skin: Skin is warm and dry. No erythema.   Psychiatric:   normal mood and affect.   behavior is normal.        Medication:  • Active medication:  acetaZOLAMIDE, 375 mg, Oral, Once  albuterol sulfate HFA, 2 puff, Inhalation, 4x Daily - RT  amitriptyline, 25 mg, Oral, Nightly  ascorbic acid, 1,500 mg, Oral, Daily  aspirin, 81 mg, Oral, Daily  atorvastatin, 10 mg, Oral, Nightly  cefTRIAXone, 2 g, Intravenous, Q24H  cholecalciferol, 50,000 Units, Oral, Weekly  clopidogrel, 75 mg, Oral, Daily  dexamethasone, 6 mg, Intravenous, Daily  docusate sodium, 100 mg, Oral, BID  doxycycline, 100 mg, Intravenous, Q12H  enoxaparin, 40 mg, Subcutaneous, Q24H  famotidine, 20 mg, Oral, BID AC  furosemide, 60 mg, Intravenous, Once  guaiFENesin, 1,200 mg, Oral, Q12H  insulin aspart, 0-9 " Units, Subcutaneous, TID AC  iron polysaccharides, 150 mg, Oral, Daily With Breakfast  isosorbide mononitrate, 60 mg, Oral, Q24H  lactobacillus acidophilus, 1 capsule, Oral, Daily  magic barrier cream, , Topical, BID  metoprolol succinate XL, 100 mg, Oral, BID  montelukast, 10 mg, Oral, Nightly  OLANZapine zydis, 2.5 mg, Oral, BID  remdesivir, 100 mg, Intravenous, Q24H  sodium chloride, 3 mL, Intravenous, Q12H  terazosin, 5 mg, Oral, Q12H  theophylline, 400 mg, Oral, Daily        • Continuous Infusions:  Pharmacy Consult - Remdesivir,        Result review:  Serum creatinine is elevated. Remains on heated high flow nasal cannula.  Respiratory culture shows moderate yeast and gram-positive bacilli.  Chest x-ray on 1/3/2021 showed patchy multifocal airspace disease.          Assessment:  · Acute hypoxic respiratory failure  · Bilateral lower lobe COVID-19 pneumonia  · COPD, centrilobular emphysema subtype  · Advanced age            Patient is a DNR/DNI.  Very poor prognosis.  The patient is currently in a state of delirium and intermittently removing high flow nasal cannula.  When he removes his oxygen saturations dropped into the 80s.  -On albuterol inhaler.  -On IV dexamethasone.  -Receiving intermittent diuresis per primary team.  -On olanzapine for altered mental status.  -On IV doxycycline.  -On IV remdesivir per protocol for COVID-19 pneumonia.  -Recommend trying CPAP at 10 cmH2O with 100% FiO2 if patient tolerates and mental status improves.  Titrate to maintain FiO2 to keep SPO2 around 90%.  -On p.o. theophylline.    Options for oxygenation and ventilation are limited as the patient is no CPR and DNI.     Guarded prognosis    Pulmonary and critical care will sign off of case.  Please call with any further questions or concerns.      Cherry tMz, APRMARGARETH  01/06/21  14:06 EST

## 2021-01-06 NOTE — PROGRESS NOTES
[facility] HOSPITALIST PROGRESS NOTE     Patient Identification:  Name:  Aaron Domínguez  Age:  89 y.o.  Sex:  male  :  1929  MRN:  [id number]  Visit Number:  [id number]  ROOM: [address] [place] [address]     Primary Care Provider:  Bran Woodruff MD    Length of stay:  17    Subjective        Chief Compliant follow-up for worsening hypoxia and COVID-19 pneumonia    Patient seen and examined this afternoon with RASHAD Marie at the bedside by telemedicine video. Last night events noted. Removed his O2 last night and noted to be hypoxic and FIO2 requirement increased to 80%. Coughed out phlegm with the help of RN. This morning, FIO2 decreased to 70% than to 55%this afternoon.     Patient is noted to be awake, restless, moving his hands and picking on the O2 sensor and taking it off. Removing the oxygen. Not conversing and not following the commands. ROS is very limited since patient is confused.  Hypothermia resolved. Taking his pills.     Objective     Current Hospital Meds:albuterol sulfate HFA, 2 puff, Inhalation, 4x Daily - RT  amitriptyline, 25 mg, Oral, Nightly  ascorbic acid, 1,500 mg, Oral, Daily  aspirin, 81 mg, Oral, Daily  atorvastatin, 10 mg, Oral, Nightly  cefTRIAXone, 2 g, Intravenous, Q24H  cholecalciferol, 50,000 Units, Oral, Weekly  clopidogrel, 75 mg, Oral, Daily  dexamethasone, 6 mg, Intravenous, Daily  docusate sodium, 100 mg, Oral, BID  doxycycline, 100 mg, Intravenous, Q12H  enoxaparin, 40 mg, Subcutaneous, Q24H  famotidine, 20 mg, Oral, BID AC  guaiFENesin, 1,200 mg, Oral, Q12H  insulin aspart, 0-9 Units, Subcutaneous, TID AC  iron polysaccharides, 150 mg, Oral, Daily With Breakfast  isosorbide mononitrate, 60 mg, Oral, Q24H  lactobacillus acidophilus, 1 capsule, Oral, Daily  magic barrier cream, , Topical, BID  metoprolol succinate XL, 100 mg, Oral, BID  montelukast, 10 mg, Oral, Nightly  OLANZapine zydis, 2.5 mg, Oral, BID  remdesivir, 100 mg, Intravenous, Q24H  sodium chloride, 3 mL,  Intravenous, Q12H  terazosin, 5 mg, Oral, Q12H  theophylline, 400 mg, Oral, Daily    Pharmacy Consult - Remdesivir,       ----------------------------------------------------------------------------------------------------------------------  Vital Signs:  Temp:  [97.2 °F (36.2 °C)-98.6 °F (37 °C)] 97.3 °F (36.3 °C)  Heart Rate:  [78-98] 96  Resp:  [20-24] 21  BP: (120-180)/(48-90) 125/76  SpO2:  [90 %-99 %] 97 %  on  Flow (L/min):  [40-60] 55;   Device (Oxygen Therapy): heated;high-flow nasal cannula  Body mass index is 28.4 kg/m².    Wt Readings from Last 3 Encounters:   01/06/21 92.4 kg (203 lb 9.6 oz)   12/17/20 92.2 kg (203 lb 3.2 oz)   10/16/18 89.8 kg (198 lb)       Intake/Output Summary (Last 24 hours) at 1/6/2021 1359  Last data filed at 1/6/2021 1209  Gross per 24 hour   Intake 740 ml   Output 650 ml   Net 90 ml     Diet Pureed; Thin; Daily Fluid Restriction; Other; 1,200  ----------------------------------------------------------------------------------------------------------------------  Physical exam:  This physical exam has been personally performed remotely in the unit aided by real-time audio/visual communication tools. RASHAD Marie present at bedside during this exam and assisted during exam. The use of a video visit has been reviewed with the patient and verbal informed consent has been obtained.     Physical Exam:  General: restless.   Head: Normocephalic, atraumatic  Eyes: EOMI. Conjunctivae and sclerae normal.  Ears: Ears appear intact with no abnormalities noted.   Neck: Trachea midline. No obvious JVD.  Heart: Tele reveals atrial flutter  Lungs: Respirations appear to be regular, even and unlabored with no signs of respiratory distress. No audible wheezing.  Abdomen: No obvious abdominal distension.  MS: Muscle tone appears normal. No gross deformities.  Extremities: No clubbing, cyanosis. Trace edema noted.  Skin: No visible bleeding, bruising, or rash.  Neurologic: awake and restless, moving  hands. No gross focal deficits.       ----------------------------------------------------------------------------------------------------------------------  ----------------------------------------------------------------------------------------------------------------------  Results from last 7 days   Lab Units 01/06/21 0755 01/05/21 0447 01/04/21 0143 01/03/21 0437   CRP mg/dL 6.09* 7.10* 10.60* 7.82*   WBC 10*3/mm3 8.61  --  10.67 6.46   HEMOGLOBIN g/dL 9.2*  --  8.9* 8.7*   HEMATOCRIT % 29.1*  --  28.6* 28.6*   MCV fL 99.3*  --  99.3* 100.0*   MCHC g/dL 31.6  --  31.1* 30.4*   PLATELETS 10*3/mm3 247  --  218 192     Results from last 7 days   Lab Units 01/06/21 0755 01/05/21 0447 01/04/21 0143 01/01/21  0152   SODIUM mmol/L 136 134* 134*   < > 137   POTASSIUM mmol/L 4.4 4.4 4.5   < > 4.1   MAGNESIUM mg/dL 1.9  --  2.1  --  2.2   CHLORIDE mmol/L 92* 91* 89*   < > 94*   CO2 mmol/L 34.2* 32.5* 31.7*   < > 36.0*   BUN mg/dL 58* 62* 50*   < > 33*   CREATININE mg/dL 1.52* 1.56* 1.64*   < > 1.11   EGFR IF NONAFRICN AM mL/min/1.73 43* 42* 40*   < > 62   CALCIUM mg/dL 9.4 9.3 9.3   < > 9.0   GLUCOSE mg/dL 273* 217* 363*   < > 231*   ALBUMIN g/dL 2.98* 2.82* 2.88*  --   --    BILIRUBIN mg/dL 0.3 0.2 0.2  --   --    ALK PHOS U/L 140* 129* 138*  --   --    AST (SGOT) U/L 25 23 21  --   --    ALT (SGPT) U/L 23 22 22  --   --     < > = values in this interval not displayed.   Estimated Creatinine Clearance: 36.8 mL/min (A) (by C-G formula based on SCr of 1.52 mg/dL (H)).  Results from last 7 days   Lab Units 01/06/21  0755 01/01/21  0759 12/31/20 2058   CK TOTAL U/L 93  --   --    TROPONIN T ng/mL  --  0.136* 0.096*     Glucose   Date/Time Value Ref Range Status   01/06/2021 1124 168 (H) 70 - 130 mg/dL Final   01/06/2021 0747 238 (H) 70 - 130 mg/dL Final   01/05/2021 1926 155 (H) 70 - 130 mg/dL Final   01/05/2021 1622 158 (H) 70 - 130 mg/dL Final   01/05/2021 1140 208 (H) 70 - 130 mg/dL Final   01/05/2021 0757 164  (H) 70 - 130 mg/dL Final   01/04/2021 2007 386 (H) 70 - 130 mg/dL Final   01/04/2021 1639 146 (H) 70 - 130 mg/dL Final     Ammonia   Date Value Ref Range Status   01/06/2021 19 16 - 60 umol/L Final       Results from last 7 days   Lab Units 01/05/21  2226   NITRITE UA  Negative   WBC UA /HPF 21-30*   BACTERIA UA /HPF 2+*   SQUAM EPITHEL UA /HPF 3-6*   URINECX  Culture in progress     No results found for: BLOODCX  Respiratory Culture   Date Value Ref Range Status   01/02/2021   Final    Moderate growth (3+) Normal Respiratory Mary: NO S.aureus/MRSA or Pseudomonas aeruginosa   No results found for: URINECXNo results found for: WOUNDCXNo results found for: BODYFLDCXNo results found for: STOOLCX  I have personally looked at the labs and they are summarized above.  ----------------------------------------------------------------------------------------------------------------------  Imaging Results (Last 24 Hours)     Procedure Component Value Units Date/Time    XR Chest 1 View [[id number]] Collected: 01/05/21 1558     Updated: 01/05/21 1604    Narrative:      EXAM:    XR Chest, 1 View     EXAM DATE:    1/5/2021 2:59 PM     CLINICAL HISTORY:    hypoxia; E16.2-Hypoglycemia, unspecified; T68.XXXA-Hypothermia,  initial encounter     TECHNIQUE:    Frontal view of the chest.     COMPARISON:    01/03/2021     FINDINGS:    Lungs:  Degree of consolidation left lung is relatively stable.   Underlying emphysema is again noted.    Pleural space:  Unremarkable.  No pneumothorax.    Heart:  Cardiomegaly is stable.    Mediastinum:  Unremarkable.    Bones/joints:  Stable bony structures.       Impression:        No significant interval change with persistent consolidation left  lung.     This report was finalized on 1/5/2021 3:59 PM by Dr. Carmelo Radford MD.           I have personally reviewed the radiology images and read the final radiology report.    Assessment & Plan      Assessment:  B/L COVID 19 Pneumonia 12/30/2020  Acute on  chronic hypoxic respiratory failure  Acute on chronic diastolic CHF exacerbation  YADIRA  Acute metabolic encephalopathy  NSTEMI with H/O CAD and PCI  New onset atrial fibrillation/flutter  Advanced COPD  DM2, A1C 9.1 with hyperglycemia secondary to steroids  Essential HTN  Anemia, ID  3.7 cm right neck mass, enlarged lymph node versus primary neck mass, OP work up per radiology    Resolved:  MRSA bacteremia, completed abx treatment  Hypoglycemia causing syncope at the time of admission      Plan:  B/L COVID 19 Pneumonia 12/30/2020.  on remdesivir, started 01/03.  Monitor renal function closely.  On IV antibiotics Rocephin and doxycycline.  on lactobacillus.  Currently patient is afebrile VSS.  CRP improving, no leukocytosis. ferritin improving. D-dimer, fibrinogen LDH elevated. on vitamin C. ID on board. UA abnormal. F/U the urine cx.     Acute on chronic hypoxic respiratory failure, improving.  On IV Decadron.  Currently on HHFNC 55%, decreased from 80%. Wean FIO2 for saturation more than 94%.  On albuterol inhaler.  Chest x-ray and CT chest done 01/03 reviewed. Pulmonology on board.     Acute on chronic diastolic CHF exacerbation, BNP worsening. will repeat a dose of IV Lasix today.  Monitor renal function, electrolytes, intake output and weight.  2D echocardiogram shows EF of 61 to 65%.  Grade 1 diastolic dysfunction.  Concentric hypertrophy.  Moderate pulmonary hypertension.     YADIRA, Cr stable at 1.5 today from 0.8-0.9.  Monitor closely.     Acute metabolic encephalopathy with agitation from hypoxia, sepsis. Ammonia normal. Will start on Zyprexa BID. Benadryl prn. Patient is pulling on to the O2, becoming hypoxic, interfering with the medical care. So 1st mittens tried but patient is able to take it off. Will do the soft restraints on top of the wrist bandage b/l.     NSTEMI with H/O CAD and PCI.  On aspirin, plavix and Lipitor. Cardiology consulted. family decided to continue with medical management.    New onset  atrial fibrillation/flutter, on Toprol- mg p.o. twice daily.  Heart rate controlled.  Not on anticoagulation currently.  Anticoagulation was evaluated by cardiology and was discussed with daughter to make further decision.      Advanced COPD, on chronic prednisone. DC home po prednisone since on decadron.  On theophylline.  Theophylline level checked and currently within range.     DM2, A1C 9.1 with hyperglycemia secondary to steroids.  On moderate dose sliding scale.  dc Levemir. Metformin dced because of worsening renal function. CBG controlled.     Essential HTN, blood pressure controlled. On Toprol XL. Hold p.o. Bumex and amlodipine.    Anemia, ID, hemoglobin stable. on p.o. iron supplement.    Lovenox subcu for DVT prophylaxis  on Pepcid for GI prophylaxis.     Management and plans discussed in detail with patient and nursing.    The patient is high risk due to the following diagnoses/reasons:  B/L COVID 19 Pneumonia 12/30/2020, Acute on chronic hypoxic respiratory failure, Acute on chronic diastolic CHF exacerbation, YADIRA    Gracy Franks MD  01/06/21  13:59 EST

## 2021-01-06 NOTE — PROGRESS NOTES
Discharge Planning Assessment   [facility]     Patient Name: Aaron Domínguez  MRN: [id number]  Today's Date: 1/6/2021    Admit Date: 12/20/2020        Discharge Plan     Row Name 01/06/21 1522       Plan    Plan  Pt admitted on 12/20/20.  Pt to be nursing home placement when medically stable for discharge.  Pt continues on high flow oxygen.  SS will follow.            TINY Patricia

## 2021-01-06 NOTE — THERAPY TREATMENT NOTE
Acute Care - Physical Therapy Treatment Note   [place]     Patient Name: Aaron Domínguez  : 1929  MRN: [id number]  Today's Date: 2021   Onset of Illness/Injury or Date of Surgery: 20       PT Assessment (last 12 hours)      PT Evaluation and Treatment     Row Name 21 1438          Physical Therapy Time and Intention    Subjective Information  -- increased confusion  -CT     Document Type  therapy note (daily note)  -CT     Mode of Treatment  physical therapy  -CT     Patient Effort  poor  -CT     Comment  Pt is confused with minimal participation today. Pt did stand at bedside and perform bed mobility. Pt has had a medical decline while in PT services.   -CT     Row Name 21 1438          General Information    Patient Profile Reviewed  yes  -CT     Row Name 21 1438          Cognition    Affect/Mental Status (Cognitive)  WFL  -CT     Orientation Status (Cognition)  oriented to;person name only  -CT     Follows Commands (Cognition)  repetition of directions required;verbal cues/prompting required  -CT     Row Name 21 1438          Mobility    Extremity Weight-bearing Status  left lower extremity;right lower extremity  -CT     Left Lower Extremity (Weight-bearing Status)  full weight-bearing (FWB)  -CT     Right Lower Extremity (Weight-bearing Status)  full weight-bearing (FWB)  -CT     Row Name 21 1438          Bed Mobility    Bed Mobility  bed mobility (all) activities  -CT     All Activities, [place] (Bed Mobility)  minimum assist (75% patient effort)  -CT     Bed Mobility, Safety Issues  decreased use of legs for bridging/pushing  -CT     Assistive Device (Bed Mobility)  bed rails  -CT     Row Name 21 1438          Transfers    Transfers  sit-stand transfer;stand-sit transfer  -CT     Sit-Stand [place] (Transfers)  moderate assist (50% patient effort)  -CT     Stand-Sit [place] (Transfers)  minimum assist (75% patient effort)  -CT     Row Name  01/06/21 1438          Sit-Stand Transfer    Assistive Device (Sit-Stand Transfers)  walker, front-wheeled  -CT     Row Name 01/06/21 1438          Stand-Sit Transfer    Assistive Device (Stand-Sit Transfers)  walker, front-wheeled  -CT     Row Name 01/06/21 1438          Gait/Stairs (Locomotion)    Comment (Gait/Stairs)  unable to follow directions for ambulation  -CT     Row Name             Wound 12/26/20 1653 medial coccyx Pressure Injury    Wound - Properties Group Placement Date: 12/26/20  -AM Placement Time: 1653  -AM Present on Hospital Admission: N  -AM Orientation: medial  -AM Location: coccyx  -AM Primary Wound Type: Pressure inj  -AM Stage, Pressure Injury : Stage 2  -AM    Retired Wound - Properties Group Date first assessed: 12/26/20  -AM Time first assessed: 1653  -AM Present on Hospital Admission: N  -AM Location: coccyx  -AM Primary Wound Type: Pressure inj  -AM    Row Name 01/06/21 1438          Coping    Observed Emotional State  calm;cooperative  -CT     Verbalized Emotional State  acceptance  -CT     Row Name 01/06/21 1438          Plan of Care Review    Progress  declining  -CT     Row Name 01/06/21 1438          Positioning and Restraints    Pre-Treatment Position  in bed  -CT     Post Treatment Position  bed  -CT     In Bed  supine;call light within reach;encouraged to call for assist;side rails up x3;with family/caregiver nursing sitter in room  -CT     Row Name 01/06/21 1438          Progress Summary (PT)    Progress Toward Functional Goals (PT)  unable to show any progress toward functional goals  -CT       User Key  (r) = Recorded By, (t) = Taken By, (c) = Cosigned By    Initials Name Provider Type    CT Sarah Guy, LALA Physical Therapist    Zi Obrien, RN Registered Nurse        Physical Therapy Education                 Title: PT OT SLP Therapies (In Progress)     Topic: Physical Therapy (In Progress)     Point: Mobility training (In Progress)     Learning Progress Summary            Patient Acceptance, E,TB, NR by CT at 1/6/2021 1443    Nonacceptance, E, NR,NL by RD at 1/6/2021 1306    Acceptance, E, VU by EB at 1/5/2021 0714    Acceptance, E, VU by RD at 1/1/2021 0747    Acceptance, E, VU by MC at 1/1/2021 0028    Acceptance, E, VU,NR by RD at 12/31/2020 0755    Acceptance, E, VU,NR by MC at 12/30/2020 2345    Acceptance, E, VU by SM at 12/29/2020 0018    Acceptance, E, VU,NR by SM at 12/27/2020 2326    Acceptance, E, VU by SM at 12/27/2020 2318    Acceptance, E, NR,VU by RD at 12/25/2020 1138    Acceptance, E, VU by EB at 12/24/2020 0733    Acceptance, E, NR,VU by MC at 12/24/2020 0015    Acceptance, E, NR by MC at 12/22/2020 2339    Acceptance, E, NR by EA at 12/21/2020 2045    Acceptance, E, VU by BC at 12/21/2020 1440                   Point: Home exercise program (In Progress)     Learning Progress Summary           Patient Acceptance, E,TB, NR by CT at 1/6/2021 1443    Nonacceptance, E, NR,NL by RD at 1/6/2021 1306    Acceptance, E, VU by EB at 1/5/2021 0714    Acceptance, E, VU by RD at 1/1/2021 0747    Acceptance, E, VU by MC at 1/1/2021 0028    Acceptance, E, VU,NR by RD at 12/31/2020 0755    Acceptance, E, VU,NR by MC at 12/30/2020 2345    Acceptance, E, VU by SM at 12/29/2020 0018    Acceptance, E, VU,NR by SM at 12/27/2020 2326    Acceptance, E, VU by SM at 12/27/2020 2318    Acceptance, E, NR,VU by RD at 12/25/2020 1138    Acceptance, E, VU by EB at 12/24/2020 0733    Acceptance, E, NR,VU by MC at 12/24/2020 0015    Acceptance, E, NR by MC at 12/22/2020 2339    Acceptance, E, NR by EA at 12/21/2020 2045    Acceptance, E, VU by BC at 12/21/2020 1440                   Point: Body mechanics (In Progress)     Learning Progress Summary           Patient Acceptance, E,TB, NR by CT at 1/6/2021 1443    Nonacceptance, E, NR,NL by RD at 1/6/2021 1306    Acceptance, E, VU by EB at 1/5/2021 0714    Acceptance, E, VU by RD at 1/1/2021 0747    Acceptance, E, VU by MC at 1/1/2021 0028     Acceptance, E, VU,NR by RD at 12/31/2020 0755    Acceptance, E, VU,NR by MC at 12/30/2020 2345    Acceptance, E, VU by SM at 12/29/2020 0018    Acceptance, E, VU,NR by SM at 12/27/2020 2326    Acceptance, E, VU by SM at 12/27/2020 2318    Acceptance, E, NR,VU by RD at 12/25/2020 1138    Acceptance, E, VU by EB at 12/24/2020 0733    Acceptance, E, NR,VU by MC at 12/24/2020 0015    Acceptance, E, NR by MC at 12/22/2020 2339    Acceptance, E, NR by EA at 12/21/2020 2045    Acceptance, E, VU by BC at 12/21/2020 1440                   Point: Precautions (In Progress)     Learning Progress Summary           Patient Acceptance, E,TB, NR by CT at 1/6/2021 1443    Nonacceptance, E, NR,NL by RD at 1/6/2021 1306    Acceptance, E, VU by EB at 1/5/2021 0714    Acceptance, E, VU by RD at 1/1/2021 0747    Acceptance, E, VU by MC at 1/1/2021 0028    Acceptance, E, VU,NR by RD at 12/31/2020 0755    Acceptance, E, VU,NR by MC at 12/30/2020 2345    Acceptance, E, VU by  at 12/29/2020 0018    Acceptance, E, VU,NR by  at 12/27/2020 2326    Acceptance, E, VU by SM at 12/27/2020 2318    Acceptance, E, NR,VU by RD at 12/25/2020 1138    Acceptance, E, VU by EB at 12/24/2020 0733    Acceptance, E, NR,VU by MC at 12/24/2020 0015    Acceptance, E, NR by MC at 12/22/2020 2339    Acceptance, E, NR by EA at 12/21/2020 2045    Acceptance, E, VU by BC at 12/21/2020 1440                               User Key     Initials Effective Dates Name Provider Type Discipline    EA 06/16/16 -  Juliette Newman, RN Registered Nurse Nurse    EB 06/16/16 -  Amada Borja, RN Registered Nurse Nurse    BC 03/14/16 -  Aarti Toro, PT Physical Therapist PT    CT 04/03/18 -  Sarah Guy, PT Physical Therapist PT    MC 09/06/18 -  Collett, Morgan, RN Registered Nurse Nurse    SM 09/12/18 -  Joseluis Ramsey, RN Registered Nurse Nurse    RD 06/10/20 -  Cynthia Barr, RASHAD Registered Nurse Nurse              PT Recommendation and Plan      Progress Summary (PT)  Progress Toward Functional Goals (PT): unable to show any progress toward functional goals  Progress: declining       Time Calculation:   PT Charges     Row Name 01/06/21 1443             Time Calculation    PT Received On  01/06/21  -CT      PT Goal Re-Cert Due Date  01/13/21  -CT         Time Calculation- PT    Total Timed Code Minutes- PT  38 minute(s)  -CT        User Key  (r) = Recorded By, (t) = Taken By, (c) = Cosigned By    Initials Name Provider Type    CT Sarah Guy, LALA Physical Therapist        Therapy Charges for Today     Code Description Service Date Service Provider Modifiers Qty    82805727160 HC PT THERAPEUTIC ACT EA 15 MIN 1/6/2021 Sarah Guy, PT GP 3               Sarah Guy PT  1/6/2021

## 2021-01-06 NOTE — PROGRESS NOTES
[facility] HOSPITALIST PROGRESS NOTE     Patient Identification:  Name:  Aaron Domínguez  Age:  89 y.o.  Sex:  male  :  1929  MRN:  [id number]  Visit Number:  [id number]  ROOM: [address] [place] [address]     Primary Care Provider:  Bran Woodruff MD    Length of stay:  17    Subjective        Chief Compliant follow-up for worsening hypoxia and COVID-19 pneumonia    Patient seen and examined this afternoon with RASHAD Marie at the bedside by telemedicine video. Last night events noted. Removed his O2 last night and noted to be hypoxic and FIO2 requirement increased to 80%. Coughed out phlegm with the help of RN. This morning, FIO2 decreased to 70% than to 55%this afternoon.     Patient is noted to be awake, restless, moving his hands and picking on the O2 sensor and taking it off. Removing the oxygen. Not conversing and not following the commands. ROS is very limited since patient is confused.  Hypothermia resolved. Taking his pills.     Objective     Current Hospital Meds:albuterol sulfate HFA, 2 puff, Inhalation, 4x Daily - RT  amitriptyline, 25 mg, Oral, Nightly  ascorbic acid, 1,500 mg, Oral, Daily  aspirin, 81 mg, Oral, Daily  atorvastatin, 10 mg, Oral, Nightly  cefTRIAXone, 2 g, Intravenous, Q24H  cholecalciferol, 50,000 Units, Oral, Weekly  clopidogrel, 75 mg, Oral, Daily  dexamethasone, 6 mg, Intravenous, Daily  docusate sodium, 100 mg, Oral, BID  doxycycline, 100 mg, Intravenous, Q12H  enoxaparin, 40 mg, Subcutaneous, Q24H  famotidine, 20 mg, Oral, BID AC  guaiFENesin, 1,200 mg, Oral, Q12H  insulin aspart, 0-9 Units, Subcutaneous, TID AC  iron polysaccharides, 150 mg, Oral, Daily With Breakfast  isosorbide mononitrate, 60 mg, Oral, Q24H  lactobacillus acidophilus, 1 capsule, Oral, Daily  magic barrier cream, , Topical, BID  metoprolol succinate XL, 100 mg, Oral, BID  montelukast, 10 mg, Oral, Nightly  OLANZapine zydis, 2.5 mg, Oral, BID  remdesivir, 100 mg, Intravenous, Q24H  sodium chloride, 3 mL,  Intravenous, Q12H  terazosin, 5 mg, Oral, Q12H  theophylline, 400 mg, Oral, Daily    Pharmacy Consult - Remdesivir,       ----------------------------------------------------------------------------------------------------------------------  Vital Signs:  Temp:  [97.3 °F (36.3 °C)-98.6 °F (37 °C)] 97.3 °F (36.3 °C)  Heart Rate:  [78-98] 95  Resp:  [20-24] 21  BP: (125-180)/(70-90) 125/76  SpO2:  [90 %-99 %] 90 %  on  Flow (L/min):  [40-60] 55;   Device (Oxygen Therapy): heated;high-flow nasal cannula  Body mass index is 28.4 kg/m².    Wt Readings from Last 3 Encounters:   01/06/21 92.4 kg (203 lb 9.6 oz)   12/17/20 92.2 kg (203 lb 3.2 oz)   10/16/18 89.8 kg (198 lb)       Intake/Output Summary (Last 24 hours) at 1/6/2021 1626  Last data filed at 1/6/2021 1209  Gross per 24 hour   Intake 740 ml   Output 300 ml   Net 440 ml     Diet Pureed; Thin; Daily Fluid Restriction; Other; 1,200  ----------------------------------------------------------------------------------------------------------------------  Physical exam:  This physical exam has been personally performed remotely in the unit aided by real-time audio/visual communication tools. RASHAD Marie present at bedside during this exam and assisted during exam. The use of a video visit has been reviewed with the patient and verbal informed consent has been obtained.     Physical Exam:  General: restless.   Head: Normocephalic, atraumatic  Eyes: EOMI. Conjunctivae and sclerae normal.  Ears: Ears appear intact with no abnormalities noted.   Neck: Trachea midline. No obvious JVD.  Heart: Tele reveals atrial flutter  Lungs: Respirations appear to be regular, even and unlabored with no signs of respiratory distress. No audible wheezing.  Abdomen: No obvious abdominal distension.  MS: Muscle tone appears normal. No gross deformities.  Extremities: No clubbing, cyanosis. Trace edema noted.  Skin: No visible bleeding, bruising, or rash.  Neurologic: awake and restless, moving  hands. No gross focal deficits.       ----------------------------------------------------------------------------------------------------------------------  ----------------------------------------------------------------------------------------------------------------------  Results from last 7 days   Lab Units 01/06/21 0755 01/05/21 0447 01/04/21 0143 01/03/21 0437   CRP mg/dL 6.09* 7.10* 10.60* 7.82*   WBC 10*3/mm3 8.61  --  10.67 6.46   HEMOGLOBIN g/dL 9.2*  --  8.9* 8.7*   HEMATOCRIT % 29.1*  --  28.6* 28.6*   MCV fL 99.3*  --  99.3* 100.0*   MCHC g/dL 31.6  --  31.1* 30.4*   PLATELETS 10*3/mm3 247  --  218 192     Results from last 7 days   Lab Units 01/06/21 0755 01/05/21 0447 01/04/21 0143 01/01/21  0152   SODIUM mmol/L 136 134* 134*   < > 137   POTASSIUM mmol/L 4.4 4.4 4.5   < > 4.1   MAGNESIUM mg/dL 1.9  --  2.1  --  2.2   CHLORIDE mmol/L 92* 91* 89*   < > 94*   CO2 mmol/L 34.2* 32.5* 31.7*   < > 36.0*   BUN mg/dL 58* 62* 50*   < > 33*   CREATININE mg/dL 1.52* 1.56* 1.64*   < > 1.11   EGFR IF NONAFRICN AM mL/min/1.73 43* 42* 40*   < > 62   CALCIUM mg/dL 9.4 9.3 9.3   < > 9.0   GLUCOSE mg/dL 273* 217* 363*   < > 231*   ALBUMIN g/dL 2.98* 2.82* 2.88*  --   --    BILIRUBIN mg/dL 0.3 0.2 0.2  --   --    ALK PHOS U/L 140* 129* 138*  --   --    AST (SGOT) U/L 25 23 21  --   --    ALT (SGPT) U/L 23 22 22  --   --     < > = values in this interval not displayed.   Estimated Creatinine Clearance: 36.8 mL/min (A) (by C-G formula based on SCr of 1.52 mg/dL (H)).  Results from last 7 days   Lab Units 01/06/21  0755 01/01/21  0759 12/31/20 2058   CK TOTAL U/L 93  --   --    TROPONIN T ng/mL  --  0.136* 0.096*     Glucose   Date/Time Value Ref Range Status   01/06/2021 1124 168 (H) 70 - 130 mg/dL Final   01/06/2021 0747 238 (H) 70 - 130 mg/dL Final   01/05/2021 1926 155 (H) 70 - 130 mg/dL Final   01/05/2021 1622 158 (H) 70 - 130 mg/dL Final   01/05/2021 1140 208 (H) 70 - 130 mg/dL Final   01/05/2021 0757 164  (H) 70 - 130 mg/dL Final   01/04/2021 2007 386 (H) 70 - 130 mg/dL Final   01/04/2021 1639 146 (H) 70 - 130 mg/dL Final     Ammonia   Date Value Ref Range Status   01/06/2021 19 16 - 60 umol/L Final       Results from last 7 days   Lab Units 01/05/21  2226   NITRITE UA  Negative   WBC UA /HPF 21-30*   BACTERIA UA /HPF 2+*   SQUAM EPITHEL UA /HPF 3-6*   URINECX  Culture in progress     No results found for: BLOODCX  Respiratory Culture   Date Value Ref Range Status   01/02/2021   Final    Moderate growth (3+) Normal Respiratory Mary: NO S.aureus/MRSA or Pseudomonas aeruginosa   No results found for: URINECXNo results found for: WOUNDCXNo results found for: BODYFLDCXNo results found for: STOOLCX  I have personally looked at the labs and they are summarized above.  ----------------------------------------------------------------------------------------------------------------------  Imaging Results (Last 24 Hours)     ** No results found for the last 24 hours. **        I have personally reviewed the radiology images and read the final radiology report.    Assessment & Plan      Assessment:  B/L COVID 19 Pneumonia 12/30/2020  Acute on chronic hypoxic respiratory failure  Acute on chronic diastolic CHF exacerbation  YADIRA  Acute metabolic encephalopathy  NSTEMI with H/O CAD and PCI  New onset atrial fibrillation/flutter  Advanced COPD  DM2, A1C 9.1 with hyperglycemia secondary to steroids  Essential HTN  Anemia, ID  3.7 cm right neck mass, enlarged lymph node versus primary neck mass, OP work up per radiology    Resolved:  MRSA bacteremia, completed abx treatment  Hypoglycemia causing syncope at the time of admission      Plan:  B/L COVID 19 Pneumonia 12/30/2020.  on remdesivir, started 01/03.  Monitor renal function closely.  On IV antibiotics Rocephin and doxycycline.  on lactobacillus.  Currently patient is afebrile VSS.  CRP improving, no leukocytosis. ferritin improving. D-dimer, fibrinogen LDH elevated. on vitamin  C. ID on board. UA abnormal. F/U the urine cx.     Acute on chronic hypoxic respiratory failure, improving.  On IV Decadron.  Currently on HHFNC 55%, decreased from 80%. Wean FIO2 for saturation more than 94%.  On albuterol inhaler.  Chest x-ray and CT chest done 01/03 reviewed. Pulmonology on board.     Acute on chronic diastolic CHF exacerbation, BNP worsening. will repeat a dose of IV Lasix today.  Monitor renal function, electrolytes, intake output and weight.  2D echocardiogram shows EF of 61 to 65%.  Grade 1 diastolic dysfunction.  Concentric hypertrophy.  Moderate pulmonary hypertension.     YADIRA, Cr stable at 1.5 today from 0.8-0.9.  Monitor closely.     Acute metabolic encephalopathy with agitation from hypoxia, sepsis. Ammonia normal. Will start on Zyprexa BID. Benadryl prn. Patient is pulling on to the O2, becoming hypoxic, interfering with the medical care. Sitter at the bedside.     NSTEMI with H/O CAD and PCI.  On aspirin, plavix and Lipitor. Cardiology consulted. family decided to continue with medical management.    New onset atrial fibrillation/flutter, on Toprol- mg p.o. twice daily.  Heart rate controlled.  Not on anticoagulation currently.  Anticoagulation was evaluated by cardiology and was discussed with daughter to make further decision.      Advanced COPD, on chronic prednisone. DC home po prednisone since on decadron.  On theophylline.  Theophylline level checked and currently within range.     DM2, A1C 9.1 with hyperglycemia secondary to steroids.  On moderate dose sliding scale.  dc Levemir. Metformin dced because of worsening renal function. CBG controlled.     Essential HTN, blood pressure controlled. On Toprol XL. Hold p.o. Bumex and amlodipine.    Anemia, ID, hemoglobin stable. on p.o. iron supplement.    Lovenox subcu for DVT prophylaxis  on Pepcid for GI prophylaxis.     Management and plans discussed in detail with patient and nursing.    The patient is high risk due to the  following diagnoses/reasons:  B/L COVID 19 Pneumonia 12/30/2020, Acute on chronic hypoxic respiratory failure, Acute on chronic diastolic CHF exacerbation, YADIRA    Gracy Franks MD  01/06/21  16:26 EST

## 2021-01-06 NOTE — THERAPY TREATMENT NOTE
Acute Care - Occupational Therapy Treatment Note   Juan     Patient Name: Aaron Domínguez  : 1929  MRN: [id number]  Today's Date: 2021  Onset of Illness/Injury or Date of Surgery: 20     Referring Physician: Dr. Birch    Admit Date: 2020       ICD-10-CM ICD-9-CM   1. Hypoglycemia  E16.2 251.2   2. Hypothermia, initial encounter  T68.XXXA 991.6     Patient Active Problem List   Diagnosis   • Hypoglycemia   • Fall at home     Past Medical History:   Diagnosis Date   • COPD (chronic obstructive pulmonary disease) (CMS/[facility])    • Diabetes mellitus (CMS/[facility])    • Hypertension      Past Surgical History:   Procedure Laterality Date   • CORONARY STENT PLACEMENT              OT ASSESSMENT FLOWSHEET (last 12 hours)      OT Evaluation and Treatment     Row Name 21 1600                   OT Time and Intention    Document Type  therapy note (daily note)  -KR        Mode of Treatment  occupational therapy  -KR        Patient Effort  fair  -KR        Symptoms Noted During/After Treatment  fatigue  -KR           Bed Mobility    Bed Mobility  bed mobility (all) activities  -KR        All Activities, Rock (Bed Mobility)  moderate assist (50% patient effort)  -KR           Transfers    Sit-Stand Rock (Transfers)  moderate assist (50% patient effort)  -KR           Motor Skills    Therapeutic Exercise  shoulder;elbow/forearm;wrist;hand  -KR           Shoulder (Therapeutic Exercise)    Shoulder (Therapeutic Exercise)  PROM (passive range of motion)  -KR           Elbow/Forearm (Therapeutic Exercise)    Elbow/Forearm (Therapeutic Exercise)  PROM (passive range of motion)  -KR           Wrist (Therapeutic Exercise)    Wrist (Therapeutic Exercise)  PROM (passive range of motion)  -KR           Hand (Therapeutic Exercise)    Hand (Therapeutic Exercise)  PROM (passive range of motion)  -KR           Wound 20 1653 medial coccyx Pressure Injury    Wound - Properties Group Placement Date:  12/26/20  -AM Placement Time: 1653  -AM Present on Hospital Admission: N  -AM Orientation: medial  -AM Location: coccyx  -AM Primary Wound Type: Pressure inj  -AM Stage, Pressure Injury : Stage 2  -AM    Retired Wound - Properties Group Date first assessed: 12/26/20  -AM Time first assessed: 1653  -AM Present on Hospital Admission: N  -AM Location: coccyx  -AM Primary Wound Type: Pressure inj  -AM      User Key  (r) = Recorded By, (t) = Taken By, (c) = Cosigned By    Initials Name Effective Dates    KR Carmelo Gill OT 04/03/18 -     AM Zi Bradford RN 11/12/19 -                OT Recommendation and Plan  Planned Therapy Interventions (OT): activity tolerance training, BADL retraining, strengthening exercise           Time Calculation:     Therapy Charges for Today     Code Description Service Date Service Provider Modifiers Qty    [id number]  OT THERAPEUTIC ACT EA 15 MIN 1/6/2021 Carmelo Gill OT GO 1    [id number]  OT SELF CARE/MGMT/TRAIN EA 15 MIN 1/6/2021 Carmelo Gill OT GO 1               Carmelo Gill OT  1/6/2021

## 2021-01-06 NOTE — PROGRESS NOTES
PROGRESS NOTE         Patient Identification:  Name:  Aaron Domínguez  Age:  89 y.o.  Sex:  male  :  1929  MRN:  [id number]  Visit Number:  [id number]  Primary Care Provider:  Bran Woodruff MD         LOS: 17 days       ----------------------------------------------------------------------------------------------------------------------  Subjective       Chief Complaints:    Hypoglycemia and Loss of Consciousness        Interval History:      Case discussed with primary RN.  Patient continues to be confused today.  Continues to have a productive cough.  Afebrile, no diarrhea.  CRP is improved at 6.09.  WBC is normal.  Chest x-ray on 2021 showed no significant interval change with persistent consolidation of the left lung.  Urine culture on 2021 is in process.    Review of Systems:    Constitutional: no fever and night sweats. No appetite change or unexpected weight change. No fatigue.  Chills.  Eyes: no eye drainage, itching or redness.  HEENT: no mouth sores, dysphagia or nose bleed.  Respiratory:  Shortness of breath and productive cough.  Chronic cough at baseline currently worsened.  Cardiovascular: no chest pain, no palpitations, no orthopnea.  Gastrointestinal: no nausea, vomiting or diarrhea. No abdominal pain, hematemesis or rectal bleeding.  Genitourinary: no dysuria or polyuria.  Hematologic/lymphatic: no lymph node abnormalities, no easy bruising or easy bleeding.  Musculoskeletal: Chronic back pain, arthralgias, and gait problems.  Skin: No rash and no itching.  Neurological: no loss of consciousness, no seizure, no headache.   Behavioral/Psych: no depression or suicidal ideation.  Endocrine: no hot flashes.  Immunologic: negative.    ----------------------------------------------------------------------------------------------------------------------      Objective       Current [facility] Meds:  albuterol sulfate HFA, 2 puff, Inhalation, 4x Daily - RT  amitriptyline, 25 mg,  Oral, Nightly  ascorbic acid, 1,500 mg, Oral, Daily  aspirin, 81 mg, Oral, Daily  atorvastatin, 10 mg, Oral, Nightly  cefTRIAXone, 2 g, Intravenous, Q24H  cholecalciferol, 50,000 Units, Oral, Weekly  clopidogrel, 75 mg, Oral, Daily  dexamethasone, 6 mg, Intravenous, Daily  docusate sodium, 100 mg, Oral, BID  doxycycline, 100 mg, Intravenous, Q12H  enoxaparin, 40 mg, Subcutaneous, Q24H  famotidine, 20 mg, Oral, BID AC  guaiFENesin, 1,200 mg, Oral, Q12H  insulin aspart, 0-9 Units, Subcutaneous, TID AC  iron polysaccharides, 150 mg, Oral, Daily With Breakfast  isosorbide mononitrate, 60 mg, Oral, Q24H  lactobacillus acidophilus, 1 capsule, Oral, Daily  magic barrier cream, , Topical, BID  metoprolol succinate XL, 100 mg, Oral, BID  montelukast, 10 mg, Oral, Nightly  OLANZapine zydis, 2.5 mg, Oral, BID  remdesivir, 100 mg, Intravenous, Q24H  sodium chloride, 3 mL, Intravenous, Q12H  terazosin, 5 mg, Oral, Q12H  theophylline, 400 mg, Oral, Daily      Pharmacy Consult - Remdesivir,       ----------------------------------------------------------------------------------------------------------------------    Vital Signs:  Temp:  [97.2 °F (36.2 °C)-98.6 °F (37 °C)] 97.3 °F (36.3 °C)  Heart Rate:  [78-98] 96  Resp:  [20-24] 21  BP: (120-180)/(48-90) 125/76  No data found.  SpO2 Percentage    01/06/21 0640 01/06/21 0803 01/06/21 1120   SpO2: 99% 94% 97%     SpO2:  [90 %-99 %] 97 %  on  Flow (L/min):  [40-60] 55;   Device (Oxygen Therapy): heated;high-flow nasal cannula    Body mass index is 28.4 kg/m².  Wt Readings from Last 3 Encounters:   01/06/21 92.4 kg (203 lb 9.6 oz)   12/17/20 92.2 kg (203 lb 3.2 oz)   10/16/18 89.8 kg (198 lb)        Intake/Output Summary (Last 24 hours) at 1/6/2021 1252  Last data filed at 1/6/2021 0800  Gross per 24 hour   Intake 500 ml   Output 650 ml   Net -150 ml     Diet Pureed; Thin; Daily Fluid Restriction; Other;  1,200  ----------------------------------------------------------------------------------------------------------------------      Physical Exam:    Deferred due to COVID-19 isolation.  ----------------------------------------------------------------------------------------------------------------------  Results from last 7 days   Lab Units 01/06/21  0755 01/01/21  0759 12/31/20 2058   CK TOTAL U/L 93  --   --    TROPONIN T ng/mL  --  0.136* 0.096*     Results from last 7 days   Lab Units 01/04/21  0143   PROBNP pg/mL 3,756.0*       Results from last 7 days   Lab Units 01/05/21  0559   PH, ARTERIAL pH units 7.408   PO2 ART mm Hg 67.5*   PCO2, ARTERIAL mm Hg 60.3*   HCO3 ART mmol/L 38.0*     Results from last 7 days   Lab Units 01/06/21  0755 01/05/21  0447 01/04/21  0143 01/03/21  0437   CRP mg/dL 6.09* 7.10* 10.60* 7.82*   WBC 10*3/mm3 8.61  --  10.67 6.46   HEMOGLOBIN g/dL 9.2*  --  8.9* 8.7*   HEMATOCRIT % 29.1*  --  28.6* 28.6*   MCV fL 99.3*  --  99.3* 100.0*   MCHC g/dL 31.6  --  31.1* 30.4*   PLATELETS 10*3/mm3 247  --  218 192     Results from last 7 days   Lab Units 01/06/21  0755 01/05/21  0447 01/04/21  0143  01/01/21  0152   SODIUM mmol/L 136 134* 134*   < > 137   POTASSIUM mmol/L 4.4 4.4 4.5   < > 4.1   MAGNESIUM mg/dL 1.9  --  2.1  --  2.2   CHLORIDE mmol/L 92* 91* 89*   < > 94*   CO2 mmol/L 34.2* 32.5* 31.7*   < > 36.0*   BUN mg/dL 58* 62* 50*   < > 33*   CREATININE mg/dL 1.52* 1.56* 1.64*   < > 1.11   EGFR IF NONAFRICN AM mL/min/1.73 43* 42* 40*   < > 62   CALCIUM mg/dL 9.4 9.3 9.3   < > 9.0   GLUCOSE mg/dL 273* 217* 363*   < > 231*   ALBUMIN g/dL 2.98* 2.82* 2.88*  --   --    BILIRUBIN mg/dL 0.3 0.2 0.2  --   --    ALK PHOS U/L 140* 129* 138*  --   --    AST (SGOT) U/L 25 23 21  --   --    ALT (SGPT) U/L 23 22 22  --   --     < > = values in this interval not displayed.   Estimated Creatinine Clearance: 36.8 mL/min (A) (by C-G formula based on SCr of 1.52 mg/dL (H)).  Ammonia   Date Value Ref Range  Status   01/06/2021 19 16 - 60 umol/L Final       Glucose   Date/Time Value Ref Range Status   01/06/2021 1124 168 (H) 70 - 130 mg/dL Final   01/06/2021 0747 238 (H) 70 - 130 mg/dL Final   01/05/2021 1926 155 (H) 70 - 130 mg/dL Final   01/05/2021 1622 158 (H) 70 - 130 mg/dL Final   01/05/2021 1140 208 (H) 70 - 130 mg/dL Final   01/05/2021 0757 164 (H) 70 - 130 mg/dL Final   01/04/2021 2007 386 (H) 70 - 130 mg/dL Final   01/04/2021 1639 146 (H) 70 - 130 mg/dL Final     Lab Results   Component Value Date    HGBA1C 9.10 (H) 12/20/2020     Lab Results   Component Value Date    TSH 2.690 12/20/2020       Blood Culture   Date Value Ref Range Status   12/20/2020 No growth at 24 hours  Preliminary   12/20/2020 No growth at 24 hours  Preliminary   12/15/2020 No growth at 5 days  Final   12/15/2020 No growth at 5 days  Final     No results found for: URINECX  No results found for: WOUNDCX  No results found for: STOOLCX  No results found for: RESPCX  Pain Management Panel     There is no flowsheet data to display.            ----------------------------------------------------------------------------------------------------------------------  Imaging Results (Last 24 Hours)     Procedure Component Value Units Date/Time    XR Chest 1 View [[id number]] Collected: 01/05/21 1558     Updated: 01/05/21 1604    Narrative:      EXAM:    XR Chest, 1 View     EXAM DATE:    1/5/2021 2:59 PM     CLINICAL HISTORY:    hypoxia; E16.2-Hypoglycemia, unspecified; T68.XXXA-Hypothermia,  initial encounter     TECHNIQUE:    Frontal view of the chest.     COMPARISON:    01/03/2021     FINDINGS:    Lungs:  Degree of consolidation left lung is relatively stable.   Underlying emphysema is again noted.    Pleural space:  Unremarkable.  No pneumothorax.    Heart:  Cardiomegaly is stable.    Mediastinum:  Unremarkable.    Bones/joints:  Stable bony structures.       Impression:        No significant interval change with persistent consolidation  left  lung.     This report was finalized on 1/5/2021 3:59 PM by Dr. Carmelo Radford MD.             ----------------------------------------------------------------------------------------------------------------------    Assessment/Plan       Assessment/Plan     ASSESSMENT:    1.  COVID-19 pneumonia  2.  MRSA bacteremia, treatment completed    PLAN:    Case discussed with primary RN.  Patient continues to be confused today.  Continues to have a productive cough.  Afebrile, no diarrhea.  CRP is improved at 6.09.  WBC is normal.  Chest x-ray on 1/5/2021 showed no significant interval change with persistent consolidation of the left lung.  Urine culture on 1/5/2021 is in process.    CT chest without contrast on 1/3/2021 showed appearance is worse.  There is bilateral airspace disease predominantly in the lower lobes, but also in the left upper lobe.  COPD.  Coronary artery calcifications.    COVID-19 PCR from 12/30/2020 positive.  Influenza and RSV PCR negative.    Blood cultures on 12/20/2020 are so far showing no growth.    COVID-19 and influenza PCR negative on 12/20/2020.  Urinalysis unremarkable.  Left upper extremity venous duplex reports no evidence of DVT.  X-ray of the left shoulder reports no acute findings.  CT of the L-spine reports no acute findings.  CT of the T-spine reports no acute abnormalities.  CT of the C-spine reports 3.7 cm mass to the right neck either enlarged lymph node or primary neck mass, 3.2 mm colloid nodule in the left upper lobe.  X-ray of the pelvis reports no acute abnormalities.  CT of the head reports no acute abnormalities, chronic left maxillary sinusitis.  Chest x-ray from 12/20/2020 reports chronic volume loss of the right lung base, no acute findings.     Patient completed bacteremia treatment of vancomycin pharmacy to dose on 12/28/2020.    We are agreeable to Decadron as the patient's oxygen requirements have increased.  We are also agreeable to remdesivir, as patient's GFR  has improved.    Would recommend to continue on Rocephin 2 g IV every 24 hours and doxycycline 100 mg IV every 12 hours.  We will follow the patient closely and adjust antibiotic therapy as appropriate.    Code Status:   Code Status and Medical Interventions:   Ordered at: 01/03/21 1329     Limited Support to NOT Include:    Intubation     Code Status:    No CPR     Medical Interventions (Level of Support Prior to Arrest):    Limited     Scribed for Azam Stephenson MD by KIM Hughes. 1/6/2021  12:52 EST  KIM Hughes  01/06/21  12:52 EST    Physician Attestation:    The documentation recorded by the scribe accurately reflects the service I personally performed and the decisions made by me.    Azam Stephenson MD  Infectious Diseases  01/06/21  12:52 EST

## 2021-01-06 NOTE — PLAN OF CARE
Goal Outcome Evaluation:  Plan of Care Reviewed With: patient  Progress: declining    Patient has been very uncooperative during shift. Pulling at IV, telemetry leads, and nasal cannula. Also attempting to climb out of bed and is very hard to reorient. Patient has also been making fists, cursing, and grabbing nursing staff. Patient is very confused and was unable to be educated on why the items listed above are important to keep on. MD made aware. Patient's oxygen drops immediately after patient pulls nasal cannula off. Sitter still at bedside. Currently on heated high flow nasal cannula 55 liters @ 55%. Saturations are maintaining at 91% and above. Will continue to monitor and follow plan of care.

## 2021-01-06 NOTE — NURSING NOTE
Patient very confused and unable to be educated. Pulling at IV, telemetry leads, and nasal cannula persistently. Sitter still at bedside. MD Franks notified.

## 2021-01-06 NOTE — NURSING NOTE
Patient having hard time getting up phlegm. Percussed patients back and orally suctioned patient. Patient produced a large amount of yellow, thick secretions. Patient requiring higher amounts of oxygen at this time.

## 2021-01-06 NOTE — NURSING NOTE
Sitter rang out stating patient had ripped O2 nasal cannula off, O2 sensor off. When I entered the room patient was very combative and verbally abusive to staff. Attempted to reorient patient and apply O2 back to nose but patient started swinging. Patient respirations labored. Staff was eventually able to get heated HF cannula back on, adjusting O2 requirements to maintain sats above 90. PA aware.

## 2021-01-07 NOTE — PROGRESS NOTES
PROGRESS NOTE         Patient Identification:  Name:  Aaron Domínguez  Age:  89 y.o.  Sex:  male  :  1929  MRN:  [id number]  Visit Number:  [id number]  Primary Care Provider:  Bran Woodruff MD         LOS: 18 days       ----------------------------------------------------------------------------------------------------------------------  Subjective       Chief Complaints:    Hypoglycemia and Loss of Consciousness        Interval History:      Case discussed with primary RN.  Patient with very hugger in place this morning.  Continues on 70% FiO2 heated high flow this morning.  WBC normal.  CRP improving at 5.41.    Review of Systems:    Constitutional: no fever and night sweats. No appetite change or unexpected weight change. No fatigue.  Chills.  Eyes: no eye drainage, itching or redness.  HEENT: no mouth sores, dysphagia or nose bleed.  Respiratory:  Shortness of breath and productive cough.  Chronic cough at baseline currently worsened.  Cardiovascular: no chest pain, no palpitations, no orthopnea.  Gastrointestinal: no nausea, vomiting or diarrhea. No abdominal pain, hematemesis or rectal bleeding.  Genitourinary: no dysuria or polyuria.  Hematologic/lymphatic: no lymph node abnormalities, no easy bruising or easy bleeding.  Musculoskeletal: Chronic back pain, arthralgias, and gait problems.  Skin: No rash and no itching.  Neurological: no loss of consciousness, no seizure, no headache.   Behavioral/Psych: no depression or suicidal ideation.  Endocrine: no hot flashes.  Immunologic: negative.    ----------------------------------------------------------------------------------------------------------------------      Objective       Current [facility] Meds:  albuterol sulfate HFA, 2 puff, Inhalation, 4x Daily - RT  amitriptyline, 25 mg, Oral, Nightly  ascorbic acid, 1,500 mg, Oral, Daily  aspirin, 81 mg, Oral, Daily  atorvastatin, 10 mg, Oral, Nightly  cefTRIAXone, 2 g, Intravenous,  Q24H  cholecalciferol, 50,000 Units, Oral, Weekly  clopidogrel, 75 mg, Oral, Daily  dexamethasone, 6 mg, Intravenous, Daily  docusate sodium, 100 mg, Oral, BID  doxycycline, 100 mg, Intravenous, Q12H  enoxaparin, 40 mg, Subcutaneous, Q24H  famotidine, 20 mg, Oral, BID AC  guaiFENesin, 1,200 mg, Oral, Q12H  insulin aspart, 0-9 Units, Subcutaneous, TID AC  iron polysaccharides, 150 mg, Oral, Daily With Breakfast  isosorbide mononitrate, 60 mg, Oral, Q24H  lactobacillus acidophilus, 1 capsule, Oral, Daily  magic barrier cream, , Topical, BID  melatonin, 5 mg, Oral, Nightly  metoprolol succinate XL, 100 mg, Oral, BID  montelukast, 10 mg, Oral, Nightly  OLANZapine zydis, 2.5 mg, Oral, BID  remdesivir, 100 mg, Intravenous, Q24H  sodium chloride, 3 mL, Intravenous, Q12H  terazosin, 5 mg, Oral, Q12H  theophylline, 400 mg, Oral, Daily      Pharmacy Consult - Remdesivir,       ----------------------------------------------------------------------------------------------------------------------    Vital Signs:  Temp:  [96.2 °F (35.7 °C)-98.5 °F (36.9 °C)] 98.5 °F (36.9 °C)  Heart Rate:  [] 90  Resp:  [22] 22  BP: (120-130)/(50-76) 126/54  No data found.  SpO2 Percentage    01/07/21 0631 01/07/21 0721 01/07/21 1100   SpO2: 94% 93% 96%     SpO2:  [85 %-99 %] 96 %  on  Flow (L/min):  [9-60] 9;   Device (Oxygen Therapy): bubble;high-flow nasal cannula    Body mass index is 27.41 kg/m².  Wt Readings from Last 3 Encounters:   01/07/21 89.1 kg (196 lb 8 oz)   12/17/20 92.2 kg (203 lb 3.2 oz)   10/16/18 89.8 kg (198 lb)        Intake/Output Summary (Last 24 hours) at 1/7/2021 1204  Last data filed at 1/7/2021 0833  Gross per 24 hour   Intake 1109.78 ml   Output 600 ml   Net 509.78 ml     Diet Pureed; Thin; Daily Fluid Restriction; Other; 1,200  ----------------------------------------------------------------------------------------------------------------------      Physical Exam:    Deferred due to COVID-19  isolation.  ----------------------------------------------------------------------------------------------------------------------  Results from last 7 days   Lab Units 01/07/21 0414 01/06/21 0755 01/01/21 0759 12/31/20 2058   CK TOTAL U/L 68 93  --   --    TROPONIN T ng/mL  --   --  0.136* 0.096*     Results from last 7 days   Lab Units 01/06/21 0755 01/04/21  0143   PROBNP pg/mL 1,152.0 3,756.0*       Results from last 7 days   Lab Units 01/05/21  0559   PH, ARTERIAL pH units 7.408   PO2 ART mm Hg 67.5*   PCO2, ARTERIAL mm Hg 60.3*   HCO3 ART mmol/L 38.0*     Results from last 7 days   Lab Units 01/07/21 0414 01/06/21 0755 01/05/21 0447 01/04/21  0143   CRP mg/dL 5.41* 6.09* 7.10* 10.60*   WBC 10*3/mm3 6.62 8.61  --  10.67   HEMOGLOBIN g/dL 8.9* 9.2*  --  8.9*   HEMATOCRIT % 29.8* 29.1*  --  28.6*   MCV fL 102.1* 99.3*  --  99.3*   MCHC g/dL 29.9* 31.6  --  31.1*   PLATELETS 10*3/mm3 238 247  --  218     Results from last 7 days   Lab Units 01/07/21 0414 01/06/21 0755 01/05/21 0447 01/04/21  0143   SODIUM mmol/L 143 136 134* 134*   POTASSIUM mmol/L 4.6 4.4 4.4 4.5   MAGNESIUM mg/dL 3.1* 1.9  --  2.1   CHLORIDE mmol/L 100 92* 91* 89*   CO2 mmol/L 32.9* 34.2* 32.5* 31.7*   BUN mg/dL 67* 58* 62* 50*   CREATININE mg/dL 1.49* 1.52* 1.56* 1.64*   EGFR IF NONAFRICN AM mL/min/1.73 44* 43* 42* 40*   CALCIUM mg/dL 9.1 9.4 9.3 9.3   GLUCOSE mg/dL 228* 273* 217* 363*   ALBUMIN g/dL 2.68* 2.98* 2.82* 2.88*   BILIRUBIN mg/dL 0.3 0.3 0.2 0.2   ALK PHOS U/L 172* 140* 129* 138*   AST (SGOT) U/L 22 25 23 21   ALT (SGPT) U/L 23 23 22 22   Estimated Creatinine Clearance: 40.7 mL/min (A) (by C-G formula based on SCr of 1.49 mg/dL (H)).  Ammonia   Date Value Ref Range Status   01/06/2021 19 16 - 60 umol/L Final       Glucose   Date/Time Value Ref Range Status   01/07/2021 1059 137 (H) 70 - 130 mg/dL Final   01/07/2021 0659 288 (H) 70 - 130 mg/dL Final   01/06/2021 2355 171 (H) 70 - 130 mg/dL Final   01/06/2021 1650 406 (C)  70 - 130 mg/dL Final   01/06/2021 1124 168 (H) 70 - 130 mg/dL Final   01/06/2021 0747 238 (H) 70 - 130 mg/dL Final   01/05/2021 1926 155 (H) 70 - 130 mg/dL Final   01/05/2021 1622 158 (H) 70 - 130 mg/dL Final     Lab Results   Component Value Date    HGBA1C 9.10 (H) 12/20/2020     Lab Results   Component Value Date    TSH 2.690 12/20/2020       Blood Culture   Date Value Ref Range Status   12/20/2020 No growth at 24 hours  Preliminary   12/20/2020 No growth at 24 hours  Preliminary   12/15/2020 No growth at 5 days  Final   12/15/2020 No growth at 5 days  Final     No results found for: URINECX  No results found for: WOUNDCX  No results found for: STOOLCX  No results found for: RESPCX  Pain Management Panel     There is no flowsheet data to display.            ----------------------------------------------------------------------------------------------------------------------  Imaging Results (Last 24 Hours)     ** No results found for the last 24 hours. **          ----------------------------------------------------------------------------------------------------------------------    Assessment/Plan       Assessment/Plan     ASSESSMENT:    1.  COVID-19 pneumonia  2.  MRSA bacteremia, treatment completed    PLAN:    Case discussed with primary RN.  Patient with very hugger in place this morning.  Continues on 70% FiO2 heated high flow this morning.  WBC normal.  CRP improving at 5.41.    Urine culture on 1/5/2021 finalized as no growth.    CT chest without contrast on 1/3/2021 showed appearance is worse.  There is bilateral airspace disease predominantly in the lower lobes, but also in the left upper lobe.  COPD.  Coronary artery calcifications.    COVID-19 PCR from 12/30/2020 positive.  Influenza and RSV PCR negative.    Blood cultures on 12/20/2020 are so far showing no growth.    COVID-19 and influenza PCR negative on 12/20/2020.  Urinalysis unremarkable.  Left upper extremity venous duplex reports no evidence  of DVT.  X-ray of the left shoulder reports no acute findings.  CT of the L-spine reports no acute findings.  CT of the T-spine reports no acute abnormalities.  CT of the C-spine reports 3.7 cm mass to the right neck either enlarged lymph node or primary neck mass, 3.2 mm colloid nodule in the left upper lobe.  X-ray of the pelvis reports no acute abnormalities.  CT of the head reports no acute abnormalities, chronic left maxillary sinusitis.       Patient completed bacteremia treatment of vancomycin pharmacy to dose on 12/28/2020.    We are agreeable to Decadron as the patient's oxygen requirements have increased.  We are also agreeable to remdesivir, as patient's GFR has improved.    Would recommend to continue on Rocephin 2 g IV every 24 hours and doxycycline 100 mg IV every 12 hours.  We will follow the patient closely and adjust antibiotic therapy as appropriate.    Code Status:   Code Status and Medical Interventions:   Ordered at: 01/03/21 1329     Limited Support to NOT Include:    Intubation     Code Status:    No CPR     Medical Interventions (Level of Support Prior to Arrest):    Limited     Scribed for Azam Stephenson MD.    Paola Ochoa, SARAH  01/07/21  12:04 EST    Physician Attestation:    The documentation recorded by the scribe accurately reflects the service I personally performed and the decisions made by me.    Azam Stephenson MD  Infectious Diseases  01/07/21  12:04 EST

## 2021-01-07 NOTE — PLAN OF CARE
Goal Outcome Evaluation:  Plan of Care Reviewed With: patient  Progress: declining   Pt has been confused and very restless today. Has been trying to remove lines, O2, blankets and gowns. Refuses care at times. Pt is back on gary hugger for hypothermia. On 60L heated high flow nasal cannula. Nursing staff and respiratory unable to titrate at this time as pt instantly desats. Sitter at bedside.

## 2021-01-07 NOTE — PLAN OF CARE
Goal Outcome Evaluation:  Plan of Care Reviewed With: patient  Progress: declining  Outcome Summary: Pt has been sleeping for most of shift, but appeared to be agitated and restless during sleep. Unable to give 2100 meds last night due to patient not being able to arouse enough to take medicines safely. Pt's temp was 96.2 rectally this AM. Bear hugger was placed on pt and Merline ALVAREZ was made aware. No s/s of acute distress noted at this time. Will cont to monitor and follow plan of care.

## 2021-01-07 NOTE — PROGRESS NOTES
[facility] HOSPITALIST PROGRESS NOTE     Patient Identification:  Name:  Aaron Domínguez  Age:  89 y.o.  Sex:  male  :  1929  MRN:  [id number]  Visit Number:  [id number]  ROOM: [address] [place] [address]     Primary Care Provider:  Bran Woodruff MD    Length of stay:  18    Subjective        Chief Compliant follow-up for worsening hypoxia and COVID-19 pneumonia    Patient seen and examined this morning with RASHAD Marti at the bedside by telemedicine video. This morning, tried to get OOB and removed his HHFNC. Placed on BHFNC 9 L and did well. Again got restless, removed his O2 and hypoxic and placed on HHFNC 60%. noted to be resting during my evaluation. Not conversing and not following the commands. ROS is very limited since patient is confused. Had hypothermia last night, placed on gary hugger, resolved now. Took his pills this morning.     Objective     Current Hospital Meds:albuterol sulfate HFA, 2 puff, Inhalation, 4x Daily - RT  amitriptyline, 25 mg, Oral, Nightly  ascorbic acid, 1,500 mg, Oral, Daily  aspirin, 81 mg, Oral, Daily  atorvastatin, 10 mg, Oral, Nightly  cefTRIAXone, 2 g, Intravenous, Q24H  cholecalciferol, 50,000 Units, Oral, Weekly  clopidogrel, 75 mg, Oral, Daily  dexamethasone, 6 mg, Intravenous, Daily  docusate sodium, 100 mg, Oral, BID  doxycycline, 100 mg, Intravenous, Q12H  enoxaparin, 40 mg, Subcutaneous, Q24H  famotidine, 20 mg, Oral, BID AC  guaiFENesin, 1,200 mg, Oral, Q12H  insulin aspart, 0-9 Units, Subcutaneous, TID AC  iron polysaccharides, 150 mg, Oral, Daily With Breakfast  isosorbide mononitrate, 60 mg, Oral, Q24H  lactobacillus acidophilus, 1 capsule, Oral, Daily  magic barrier cream, , Topical, BID  melatonin, 5 mg, Oral, Nightly  metoprolol succinate XL, 100 mg, Oral, BID  montelukast, 10 mg, Oral, Nightly  OLANZapine zydis, 5 mg, Oral, BID  sodium chloride, 3 mL, Intravenous, Q12H  terazosin, 5 mg, Oral, Q12H  theophylline, 400 mg, Oral, Daily    Pharmacy Consult -  Remdesivir,       ----------------------------------------------------------------------------------------------------------------------  Vital Signs:  Temp:  [96.2 °F (35.7 °C)-98.5 °F (36.9 °C)] 97.6 °F (36.4 °C)  Heart Rate:  [] 72  Resp:  [22] 22  BP: (120-144)/(50-80) 124/60  SpO2:  [85 %-99 %] 93 %  on  Flow (L/min):  [9-60] 60;   Device (Oxygen Therapy): heated;high-flow nasal cannula  Body mass index is 27.41 kg/m².    Wt Readings from Last 3 Encounters:   01/07/21 89.1 kg (196 lb 8 oz)   12/17/20 92.2 kg (203 lb 3.2 oz)   10/16/18 89.8 kg (198 lb)       Intake/Output Summary (Last 24 hours) at 1/7/2021 1539  Last data filed at 1/7/2021 1332  Gross per 24 hour   Intake 989.78 ml   Output --   Net 989.78 ml     Diet Pureed; Thin; Daily Fluid Restriction; Other; 1,200  ----------------------------------------------------------------------------------------------------------------------  Physical exam:  This physical exam has been personally performed remotely in the unit aided by real-time audio/visual communication tools. RASHAD Marti present at bedside during this exam and assisted during exam. The use of a video visit has been reviewed with the patient and verbal informed consent has been obtained.     Physical Exam:  General: resting.   Head: Normocephalic, atraumatic  Eyes: EOMI. Conjunctivae and sclerae normal.  Ears: Ears appear intact with no abnormalities noted.   Neck: Trachea midline. No obvious JVD.  Heart: Tele reveals atrial flutter  Lungs: Respirations appear to be regular, even and unlabored with no signs of respiratory distress. No audible wheezing.  Abdomen: No obvious abdominal distension.  MS: Muscle tone appears normal. No gross deformities.  Extremities: No clubbing, cyanosis. Trace edema noted.  Skin: No visible bleeding, bruising, or rash.  Neurologic: resting. No gross focal deficits.        ----------------------------------------------------------------------------------------------------------------------  ----------------------------------------------------------------------------------------------------------------------  Results from last 7 days   Lab Units 01/07/21 0414 01/06/21 0755 01/05/21 0447 01/04/21 0143   CRP mg/dL 5.41* 6.09* 7.10* 10.60*   WBC 10*3/mm3 6.62 8.61  --  10.67   HEMOGLOBIN g/dL 8.9* 9.2*  --  8.9*   HEMATOCRIT % 29.8* 29.1*  --  28.6*   MCV fL 102.1* 99.3*  --  99.3*   MCHC g/dL 29.9* 31.6  --  31.1*   PLATELETS 10*3/mm3 238 247  --  218     Results from last 7 days   Lab Units 01/07/21 0414 01/06/21 0755 01/05/21 0447 01/04/21  0143   SODIUM mmol/L 143 136 134* 134*   POTASSIUM mmol/L 4.6 4.4 4.4 4.5   MAGNESIUM mg/dL 3.1* 1.9  --  2.1   CHLORIDE mmol/L 100 92* 91* 89*   CO2 mmol/L 32.9* 34.2* 32.5* 31.7*   BUN mg/dL 67* 58* 62* 50*   CREATININE mg/dL 1.49* 1.52* 1.56* 1.64*   EGFR IF NONAFRICN AM mL/min/1.73 44* 43* 42* 40*   CALCIUM mg/dL 9.1 9.4 9.3 9.3   GLUCOSE mg/dL 228* 273* 217* 363*   ALBUMIN g/dL 2.68* 2.98* 2.82* 2.88*   BILIRUBIN mg/dL 0.3 0.3 0.2 0.2   ALK PHOS U/L 172* 140* 129* 138*   AST (SGOT) U/L 22 25 23 21   ALT (SGPT) U/L 23 23 22 22   Estimated Creatinine Clearance: 40.7 mL/min (A) (by C-G formula based on SCr of 1.49 mg/dL (H)).  Results from last 7 days   Lab Units 01/07/21  0414 01/06/21  0755 01/01/21  0759 12/31/20  2058   CK TOTAL U/L 68 93  --   --    TROPONIN T ng/mL  --   --  0.136* 0.096*     Glucose   Date/Time Value Ref Range Status   01/07/2021 1059 137 (H) 70 - 130 mg/dL Final   01/07/2021 0659 288 (H) 70 - 130 mg/dL Final   01/06/2021 2355 171 (H) 70 - 130 mg/dL Final   01/06/2021 1650 406 (C) 70 - 130 mg/dL Final   01/06/2021 1124 168 (H) 70 - 130 mg/dL Final   01/06/2021 0747 238 (H) 70 - 130 mg/dL Final   01/05/2021 1926 155 (H) 70 - 130 mg/dL Final   01/05/2021 1622 158 (H) 70 - 130 mg/dL Final     Ammonia   Date Value  Ref Range Status   01/06/2021 19 16 - 60 umol/L Final       Results from last 7 days   Lab Units 01/05/21  2226   NITRITE UA  Negative   WBC UA /HPF 21-30*   BACTERIA UA /HPF 2+*   SQUAM EPITHEL UA /HPF 3-6*   URINECX  No growth     No results found for: BLOODCX  Respiratory Culture   Date Value Ref Range Status   01/02/2021   Final    Moderate growth (3+) Normal Respiratory Mary: NO S.aureus/MRSA or Pseudomonas aeruginosa   No results found for: URINECXNo results found for: WOUNDCXNo results found for: BODYFLDCXNo results found for: STOOLCX  I have personally looked at the labs and they are summarized above.  ----------------------------------------------------------------------------------------------------------------------  Imaging Results (Last 24 Hours)     ** No results found for the last 24 hours. **        I have personally reviewed the radiology images and read the final radiology report.    Assessment & Plan      Assessment:  B/L COVID 19 Pneumonia 12/30/2020  Acute on chronic hypoxic respiratory failure  Acute on chronic diastolic CHF exacerbation  YADIRA  Acute metabolic encephalopathy  NSTEMI with H/O CAD and PCI  New onset atrial fibrillation/flutter  Advanced COPD  DM2, A1C 9.1 with hyperglycemia secondary to steroids  Essential HTN  Anemia, ID  3.7 cm right neck mass, enlarged lymph node versus primary neck mass, OP work up per radiology    Resolved:  MRSA bacteremia, completed abx treatment  Hypoglycemia causing syncope at the time of admission      Plan:  B/L COVID 19 Pneumonia 12/30/2020.  on remdesivir, started 01/03.  Monitor renal function closely.  On IV antibiotics Rocephin and doxycycline.  on lactobacillus.  Currently patient is afebrile VSS.  CRP improving, no leukocytosis. ferritin normalized. D-dimer, fibrinogen LDH elevated. on vitamin C. ID on board. UA abnormal.  urine cx no growth.     Acute on chronic hypoxic respiratory failure.  On IV Decadron.  Currently on HHFNC 55%, decreased  from 80%. Wean FIO2 for saturation more than 94%.  On albuterol inhaler.  Chest x-ray and CT chest done 01/03 reviewed. Pulmonology on board.     Acute on chronic diastolic CHF exacerbation, BNP worsening. will repeat a dose of IV Lasix today. Do a dose of diamox for the elevated CO2. Monitor renal function, electrolytes, intake output and weight.  2D echocardiogram shows EF of 61 to 65%.  Grade 1 diastolic dysfunction.  Concentric hypertrophy.  Moderate pulmonary hypertension.     YADIRA, Cr stable at 1.4 today from 0.8-0.9.  Monitor closely.     Acute metabolic encephalopathy with agitation from hypoxia, sepsis. Ammonia normal. on Zyprexa BID, increase the dose. Benadryl prn. Patient is pulling on to the O2, becoming hypoxic, interfering with the medical care. Sitter at the bedside.     NSTEMI with H/O CAD and PCI.  On aspirin, plavix and Lipitor. Cardiology consulted. family decided to continue with medical management.    New onset atrial fibrillation/flutter, on Toprol- mg p.o. twice daily.  Heart rate controlled.  Not on anticoagulation currently.  Anticoagulation was evaluated by cardiology and was discussed with daughter to make further decision.      Advanced COPD, on chronic prednisone. DC home po prednisone since on decadron.  On theophylline.  Theophylline level checked and currently within range.     DM2, A1C 9.1 with hyperglycemia secondary to steroids.  On moderate dose sliding scale.  dc Levemir. Metformin dced because of worsening renal function. CBG controlled.     Essential HTN, blood pressure controlled. On Toprol XL. Hold p.o. Bumex and amlodipine.    Anemia, ID, hemoglobin stable. on p.o. iron supplement.    Lovenox subcu for DVT prophylaxis  on Pepcid for GI prophylaxis.     Management and plans discussed in detail with patient and nursing. Called Rossana on the phone no [phone]  And discussed with her reg the goals of care. She would like to continue with the current treatment and monitor  how he does.       The patient is high risk due to the following diagnoses/reasons:  B/L COVID 19 Pneumonia 12/30/2020, Acute on chronic hypoxic respiratory failure, Acute on chronic diastolic CHF exacerbation, YADIRA    Gracy Franks MD  01/07/21  15:39 EST

## 2021-01-08 NOTE — PROGRESS NOTES
Discharge Planning Assessment   [place]     Patient Name: Aaron Domínguez  MRN: [id number]  Today's Date: 1/8/2021    Admit Date: 12/20/2020      Discharge Plan     Row Name 01/08/21 1115       Plan    Plan  Pt admitted on 12/20/20.  Pt lives at home with daughters Lois and Rossana assisting with care.  Pt to be nursing home placement at discharge with preferred facility as [facility].  SS will follow.        Continued Care and Services - Admitted Since 12/20/2020     Destination     Service Provider Request Status Selected Services Address Phone Fax Patient Preferred    [facility]  Pending - Request Sent N/A [address], [place] [address] [phone] --           CHASE PatriciaW

## 2021-01-08 NOTE — PLAN OF CARE
Problem: Swallowing Impairment  Goal: Improved Swallowing Without Aspiration  Outcome: Ongoing, Not Progressing   F/u this pm for pt status. Mr. Domínguez continues on a puree diet w/ thin liquids 2/2 ams and fatigue per current ongoing medical status. He is persistently unable to functionally participate in reassessment of candidacy for diet upgrade per this status, felt to most benefit from continuing puree consistency, thin liquids as this is felt to be his least restrictive modified diet  at this time.     D/w RN pt status this pm via telephone. RN reports pt is accepting his po meds whole in puree and crushed in puree PRN well, w/o overt s/s aspiration. He does continue w/ decreased po intake overall, is receiving supplemental boost w/ breakfast and magic cup w/ lunch and dinner. SLP t o sign off at this time as Mr. Domínguez is felt to be on his least restrictive po diet at this time. Please reconsult w/ improvement in ams/medical status to functionally participate in reassessment for possible diet upgrade. RN verbalizes agreement.     Thank you-  Danna Rangel M.A., CCC-SLP

## 2021-01-08 NOTE — PROGRESS NOTES
PROGRESS NOTE         Patient Identification:  Name:  Aaron Domínguez  Age:  89 y.o.  Sex:  male  :  1929  MRN:  [id number]  Visit Number:  [id number]  Primary Care Provider:  Bran Woodruff MD         LOS: 19 days       ----------------------------------------------------------------------------------------------------------------------  Subjective       Chief Complaints:    Hypoglycemia and Loss of Consciousness        Interval History:      Case discussed with primary RN.  Patient on decreased oxygen requirement of 68% FiO2 heated high flow.  WBC normal.  CRP improving at 3.86.    Review of Systems:    Constitutional: no fever and night sweats. No appetite change or unexpected weight change. No fatigue.  Chills.  Eyes: no eye drainage, itching or redness.  HEENT: no mouth sores, dysphagia or nose bleed.  Respiratory:  Shortness of breath and productive cough.  Chronic cough at baseline currently worsened.  Cardiovascular: no chest pain, no palpitations, no orthopnea.  Gastrointestinal: no nausea, vomiting or diarrhea. No abdominal pain, hematemesis or rectal bleeding.  Genitourinary: no dysuria or polyuria.  Hematologic/lymphatic: no lymph node abnormalities, no easy bruising or easy bleeding.  Musculoskeletal: Chronic back pain, arthralgias, and gait problems.  Skin: No rash and no itching.  Neurological: no loss of consciousness, no seizure, no headache.   Behavioral/Psych: no depression or suicidal ideation.  Endocrine: no hot flashes.  Immunologic: negative.    ----------------------------------------------------------------------------------------------------------------------      Objective       Current [facility] Meds:  acetaZOLAMIDE, 375 mg, Oral, Once  albuterol sulfate HFA, 2 puff, Inhalation, 4x Daily - RT  amitriptyline, 25 mg, Oral, Nightly  ascorbic acid, 1,500 mg, Oral, Daily  aspirin, 81 mg, Oral, Daily  atorvastatin, 10 mg, Oral, Nightly  cefTRIAXone, 2 g, Intravenous,  Q24H  cholecalciferol, 50,000 Units, Oral, Weekly  clopidogrel, 75 mg, Oral, Daily  dexamethasone, 6 mg, Intravenous, Daily  docusate sodium, 100 mg, Oral, BID  doxycycline, 100 mg, Intravenous, Q12H  enoxaparin, 40 mg, Subcutaneous, Q24H  famotidine, 20 mg, Oral, BID AC  guaiFENesin, 1,200 mg, Oral, Q12H  insulin aspart, 0-9 Units, Subcutaneous, TID AC  iron polysaccharides, 150 mg, Oral, Daily With Breakfast  isosorbide mononitrate, 60 mg, Oral, Q24H  lactobacillus acidophilus, 1 capsule, Oral, Daily  magic barrier cream, , Topical, BID  melatonin, 5 mg, Oral, Nightly  metoprolol succinate XL, 100 mg, Oral, BID  montelukast, 10 mg, Oral, Nightly  OLANZapine zydis, 7.5 mg, Oral, BID  sodium chloride, 3 mL, Intravenous, Q12H  terazosin, 5 mg, Oral, Q12H  theophylline, 400 mg, Oral, Daily      Pharmacy Consult - Remdesivir,       ----------------------------------------------------------------------------------------------------------------------    Vital Signs:  Temp:  [95.5 °F (35.3 °C)-98 °F (36.7 °C)] 96.1 °F (35.6 °C)  Heart Rate:  [70-93] 84  Resp:  [18-22] 18  BP: (124-144)/(50-84) 130/50  No data found.  SpO2 Percentage    01/08/21 0215 01/08/21 0604 01/08/21 1005   SpO2: 96% 94% 92%     SpO2:  [92 %-98 %] 92 %  on  Flow (L/min):  [9-60] 60;   Device (Oxygen Therapy): high-flow mask;high-flow nasal cannula    Body mass index is 28.06 kg/m².  Wt Readings from Last 3 Encounters:   01/08/21 91.3 kg (201 lb 3.2 oz)   12/17/20 92.2 kg (203 lb 3.2 oz)   10/16/18 89.8 kg (198 lb)        Intake/Output Summary (Last 24 hours) at 1/8/2021 1009  Last data filed at 1/8/2021 0853  Gross per 24 hour   Intake 490 ml   Output --   Net 490 ml     Diet Pureed; Thin; Daily Fluid Restriction; Other; 1,200  ----------------------------------------------------------------------------------------------------------------------      Physical Exam:    Deferred due to COVID-19  isolation.  ----------------------------------------------------------------------------------------------------------------------  Results from last 7 days   Lab Units 01/07/21  0414 01/06/21  0755   CK TOTAL U/L 68 93     Results from last 7 days   Lab Units 01/06/21 0755 01/04/21  0143   PROBNP pg/mL 1,152.0 3,756.0*       Results from last 7 days   Lab Units 01/05/21  0559   PH, ARTERIAL pH units 7.408   PO2 ART mm Hg 67.5*   PCO2, ARTERIAL mm Hg 60.3*   HCO3 ART mmol/L 38.0*     Results from last 7 days   Lab Units 01/08/21  0546 01/07/21 0414 01/06/21 0755   CRP mg/dL 3.86* 5.41* 6.09*   WBC 10*3/mm3 8.88 6.62 8.61   HEMOGLOBIN g/dL 9.1* 8.9* 9.2*   HEMATOCRIT % 29.8* 29.8* 29.1*   MCV fL 101.7* 102.1* 99.3*   MCHC g/dL 30.5* 29.9* 31.6   PLATELETS 10*3/mm3 265 238 247     Results from last 7 days   Lab Units 01/08/21  0546 01/07/21 0414 01/06/21  0755   SODIUM mmol/L 144 143 136   POTASSIUM mmol/L 4.4 4.6 4.4   MAGNESIUM mg/dL 2.8* 3.1* 1.9   CHLORIDE mmol/L 100 100 92*   CO2 mmol/L 35.2* 32.9* 34.2*   BUN mg/dL 92* 67* 58*   CREATININE mg/dL 1.65* 1.49* 1.52*   EGFR IF NONAFRICN AM mL/min/1.73 39* 44* 43*   CALCIUM mg/dL 10.0* 9.1 9.4   GLUCOSE mg/dL 344* 228* 273*   ALBUMIN g/dL 2.98* 2.68* 2.98*   BILIRUBIN mg/dL 0.2 0.3 0.3   ALK PHOS U/L 146* 172* 140*   AST (SGOT) U/L 18 22 25   ALT (SGPT) U/L 23 23 23   Estimated Creatinine Clearance: 33.7 mL/min (A) (by C-G formula based on SCr of 1.65 mg/dL (H)).  Ammonia   Date Value Ref Range Status   01/06/2021 19 16 - 60 umol/L Final       Glucose   Date/Time Value Ref Range Status   01/08/2021 0821 353 (H) 70 - 130 mg/dL Final   01/08/2021 0609 325 (H) 70 - 130 mg/dL Final   01/07/2021 1932 287 (H) 70 - 130 mg/dL Final   01/07/2021 1606 229 (H) 70 - 130 mg/dL Final   01/07/2021 1059 137 (H) 70 - 130 mg/dL Final   01/07/2021 0659 288 (H) 70 - 130 mg/dL Final   01/06/2021 2355 171 (H) 70 - 130 mg/dL Final   01/06/2021 1650 406 (C) 70 - 130 mg/dL Final     Lab  Results   Component Value Date    HGBA1C 9.10 (H) 12/20/2020     Lab Results   Component Value Date    TSH 2.690 12/20/2020       Blood Culture   Date Value Ref Range Status   12/20/2020 No growth at 24 hours  Preliminary   12/20/2020 No growth at 24 hours  Preliminary   12/15/2020 No growth at 5 days  Final   12/15/2020 No growth at 5 days  Final     No results found for: URINECX  No results found for: WOUNDCX  No results found for: STOOLCX  No results found for: RESPCX  Pain Management Panel     There is no flowsheet data to display.            ----------------------------------------------------------------------------------------------------------------------  Imaging Results (Last 24 Hours)     ** No results found for the last 24 hours. **          ----------------------------------------------------------------------------------------------------------------------    Assessment/Plan       Assessment/Plan     ASSESSMENT:    1.  COVID-19 pneumonia  2.  MRSA bacteremia, treatment completed    PLAN:    Case discussed with primary RN.  Patient on decreased oxygen requirement of 68% FiO2 heated high flow.  WBC normal.  CRP improving at 3.86.    Urine culture on 1/5/2021 finalized as no growth.    CT chest without contrast on 1/3/2021 showed appearance is worse.  There is bilateral airspace disease predominantly in the lower lobes, but also in the left upper lobe.  COPD.  Coronary artery calcifications.    COVID-19 PCR from 12/30/2020 positive.  Influenza and RSV PCR negative.    Blood cultures on 12/20/2020 are so far showing no growth.    COVID-19 and influenza PCR negative on 12/20/2020.  Urinalysis unremarkable.  Left upper extremity venous duplex reports no evidence of DVT.  X-ray of the left shoulder reports no acute findings.  CT of the L-spine reports no acute findings.  CT of the T-spine reports no acute abnormalities.  CT of the C-spine reports 3.7 cm mass to the right neck either enlarged lymph node or  primary neck mass, 3.2 mm colloid nodule in the left upper lobe.  X-ray of the pelvis reports no acute abnormalities.  CT of the head reports no acute abnormalities, chronic left maxillary sinusitis.       Patient completed bacteremia treatment of vancomycin pharmacy to dose on 12/28/2020.    We are agreeable to Decadron as the patient's oxygen requirements have increased.  We are also agreeable to remdesivir, as patient's GFR has improved.    Would recommend to continue on Rocephin 2 g IV every 24 hours and doxycycline 100 mg IV every 12 hours.  We will follow the patient closely and adjust antibiotic therapy as appropriate.    Code Status:   Code Status and Medical Interventions:   Ordered at: 01/03/21 1329     Limited Support to NOT Include:    Intubation     Code Status:    No CPR     Medical Interventions (Level of Support Prior to Arrest):    Limited     Scribed for Azam Stephenson MD.    Paola Don, APRN  01/08/21  10:09 EST    Physician Attestation:    The documentation recorded by the scribe accurately reflects the service I personally performed and the decisions made by me.    Azam Stephenson MD  Infectious Diseases  01/08/21  10:09 EST

## 2021-01-08 NOTE — PLAN OF CARE
Goal Outcome Evaluation:  Pt very restless, combative, pulling leads off, sitter is at bedside. Continue to monitor.

## 2021-01-08 NOTE — PLAN OF CARE
Goal Outcome Evaluation:  Plan of Care Reviewed With: patient  Progress: declining     Pt confused and agitated today. Did converse with me this Am and took his PO meds. Still muttering incoherently to himself. Will continue current plan of care

## 2021-01-08 NOTE — PROGRESS NOTES
[facility] HOSPITALIST PROGRESS NOTE     Patient Identification:  Name:  Aaron Domínguez  Age:  89 y.o.  Sex:  male  :  1929  MRN:  [id number]  Visit Number:  [id number]  ROOM: [address] [place] [address]     Primary Care Provider:  Bran Woodruff MD    Length of stay:  19    Subjective        Chief Compliant follow-up for worsening hypoxia and COVID-19 pneumonia    Patient seen and examined this morning with RASHAD Jaramillo at the bedside by telemedicine video. noted to be moving during my evaluation. Not conversing and not following the commands. ROS is very limited since patient is confused. No hypothermia. Took his pills this morning. Agitation better after the Zyprexa dose increased this morning. FIO2 weaned to BHFNC 11 L from HHFNC 62%.    Objective     Current Hospital Meds:albuterol sulfate HFA, 2 puff, Inhalation, 4x Daily - RT  amitriptyline, 25 mg, Oral, Nightly  ascorbic acid, 1,500 mg, Oral, Daily  aspirin, 81 mg, Oral, Daily  atorvastatin, 10 mg, Oral, Nightly  cefTRIAXone, 2 g, Intravenous, Q24H  cholecalciferol, 50,000 Units, Oral, Weekly  clopidogrel, 75 mg, Oral, Daily  dexamethasone, 6 mg, Intravenous, Daily  docusate sodium, 100 mg, Oral, BID  doxycycline, 100 mg, Intravenous, Q12H  enoxaparin, 40 mg, Subcutaneous, Q24H  famotidine, 20 mg, Oral, BID AC  guaiFENesin, 1,200 mg, Oral, Q12H  insulin aspart, 0-9 Units, Subcutaneous, TID AC  insulin detemir, 5 Units, Subcutaneous, Nightly  iron polysaccharides, 150 mg, Oral, Daily With Breakfast  isosorbide mononitrate, 60 mg, Oral, Q24H  lactobacillus acidophilus, 1 capsule, Oral, Daily  magic barrier cream, , Topical, BID  melatonin, 5 mg, Oral, Nightly  metoprolol succinate XL, 100 mg, Oral, BID  montelukast, 10 mg, Oral, Nightly  OLANZapine zydis, 7.5 mg, Oral, BID  sodium chloride, 3 mL, Intravenous, Q12H  terazosin, 5 mg, Oral, Q12H  theophylline, 400 mg, Oral, Daily        ----------------------------------------------------------------------------------------------------------------------  Vital Signs:  Temp:  [96.1 °F (35.6 °C)-97.3 °F (36.3 °C)] 96.1 °F (35.6 °C)  Heart Rate:  [70-93] 77  Resp:  [18-20] 18  BP: (130-140)/(50-84) 130/50  SpO2:  [92 %-99 %] 94 %  on  Flow (L/min):  [11-60] 11;   Device (Oxygen Therapy): bubble;high-flow nasal cannula  Body mass index is 28.06 kg/m².    Wt Readings from Last 3 Encounters:   01/08/21 91.3 kg (201 lb 3.2 oz)   12/17/20 92.2 kg (203 lb 3.2 oz)   10/16/18 89.8 kg (198 lb)       Intake/Output Summary (Last 24 hours) at 1/8/2021 1732  Last data filed at 1/8/2021 1300  Gross per 24 hour   Intake 250 ml   Output --   Net 250 ml     Diet Pureed; Thin; Daily Fluid Restriction; Other; 1,200  ----------------------------------------------------------------------------------------------------------------------  Physical exam:  This physical exam has been personally performed remotely in the unit aided by real-time audio/visual communication tools. RASHAD Jaramillo present at bedside during this exam and assisted during exam. The use of a video visit has been reviewed with the patient and verbal informed consent has been obtained.     Physical Exam:  General: comfortable.   Head: Normocephalic, atraumatic  Eyes: EOMI. Conjunctivae and sclerae normal.  Ears: Ears appear intact with no abnormalities noted.   Neck: Trachea midline. No obvious JVD.  Heart: Tele reveals atrial flutter  Lungs: Respirations appear to be regular, even and unlabored with no signs of respiratory distress. No audible wheezing.  Abdomen: No obvious abdominal distension.  MS: Muscle tone appears normal. No gross deformities.  Extremities: No clubbing, cyanosis. Trace edema noted.  Skin: No visible bleeding, bruising, or rash.  Neurologic: moving. No gross focal deficits.        ----------------------------------------------------------------------------------------------------------------------  ----------------------------------------------------------------------------------------------------------------------  Results from last 7 days   Lab Units 01/08/21  0546 01/07/21  0414 01/06/21  0755   CRP mg/dL 3.86* 5.41* 6.09*   WBC 10*3/mm3 8.88 6.62 8.61   HEMOGLOBIN g/dL 9.1* 8.9* 9.2*   HEMATOCRIT % 29.8* 29.8* 29.1*   MCV fL 101.7* 102.1* 99.3*   MCHC g/dL 30.5* 29.9* 31.6   PLATELETS 10*3/mm3 541 113 462     Results from last 7 days   Lab Units 01/08/21  0546 01/07/21  0414 01/06/21  0755   SODIUM mmol/L 144 143 136   POTASSIUM mmol/L 4.4 4.6 4.4   MAGNESIUM mg/dL 2.8* 3.1* 1.9   CHLORIDE mmol/L 100 100 92*   CO2 mmol/L 35.2* 32.9* 34.2*   BUN mg/dL 92* 67* 58*   CREATININE mg/dL 1.65* 1.49* 1.52*   EGFR IF NONAFRICN AM mL/min/1.73 39* 44* 43*   CALCIUM mg/dL 10.0* 9.1 9.4   GLUCOSE mg/dL 344* 228* 273*   ALBUMIN g/dL 2.98* 2.68* 2.98*   BILIRUBIN mg/dL 0.2 0.3 0.3   ALK PHOS U/L 146* 172* 140*   AST (SGOT) U/L 18 22 25   ALT (SGPT) U/L 23 23 23   Estimated Creatinine Clearance: 33.7 mL/min (A) (by C-G formula based on SCr of 1.65 mg/dL (H)).  Results from last 7 days   Lab Units 01/07/21  0414 01/06/21  0755   CK TOTAL U/L 68 93     Glucose   Date/Time Value Ref Range Status   01/08/2021 1705 160 (H) 70 - 130 mg/dL Final   01/08/2021 1124 486 (C) 70 - 130 mg/dL Final   01/08/2021 0821 353 (H) 70 - 130 mg/dL Final   01/08/2021 0609 325 (H) 70 - 130 mg/dL Final   01/07/2021 1932 287 (H) 70 - 130 mg/dL Final   01/07/2021 1606 229 (H) 70 - 130 mg/dL Final   01/07/2021 1059 137 (H) 70 - 130 mg/dL Final   01/07/2021 0659 288 (H) 70 - 130 mg/dL Final     Ammonia   Date Value Ref Range Status   01/06/2021 19 16 - 60 umol/L Final       Results from last 7 days   Lab Units 01/05/21  2226   NITRITE UA  Negative   WBC UA /HPF 21-30*   BACTERIA UA /HPF 2+*   SQUAM EPITHEL UA /HPF 3-6*    URINECX  No growth     No results found for: BLOODCX  Respiratory Culture   Date Value Ref Range Status   01/02/2021   Final    Moderate growth (3+) Normal Respiratory Mary: NO S.aureus/MRSA or Pseudomonas aeruginosa   No results found for: URINECXNo results found for: WOUNDCXNo results found for: BODYFLDCXNo results found for: STOOLCX  I have personally looked at the labs and they are summarized above.  ----------------------------------------------------------------------------------------------------------------------  Imaging Results (Last 24 Hours)     ** No results found for the last 24 hours. **        I have personally reviewed the radiology images and read the final radiology report.    Assessment & Plan      Assessment:  B/L COVID 19 Pneumonia 12/30/2020  Acute on chronic hypoxic respiratory failure  Acute on chronic diastolic CHF exacerbation  YADIRA  Acute metabolic encephalopathy  NSTEMI with H/O CAD and PCI  New onset atrial fibrillation/flutter  Advanced COPD  DM2, A1C 9.1 with hyperglycemia secondary to steroids  Essential HTN  Anemia, ID  3.7 cm right neck mass, enlarged lymph node versus primary neck mass, OP work up per radiology    Resolved:  MRSA bacteremia, completed abx treatment  Hypoglycemia causing syncope at the time of admission      Plan:  B/L COVID 19 Pneumonia 12/30/2020.  finished remdesivir, started 01/03.  Monitor renal function closely.  On IV antibiotics Rocephin and doxycycline.  on lactobacillus.  Currently patient is afebrile VSS.  CRP improving, no leukocytosis. ferritin normalized. D-dimer, fibrinogen LDH elevated. on vitamin C. ID on board. urine cx no growth.     Acute on chronic hypoxic respiratory failure.  On IV Decadron.  Currently on HHFNC 55%, decreased from 80%. Wean FIO2 for saturation more than 94%.  On albuterol inhaler.  Chest x-ray and CT chest done 01/03 reviewed. Pulmonology on board.     Acute on chronic diastolic CHF exacerbation, BNP worsening. will hold IV  Lasix since Cr increased to 1.6 from 1.4. Do a dose of diamox for the elevated CO2. Monitor renal function, electrolytes, intake output and weight.  2D echocardiogram shows EF of 61 to 65%.  Grade 1 diastolic dysfunction.  Concentric hypertrophy.  Moderate pulmonary hypertension.     YADIRA, Cr trended up to1.6 from 1.4. baseline 0.8-0.9.  Monitor closely. Hold lasix.     Acute metabolic encephalopathy with agitation from hypoxia, sepsis. Ammonia normal. on Zyprexa BID, increase the dose. Benadryl prn. Patient is pulling on to the O2, becoming hypoxic, interfering with the medical care. Sitter at the bedside.     NSTEMI with H/O CAD and PCI.  On aspirin, plavix and Lipitor. Cardiology consulted. family decided to continue with medical management.    New onset atrial fibrillation/flutter, on Toprol- mg p.o. twice daily.  Heart rate controlled.  Not on anticoagulation currently.  Anticoagulation was evaluated by cardiology and was discussed with daughter to make further decision.      Advanced COPD, on chronic prednisone. DC home po prednisone since on decadron.  On theophylline.  Theophylline level checked and currently within range.     DM2, A1C 9.1 with hyperglycemia secondary to steroids.  On moderate dose sliding scale.  resume Levemir 5 U. Metformin dced because of worsening renal function. CBG controlled.     Essential HTN, blood pressure controlled. On Toprol XL. Hold p.o. Bumex and amlodipine.    Anemia, ID, hemoglobin stable. on p.o. iron supplement.    Lovenox subcu for DVT prophylaxis  on Pepcid for GI prophylaxis.     Condition stable and improving.     Management and plans discussed in detail with patient and nursing.   01/07: Called Rossana on the phone no [phone]  And discussed with her reg the goals of care. She would like to continue with the current treatment and monitor how he does.       The patient is high risk due to the following diagnoses/reasons:  B/L COVID 19 Pneumonia 12/30/2020, Acute  on chronic hypoxic respiratory failure, Acute on chronic diastolic CHF exacerbation, YADIRA Franks MD  01/08/21  17:38 EST

## 2021-01-09 NOTE — PLAN OF CARE
Goal Outcome Evaluation:  Pt oxygen sat keep dropping, lung sounds very diminished, ABG critical notified Merline ALVAREZ. Patient was placed on Bipap, O2 has been stable, pt still very restless and wanting to pull at lines and tubes sitter is at bedside. Will continue to monitor.

## 2021-01-09 NOTE — SIGNIFICANT NOTE
Took pt off bipap and placed on bubble highflow as ordered by dr. Franks   Bipmarga and Heated Airvo cleaned and removed from room.

## 2021-01-09 NOTE — PROGRESS NOTES
PROGRESS NOTE         Patient Identification:  Name:  Aaron Domínguez  Age:  89 y.o.  Sex:  male  :  1929  MRN:  [id number]  Visit Number:  [id number]  Primary Care Provider:  Bran Woodruff MD         LOS: 20 days       ----------------------------------------------------------------------------------------------------------------------  Subjective       Chief Complaints:    Hypoglycemia and Loss of Consciousness        Interval History:      Case discussed with primary RN.  Patient on BiPAP at 70% FiO2 this morning.  Family has elected to make patient comfort measures at this time.  Chest x-ray from 2021 reports increasing right pleural effusion and bilateral airspace disease concerning for increasing pneumonia.    Review of Systems:    Constitutional: no fever and night sweats. No appetite change or unexpected weight change. No fatigue.  Chills.  Eyes: no eye drainage, itching or redness.  HEENT: no mouth sores, dysphagia or nose bleed.  Respiratory:  Shortness of breath and productive cough.  Chronic cough at baseline currently worsened.  Cardiovascular: no chest pain, no palpitations, no orthopnea.  Gastrointestinal: no nausea, vomiting or diarrhea. No abdominal pain, hematemesis or rectal bleeding.  Genitourinary: no dysuria or polyuria.  Hematologic/lymphatic: no lymph node abnormalities, no easy bruising or easy bleeding.  Musculoskeletal: Chronic back pain, arthralgias, and gait problems.  Skin: No rash and no itching.  Neurological: no loss of consciousness, no seizure, no headache.   Behavioral/Psych: no depression or suicidal ideation.  Endocrine: no hot flashes.  Immunologic: negative.    ----------------------------------------------------------------------------------------------------------------------      Objective       Current Hospital Meds:  albuterol sulfate HFA, 2 puff, Inhalation, 4x Daily - RT  LORazepam, 1 mg, Intravenous, Q6H  Morphine, 2 mg, Intravenous,  Q4H         ----------------------------------------------------------------------------------------------------------------------    Vital Signs:  Temp:  [97.2 °F (36.2 °C)-97.3 °F (36.3 °C)] 97.3 °F (36.3 °C)  Heart Rate:  [70-94] 84  Resp:  [18-28] 26  BP: (110-112)/(60-70) 110/60  No data found.  SpO2 Percentage    01/09/21 0341 01/09/21 0651 01/09/21 0929   SpO2: 91% 94% 95%     SpO2:  [90 %-99 %] 95 %  on  Flow (L/min):  [11-60] 15;   Device (Oxygen Therapy): bubble;high-flow nasal cannula    Body mass index is 27.45 kg/m².  Wt Readings from Last 3 Encounters:   01/09/21 89.3 kg (196 lb 12.8 oz)   12/17/20 92.2 kg (203 lb 3.2 oz)   10/16/18 89.8 kg (198 lb)        Intake/Output Summary (Last 24 hours) at 1/9/2021 1243  Last data filed at 1/9/2021 0625  Gross per 24 hour   Intake 240.53 ml   Output --   Net 240.53 ml     Diet Pureed; Thin; Daily Fluid Restriction; Other; 1,200  ----------------------------------------------------------------------------------------------------------------------      Physical Exam:    Deferred due to COVID-19 isolation.  ----------------------------------------------------------------------------------------------------------------------  Results from last 7 days   Lab Units 01/07/21 0414 01/06/21  0755   CK TOTAL U/L 68 93     Results from last 7 days   Lab Units 01/09/21  0709 01/06/21  0755 01/04/21  0143   PROBNP pg/mL 1,440.0 1,152.0 3,756.0*       Results from last 7 days   Lab Units 01/09/21  0350   PH, ARTERIAL pH units 7.334*   PO2 ART mm Hg 71.1*   PCO2, ARTERIAL mm Hg 69.0*   HCO3 ART mmol/L 36.7*     Results from last 7 days   Lab Units 01/09/21  0709 01/08/21  0546 01/07/21  0414   CRP mg/dL 2.70* 3.86* 5.41*   WBC 10*3/mm3 10.42 8.88 6.62   HEMOGLOBIN g/dL 8.9* 9.1* 8.9*   HEMATOCRIT % 30.3* 29.8* 29.8*   MCV fL 104.5* 101.7* 102.1*   MCHC g/dL 29.4* 30.5* 29.9*   PLATELETS 10*3/mm3 293 265 238     Results from last 7 days   Lab Units 01/09/21  0709 01/08/21  0546  01/07/21  0414 01/06/21  0755   SODIUM mmol/L 147* 144 143 136   POTASSIUM mmol/L 4.5 4.4 4.6 4.4   MAGNESIUM mg/dL  --  2.8* 3.1* 1.9   CHLORIDE mmol/L 104 100 100 92*   CO2 mmol/L 33.3* 35.2* 32.9* 34.2*   BUN mg/dL 94* 92* 67* 58*   CREATININE mg/dL 2.16* 1.65* 1.49* 1.52*   EGFR IF NONAFRICN AM mL/min/1.73 29* 39* 44* 43*   CALCIUM mg/dL 9.6 10.0* 9.1 9.4   GLUCOSE mg/dL 247* 344* 228* 273*   ALBUMIN g/dL 2.68* 2.98* 2.68* 2.98*   BILIRUBIN mg/dL 0.3 0.2 0.3 0.3   ALK PHOS U/L 142* 146* 172* 140*   AST (SGOT) U/L 20 18 22 25   ALT (SGPT) U/L 23 23 23 23   Estimated Creatinine Clearance: 28.1 mL/min (A) (by C-G formula based on SCr of 2.16 mg/dL (H)).  No results found for: AMMONIA    Glucose   Date/Time Value Ref Range Status   01/09/2021 0738 244 (H) 70 - 130 mg/dL Final   01/08/2021 1929 249 (H) 70 - 130 mg/dL Final   01/08/2021 1705 160 (H) 70 - 130 mg/dL Final   01/08/2021 1124 486 (C) 70 - 130 mg/dL Final   01/08/2021 0821 353 (H) 70 - 130 mg/dL Final   01/08/2021 0609 325 (H) 70 - 130 mg/dL Final   01/07/2021 1932 287 (H) 70 - 130 mg/dL Final   01/07/2021 1606 229 (H) 70 - 130 mg/dL Final     Lab Results   Component Value Date    HGBA1C 9.10 (H) 12/20/2020     Lab Results   Component Value Date    TSH 2.690 12/20/2020       Blood Culture   Date Value Ref Range Status   12/20/2020 No growth at 24 hours  Preliminary   12/20/2020 No growth at 24 hours  Preliminary   12/15/2020 No growth at 5 days  Final   12/15/2020 No growth at 5 days  Final     No results found for: URINECX  No results found for: WOUNDCX  No results found for: STOOLCX  No results found for: RESPCX  Pain Management Panel     There is no flowsheet data to display.            ----------------------------------------------------------------------------------------------------------------------  Imaging Results (Last 24 Hours)     Procedure Component Value Units Date/Time    XR Chest 1 View [[id number]] Collected: 01/08/21 5915     Updated:  01/08/21 2248    Narrative:      CR Chest 1 Vw    INDICATION:   Diminished breath sounds     COMPARISON:    Chest CT from 1/3/2021    FINDINGS:  Single portable AP view(s) of the chest.    There is a new right pleural effusion and there is increasing bilateral lung opacification with bilateral areas of consolidation. No evidence of pneumothorax.      Impression:      Increasing right pleural effusion with increasing bilateral airspace disease concerning for increasing pneumonia.    Signer Name: Stacy Aldridge MD   Signed: 1/8/2021 10:46 PM   Workstation Name: [id number]    [facility]          ----------------------------------------------------------------------------------------------------------------------    Assessment/Plan       Assessment/Plan     ASSESSMENT:    1.  COVID-19 pneumonia  2.  MRSA bacteremia, treatment completed    PLAN:    Case discussed with primary RN.  Patient on BiPAP at 70% FiO2 this morning.  Family has elected to make patient comfort measures at this time.  Chest x-ray from 1/8/2021 reports increasing right pleural effusion and bilateral airspace disease concerning for increasing pneumonia.    Urine culture on 1/5/2021 finalized as no growth.    CT chest without contrast on 1/3/2021 showed appearance is worse.  There is bilateral airspace disease predominantly in the lower lobes, but also in the left upper lobe.  COPD.  Coronary artery calcifications.    COVID-19 PCR from 12/30/2020 positive.  Influenza and RSV PCR negative.    Blood cultures on 12/20/2020 are so far showing no growth.    COVID-19 and influenza PCR negative on 12/20/2020.  Urinalysis unremarkable.  Left upper extremity venous duplex reports no evidence of DVT.  X-ray of the left shoulder reports no acute findings.  CT of the L-spine reports no acute findings.  CT of the T-spine reports no acute abnormalities.  CT of the C-spine reports 3.7 cm mass to the right neck either enlarged lymph node or  primary neck mass, 3.2 mm colloid nodule in the left upper lobe.  X-ray of the pelvis reports no acute abnormalities.  CT of the head reports no acute abnormalities, chronic left maxillary sinusitis.       Patient completed bacteremia treatment of vancomycin pharmacy to dose on 12/28/2020.    We are agreeable to Decadron as the patient's oxygen requirements have increased.  We are also agreeable to remdesivir, as patient's GFR has improved.    Antibiotic therapy was discontinued as patient has been transitioned to comfort measures.  ID will sign off for now.  Please contact us if we can further assist in this case.    Code Status:   Code Status and Medical Interventions:   Ordered at: 01/09/21 0937     Code Status:    No CPR     Medical Interventions (Level of Support Prior to Arrest):    Comfort Measures         SARAH Vasquez  01/09/21  12:43 EST

## 2021-01-09 NOTE — PLAN OF CARE
Goal Outcome Evaluation:  Plan of Care Reviewed With: patient  Progress: no change  Outcome Summary: Pt resting in bed. Pt placed on comfort measures today. Pt currently on 12L bubble high flow with O2 saturation of 99%. Pt shows no s/s of pain and appears to be resting comfortably. Will continue to monitor and follow care of plan.

## 2021-01-10 NOTE — NURSING NOTE
Notified patient power of  (Guardian) of patients expiration. Patients Guardian states to discard of any clothing that is with patient.

## 2021-01-10 NOTE — DISCHARGE SUMMARY
DEATH SUMMARY    Date of Death: 01/10/2021  Time of Death: 0540    Primary Cause of Death:  B/L COVID 19 Pneumonia 12/30/2020  Acute on chronic hypoxic respiratory failure    Secondary Cause of Death:  Acute on chronic diastolic CHF exacerbation  YADIRA  Acute metabolic encephalopathy  NSTEMI with H/O CAD and PCI  New onset atrial fibrillation/flutter  Advanced COPD  DM2, A1C 9.1 with hyperglycemia secondary to steroids  Essential HTN  Anemia, ID  3.7 cm right neck mass, enlarged lymph node versus primary neck mass, OP work up per radiology       Brief Course of Hospital Stay  Mr. Domínguez is a 89 y.o. male with past medical history significant for COPD, IDDM, hypertension. He was just recently admitted to [facility] from 12/13/2020-12/17/2020 where he was treated for an acute COPD exacerbation. During that stay, he had 1 of 3 blood cultures return positive for MRSA. ID was consulted and recommended treatment with IV vancomycin for 2 weeks. He had a picc line placed and was discharge home under the care of his daughters who rotate taking care of him. The patient had been offered SNF placement and declined. He was brought back to the emergency department of [facility] on 12/20/2020. Admitted with severe hypoglycemia of 21 causing syncope. Started on D5 drip. CBG remained stale. SS consulted for the short term rehab placement. Noted to have worsening shortness of breath, so repeat chest x-ray done and demonstrated possible bibasilar pneumonia.  Patient already on IV vancomycin for bacteremia, IV Zosyn started empirically. Started on diuresis for CHF exacerbation. Plan to discharge to Rehab on 12/28/2020.  Patient found to have COVID 19 on screening exam on 12/30. Developed chest pain with NSTEMI. Cardiology discussed with family and they have opted to avoid invasive measures and treat medically. Had worsening FIO2 requirement, increasing from 4 to 8 L. CT chest showed worsening airspace disease bilaterally. Started  on dexamethsone and finished remdesivir. ID re-consulted. After discussion with the family, patient was made DNR/DNI.  Continue to have worsening FiO2 requirement.  Placed on heated high flow nasal cannula. Continue to have fluctuating high FiO2 requirement.  Received intermittent diuresis for CHF.  Pulmonology consulted. Noted to have severe agitation and patient started having confusion. On , last night ABG with worsening hypercarbia pCO2 74 and acute respiratory acidosis with pH 7.31, so placed on BIPAP. Repeat ABG with pCO2 improved to 69 and pH to 7.33. Did not tolerate BiPAP. Continues to be restless and pulling on the lines and the O2 with the sitter at the bedside in spite of the medications. Discussed with the LEI Tracy over the phone and she after discussing with her two sisters wishes to keep the patient comfortable.  End of life care medications started. Started on the scheduled morphine and ativan and prn morphine and ativan. All the labs and the medications discontinued. Continued on BHFNC 15.  Patient remained comfortable with the palliative medications.  Patient was pronounced  on 01/10/2021 0540.  Family support provided.  No organ donation and no autopsy done.

## 2021-01-10 NOTE — PLAN OF CARE
Problem: Adult Inpatient Plan of Care  Goal: Plan of Care Review  Outcome: Ongoing, Progressing  Goal: Patient-Specific Goal (Individualized)  Outcome: Ongoing, Progressing  Goal: Absence of Hospital-Acquired Illness or Injury  Outcome: Ongoing, Progressing  Intervention: Identify and Manage Fall Risk  Recent Flowsheet Documentation  Taken 1/9/2021 2038 by Ernie Lujan RN  Safety Promotion/Fall Prevention:   safety round/check completed   fall prevention program maintained  Intervention: Prevent Skin Injury  Recent Flowsheet Documentation  Taken 1/9/2021 2038 by Ernie Lujan RN  Body Position: weight shift assistance provided  Intervention: Prevent and Manage VTE (venous thromboembolism) Risk  Recent Flowsheet Documentation  Taken 1/9/2021 2038 by Ernie Lujan RN  VTE Prevention/Management: (see orders ) --  Intervention: Prevent Infection  Recent Flowsheet Documentation  Taken 1/9/2021 2038 by Ernie Lujan RN  Infection Prevention:   cohorting utilized   rest/sleep promoted  Goal: Optimal Comfort and Wellbeing  Outcome: Ongoing, Progressing  Intervention: Provide Person-Centered Care  Recent Flowsheet Documentation  Taken 1/9/2021 2038 by Ernie Lujan RN  Trust Relationship/Rapport:   care explained   thoughts/feelings acknowledged  Goal: Readiness for Transition of Care  Outcome: Ongoing, Progressing     Problem: Fall Injury Risk  Goal: Absence of Fall and Fall-Related Injury  Outcome: Ongoing, Progressing  Intervention: Identify and Manage Contributors to Fall Injury Risk  Recent Flowsheet Documentation  Taken 1/9/2021 2038 by Ernie Lujan RN  Medication Review/Management: medications reviewed  Intervention: Promote Injury-Free Environment  Recent Flowsheet Documentation  Taken 1/9/2021 2038 by Ernie Lujan RN  Safety Promotion/Fall Prevention:   safety round/check completed   fall prevention program maintained     Problem: Skin Injury Risk Increased  Goal: Skin Health and  Integrity  Outcome: Ongoing, Progressing  Intervention: Optimize Skin Protection  Recent Flowsheet Documentation  Taken 1/9/2021 2038 by Ernie Lujan RN  Pressure Reduction Techniques:   frequent weight shift encouraged   heels elevated off bed   weight shift assistance provided  Head of Bed (HOB): HOB elevated  Pressure Reduction Devices: pressure-redistributing mattress utilized  Skin Protection:   adhesive use limited   pulse oximeter probe site changed  Intervention: Promote and Optimize Oral Intake  Recent Flowsheet Documentation  Taken 1/9/2021 2038 by Ernie Lujan RN  Oral Nutrition Promotion: rest periods promoted     Problem: Swallowing Impairment  Goal: Improved Swallowing Without Aspiration  Outcome: Ongoing, Progressing  Intervention: Optimize Eating and Swallowing  Recent Flowsheet Documentation  Taken 1/9/2021 2038 by Ernie Lujan, RN  Aspiration Precautions: awake/alert before oral intake

## 2021-01-10 NOTE — PROGRESS NOTES
[facility] HOSPITALIST PROGRESS NOTE     Patient Identification:  Name:  Aaron Domínguez  Age:  89 y.o.  Sex:  male  :  1929  MRN:  [id number]  Visit Number:  [id number]  ROOM: [address] [place] [address]     Primary Care Provider:  Bran Woodruff MD    Length of stay:  21    Subjective        Chief Compliant follow-up for worsening hypoxia and COVID-19 pneumonia    Patient seen and examined this morning with RASHAD Pandey at the bedside by telemedicine video. Last night events noted. ABG with hypercarbia pCO2 74 and acute respiratory acidosis with pH 7.31, so placed on BIPAP. Repeat ABG with pCO2 improved to 69 and pH to 7.33. Continues to be restless and pulling on the lines and the O2 with the sitter at the bedside in spite of the medications. Discussed with the POKARTIK Tracy over the phone and she after discussing with her two sisters wishes to keep the patient comfortable.  End of life care medications started. Started on the scheduled morphine and ativan and prn morphine and ativan. All the labs and the medications discontinued. Continued on BHFNC 15.   During my evaluation. Patient is resting comfortable.     Objective     Current Hospital Meds:albuterol sulfate HFA, 2 puff, Inhalation, 4x Daily - RT  LORazepam, 1 mg, Intravenous, Q6H  Morphine, 2 mg, Intravenous, Q4H       ----------------------------------------------------------------------------------------------------------------------  Vital Signs:  / 1642  --  72  --  128/46  bubble;high-flow nasal cannula  12  --  96       ----------------------------------------------------------------------------------------------------------------------  Physical exam:  This physical exam has been personally performed remotely in the unit aided by real-time audio/visual communication tools. RASHAD Pandey present at bedside during this exam and assisted during exam. The use of a video visit has been reviewed with the patient and verbal informed consent has been  obtained.     Physical Exam:  General: comfortable.   Head: Normocephalic, atraumatic  Eyes: EOMI. Conjunctivae and sclerae normal.  Ears: Ears appear intact with no abnormalities noted.   Neck: Trachea midline. No obvious JVD.  Heart: Tele reveals atrial flutter  Lungs: Respirations appear to be regular, even and unlabored with no signs of respiratory distress. No audible wheezing.  Abdomen: No obvious abdominal distension.  MS: Muscle tone appears normal. No gross deformities.  Extremities: No clubbing, cyanosis. Trace edema noted.  Skin: No visible bleeding, bruising, or rash.  Neurologic: resting comfortable. No gross focal deficits.       ----------------------------------------------------------------------------------------------------------------------  ----------------------------------------------------------------------------------------------------------------------  Results from last 7 days   Lab Units 01/09/21  0709 01/08/21  0546 01/07/21  0414   CRP mg/dL 2.70* 3.86* 5.41*   WBC 10*3/mm3 10.42 8.88 6.62   HEMOGLOBIN g/dL 8.9* 9.1* 8.9*   HEMATOCRIT % 30.3* 29.8* 29.8*   MCV fL 104.5* 101.7* 102.1*   MCHC g/dL 29.4* 30.5* 29.9*   PLATELETS 10*3/mm3 293 265 238     Results from last 7 days   Lab Units 01/09/21  0709 01/08/21  0546 01/07/21  0414 01/06/21  0755   SODIUM mmol/L 147* 144 143 136   POTASSIUM mmol/L 4.5 4.4 4.6 4.4   MAGNESIUM mg/dL  --  2.8* 3.1* 1.9   CHLORIDE mmol/L 104 100 100 92*   CO2 mmol/L 33.3* 35.2* 32.9* 34.2*   BUN mg/dL 94* 92* 67* 58*   CREATININE mg/dL 2.16* 1.65* 1.49* 1.52*   EGFR IF NONAFRICN AM mL/min/1.73 29* 39* 44* 43*   CALCIUM mg/dL 9.6 10.0* 9.1 9.4   GLUCOSE mg/dL 247* 344* 228* 273*   ALBUMIN g/dL 2.68* 2.98* 2.68* 2.98*   BILIRUBIN mg/dL 0.3 0.2 0.3 0.3   ALK PHOS U/L 142* 146* 172* 140*   AST (SGOT) U/L 20 18 22 25   ALT (SGPT) U/L 23 23 23 23   Estimated Creatinine Clearance: 27.6 mL/min (A) (by C-G formula based on SCr of 2.16 mg/dL (H)).  Results from last 7  days   Lab Units 01/07/21  0414 01/06/21  0755   CK TOTAL U/L 68 93     Glucose   Date/Time Value Ref Range Status   01/09/2021 0738 244 (H) 70 - 130 mg/dL Final   01/08/2021 1929 249 (H) 70 - 130 mg/dL Final   01/08/2021 1705 160 (H) 70 - 130 mg/dL Final   01/08/2021 1124 486 (C) 70 - 130 mg/dL Final   01/08/2021 0821 353 (H) 70 - 130 mg/dL Final   01/08/2021 0609 325 (H) 70 - 130 mg/dL Final   01/07/2021 1932 287 (H) 70 - 130 mg/dL Final   01/07/2021 1606 229 (H) 70 - 130 mg/dL Final     No results found for: AMMONIA    Results from last 7 days   Lab Units 01/05/21  2226   NITRITE UA  Negative   WBC UA /HPF 21-30*   BACTERIA UA /HPF 2+*   SQUAM EPITHEL UA /HPF 3-6*   URINECX  No growth     No results found for: BLOODCX  Respiratory Culture   Date Value Ref Range Status   01/02/2021   Final    Moderate growth (3+) Normal Respiratory Mary: NO S.aureus/MRSA or Pseudomonas aeruginosa   No results found for: URINECXNo results found for: WOUNDCXNo results found for: BODYFLDCXNo results found for: STOOLCX  I have personally looked at the labs and they are summarized above.  ----------------------------------------------------------------------------------------------------------------------  Imaging Results (Last 24 Hours)     ** No results found for the last 24 hours. **        I have personally reviewed the radiology images and read the final radiology report.    Assessment & Plan      Assessment:  End of Life Care  B/L COVID 19 Pneumonia 12/30/2020  Acute on chronic hypoxic acute hypercapnic respiratory failure with acute respiratory acidosis   Acute on chronic diastolic CHF exacerbation  YADIRA  Acute metabolic encephalopathy  NSTEMI with H/O CAD and PCI  New onset atrial fibrillation/flutter  Advanced COPD  DM2, A1C 9.1 with hyperglycemia secondary to steroids  Essential HTN  Anemia, ID  3.7 cm right neck mass, enlarged lymph node versus primary neck mass, OP work up per radiology    Resolved:  MRSA bacteremia,  completed abx treatment  Hypoglycemia causing syncope at the time of admission      Plan:  End of Life care. End of life care medications started. Started on the scheduled morphine and ativan and prn morphine and ativan and prn robinul. All the labs and the medications discontinued. Continued on BHFNC 15. Consult palliative.     B/L COVID 19 Pneumonia 12/30/2020.  finished remdesivir, started 01/03.  Monitor renal function closely.  On IV antibiotics Rocephin and doxycycline.  on lactobacillus.  Currently patient is afebrile VSS.  CRP improving, no leukocytosis. ferritin normalized. D-dimer, fibrinogen LDH elevated. on vitamin C. ID on board. urine cx no growth.     Acute on chronic hypoxic and acute hypercapnic respiratory failure with acute respiratory acidosis.  ABG with hypercarbia pCO2 74 and acute respiratory acidosis with pH 7.31, so placed on BIPAP. Repeat ABG with pCO2 improved to 69 and pH to 7.33.On IV Decadron.  Currently on BIPAP 70%.  On albuterol inhaler.  Chest x-ray and CT chest done 01/03 reviewed. Pulmonology not available.     Acute on chronic diastolic CHF exacerbation, BNP normalized. will hold IV Lasix since Cr increased to 2.1 from 1.6. Monitor renal function, electrolytes, intake output and weight.  2D echocardiogram shows EF of 61 to 65%.  Grade 1 diastolic dysfunction.  Concentric hypertrophy.  Moderate pulmonary hypertension.     YADIRA, worsening. Cr trended up to 2.1 fom 1.6. baseline 0.8-0.9.  Monitor closely. Hold lasix.     Acute metabolic encephalopathy with agitation from hypoxia, sepsis. Ammonia normal. on Zyprexa BID. Benadryl prn. Patient is pulling on to the O2, becoming hypoxic, interfering with the medical care. Sitter at the bedside.     NSTEMI with H/O CAD and PCI.  On aspirin, plavix and Lipitor. Cardiology consulted. family decided to continue with medical management.    New onset atrial fibrillation/flutter, on Toprol- mg p.o. twice daily.  Heart rate controlled.  Not  on anticoagulation currently.  Anticoagulation was evaluated by cardiology and was discussed with daughter to make further decision.      Advanced COPD, on chronic prednisone. DC home po prednisone since on decadron.  On theophylline.  Theophylline level checked and currently within range.     DM2, A1C 9.1 with hyperglycemia secondary to steroids.  On moderate dose sliding scale.  resume Levemir 5 U. Metformin dced because of worsening renal function. CBG controlled.     Essential HTN, blood pressure controlled. On Toprol XL. Hold p.o. Bumex and amlodipine.    Condition critical and worsening. Discussed with the LEI Tracy over the phone and she after discussing with her two sisters wishes to keep the patient comfortable.     Management and plans discussed in detail with nursing and LEI Tracy.   01/07: Called Rossana on the phone no [phone]  And discussed with her reg the goals of care. She would like to continue with the current treatment and monitor how he does.       The patient is high risk due to the following diagnoses/reasons:  B/L COVID 19 Pneumonia 12/30/2020, Acute on chronic hypoxic respiratory failure, Acute on chronic diastolic CHF exacerbation, YADIRA    Gracy Franks MD  01/10/21  07:37 EST
